# Patient Record
Sex: FEMALE | Race: WHITE | Employment: OTHER | URBAN - NONMETROPOLITAN AREA
[De-identification: names, ages, dates, MRNs, and addresses within clinical notes are randomized per-mention and may not be internally consistent; named-entity substitution may affect disease eponyms.]

---

## 2017-01-04 DIAGNOSIS — E11.9 TYPE 2 DIABETES MELLITUS WITHOUT COMPLICATION, UNSPECIFIED LONG TERM INSULIN USE STATUS: ICD-10-CM

## 2017-01-04 RX ORDER — LIRAGLUTIDE 6 MG/ML
INJECTION SUBCUTANEOUS
Qty: 9 ML | Refills: 3 | Status: SHIPPED | OUTPATIENT
Start: 2017-01-04 | End: 2017-02-08 | Stop reason: SDUPTHER

## 2017-02-08 DIAGNOSIS — E11.9 TYPE 2 DIABETES MELLITUS WITHOUT COMPLICATION, UNSPECIFIED LONG TERM INSULIN USE STATUS: ICD-10-CM

## 2017-02-08 RX ORDER — OXCARBAZEPINE 300 MG/1
300 TABLET, FILM COATED ORAL 2 TIMES DAILY
Qty: 180 TABLET | Refills: 3 | Status: SHIPPED | OUTPATIENT
Start: 2017-02-08 | End: 2018-01-17 | Stop reason: SDUPTHER

## 2017-02-08 RX ORDER — PANTOPRAZOLE SODIUM 40 MG/1
TABLET, DELAYED RELEASE ORAL
Qty: 90 TABLET | Refills: 3 | Status: SHIPPED | OUTPATIENT
Start: 2017-02-08 | End: 2018-01-17 | Stop reason: SDUPTHER

## 2017-02-08 RX ORDER — LANCETS 30 GAUGE
EACH MISCELLANEOUS
Qty: 200 EACH | Refills: 3 | Status: SHIPPED | OUTPATIENT
Start: 2017-02-08 | End: 2017-03-28

## 2017-02-08 RX ORDER — CELECOXIB 200 MG/1
CAPSULE ORAL
Qty: 90 CAPSULE | Refills: 3 | Status: SHIPPED | OUTPATIENT
Start: 2017-02-08 | End: 2018-01-17 | Stop reason: SDUPTHER

## 2017-02-08 RX ORDER — GLUCOSAMINE HCL/CHONDROITIN SU 500-400 MG
CAPSULE ORAL
Qty: 200 STRIP | Refills: 3 | Status: SHIPPED | OUTPATIENT
Start: 2017-02-08 | End: 2017-03-28

## 2017-03-06 RX ORDER — ROPINIROLE 1 MG/1
2 TABLET, FILM COATED ORAL 2 TIMES DAILY
Qty: 90 TABLET | Refills: 5 | Status: SHIPPED | OUTPATIENT
Start: 2017-03-06 | End: 2017-03-10 | Stop reason: SDUPTHER

## 2017-03-10 ENCOUNTER — OFFICE VISIT (OUTPATIENT)
Dept: PRIMARY CARE CLINIC | Age: 62
End: 2017-03-10
Payer: MEDICARE

## 2017-03-10 VITALS
HEART RATE: 113 BPM | DIASTOLIC BLOOD PRESSURE: 78 MMHG | HEIGHT: 61 IN | TEMPERATURE: 97 F | OXYGEN SATURATION: 98 % | SYSTOLIC BLOOD PRESSURE: 118 MMHG | WEIGHT: 238 LBS | BODY MASS INDEX: 44.93 KG/M2

## 2017-03-10 DIAGNOSIS — Z99.81 OXYGEN DEPENDENT: ICD-10-CM

## 2017-03-10 DIAGNOSIS — I15.9 SECONDARY HYPERTENSION, UNSPECIFIED: ICD-10-CM

## 2017-03-10 DIAGNOSIS — E11.9 TYPE 2 DIABETES MELLITUS WITHOUT COMPLICATION, UNSPECIFIED LONG TERM INSULIN USE STATUS: Primary | ICD-10-CM

## 2017-03-10 DIAGNOSIS — J42 CHRONIC BRONCHITIS, UNSPECIFIED CHRONIC BRONCHITIS TYPE (HCC): ICD-10-CM

## 2017-03-10 DIAGNOSIS — Z79.899 POLYPHARMACY: ICD-10-CM

## 2017-03-10 DIAGNOSIS — G47.30 SLEEP APNEA, UNSPECIFIED TYPE: ICD-10-CM

## 2017-03-10 DIAGNOSIS — F31.78 BIPOLAR DISORDER, IN FULL REMISSION, MOST RECENT EPISODE MIXED (HCC): ICD-10-CM

## 2017-03-10 DIAGNOSIS — R29.6 FREQUENT FALLS: ICD-10-CM

## 2017-03-10 DIAGNOSIS — Z12.11 COLON CANCER SCREENING: ICD-10-CM

## 2017-03-10 DIAGNOSIS — Z12.11 SCREEN FOR COLON CANCER: ICD-10-CM

## 2017-03-10 DIAGNOSIS — G25.81 RLS (RESTLESS LEGS SYNDROME): ICD-10-CM

## 2017-03-10 LAB — HBA1C MFR BLD: 5.7 %

## 2017-03-10 PROCEDURE — 99214 OFFICE O/P EST MOD 30 MIN: CPT | Performed by: NURSE PRACTITIONER

## 2017-03-10 PROCEDURE — 83036 HEMOGLOBIN GLYCOSYLATED A1C: CPT | Performed by: NURSE PRACTITIONER

## 2017-03-10 RX ORDER — IPRATROPIUM BROMIDE AND ALBUTEROL SULFATE 2.5; .5 MG/3ML; MG/3ML
1 SOLUTION RESPIRATORY (INHALATION) 4 TIMES DAILY
Qty: 120 ML | Refills: 0 | Status: SHIPPED | OUTPATIENT
Start: 2017-03-10 | End: 2017-03-28

## 2017-03-10 RX ORDER — PRAMIPEXOLE DIHYDROCHLORIDE 0.25 MG/1
0.25 TABLET ORAL 3 TIMES DAILY
Qty: 90 TABLET | Refills: 3 | Status: SHIPPED | OUTPATIENT
Start: 2017-03-10 | End: 2017-05-31 | Stop reason: SDUPTHER

## 2017-03-10 RX ORDER — LISINOPRIL 10 MG/1
10 TABLET ORAL DAILY
Qty: 30 TABLET | Refills: 11 | Status: SHIPPED | OUTPATIENT
Start: 2017-03-10 | End: 2017-06-09 | Stop reason: SDUPTHER

## 2017-03-10 RX ORDER — DOCOSANOL 100 MG/G
1 CREAM TOPICAL
Qty: 1 TUBE | Refills: 0 | COMMUNITY
Start: 2017-03-10 | End: 2017-03-20

## 2017-03-10 RX ORDER — ROPINIROLE 2 MG/1
2 TABLET, FILM COATED ORAL 2 TIMES DAILY
Qty: 60 TABLET | Refills: 5 | Status: SHIPPED | OUTPATIENT
Start: 2017-03-10 | End: 2017-06-09 | Stop reason: SDUPTHER

## 2017-03-10 RX ORDER — ACETAZOLAMIDE 500 MG/1
500 CAPSULE, EXTENDED RELEASE ORAL 2 TIMES DAILY
Qty: 60 CAPSULE | Refills: 0 | Status: SHIPPED | OUTPATIENT
Start: 2017-03-10 | End: 2017-03-28

## 2017-03-10 ASSESSMENT — ENCOUNTER SYMPTOMS
COUGH: 0
SHORTNESS OF BREATH: 0
EYES NEGATIVE: 1
SORE THROAT: 0
ABDOMINAL PAIN: 0
WHEEZING: 0

## 2017-03-15 ENCOUNTER — TELEPHONE (OUTPATIENT)
Dept: PRIMARY CARE CLINIC | Age: 62
End: 2017-03-15

## 2017-03-15 DIAGNOSIS — F31.78 BIPOLAR DISORDER, IN FULL REMISSION, MOST RECENT EPISODE MIXED (HCC): ICD-10-CM

## 2017-03-21 ENCOUNTER — TELEPHONE (OUTPATIENT)
Dept: PRIMARY CARE CLINIC | Age: 62
End: 2017-03-21

## 2017-03-27 RX ORDER — TRAZODONE HYDROCHLORIDE 50 MG/1
50 TABLET ORAL NIGHTLY
Qty: 15 TABLET | Refills: 0 | Status: SHIPPED | OUTPATIENT
Start: 2017-03-27 | End: 2017-06-26

## 2017-03-28 ENCOUNTER — OFFICE VISIT (OUTPATIENT)
Dept: PRIMARY CARE CLINIC | Age: 62
End: 2017-03-28
Payer: MEDICARE

## 2017-03-28 VITALS
TEMPERATURE: 97 F | DIASTOLIC BLOOD PRESSURE: 86 MMHG | OXYGEN SATURATION: 96 % | HEIGHT: 61 IN | WEIGHT: 232.75 LBS | SYSTOLIC BLOOD PRESSURE: 130 MMHG | HEART RATE: 105 BPM | BODY MASS INDEX: 43.94 KG/M2

## 2017-03-28 DIAGNOSIS — B37.9 YEAST INFECTION: Primary | ICD-10-CM

## 2017-03-28 DIAGNOSIS — J42 CHRONIC BRONCHITIS, UNSPECIFIED CHRONIC BRONCHITIS TYPE (HCC): ICD-10-CM

## 2017-03-28 DIAGNOSIS — R30.0 DYSURIA: ICD-10-CM

## 2017-03-28 DIAGNOSIS — R53.83 FATIGUE, UNSPECIFIED TYPE: ICD-10-CM

## 2017-03-28 LAB
BILIRUBIN, POC: NORMAL
BLOOD URINE, POC: NORMAL
CLARITY, POC: CLEAR
COLOR, POC: YELLOW
GLUCOSE URINE, POC: NORMAL
KETONES, POC: NORMAL
LEUKOCYTE EST, POC: NORMAL
NITRITE, POC: NORMAL
PH, POC: 7
PROTEIN, POC: NORMAL
SPECIFIC GRAVITY, POC: 1.02
UROBILINOGEN, POC: 0.2

## 2017-03-28 PROCEDURE — 99214 OFFICE O/P EST MOD 30 MIN: CPT | Performed by: NURSE PRACTITIONER

## 2017-03-28 PROCEDURE — 81002 URINALYSIS NONAUTO W/O SCOPE: CPT | Performed by: NURSE PRACTITIONER

## 2017-03-28 RX ORDER — FLUCONAZOLE 150 MG/1
150 TABLET ORAL DAILY
Qty: 3 TABLET | Refills: 0 | Status: SHIPPED | OUTPATIENT
Start: 2017-03-28 | End: 2017-03-28 | Stop reason: SDUPTHER

## 2017-03-28 RX ORDER — FLUCONAZOLE 150 MG/1
150 TABLET ORAL DAILY
Qty: 3 TABLET | Refills: 0 | Status: SHIPPED | OUTPATIENT
Start: 2017-03-28 | End: 2017-04-25

## 2017-03-28 RX ORDER — PREDNISONE 20 MG/1
TABLET ORAL
COMMUNITY
Start: 2017-03-27 | End: 2017-06-26 | Stop reason: ALTCHOICE

## 2017-03-28 RX ORDER — NYSTATIN 100000 [USP'U]/G
POWDER TOPICAL
Qty: 60 G | Refills: 2 | Status: SHIPPED | OUTPATIENT
Start: 2017-03-28 | End: 2017-10-03

## 2017-03-28 RX ORDER — MECLIZINE HYDROCHLORIDE 25 MG/1
TABLET ORAL PRN
COMMUNITY
Start: 2017-03-27 | End: 2017-07-11

## 2017-03-28 RX ORDER — CEFUROXIME AXETIL 500 MG/1
TABLET ORAL
COMMUNITY
Start: 2017-03-27 | End: 2017-06-26 | Stop reason: ALTCHOICE

## 2017-03-28 RX ORDER — NYSTATIN 100000 U/G
CREAM TOPICAL
Qty: 30 G | Refills: 5 | Status: SHIPPED | OUTPATIENT
Start: 2017-03-28 | End: 2017-10-03

## 2017-03-28 RX ORDER — IPRATROPIUM BROMIDE AND ALBUTEROL SULFATE 2.5; .5 MG/3ML; MG/3ML
SOLUTION RESPIRATORY (INHALATION)
Qty: 90 ML | Refills: 0 | Status: SHIPPED | OUTPATIENT
Start: 2017-03-28 | End: 2017-04-05 | Stop reason: SDUPTHER

## 2017-03-28 ASSESSMENT — ENCOUNTER SYMPTOMS
SORE THROAT: 0
COUGH: 0
SHORTNESS OF BREATH: 0

## 2017-03-29 ENCOUNTER — TELEPHONE (OUTPATIENT)
Dept: PRIMARY CARE CLINIC | Age: 62
End: 2017-03-29

## 2017-04-04 ENCOUNTER — TELEPHONE (OUTPATIENT)
Dept: PRIMARY CARE CLINIC | Age: 62
End: 2017-04-04

## 2017-04-04 RX ORDER — MIRTAZAPINE 15 MG/1
15 TABLET, FILM COATED ORAL DAILY
Qty: 90 TABLET | Refills: 3 | Status: SHIPPED | OUTPATIENT
Start: 2017-04-04 | End: 2018-01-17 | Stop reason: SDUPTHER

## 2017-04-04 RX ORDER — DONEPEZIL HYDROCHLORIDE 10 MG/1
10 TABLET, FILM COATED ORAL NIGHTLY
Qty: 90 TABLET | Refills: 3 | Status: SHIPPED | OUTPATIENT
Start: 2017-04-04 | End: 2018-01-17 | Stop reason: SDUPTHER

## 2017-04-04 RX ORDER — SERTRALINE HYDROCHLORIDE 100 MG/1
100 TABLET, FILM COATED ORAL DAILY
Qty: 90 TABLET | Refills: 3 | Status: SHIPPED | OUTPATIENT
Start: 2017-04-04 | End: 2017-04-07 | Stop reason: SDUPTHER

## 2017-04-04 RX ORDER — BENZTROPINE MESYLATE 1 MG/1
1 TABLET ORAL 2 TIMES DAILY
Qty: 180 TABLET | Refills: 3 | Status: SHIPPED | OUTPATIENT
Start: 2017-04-04 | End: 2018-01-17 | Stop reason: SDUPTHER

## 2017-04-04 RX ORDER — BUSPIRONE HYDROCHLORIDE 15 MG/1
15 TABLET ORAL 3 TIMES DAILY
Qty: 270 TABLET | Refills: 3 | Status: SHIPPED | OUTPATIENT
Start: 2017-04-04 | End: 2018-01-17 | Stop reason: SDUPTHER

## 2017-04-05 ENCOUNTER — TELEPHONE (OUTPATIENT)
Dept: PRIMARY CARE CLINIC | Age: 62
End: 2017-04-05

## 2017-04-05 DIAGNOSIS — J42 CHRONIC BRONCHITIS, UNSPECIFIED CHRONIC BRONCHITIS TYPE (HCC): ICD-10-CM

## 2017-04-06 RX ORDER — IPRATROPIUM BROMIDE AND ALBUTEROL SULFATE 2.5; .5 MG/3ML; MG/3ML
SOLUTION RESPIRATORY (INHALATION)
Qty: 90 ML | Refills: 2 | Status: SHIPPED | OUTPATIENT
Start: 2017-04-06 | End: 2018-03-06 | Stop reason: SDUPTHER

## 2017-04-07 ENCOUNTER — TELEPHONE (OUTPATIENT)
Dept: PRIMARY CARE CLINIC | Age: 62
End: 2017-04-07

## 2017-04-07 RX ORDER — SERTRALINE HYDROCHLORIDE 100 MG/1
100 TABLET, FILM COATED ORAL DAILY
Qty: 90 TABLET | Refills: 3 | Status: SHIPPED | OUTPATIENT
Start: 2017-04-07 | End: 2017-04-11 | Stop reason: SDUPTHER

## 2017-04-11 ENCOUNTER — TELEPHONE (OUTPATIENT)
Dept: PRIMARY CARE CLINIC | Age: 62
End: 2017-04-11

## 2017-04-11 DIAGNOSIS — Z99.81 OXYGEN DEPENDENT: ICD-10-CM

## 2017-04-11 DIAGNOSIS — J42 CHRONIC BRONCHITIS, UNSPECIFIED CHRONIC BRONCHITIS TYPE (HCC): Primary | ICD-10-CM

## 2017-04-11 RX ORDER — SERTRALINE HYDROCHLORIDE 100 MG/1
TABLET, FILM COATED ORAL
Qty: 135 TABLET | Refills: 3 | Status: SHIPPED | OUTPATIENT
Start: 2017-04-11 | End: 2018-01-08 | Stop reason: SDUPTHER

## 2017-04-11 RX ORDER — SERTRALINE HYDROCHLORIDE 100 MG/1
TABLET, FILM COATED ORAL
Qty: 90 TABLET | Refills: 3 | Status: SHIPPED | OUTPATIENT
Start: 2017-04-11 | End: 2017-04-11 | Stop reason: SDUPTHER

## 2017-04-13 ENCOUNTER — TELEPHONE (OUTPATIENT)
Dept: PRIMARY CARE CLINIC | Age: 62
End: 2017-04-13

## 2017-04-13 DIAGNOSIS — R29.6 FREQUENT FALLS: Primary | ICD-10-CM

## 2017-04-13 RX ORDER — ACETAZOLAMIDE 500 MG/1
CAPSULE, EXTENDED RELEASE ORAL
Qty: 60 CAPSULE | Refills: 5 | Status: SHIPPED | OUTPATIENT
Start: 2017-04-13 | End: 2017-06-09 | Stop reason: SDUPTHER

## 2017-04-13 RX ORDER — BLOOD GLUCOSE CONTROL HIGH,LOW
EACH MISCELLANEOUS
Qty: 1 EACH | Refills: 0 | Status: SHIPPED | OUTPATIENT
Start: 2017-04-13 | End: 2017-05-25

## 2017-04-13 RX ORDER — BLOOD-GLUCOSE METER
EACH MISCELLANEOUS
Qty: 1 KIT | Refills: 0 | Status: SHIPPED | OUTPATIENT
Start: 2017-04-13 | End: 2017-04-25

## 2017-04-20 RX ORDER — WHEELCHAIR
EACH MISCELLANEOUS
Qty: 1 EACH | Refills: 0 | OUTPATIENT
Start: 2017-04-20 | End: 2017-04-25

## 2017-04-25 ENCOUNTER — TELEPHONE (OUTPATIENT)
Dept: PRIMARY CARE CLINIC | Age: 62
End: 2017-04-25

## 2017-04-25 ENCOUNTER — OFFICE VISIT (OUTPATIENT)
Dept: PRIMARY CARE CLINIC | Age: 62
End: 2017-04-25
Payer: MEDICARE

## 2017-04-25 VITALS
HEIGHT: 61 IN | BODY MASS INDEX: 41.91 KG/M2 | SYSTOLIC BLOOD PRESSURE: 88 MMHG | HEART RATE: 60 BPM | DIASTOLIC BLOOD PRESSURE: 62 MMHG | TEMPERATURE: 98.2 F | OXYGEN SATURATION: 97 % | WEIGHT: 222 LBS

## 2017-04-25 DIAGNOSIS — Z12.11 SCREEN FOR COLON CANCER: ICD-10-CM

## 2017-04-25 DIAGNOSIS — R42 VERTIGO: ICD-10-CM

## 2017-04-25 DIAGNOSIS — R29.6 FREQUENT FALLS: ICD-10-CM

## 2017-04-25 DIAGNOSIS — R63.4 WEIGHT LOSS: ICD-10-CM

## 2017-04-25 DIAGNOSIS — I95.9 HYPOTENSION, UNSPECIFIED HYPOTENSION TYPE: ICD-10-CM

## 2017-04-25 DIAGNOSIS — R53.1 WEAKNESS: ICD-10-CM

## 2017-04-25 DIAGNOSIS — I95.9 HYPOTENSION, UNSPECIFIED HYPOTENSION TYPE: Primary | ICD-10-CM

## 2017-04-25 DIAGNOSIS — I15.9 SECONDARY HYPERTENSION, UNSPECIFIED: ICD-10-CM

## 2017-04-25 DIAGNOSIS — R06.02 SHORTNESS OF BREATH: ICD-10-CM

## 2017-04-25 LAB
ALBUMIN SERPL-MCNC: 4.3 G/DL (ref 3.5–5.2)
ALP BLD-CCNC: 136 U/L (ref 35–104)
ALT SERPL-CCNC: 9 U/L (ref 5–33)
ANION GAP SERPL CALCULATED.3IONS-SCNC: 18 MMOL/L (ref 7–19)
AST SERPL-CCNC: 20 U/L (ref 5–32)
BASOPHILS ABSOLUTE: 0.1 K/UL (ref 0–0.2)
BASOPHILS RELATIVE PERCENT: 0.7 % (ref 0–1)
BILIRUB SERPL-MCNC: <0.2 MG/DL (ref 0.2–1.2)
BUN BLDV-MCNC: 15 MG/DL (ref 8–23)
CALCIUM SERPL-MCNC: 9.1 MG/DL (ref 8.8–10.2)
CHLORIDE BLD-SCNC: 98 MMOL/L (ref 98–111)
CO2: 16 MMOL/L (ref 22–29)
CREAT SERPL-MCNC: 1 MG/DL (ref 0.5–0.9)
EOSINOPHILS ABSOLUTE: 0.2 K/UL (ref 0–0.6)
EOSINOPHILS RELATIVE PERCENT: 1.7 % (ref 0–5)
GFR NON-AFRICAN AMERICAN: 56
GLOBULIN: 2.8 G/DL
GLUCOSE BLD-MCNC: 118 MG/DL (ref 74–109)
HCT VFR BLD CALC: 38.2 % (ref 37–47)
HEMOGLOBIN: 11.2 G/DL (ref 12–16)
INFLUENZA A ANTIBODY: NEGATIVE
INFLUENZA B ANTIBODY: NEGATIVE
LYMPHOCYTES ABSOLUTE: 3.4 K/UL (ref 1.1–4.5)
LYMPHOCYTES RELATIVE PERCENT: 29.8 % (ref 20–40)
MCH RBC QN AUTO: 22.7 PG (ref 27–31)
MCHC RBC AUTO-ENTMCNC: 29.3 G/DL (ref 33–37)
MCV RBC AUTO: 77.5 FL (ref 81–99)
MONOCYTES ABSOLUTE: 0.9 K/UL (ref 0–0.9)
MONOCYTES RELATIVE PERCENT: 7.7 % (ref 0–10)
NEUTROPHILS ABSOLUTE: 6.7 K/UL (ref 1.5–7.5)
NEUTROPHILS RELATIVE PERCENT: 59.8 % (ref 50–65)
PDW BLD-RTO: 24.8 % (ref 11.5–14.5)
PLATELET # BLD: 434 K/UL (ref 130–400)
PMV BLD AUTO: 10.5 FL (ref 7.4–10.4)
POTASSIUM SERPL-SCNC: 5.3 MMOL/L (ref 3.5–5)
RBC # BLD: 4.93 M/UL (ref 4.2–5.4)
SODIUM BLD-SCNC: 132 MMOL/L (ref 136–145)
TOTAL PROTEIN: 7.1 G/DL (ref 6.6–8.7)
WBC # BLD: 11.3 K/UL (ref 4.8–10.8)

## 2017-04-25 PROCEDURE — 87804 INFLUENZA ASSAY W/OPTIC: CPT | Performed by: NURSE PRACTITIONER

## 2017-04-25 PROCEDURE — 99214 OFFICE O/P EST MOD 30 MIN: CPT | Performed by: NURSE PRACTITIONER

## 2017-04-25 RX ORDER — ONDANSETRON 4 MG/1
4 TABLET, FILM COATED ORAL EVERY 8 HOURS PRN
Qty: 30 TABLET | Refills: 0 | Status: SHIPPED | OUTPATIENT
Start: 2017-04-25 | End: 2017-07-14 | Stop reason: SDUPTHER

## 2017-04-25 ASSESSMENT — ENCOUNTER SYMPTOMS
PHOTOPHOBIA: 0
WHEEZING: 0
CHEST TIGHTNESS: 0
NAUSEA: 1
BACK PAIN: 0
VOMITING: 0
COUGH: 0
DIARRHEA: 0
ABDOMINAL PAIN: 0
SINUS PRESSURE: 0
SORE THROAT: 0
CONSTIPATION: 0
VOICE CHANGE: 0
SHORTNESS OF BREATH: 0
TROUBLE SWALLOWING: 0
BLOOD IN STOOL: 0
EYE DISCHARGE: 0
EYE PAIN: 0

## 2017-04-26 ENCOUNTER — TELEPHONE (OUTPATIENT)
Dept: PRIMARY CARE CLINIC | Age: 62
End: 2017-04-26

## 2017-04-26 DIAGNOSIS — R53.1 WEAKNESS: Primary | ICD-10-CM

## 2017-04-26 DIAGNOSIS — R29.6 FREQUENT FALLS: ICD-10-CM

## 2017-05-01 ENCOUNTER — TELEPHONE (OUTPATIENT)
Dept: PRIMARY CARE CLINIC | Age: 62
End: 2017-05-01

## 2017-05-02 ENCOUNTER — TELEPHONE (OUTPATIENT)
Dept: PRIMARY CARE CLINIC | Age: 62
End: 2017-05-02

## 2017-05-09 ENCOUNTER — OFFICE VISIT (OUTPATIENT)
Dept: PRIMARY CARE CLINIC | Age: 62
End: 2017-05-09
Payer: MEDICARE

## 2017-05-09 VITALS
WEIGHT: 218 LBS | HEIGHT: 61 IN | BODY MASS INDEX: 41.16 KG/M2 | OXYGEN SATURATION: 90 % | SYSTOLIC BLOOD PRESSURE: 128 MMHG | TEMPERATURE: 97 F | DIASTOLIC BLOOD PRESSURE: 86 MMHG | HEART RATE: 94 BPM

## 2017-05-09 DIAGNOSIS — E11.9 TYPE 2 DIABETES MELLITUS WITHOUT COMPLICATION, UNSPECIFIED LONG TERM INSULIN USE STATUS: ICD-10-CM

## 2017-05-09 DIAGNOSIS — I15.9 SECONDARY HYPERTENSION: ICD-10-CM

## 2017-05-09 DIAGNOSIS — J42 CHRONIC BRONCHITIS, UNSPECIFIED CHRONIC BRONCHITIS TYPE (HCC): ICD-10-CM

## 2017-05-09 DIAGNOSIS — I15.9 SECONDARY HYPERTENSION: Primary | ICD-10-CM

## 2017-05-09 LAB
ALBUMIN SERPL-MCNC: 4 G/DL (ref 3.5–5.2)
ALP BLD-CCNC: 125 U/L (ref 35–104)
ALT SERPL-CCNC: 9 U/L (ref 5–33)
ANION GAP SERPL CALCULATED.3IONS-SCNC: 16 MMOL/L (ref 7–19)
AST SERPL-CCNC: 19 U/L (ref 5–32)
BASOPHILS ABSOLUTE: 0.1 K/UL (ref 0–0.2)
BASOPHILS RELATIVE PERCENT: 1 % (ref 0–1)
BILIRUB SERPL-MCNC: <0.2 MG/DL (ref 0.2–1.2)
BUN BLDV-MCNC: 9 MG/DL (ref 8–23)
CALCIUM SERPL-MCNC: 8.9 MG/DL (ref 8.8–10.2)
CHLORIDE BLD-SCNC: 102 MMOL/L (ref 98–111)
CO2: 18 MMOL/L (ref 22–29)
CREAT SERPL-MCNC: 0.9 MG/DL (ref 0.5–0.9)
CREATININE URINE: 143.5 MG/DL (ref 4.2–622)
EOSINOPHILS ABSOLUTE: 0.2 K/UL (ref 0–0.6)
EOSINOPHILS RELATIVE PERCENT: 1.9 % (ref 0–5)
GFR NON-AFRICAN AMERICAN: >60
GLOBULIN: 3.1 G/DL
GLUCOSE BLD-MCNC: 116 MG/DL (ref 74–109)
HBA1C MFR BLD: 4.9 %
HCT VFR BLD CALC: 36.5 % (ref 37–47)
HEMOGLOBIN: 10.5 G/DL (ref 12–16)
LYMPHOCYTES ABSOLUTE: 3 K/UL (ref 1.1–4.5)
LYMPHOCYTES RELATIVE PERCENT: 31.1 % (ref 20–40)
MCH RBC QN AUTO: 23.5 PG (ref 27–31)
MCHC RBC AUTO-ENTMCNC: 28.8 G/DL (ref 33–37)
MCV RBC AUTO: 81.8 FL (ref 81–99)
MICROALBUMIN UR-MCNC: <1.2 MG/DL (ref 0–19)
MICROALBUMIN/CREAT UR-RTO: NORMAL MG/G
MONOCYTES ABSOLUTE: 0.8 K/UL (ref 0–0.9)
MONOCYTES RELATIVE PERCENT: 8.1 % (ref 0–10)
NEUTROPHILS ABSOLUTE: 5.6 K/UL (ref 1.5–7.5)
NEUTROPHILS RELATIVE PERCENT: 57.7 % (ref 50–65)
PDW BLD-RTO: 24.4 % (ref 11.5–14.5)
PLATELET # BLD: 406 K/UL (ref 130–400)
PMV BLD AUTO: 10.6 FL (ref 7.4–10.4)
POTASSIUM SERPL-SCNC: 4.9 MMOL/L (ref 3.5–5)
RBC # BLD: 4.46 M/UL (ref 4.2–5.4)
SODIUM BLD-SCNC: 136 MMOL/L (ref 136–145)
TOTAL PROTEIN: 7.1 G/DL (ref 6.6–8.7)
WBC # BLD: 9.7 K/UL (ref 4.8–10.8)

## 2017-05-09 PROCEDURE — 99214 OFFICE O/P EST MOD 30 MIN: CPT | Performed by: NURSE PRACTITIONER

## 2017-05-09 RX ORDER — WHEELCHAIR
1 EACH MISCELLANEOUS DAILY
Qty: 1 EACH | Refills: 0 | Status: SHIPPED | OUTPATIENT
Start: 2017-05-09 | End: 2017-05-25

## 2017-05-09 ASSESSMENT — ENCOUNTER SYMPTOMS
VOICE CHANGE: 0
VOMITING: 0
BLOOD IN STOOL: 0
BACK PAIN: 0
SHORTNESS OF BREATH: 1
PHOTOPHOBIA: 0
SINUS PRESSURE: 0
WHEEZING: 0
CONSTIPATION: 0
COUGH: 0
EYE PAIN: 0
SORE THROAT: 0
TROUBLE SWALLOWING: 0
CHEST TIGHTNESS: 0
EYE DISCHARGE: 0
ABDOMINAL PAIN: 0
DIARRHEA: 0
NAUSEA: 0

## 2017-05-10 ENCOUNTER — TELEPHONE (OUTPATIENT)
Dept: PRIMARY CARE CLINIC | Age: 62
End: 2017-05-10

## 2017-05-11 ENCOUNTER — TELEPHONE (OUTPATIENT)
Dept: PRIMARY CARE CLINIC | Age: 62
End: 2017-05-11

## 2017-05-11 DIAGNOSIS — D64.9 ANEMIA, UNSPECIFIED TYPE: Primary | ICD-10-CM

## 2017-05-17 ENCOUNTER — TELEPHONE (OUTPATIENT)
Dept: PRIMARY CARE CLINIC | Age: 62
End: 2017-05-17

## 2017-05-19 ENCOUNTER — TELEPHONE (OUTPATIENT)
Dept: PRIMARY CARE CLINIC | Age: 62
End: 2017-05-19

## 2017-05-23 ENCOUNTER — TELEPHONE (OUTPATIENT)
Dept: PRIMARY CARE CLINIC | Age: 62
End: 2017-05-23

## 2017-05-25 ENCOUNTER — HOSPITAL ENCOUNTER (OUTPATIENT)
Age: 62
Setting detail: SPECIMEN
Discharge: HOME | End: 2017-05-25
Payer: MEDICARE

## 2017-05-25 ENCOUNTER — OFFICE VISIT (OUTPATIENT)
Dept: PRIMARY CARE CLINIC | Age: 62
End: 2017-05-25
Payer: MEDICARE

## 2017-05-25 VITALS
SYSTOLIC BLOOD PRESSURE: 118 MMHG | OXYGEN SATURATION: 96 % | BODY MASS INDEX: 41.35 KG/M2 | DIASTOLIC BLOOD PRESSURE: 70 MMHG | HEART RATE: 94 BPM | TEMPERATURE: 98 F | HEIGHT: 61 IN | WEIGHT: 219 LBS

## 2017-05-25 DIAGNOSIS — Z01.419 ENCOUNTER FOR GYNECOLOGICAL EXAMINATION WITHOUT ABNORMAL FINDING: ICD-10-CM

## 2017-05-25 DIAGNOSIS — D50.9 MICROCYTIC ANEMIA: ICD-10-CM

## 2017-05-25 DIAGNOSIS — B37.2 SKIN YEAST INFECTION: ICD-10-CM

## 2017-05-25 DIAGNOSIS — Z99.81 DEPENDENCE ON SUPPLEMENTAL OXYGEN WHEN AMBULATING: ICD-10-CM

## 2017-05-25 DIAGNOSIS — Z01.419 WELL WOMAN EXAM WITH ROUTINE GYNECOLOGICAL EXAM: Primary | ICD-10-CM

## 2017-05-25 DIAGNOSIS — J42 CHRONIC BRONCHITIS, UNSPECIFIED CHRONIC BRONCHITIS TYPE (HCC): ICD-10-CM

## 2017-05-25 DIAGNOSIS — Z12.11 COLON CANCER SCREENING: ICD-10-CM

## 2017-05-25 PROCEDURE — G0101 CA SCREEN;PELVIC/BREAST EXAM: HCPCS | Performed by: NURSE PRACTITIONER

## 2017-05-25 PROCEDURE — 99214 OFFICE O/P EST MOD 30 MIN: CPT | Performed by: NURSE PRACTITIONER

## 2017-05-25 PROCEDURE — 88142 CYTOPATH C/V THIN LAYER: CPT

## 2017-05-25 PROCEDURE — 87624 HPV HI-RISK TYP POOLED RSLT: CPT

## 2017-05-25 RX ORDER — OYSTER SHELL CALCIUM WITH VITAMIN D 500; 200 MG/1; [IU]/1
1 TABLET, FILM COATED ORAL DAILY
Qty: 30 TABLET | Refills: 3 | Status: SHIPPED | OUTPATIENT
Start: 2017-05-25 | End: 2017-06-09 | Stop reason: SDUPTHER

## 2017-05-25 RX ORDER — NYSTATIN 100000 [USP'U]/G
POWDER TOPICAL
Qty: 45 G | Refills: 5 | Status: SHIPPED | OUTPATIENT
Start: 2017-05-25 | End: 2017-06-09 | Stop reason: SDUPTHER

## 2017-05-25 ASSESSMENT — ENCOUNTER SYMPTOMS
BACK PAIN: 0
PHOTOPHOBIA: 0
EYE DISCHARGE: 0
WHEEZING: 0
CONSTIPATION: 0
SHORTNESS OF BREATH: 1
CHEST TIGHTNESS: 0
BLOOD IN STOOL: 0
TROUBLE SWALLOWING: 0
SORE THROAT: 0
SINUS PRESSURE: 0
VOICE CHANGE: 0
EYE PAIN: 0
DIARRHEA: 0
ABDOMINAL PAIN: 0
VOMITING: 0
NAUSEA: 0
COUGH: 0

## 2017-05-30 ENCOUNTER — TELEPHONE (OUTPATIENT)
Dept: PRIMARY CARE CLINIC | Age: 62
End: 2017-05-30

## 2017-05-31 DIAGNOSIS — G25.81 RLS (RESTLESS LEGS SYNDROME): ICD-10-CM

## 2017-05-31 RX ORDER — PRAMIPEXOLE DIHYDROCHLORIDE 0.25 MG/1
0.25 TABLET ORAL 3 TIMES DAILY
Qty: 270 TABLET | Refills: 3 | Status: SHIPPED | OUTPATIENT
Start: 2017-05-31 | End: 2018-03-06 | Stop reason: SDUPTHER

## 2017-06-01 ENCOUNTER — TELEPHONE (OUTPATIENT)
Dept: PRIMARY CARE CLINIC | Age: 62
End: 2017-06-01

## 2017-06-01 DIAGNOSIS — R87.619 ABNORMAL CERVICAL PAPANICOLAOU SMEAR, UNSPECIFIED ABNORMAL PAP FINDING: Primary | ICD-10-CM

## 2017-06-01 LAB
HPV SOURCE: ABNORMAL
HPV, HIGH RISK: POSITIVE

## 2017-06-05 ENCOUNTER — TELEPHONE (OUTPATIENT)
Dept: PRIMARY CARE CLINIC | Age: 62
End: 2017-06-05

## 2017-06-09 ENCOUNTER — TELEPHONE (OUTPATIENT)
Dept: PRIMARY CARE CLINIC | Age: 62
End: 2017-06-09

## 2017-06-09 DIAGNOSIS — Z01.419 WELL WOMAN EXAM WITH ROUTINE GYNECOLOGICAL EXAM: ICD-10-CM

## 2017-06-09 DIAGNOSIS — I15.9 SECONDARY HYPERTENSION, UNSPECIFIED: ICD-10-CM

## 2017-06-09 DIAGNOSIS — Z12.11 SCREEN FOR COLON CANCER: ICD-10-CM

## 2017-06-09 DIAGNOSIS — B37.2 SKIN YEAST INFECTION: ICD-10-CM

## 2017-06-09 RX ORDER — ROPINIROLE 2 MG/1
2 TABLET, FILM COATED ORAL 2 TIMES DAILY
Qty: 180 TABLET | Refills: 3 | Status: SHIPPED | OUTPATIENT
Start: 2017-06-09 | End: 2018-03-06 | Stop reason: SDUPTHER

## 2017-06-09 RX ORDER — LISINOPRIL 10 MG/1
10 TABLET ORAL DAILY
Qty: 90 TABLET | Refills: 3 | Status: SHIPPED | OUTPATIENT
Start: 2017-06-09 | End: 2018-03-06 | Stop reason: SDUPTHER

## 2017-06-09 RX ORDER — NYSTATIN 100000 [USP'U]/G
POWDER TOPICAL
Qty: 135 G | Refills: 1 | Status: SHIPPED | OUTPATIENT
Start: 2017-06-09 | End: 2017-06-26

## 2017-06-09 RX ORDER — OYSTER SHELL CALCIUM WITH VITAMIN D 500; 200 MG/1; [IU]/1
1 TABLET, FILM COATED ORAL DAILY
Qty: 90 TABLET | Refills: 1 | Status: SHIPPED | OUTPATIENT
Start: 2017-06-09 | End: 2017-06-26

## 2017-06-09 RX ORDER — ACETAZOLAMIDE 500 MG/1
CAPSULE, EXTENDED RELEASE ORAL
Qty: 180 CAPSULE | Refills: 3 | Status: SHIPPED | OUTPATIENT
Start: 2017-06-09 | End: 2019-10-22 | Stop reason: SDUPTHER

## 2017-06-12 ENCOUNTER — TELEPHONE (OUTPATIENT)
Dept: PRIMARY CARE CLINIC | Age: 62
End: 2017-06-12

## 2017-06-26 ENCOUNTER — OFFICE VISIT (OUTPATIENT)
Dept: PRIMARY CARE CLINIC | Age: 62
End: 2017-06-26
Payer: MEDICARE

## 2017-06-26 ENCOUNTER — OFFICE VISIT (OUTPATIENT)
Dept: OBGYN | Age: 62
End: 2017-06-26
Payer: MEDICARE

## 2017-06-26 VITALS
SYSTOLIC BLOOD PRESSURE: 116 MMHG | BODY MASS INDEX: 40.78 KG/M2 | HEIGHT: 61 IN | TEMPERATURE: 98.6 F | HEART RATE: 88 BPM | DIASTOLIC BLOOD PRESSURE: 76 MMHG | WEIGHT: 216 LBS

## 2017-06-26 VITALS
WEIGHT: 217.5 LBS | TEMPERATURE: 97 F | BODY MASS INDEX: 41.07 KG/M2 | SYSTOLIC BLOOD PRESSURE: 118 MMHG | OXYGEN SATURATION: 99 % | DIASTOLIC BLOOD PRESSURE: 80 MMHG | HEIGHT: 61 IN | HEART RATE: 110 BPM

## 2017-06-26 DIAGNOSIS — Z12.11 SCREEN FOR COLON CANCER: ICD-10-CM

## 2017-06-26 DIAGNOSIS — R87.610 ASCUS WITH POSITIVE HIGH RISK HPV CERVICAL: Primary | ICD-10-CM
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
## 2017-06-26 DIAGNOSIS — M54.50 ACUTE LOW BACK PAIN WITHOUT SCIATICA, UNSPECIFIED BACK PAIN LATERALITY: ICD-10-CM

## 2017-06-26 DIAGNOSIS — R87.810 ASCUS WITH POSITIVE HIGH RISK HPV CERVICAL: Primary | ICD-10-CM

## 2017-06-26 DIAGNOSIS — R52 ACUTE PAIN: ICD-10-CM

## 2017-06-26 DIAGNOSIS — E11.49 OTHER DIABETIC NEUROLOGICAL COMPLICATION ASSOCIATED WITH TYPE 2 DIABETES MELLITUS (HCC): ICD-10-CM

## 2017-06-26 DIAGNOSIS — N89.8 VAGINAL DISCHARGE: ICD-10-CM

## 2017-06-26 DIAGNOSIS — R00.0 TACHYCARDIA: Primary | ICD-10-CM

## 2017-06-26 DIAGNOSIS — R42 DIZZINESS: ICD-10-CM

## 2017-06-26 PROBLEM — E11.40 DIABETIC NEUROPATHY ASSOCIATED WITH TYPE 2 DIABETES MELLITUS (HCC): Status: ACTIVE | Noted: 2017-06-26

## 2017-06-26 LAB
CLUE CELLS: NORMAL
HYPHAL YEAST: NORMAL
TRICHOMONAS: NORMAL
WET PREP: NEGATIVE
WHITE BLOOD CELLS: NORMAL

## 2017-06-26 PROCEDURE — 57454 BX/CURETT OF CERVIX W/SCOPE: CPT | Performed by: OBSTETRICS & GYNECOLOGY

## 2017-06-26 PROCEDURE — 99214 OFFICE O/P EST MOD 30 MIN: CPT | Performed by: NURSE PRACTITIONER

## 2017-06-26 RX ORDER — PROMETHAZINE HYDROCHLORIDE 25 MG/1
25 TABLET ORAL EVERY 8 HOURS PRN
Qty: 30 TABLET | Refills: 2 | Status: SHIPPED | OUTPATIENT
Start: 2017-06-26 | End: 2018-03-06 | Stop reason: SDUPTHER

## 2017-06-26 RX ORDER — HYDROCODONE BITARTRATE AND ACETAMINOPHEN 5; 325 MG/1; MG/1
1 TABLET ORAL EVERY 6 HOURS PRN
Qty: 12 TABLET | Refills: 0 | Status: SHIPPED | OUTPATIENT
Start: 2017-06-26 | End: 2017-07-11

## 2017-06-26 ASSESSMENT — ENCOUNTER SYMPTOMS
COUGH: 0
TROUBLE SWALLOWING: 0
SHORTNESS OF BREATH: 0
BLOOD IN STOOL: 0
EYE PAIN: 0
WHEEZING: 0
SORE THROAT: 0
EYE DISCHARGE: 0
PHOTOPHOBIA: 0
SINUS PRESSURE: 0
VOMITING: 0
EYES NEGATIVE: 1
VOICE CHANGE: 0
RESPIRATORY NEGATIVE: 1
ABDOMINAL PAIN: 0
GASTROINTESTINAL NEGATIVE: 1
NAUSEA: 0
CHEST TIGHTNESS: 0
CONSTIPATION: 0
ALLERGIC/IMMUNOLOGIC NEGATIVE: 1
BACK PAIN: 1
DIARRHEA: 0

## 2017-06-28 ENCOUNTER — TELEPHONE (OUTPATIENT)
Dept: PRIMARY CARE CLINIC | Age: 62
End: 2017-06-28

## 2017-06-30 ENCOUNTER — TELEPHONE (OUTPATIENT)
Dept: OBGYN | Age: 62
End: 2017-06-30

## 2017-07-06 ENCOUNTER — TELEPHONE (OUTPATIENT)
Dept: GASTROENTEROLOGY | Age: 62
End: 2017-07-06

## 2017-07-06 ENCOUNTER — OFFICE VISIT (OUTPATIENT)
Dept: GASTROENTEROLOGY | Age: 62
End: 2017-07-06
Payer: MEDICARE

## 2017-07-06 VITALS
WEIGHT: 227 LBS | BODY MASS INDEX: 42.86 KG/M2 | HEART RATE: 100 BPM | HEIGHT: 61 IN | DIASTOLIC BLOOD PRESSURE: 82 MMHG | OXYGEN SATURATION: 98 % | SYSTOLIC BLOOD PRESSURE: 118 MMHG

## 2017-07-06 DIAGNOSIS — D50.9 MICROCYTIC ANEMIA: Primary | ICD-10-CM

## 2017-07-06 DIAGNOSIS — R11.0 CHRONIC NAUSEA: ICD-10-CM

## 2017-07-06 DIAGNOSIS — Z79.899 POLYPHARMACY: ICD-10-CM

## 2017-07-06 DIAGNOSIS — R19.8 ALTERNATING CONSTIPATION AND DIARRHEA: ICD-10-CM

## 2017-07-06 DIAGNOSIS — Z98.84 S/P GASTRIC BYPASS: ICD-10-CM

## 2017-07-06 DIAGNOSIS — D64.9 ANEMIA, UNSPECIFIED TYPE: ICD-10-CM

## 2017-07-06 DIAGNOSIS — D50.9 MICROCYTIC ANEMIA: ICD-10-CM

## 2017-07-06 LAB
FOLATE: 8.1 NG/ML (ref 4.8–37.3)
IRON SATURATION: 4 % (ref 14–50)
IRON: 16 UG/DL (ref 37–145)
TOTAL IRON BINDING CAPACITY: 443 UG/DL (ref 250–400)
VITAMIN B-12: 157 PG/ML (ref 211–946)

## 2017-07-06 PROCEDURE — 99214 OFFICE O/P EST MOD 30 MIN: CPT | Performed by: NURSE PRACTITIONER

## 2017-07-06 ASSESSMENT — ENCOUNTER SYMPTOMS
BLOOD IN STOOL: 0
BACK PAIN: 1
COUGH: 1
ABDOMINAL DISTENTION: 0
DIARRHEA: 1
SHORTNESS OF BREATH: 1
CHOKING: 0
ANAL BLEEDING: 0
VOMITING: 0
WHEEZING: 0
PHOTOPHOBIA: 0
SORE THROAT: 0
ABDOMINAL PAIN: 0
CONSTIPATION: 1
NAUSEA: 1
RECTAL PAIN: 0

## 2017-07-10 RX ORDER — DOXYCYCLINE 100 MG/1
100 CAPSULE ORAL 2 TIMES DAILY
Qty: 14 CAPSULE | Refills: 0 | Status: SHIPPED | OUTPATIENT
Start: 2017-07-10 | End: 2017-07-11

## 2017-07-11 ENCOUNTER — OFFICE VISIT (OUTPATIENT)
Dept: PRIMARY CARE CLINIC | Age: 62
End: 2017-07-11
Payer: MEDICARE

## 2017-07-11 VITALS
HEART RATE: 103 BPM | HEIGHT: 61 IN | WEIGHT: 221 LBS | BODY MASS INDEX: 41.72 KG/M2 | TEMPERATURE: 97.6 F | SYSTOLIC BLOOD PRESSURE: 102 MMHG | DIASTOLIC BLOOD PRESSURE: 68 MMHG | OXYGEN SATURATION: 98 %

## 2017-07-11 DIAGNOSIS — R07.9 CHEST PAIN, UNSPECIFIED TYPE: Primary | ICD-10-CM

## 2017-07-11 DIAGNOSIS — D64.9 ANEMIA, UNSPECIFIED TYPE: ICD-10-CM

## 2017-07-11 DIAGNOSIS — D51.3 OTHER DIETARY VITAMIN B12 DEFICIENCY ANEMIA: ICD-10-CM

## 2017-07-11 DIAGNOSIS — L03.019 PARONYCHIA OF FINGER, UNSPECIFIED LATERALITY: ICD-10-CM

## 2017-07-11 DIAGNOSIS — E53.8 B12 DEFICIENCY: ICD-10-CM

## 2017-07-11 LAB
BASOPHILS ABSOLUTE: 0.1 K/UL (ref 0–0.2)
BASOPHILS RELATIVE PERCENT: 0.8 % (ref 0–1)
CALCIUM IONIZED: 1.13 MMOL/L (ref 1.1–1.3)
CO2: 21 MEQ/L (ref 21–32)
EOSINOPHILS ABSOLUTE: 0.3 K/UL (ref 0–0.6)
EOSINOPHILS RELATIVE PERCENT: 2.4 % (ref 0–5)
FERRITIN: 6.5 NG/ML (ref 13–150)
GFR NON-AFRICAN AMERICAN: >60
GLUCOSE BLD-MCNC: 106 MG/DL (ref 70–99)
HCT VFR BLD CALC: 32.9 % (ref 37–47)
HEMOGLOBIN: 11.2 GM/DL (ref 12–18)
HEMOGLOBIN: 9.6 G/DL (ref 12–16)
IRON: 20 UG/DL (ref 37–145)
LYMPHOCYTES ABSOLUTE: 3.1 K/UL (ref 1.1–4.5)
LYMPHOCYTES RELATIVE PERCENT: 29.3 % (ref 20–40)
MCH RBC QN AUTO: 24.2 PG (ref 27–31)
MCHC RBC AUTO-ENTMCNC: 29.2 G/DL (ref 33–37)
MCV RBC AUTO: 82.9 FL (ref 81–99)
MONOCYTES ABSOLUTE: 0.8 K/UL (ref 0–0.9)
MONOCYTES RELATIVE PERCENT: 7.1 % (ref 0–10)
NEUTROPHILS ABSOLUTE: 6.4 K/UL (ref 1.5–7.5)
NEUTROPHILS RELATIVE PERCENT: 60.1 % (ref 50–65)
PDW BLD-RTO: 18.8 % (ref 11.5–14.5)
PERFORMED ON: ABNORMAL
PLATELET # BLD: 439 K/UL (ref 130–400)
PMV BLD AUTO: 9.9 FL (ref 9.4–12.3)
POC ANION GAP: 10
POC BUN: 13 MG/DL (ref 7–18)
POC CHLORIDE: 104 MEQ/L (ref 99–110)
POC CREATININE: 0.6 MG/DL (ref 0.3–1.3)
POC HEMATOCRIT: 33 % (ref 37–52)
POC POTASSIUM: 4.3 MEQ/L (ref 3.5–5.1)
POC SODIUM: 135 MEQ/L (ref 136–145)
RBC # BLD: 3.97 M/UL (ref 4.2–5.4)
WBC # BLD: 10.6 K/UL (ref 4.8–10.8)

## 2017-07-11 PROCEDURE — 96372 THER/PROPH/DIAG INJ SC/IM: CPT | Performed by: NURSE PRACTITIONER

## 2017-07-11 PROCEDURE — 99214 OFFICE O/P EST MOD 30 MIN: CPT | Performed by: NURSE PRACTITIONER

## 2017-07-11 PROCEDURE — 93000 ELECTROCARDIOGRAM COMPLETE: CPT | Performed by: NURSE PRACTITIONER

## 2017-07-11 RX ORDER — SULFAMETHOXAZOLE AND TRIMETHOPRIM 800; 160 MG/1; MG/1
1 TABLET ORAL 2 TIMES DAILY
Qty: 14 TABLET | Refills: 0 | Status: SHIPPED | OUTPATIENT
Start: 2017-07-11 | End: 2018-01-09 | Stop reason: SDUPTHER

## 2017-07-11 RX ORDER — CYANOCOBALAMIN 1000 UG/ML
1000 INJECTION INTRAMUSCULAR; SUBCUTANEOUS ONCE
Status: COMPLETED | OUTPATIENT
Start: 2017-07-11 | End: 2017-07-11

## 2017-07-11 RX ORDER — CYANOCOBALAMIN 1000 UG/ML
1000 INJECTION INTRAMUSCULAR; SUBCUTANEOUS
Qty: 10 ML | Refills: 0 | Status: SHIPPED | OUTPATIENT
Start: 2017-07-11 | End: 2017-07-14 | Stop reason: ALTCHOICE

## 2017-07-11 RX ADMIN — CYANOCOBALAMIN 1000 MCG: 1000 INJECTION INTRAMUSCULAR; SUBCUTANEOUS at 14:48

## 2017-07-11 ASSESSMENT — ENCOUNTER SYMPTOMS
BACK PAIN: 1
COUGH: 0
SORE THROAT: 0
BLOOD IN STOOL: 0
EYE REDNESS: 0
ABDOMINAL PAIN: 0
DIARRHEA: 0
TROUBLE SWALLOWING: 0
WHEEZING: 0
EYE DISCHARGE: 0
RHINORRHEA: 0
CONSTIPATION: 0

## 2017-07-13 ENCOUNTER — TELEPHONE (OUTPATIENT)
Dept: PRIMARY CARE CLINIC | Age: 62
End: 2017-07-13

## 2017-07-14 DIAGNOSIS — I95.9 HYPOTENSION, UNSPECIFIED HYPOTENSION TYPE: ICD-10-CM

## 2017-07-14 DIAGNOSIS — I15.9 SECONDARY HYPERTENSION, UNSPECIFIED: ICD-10-CM

## 2017-07-14 DIAGNOSIS — R53.1 WEAKNESS: ICD-10-CM

## 2017-07-14 RX ORDER — GABAPENTIN 100 MG/1
100 CAPSULE ORAL DAILY
Qty: 90 CAPSULE | Refills: 1 | Status: SHIPPED | OUTPATIENT
Start: 2017-07-14 | End: 2018-03-06 | Stop reason: SDUPTHER

## 2017-07-14 RX ORDER — LANOLIN ALCOHOL/MO/W.PET/CERES
325 CREAM (GRAM) TOPICAL 2 TIMES DAILY
Qty: 60 TABLET | Refills: 3 | Status: SHIPPED | OUTPATIENT
Start: 2017-07-14 | End: 2020-03-05 | Stop reason: ALTCHOICE

## 2017-07-14 RX ORDER — DOCUSATE SODIUM 100 MG/1
100 CAPSULE, LIQUID FILLED ORAL 2 TIMES DAILY
Qty: 60 CAPSULE | Refills: 11 | Status: SHIPPED | OUTPATIENT
Start: 2017-07-14 | End: 2018-01-09

## 2017-07-14 RX ORDER — ONDANSETRON 4 MG/1
4 TABLET, FILM COATED ORAL EVERY 8 HOURS PRN
Qty: 30 TABLET | Refills: 0 | Status: SHIPPED | OUTPATIENT
Start: 2017-07-14 | End: 2019-10-22

## 2017-07-14 RX ORDER — CHOLECALCIFEROL (VITAMIN D3) 125 MCG
500 CAPSULE ORAL DAILY
Qty: 30 TABLET | Refills: 3 | Status: SHIPPED | OUTPATIENT
Start: 2017-07-14 | End: 2018-10-02

## 2017-07-14 RX ORDER — LURASIDONE HYDROCHLORIDE 40 MG/1
20 TABLET, FILM COATED ORAL DAILY
Qty: 30 TABLET | Refills: 0 | Status: SHIPPED | OUTPATIENT
Start: 2017-07-14 | End: 2017-10-03 | Stop reason: SDUPTHER

## 2017-07-18 ENCOUNTER — TELEPHONE (OUTPATIENT)
Dept: PRIMARY CARE CLINIC | Age: 62
End: 2017-07-18

## 2017-07-27 ENCOUNTER — TELEPHONE (OUTPATIENT)
Dept: PRIMARY CARE CLINIC | Age: 62
End: 2017-07-27

## 2017-08-07 RX ORDER — BLOOD GLUCOSE CONTROL HIGH,LOW
EACH MISCELLANEOUS
Qty: 1 EACH | Refills: 11 | Status: SHIPPED | OUTPATIENT
Start: 2017-08-07 | End: 2017-10-03

## 2017-08-19 ENCOUNTER — APPOINTMENT (OUTPATIENT)
Dept: GENERAL RADIOLOGY | Age: 62
End: 2017-08-19
Payer: MEDICARE

## 2017-08-19 ENCOUNTER — APPOINTMENT (OUTPATIENT)
Dept: CT IMAGING | Age: 62
End: 2017-08-19
Payer: MEDICARE

## 2017-08-19 ENCOUNTER — HOSPITAL ENCOUNTER (OUTPATIENT)
Age: 62
Setting detail: OBSERVATION
Discharge: HOME | End: 2017-08-20
Attending: EMERGENCY MEDICINE | Admitting: INTERNAL MEDICINE
Payer: MEDICARE

## 2017-08-19 DIAGNOSIS — R07.89 CHEST DISCOMFORT: Primary | ICD-10-CM

## 2017-08-19 LAB
ALBUMIN SERPL-MCNC: 4 G/DL (ref 3.5–5.2)
ALP BLD-CCNC: 147 U/L (ref 35–104)
ALT SERPL-CCNC: 12 U/L (ref 5–33)
ANION GAP SERPL CALCULATED.3IONS-SCNC: 14 MMOL/L (ref 7–19)
AST SERPL-CCNC: 21 U/L (ref 5–32)
BASOPHILS ABSOLUTE: 0.1 K/UL (ref 0–0.2)
BASOPHILS RELATIVE PERCENT: 0.4 % (ref 0–1)
BILIRUB SERPL-MCNC: <0.2 MG/DL (ref 0.2–1.2)
BUN BLDV-MCNC: 24 MG/DL (ref 8–23)
CALCIUM SERPL-MCNC: 8.7 MG/DL (ref 8.8–10.2)
CHLORIDE BLD-SCNC: 95 MMOL/L (ref 98–111)
CO2: 21 MMOL/L (ref 22–29)
CREAT SERPL-MCNC: 0.7 MG/DL (ref 0.5–0.9)
D DIMER: 3.24 NG/ML DDU (ref 0–0.48)
EOSINOPHILS ABSOLUTE: 0.2 K/UL (ref 0–0.6)
EOSINOPHILS RELATIVE PERCENT: 1.8 % (ref 0–5)
GFR NON-AFRICAN AMERICAN: >60
GLUCOSE BLD-MCNC: 100 MG/DL (ref 70–99)
GLUCOSE BLD-MCNC: 100 MG/DL (ref 74–109)
HCT VFR BLD CALC: 31.9 % (ref 37–47)
HEMOGLOBIN: 9.8 G/DL (ref 12–16)
LYMPHOCYTES ABSOLUTE: 2.9 K/UL (ref 1.1–4.5)
LYMPHOCYTES RELATIVE PERCENT: 21.9 % (ref 20–40)
MCH RBC QN AUTO: 24.3 PG (ref 27–31)
MCHC RBC AUTO-ENTMCNC: 30.7 G/DL (ref 33–37)
MCV RBC AUTO: 79.2 FL (ref 81–99)
MONOCYTES ABSOLUTE: 0.8 K/UL (ref 0–0.9)
MONOCYTES RELATIVE PERCENT: 6.4 % (ref 0–10)
NEUTROPHILS ABSOLUTE: 9.1 K/UL (ref 1.5–7.5)
NEUTROPHILS RELATIVE PERCENT: 69.3 % (ref 50–65)
PDW BLD-RTO: 19.1 % (ref 11.5–14.5)
PERFORMED ON: ABNORMAL
PERFORMED ON: NORMAL
PERFORMED ON: NORMAL
PLATELET # BLD: 362 K/UL (ref 130–400)
PMV BLD AUTO: 10.2 FL (ref 9.4–12.3)
POC TROPONIN I: 0 NG/ML (ref 0–0.08)
POC TROPONIN I: 0 NG/ML (ref 0–0.08)
POTASSIUM SERPL-SCNC: 5.5 MMOL/L (ref 3.5–5)
RBC # BLD: 4.03 M/UL (ref 4.2–5.4)
SODIUM BLD-SCNC: 130 MMOL/L (ref 136–145)
TOTAL PROTEIN: 6.6 G/DL (ref 6.6–8.7)
WBC # BLD: 13.1 K/UL (ref 4.8–10.8)

## 2017-08-19 PROCEDURE — 71275 CT ANGIOGRAPHY CHEST: CPT

## 2017-08-19 PROCEDURE — 93005 ELECTROCARDIOGRAM TRACING: CPT

## 2017-08-19 PROCEDURE — 6360000004 HC RX CONTRAST MEDICATION: Performed by: EMERGENCY MEDICINE

## 2017-08-19 PROCEDURE — 99285 EMERGENCY DEPT VISIT HI MDM: CPT

## 2017-08-19 PROCEDURE — 2700000000 HC OXYGEN THERAPY PER DAY

## 2017-08-19 PROCEDURE — 85025 COMPLETE CBC W/AUTO DIFF WBC: CPT

## 2017-08-19 PROCEDURE — 2580000003 HC RX 258: Performed by: EMERGENCY MEDICINE

## 2017-08-19 PROCEDURE — 82948 REAGENT STRIP/BLOOD GLUCOSE: CPT

## 2017-08-19 PROCEDURE — 6370000000 HC RX 637 (ALT 250 FOR IP): Performed by: INTERNAL MEDICINE

## 2017-08-19 PROCEDURE — 96372 THER/PROPH/DIAG INJ SC/IM: CPT

## 2017-08-19 PROCEDURE — 2580000003 HC RX 258: Performed by: INTERNAL MEDICINE

## 2017-08-19 PROCEDURE — 85379 FIBRIN DEGRADATION QUANT: CPT

## 2017-08-19 PROCEDURE — 71010 XR CHEST PORTABLE: CPT

## 2017-08-19 PROCEDURE — 99220 PR INITIAL OBSERVATION CARE/DAY 70 MINUTES: CPT | Performed by: INTERNAL MEDICINE

## 2017-08-19 PROCEDURE — 6370000000 HC RX 637 (ALT 250 FOR IP): Performed by: EMERGENCY MEDICINE

## 2017-08-19 PROCEDURE — 6360000002 HC RX W HCPCS: Performed by: INTERNAL MEDICINE

## 2017-08-19 PROCEDURE — 84484 ASSAY OF TROPONIN QUANT: CPT

## 2017-08-19 PROCEDURE — 99283 EMERGENCY DEPT VISIT LOW MDM: CPT | Performed by: EMERGENCY MEDICINE

## 2017-08-19 PROCEDURE — 80053 COMPREHEN METABOLIC PANEL: CPT

## 2017-08-19 PROCEDURE — G0378 HOSPITAL OBSERVATION PER HR: HCPCS

## 2017-08-19 PROCEDURE — 36415 COLL VENOUS BLD VENIPUNCTURE: CPT

## 2017-08-19 RX ORDER — PANTOPRAZOLE SODIUM 40 MG/1
40 TABLET, DELAYED RELEASE ORAL
Status: DISCONTINUED | OUTPATIENT
Start: 2017-08-20 | End: 2017-08-20 | Stop reason: HOSPADM

## 2017-08-19 RX ORDER — BUSPIRONE HYDROCHLORIDE 15 MG/1
15 TABLET ORAL 3 TIMES DAILY
Status: DISCONTINUED | OUTPATIENT
Start: 2017-08-19 | End: 2017-08-20 | Stop reason: HOSPADM

## 2017-08-19 RX ORDER — ONDANSETRON 2 MG/ML
4 INJECTION INTRAMUSCULAR; INTRAVENOUS EVERY 6 HOURS PRN
Status: DISCONTINUED | OUTPATIENT
Start: 2017-08-19 | End: 2017-08-19 | Stop reason: ALTCHOICE

## 2017-08-19 RX ORDER — DONEPEZIL HYDROCHLORIDE 5 MG/1
10 TABLET, FILM COATED ORAL NIGHTLY
Status: DISCONTINUED | OUTPATIENT
Start: 2017-08-19 | End: 2017-08-19 | Stop reason: SDUPTHER

## 2017-08-19 RX ORDER — MECLIZINE HYDROCHLORIDE 25 MG/1
25 TABLET ORAL 3 TIMES DAILY PRN
Status: DISCONTINUED | OUTPATIENT
Start: 2017-08-19 | End: 2017-08-20 | Stop reason: HOSPADM

## 2017-08-19 RX ORDER — ACETAMINOPHEN 500 MG
500 TABLET ORAL EVERY 6 HOURS PRN
Status: ON HOLD | COMMUNITY
End: 2019-07-08 | Stop reason: HOSPADM

## 2017-08-19 RX ORDER — MIRTAZAPINE 15 MG/1
15 TABLET, FILM COATED ORAL DAILY
Status: DISCONTINUED | OUTPATIENT
Start: 2017-08-20 | End: 2017-08-20 | Stop reason: HOSPADM

## 2017-08-19 RX ORDER — NYSTATIN 100000 U/G
CREAM TOPICAL 2 TIMES DAILY
Status: DISCONTINUED | OUTPATIENT
Start: 2017-08-19 | End: 2017-08-20 | Stop reason: HOSPADM

## 2017-08-19 RX ORDER — NICOTINE POLACRILEX 4 MG
15 LOZENGE BUCCAL PRN
Status: DISCONTINUED | OUTPATIENT
Start: 2017-08-19 | End: 2017-08-20 | Stop reason: HOSPADM

## 2017-08-19 RX ORDER — SODIUM CHLORIDE 0.9 % (FLUSH) 0.9 %
10 SYRINGE (ML) INJECTION EVERY 12 HOURS SCHEDULED
Status: DISCONTINUED | OUTPATIENT
Start: 2017-08-19 | End: 2017-08-20 | Stop reason: HOSPADM

## 2017-08-19 RX ORDER — ONDANSETRON 4 MG/1
4 TABLET, FILM COATED ORAL EVERY 8 HOURS PRN
Status: DISCONTINUED | OUTPATIENT
Start: 2017-08-19 | End: 2017-08-20 | Stop reason: HOSPADM

## 2017-08-19 RX ORDER — BENZTROPINE MESYLATE 1 MG/1
1 TABLET ORAL 2 TIMES DAILY
Status: DISCONTINUED | OUTPATIENT
Start: 2017-08-19 | End: 2017-08-20 | Stop reason: HOSPADM

## 2017-08-19 RX ORDER — ASPIRIN 81 MG/1
81 TABLET, CHEWABLE ORAL DAILY
Status: DISCONTINUED | OUTPATIENT
Start: 2017-08-19 | End: 2017-08-20 | Stop reason: HOSPADM

## 2017-08-19 RX ORDER — DONEPEZIL HYDROCHLORIDE 10 MG/1
10 TABLET, FILM COATED ORAL NIGHTLY
Status: DISCONTINUED | OUTPATIENT
Start: 2017-08-19 | End: 2017-08-20 | Stop reason: HOSPADM

## 2017-08-19 RX ORDER — FERROUS SULFATE 325(65) MG
325 TABLET ORAL 2 TIMES DAILY
Status: DISCONTINUED | OUTPATIENT
Start: 2017-08-19 | End: 2017-08-20 | Stop reason: HOSPADM

## 2017-08-19 RX ORDER — GABAPENTIN 100 MG/1
100 CAPSULE ORAL DAILY
Status: DISCONTINUED | OUTPATIENT
Start: 2017-08-20 | End: 2017-08-20 | Stop reason: HOSPADM

## 2017-08-19 RX ORDER — ACETAMINOPHEN 500 MG
500 TABLET ORAL EVERY 6 HOURS PRN
Status: DISCONTINUED | OUTPATIENT
Start: 2017-08-19 | End: 2017-08-20 | Stop reason: HOSPADM

## 2017-08-19 RX ORDER — ALBUTEROL SULFATE 90 UG/1
2 AEROSOL, METERED RESPIRATORY (INHALATION) EVERY 6 HOURS PRN
Status: DISCONTINUED | OUTPATIENT
Start: 2017-08-19 | End: 2017-08-20 | Stop reason: HOSPADM

## 2017-08-19 RX ORDER — ACETAZOLAMIDE 500 MG/1
500 CAPSULE, EXTENDED RELEASE ORAL 2 TIMES DAILY
Status: DISCONTINUED | OUTPATIENT
Start: 2017-08-19 | End: 2017-08-19 | Stop reason: SDUPTHER

## 2017-08-19 RX ORDER — ACETAZOLAMIDE 500 MG/1
500 CAPSULE, EXTENDED RELEASE ORAL 2 TIMES DAILY
Status: DISCONTINUED | OUTPATIENT
Start: 2017-08-19 | End: 2017-08-20 | Stop reason: HOSPADM

## 2017-08-19 RX ORDER — IPRATROPIUM BROMIDE AND ALBUTEROL SULFATE 2.5; .5 MG/3ML; MG/3ML
1 SOLUTION RESPIRATORY (INHALATION) EVERY 4 HOURS PRN
Status: DISCONTINUED | OUTPATIENT
Start: 2017-08-19 | End: 2017-08-20 | Stop reason: HOSPADM

## 2017-08-19 RX ORDER — MECLIZINE HYDROCHLORIDE 25 MG/1
25 TABLET ORAL 3 TIMES DAILY PRN
COMMUNITY
End: 2019-10-22

## 2017-08-19 RX ORDER — SERTRALINE HYDROCHLORIDE 100 MG/1
150 TABLET, FILM COATED ORAL DAILY
Status: DISCONTINUED | OUTPATIENT
Start: 2017-08-20 | End: 2017-08-20 | Stop reason: HOSPADM

## 2017-08-19 RX ORDER — PRAMIPEXOLE DIHYDROCHLORIDE 0.25 MG/1
0.25 TABLET ORAL 3 TIMES DAILY
Status: DISCONTINUED | OUTPATIENT
Start: 2017-08-19 | End: 2017-08-20 | Stop reason: HOSPADM

## 2017-08-19 RX ORDER — DOCUSATE SODIUM 100 MG/1
100 CAPSULE, LIQUID FILLED ORAL 2 TIMES DAILY
Status: DISCONTINUED | OUTPATIENT
Start: 2017-08-19 | End: 2017-08-20 | Stop reason: HOSPADM

## 2017-08-19 RX ORDER — CELECOXIB 200 MG/1
200 CAPSULE ORAL DAILY
Status: DISCONTINUED | OUTPATIENT
Start: 2017-08-20 | End: 2017-08-20 | Stop reason: HOSPADM

## 2017-08-19 RX ORDER — DEXTROSE MONOHYDRATE 50 MG/ML
100 INJECTION, SOLUTION INTRAVENOUS PRN
Status: DISCONTINUED | OUTPATIENT
Start: 2017-08-19 | End: 2017-08-20 | Stop reason: HOSPADM

## 2017-08-19 RX ORDER — CHOLECALCIFEROL (VITAMIN D3) 125 MCG
500 CAPSULE ORAL DAILY
Status: DISCONTINUED | OUTPATIENT
Start: 2017-08-20 | End: 2017-08-20 | Stop reason: HOSPADM

## 2017-08-19 RX ORDER — LISINOPRIL 10 MG/1
10 TABLET ORAL DAILY
Status: DISCONTINUED | OUTPATIENT
Start: 2017-08-20 | End: 2017-08-20 | Stop reason: HOSPADM

## 2017-08-19 RX ORDER — SODIUM CHLORIDE 0.9 % (FLUSH) 0.9 %
10 SYRINGE (ML) INJECTION PRN
Status: DISCONTINUED | OUTPATIENT
Start: 2017-08-19 | End: 2017-08-20 | Stop reason: HOSPADM

## 2017-08-19 RX ORDER — ROPINIROLE 2 MG/1
2 TABLET, FILM COATED ORAL 2 TIMES DAILY
Status: DISCONTINUED | OUTPATIENT
Start: 2017-08-19 | End: 2017-08-20 | Stop reason: HOSPADM

## 2017-08-19 RX ORDER — DEXTROSE MONOHYDRATE 25 G/50ML
12.5 INJECTION, SOLUTION INTRAVENOUS PRN
Status: DISCONTINUED | OUTPATIENT
Start: 2017-08-19 | End: 2017-08-20 | Stop reason: HOSPADM

## 2017-08-19 RX ORDER — ASPIRIN 81 MG/1
324 TABLET, CHEWABLE ORAL ONCE
Status: COMPLETED | OUTPATIENT
Start: 2017-08-19 | End: 2017-08-19

## 2017-08-19 RX ORDER — 0.9 % SODIUM CHLORIDE 0.9 %
500 INTRAVENOUS SOLUTION INTRAVENOUS ONCE
Status: COMPLETED | OUTPATIENT
Start: 2017-08-19 | End: 2017-08-19

## 2017-08-19 RX ORDER — OXCARBAZEPINE 300 MG/1
300 TABLET, FILM COATED ORAL 2 TIMES DAILY
Status: DISCONTINUED | OUTPATIENT
Start: 2017-08-19 | End: 2017-08-20 | Stop reason: HOSPADM

## 2017-08-19 RX ORDER — ACETAMINOPHEN 325 MG/1
650 TABLET ORAL EVERY 4 HOURS PRN
Status: DISCONTINUED | OUTPATIENT
Start: 2017-08-19 | End: 2017-08-19 | Stop reason: ALTCHOICE

## 2017-08-19 RX ADMIN — ROPINIROLE 2 MG: 2 TABLET, FILM COATED ORAL at 23:59

## 2017-08-19 RX ADMIN — ASPIRIN 81 MG CHEWABLE TABLET 324 MG: 81 TABLET CHEWABLE at 12:10

## 2017-08-19 RX ADMIN — BENZTROPINE MESYLATE 1 MG: 1 TABLET ORAL at 23:57

## 2017-08-19 RX ADMIN — OXCARBAZEPINE 300 MG: 300 TABLET, FILM COATED ORAL at 23:59

## 2017-08-19 RX ADMIN — BUSPIRONE HYDROCHLORIDE 15 MG: 15 TABLET ORAL at 23:59

## 2017-08-19 RX ADMIN — DONEPEZIL HYDROCHLORIDE 10 MG: 10 TABLET, FILM COATED ORAL at 23:59

## 2017-08-19 RX ADMIN — ASPIRIN 81 MG CHEWABLE TABLET 81 MG: 81 TABLET CHEWABLE at 23:57

## 2017-08-19 RX ADMIN — SODIUM CHLORIDE 500 ML: 9 INJECTION, SOLUTION INTRAVENOUS at 12:09

## 2017-08-19 RX ADMIN — ACETAMINOPHEN 500 MG: 500 TABLET ORAL at 23:58

## 2017-08-19 RX ADMIN — PRAMIPEXOLE DIHYDROCHLORIDE 0.25 MG: 0.25 TABLET ORAL at 23:57

## 2017-08-19 RX ADMIN — DOCUSATE SODIUM 100 MG: 100 CAPSULE, LIQUID FILLED ORAL at 23:57

## 2017-08-19 RX ADMIN — ENOXAPARIN SODIUM 40 MG: 40 INJECTION SUBCUTANEOUS at 23:57

## 2017-08-19 RX ADMIN — IOVERSOL 90 ML: 741 INJECTION INTRA-ARTERIAL; INTRAVENOUS at 13:26

## 2017-08-19 RX ADMIN — FERROUS SULFATE TAB 325 MG (65 MG ELEMENTAL FE) 325 MG: 325 (65 FE) TAB at 23:58

## 2017-08-19 RX ADMIN — ACETAZOLAMIDE 500 MG: 500 CAPSULE, EXTENDED RELEASE ORAL at 23:59

## 2017-08-19 RX ADMIN — Medication 10 ML: at 23:55

## 2017-08-19 ASSESSMENT — PAIN SCALES - GENERAL
PAINLEVEL_OUTOF10: 7
PAINLEVEL_OUTOF10: 0
PAINLEVEL_OUTOF10: 0

## 2017-08-19 ASSESSMENT — ENCOUNTER SYMPTOMS
SHORTNESS OF BREATH: 1
NAUSEA: 0
VOMITING: 0
COUGH: 0

## 2017-08-19 ASSESSMENT — PAIN DESCRIPTION - LOCATION: LOCATION: CHEST

## 2017-08-19 ASSESSMENT — PAIN DESCRIPTION - DESCRIPTORS: DESCRIPTORS: TIGHTNESS

## 2017-08-20 ENCOUNTER — APPOINTMENT (OUTPATIENT)
Dept: NUCLEAR MEDICINE | Age: 62
End: 2017-08-20
Payer: MEDICARE

## 2017-08-20 VITALS
HEART RATE: 71 BPM | BODY MASS INDEX: 40.45 KG/M2 | TEMPERATURE: 96.6 F | OXYGEN SATURATION: 98 % | SYSTOLIC BLOOD PRESSURE: 108 MMHG | HEIGHT: 61 IN | WEIGHT: 214.25 LBS | RESPIRATION RATE: 18 BRPM | DIASTOLIC BLOOD PRESSURE: 68 MMHG

## 2017-08-20 LAB
ALBUMIN SERPL-MCNC: 3.5 G/DL (ref 3.5–5.2)
ALP BLD-CCNC: 151 U/L (ref 35–104)
ALT SERPL-CCNC: 11 U/L (ref 5–33)
ANION GAP SERPL CALCULATED.3IONS-SCNC: 15 MMOL/L (ref 7–19)
AST SERPL-CCNC: 14 U/L (ref 5–32)
BASOPHILS ABSOLUTE: 0.1 K/UL (ref 0–0.2)
BASOPHILS RELATIVE PERCENT: 0.7 % (ref 0–1)
BILIRUB SERPL-MCNC: <0.2 MG/DL (ref 0.2–1.2)
BUN BLDV-MCNC: 20 MG/DL (ref 8–23)
CALCIUM SERPL-MCNC: 8.2 MG/DL (ref 8.8–10.2)
CHLORIDE BLD-SCNC: 101 MMOL/L (ref 98–111)
CHOLESTEROL, TOTAL: 167 MG/DL (ref 160–199)
CO2: 20 MMOL/L (ref 22–29)
CREAT SERPL-MCNC: 0.7 MG/DL (ref 0.5–0.9)
EOSINOPHILS ABSOLUTE: 0.4 K/UL (ref 0–0.6)
EOSINOPHILS RELATIVE PERCENT: 4.1 % (ref 0–5)
GFR NON-AFRICAN AMERICAN: >60
GLUCOSE BLD-MCNC: 114 MG/DL (ref 74–109)
GLUCOSE BLD-MCNC: 119 MG/DL (ref 70–99)
GLUCOSE BLD-MCNC: 188 MG/DL (ref 70–99)
HCT VFR BLD CALC: 29.2 % (ref 37–47)
HDLC SERPL-MCNC: 35 MG/DL (ref 65–121)
HEMOGLOBIN: 9 G/DL (ref 12–16)
LDL CHOLESTEROL CALCULATED: 80 MG/DL
LYMPHOCYTES ABSOLUTE: 3.1 K/UL (ref 1.1–4.5)
LYMPHOCYTES RELATIVE PERCENT: 35 % (ref 20–40)
MCH RBC QN AUTO: 24.6 PG (ref 27–31)
MCHC RBC AUTO-ENTMCNC: 30.8 G/DL (ref 33–37)
MCV RBC AUTO: 79.8 FL (ref 81–99)
MONOCYTES ABSOLUTE: 0.8 K/UL (ref 0–0.9)
MONOCYTES RELATIVE PERCENT: 9.1 % (ref 0–10)
NEUTROPHILS ABSOLUTE: 4.4 K/UL (ref 1.5–7.5)
NEUTROPHILS RELATIVE PERCENT: 50.5 % (ref 50–65)
PDW BLD-RTO: 19.2 % (ref 11.5–14.5)
PERFORMED ON: ABNORMAL
PERFORMED ON: ABNORMAL
PLATELET # BLD: 339 K/UL (ref 130–400)
PMV BLD AUTO: 9.7 FL (ref 9.4–12.3)
POTASSIUM SERPL-SCNC: 4.3 MMOL/L (ref 3.5–5)
RBC # BLD: 3.66 M/UL (ref 4.2–5.4)
SODIUM BLD-SCNC: 136 MMOL/L (ref 136–145)
TOTAL PROTEIN: 6.2 G/DL (ref 6.6–8.7)
TRIGL SERPL-MCNC: 258 MG/DL (ref 150–199)
TROPONIN: <0.01 NG/ML (ref 0–0.03)
WBC # BLD: 8.8 K/UL (ref 4.8–10.8)

## 2017-08-20 PROCEDURE — 2580000003 HC RX 258: Performed by: INTERNAL MEDICINE

## 2017-08-20 PROCEDURE — 6360000002 HC RX W HCPCS: Performed by: INTERNAL MEDICINE

## 2017-08-20 PROCEDURE — 3430000000 HC RX DIAGNOSTIC RADIOPHARMACEUTICAL: Performed by: INTERNAL MEDICINE

## 2017-08-20 PROCEDURE — 93005 ELECTROCARDIOGRAM TRACING: CPT

## 2017-08-20 PROCEDURE — 36415 COLL VENOUS BLD VENIPUNCTURE: CPT

## 2017-08-20 PROCEDURE — 78452 HT MUSCLE IMAGE SPECT MULT: CPT

## 2017-08-20 PROCEDURE — 82948 REAGENT STRIP/BLOOD GLUCOSE: CPT

## 2017-08-20 PROCEDURE — 93017 CV STRESS TEST TRACING ONLY: CPT

## 2017-08-20 PROCEDURE — G0378 HOSPITAL OBSERVATION PER HR: HCPCS

## 2017-08-20 PROCEDURE — 84484 ASSAY OF TROPONIN QUANT: CPT

## 2017-08-20 PROCEDURE — 80053 COMPREHEN METABOLIC PANEL: CPT

## 2017-08-20 PROCEDURE — A9500 TC99M SESTAMIBI: HCPCS | Performed by: INTERNAL MEDICINE

## 2017-08-20 PROCEDURE — 6370000000 HC RX 637 (ALT 250 FOR IP): Performed by: INTERNAL MEDICINE

## 2017-08-20 PROCEDURE — 99217 PR OBSERVATION CARE DISCHARGE MANAGEMENT: CPT | Performed by: INTERNAL MEDICINE

## 2017-08-20 PROCEDURE — 2700000000 HC OXYGEN THERAPY PER DAY

## 2017-08-20 PROCEDURE — 85025 COMPLETE CBC W/AUTO DIFF WBC: CPT

## 2017-08-20 PROCEDURE — 80061 LIPID PANEL: CPT

## 2017-08-20 RX ORDER — ASPIRIN 81 MG/1
81 TABLET, CHEWABLE ORAL DAILY
Qty: 30 TABLET | Refills: 3 | Status: SHIPPED | OUTPATIENT
Start: 2017-08-20 | End: 2018-01-09

## 2017-08-20 RX ADMIN — PANTOPRAZOLE SODIUM 40 MG: 40 TABLET, DELAYED RELEASE ORAL at 06:39

## 2017-08-20 RX ADMIN — ACETAMINOPHEN 500 MG: 500 TABLET ORAL at 08:03

## 2017-08-20 RX ADMIN — CYANOCOBALAMIN TAB 500 MCG 500 MCG: 500 TAB at 08:03

## 2017-08-20 RX ADMIN — TETRAKIS(2-METHOXYISOBUTYLISOCYANIDE)COPPER(I) TETRAFLUOROBORATE 10 MILLICURIE: 1 INJECTION, POWDER, LYOPHILIZED, FOR SOLUTION INTRAVENOUS at 11:36

## 2017-08-20 RX ADMIN — MIRTAZAPINE 15 MG: 15 TABLET, FILM COATED ORAL at 10:50

## 2017-08-20 RX ADMIN — BENZTROPINE MESYLATE 1 MG: 1 TABLET ORAL at 08:03

## 2017-08-20 RX ADMIN — SERTRALINE HYDROCHLORIDE 150 MG: 100 TABLET, FILM COATED ORAL at 10:50

## 2017-08-20 RX ADMIN — BUSPIRONE HYDROCHLORIDE 15 MG: 15 TABLET ORAL at 10:52

## 2017-08-20 RX ADMIN — OXCARBAZEPINE 300 MG: 300 TABLET, FILM COATED ORAL at 10:52

## 2017-08-20 RX ADMIN — Medication 10 ML: at 10:54

## 2017-08-20 RX ADMIN — LISINOPRIL 10 MG: 10 TABLET ORAL at 10:52

## 2017-08-20 RX ADMIN — DOCUSATE SODIUM 100 MG: 100 CAPSULE, LIQUID FILLED ORAL at 08:03

## 2017-08-20 RX ADMIN — FERROUS SULFATE TAB 325 MG (65 MG ELEMENTAL FE) 325 MG: 325 (65 FE) TAB at 08:03

## 2017-08-20 RX ADMIN — ASPIRIN 81 MG CHEWABLE TABLET 81 MG: 81 TABLET CHEWABLE at 08:03

## 2017-08-20 RX ADMIN — TETRAKIS(2-METHOXYISOBUTYLISOCYANIDE)COPPER(I) TETRAFLUOROBORATE 30 MILLICURIE: 1 INJECTION, POWDER, LYOPHILIZED, FOR SOLUTION INTRAVENOUS at 11:36

## 2017-08-20 RX ADMIN — PRAMIPEXOLE DIHYDROCHLORIDE 0.25 MG: 0.25 TABLET ORAL at 08:02

## 2017-08-20 RX ADMIN — LURASIDONE HYDROCHLORIDE 20 MG: 40 TABLET, FILM COATED ORAL at 08:03

## 2017-08-20 RX ADMIN — GABAPENTIN 100 MG: 100 CAPSULE ORAL at 08:03

## 2017-08-20 RX ADMIN — ACETAZOLAMIDE 500 MG: 500 CAPSULE, EXTENDED RELEASE ORAL at 10:51

## 2017-08-20 RX ADMIN — CELECOXIB 200 MG: 200 CAPSULE ORAL at 10:51

## 2017-08-20 RX ADMIN — REGADENOSON 0.4 MG: 0.08 INJECTION, SOLUTION INTRAVENOUS at 11:36

## 2017-08-20 RX ADMIN — ROPINIROLE 2 MG: 2 TABLET, FILM COATED ORAL at 10:52

## 2017-08-20 ASSESSMENT — PAIN SCALES - GENERAL
PAINLEVEL_OUTOF10: 7
PAINLEVEL_OUTOF10: 0
PAINLEVEL_OUTOF10: 5
PAINLEVEL_OUTOF10: 3

## 2017-08-21 LAB
LV EF: 71 %
LVEF MODALITY: NORMAL

## 2017-08-24 LAB
EKG P AXIS: 36 DEGREES
EKG P-R INTERVAL: 196 MS
EKG Q-T INTERVAL: 377 MS
EKG QRS DURATION: 90 MS
EKG QTC CALCULATION (BAZETT): 449 MS
EKG T AXIS: 63 DEGREES

## 2017-08-29 LAB
EKG P AXIS: 38 DEGREES
EKG P-R INTERVAL: 191 MS
EKG Q-T INTERVAL: 413 MS
EKG QRS DURATION: 92 MS
EKG QTC CALCULATION (BAZETT): 462 MS
EKG T AXIS: 55 DEGREES

## 2017-09-11 DIAGNOSIS — F31.78 BIPOLAR DISORDER, IN FULL REMISSION, MOST RECENT EPISODE MIXED (HCC): ICD-10-CM

## 2017-09-12 DIAGNOSIS — F31.78 BIPOLAR DISORDER, IN FULL REMISSION, MOST RECENT EPISODE MIXED (HCC): ICD-10-CM

## 2017-10-03 ENCOUNTER — OFFICE VISIT (OUTPATIENT)
Dept: PRIMARY CARE CLINIC | Age: 62
End: 2017-10-03
Payer: MEDICARE

## 2017-10-03 VITALS
SYSTOLIC BLOOD PRESSURE: 120 MMHG | BODY MASS INDEX: 40.02 KG/M2 | WEIGHT: 212 LBS | DIASTOLIC BLOOD PRESSURE: 68 MMHG | TEMPERATURE: 98 F | OXYGEN SATURATION: 98 % | HEART RATE: 85 BPM | HEIGHT: 61 IN

## 2017-10-03 DIAGNOSIS — R30.0 DYSURIA: ICD-10-CM

## 2017-10-03 DIAGNOSIS — N30.01 ACUTE CYSTITIS WITH HEMATURIA: ICD-10-CM

## 2017-10-03 DIAGNOSIS — F31.32 BIPOLAR AFFECTIVE DISORDER, CURRENTLY DEPRESSED, MODERATE (HCC): Primary | ICD-10-CM

## 2017-10-03 DIAGNOSIS — Z12.39 SCREENING FOR BREAST CANCER: ICD-10-CM

## 2017-10-03 DIAGNOSIS — Z12.31 ENCOUNTER FOR SCREENING MAMMOGRAM FOR BREAST CANCER: ICD-10-CM

## 2017-10-03 DIAGNOSIS — R53.1 WEAKNESS: ICD-10-CM

## 2017-10-03 DIAGNOSIS — F31.78 BIPOLAR DISORDER, IN FULL REMISSION, MOST RECENT EPISODE MIXED (HCC): ICD-10-CM

## 2017-10-03 LAB
BILIRUBIN, POC: NEGATIVE
BLOOD URINE, POC: NEGATIVE
CLARITY, POC: ABNORMAL
COLOR, POC: YELLOW
GLUCOSE URINE, POC: NEGATIVE
KETONES, POC: NEGATIVE
LEUKOCYTE EST, POC: ABNORMAL
NITRITE, POC: POSITIVE
PH, POC: 5.5
PROTEIN, POC: NEGATIVE
SPECIFIC GRAVITY, POC: 1.02
UROBILINOGEN, POC: 0.2

## 2017-10-03 PROCEDURE — 81002 URINALYSIS NONAUTO W/O SCOPE: CPT | Performed by: NURSE PRACTITIONER

## 2017-10-03 PROCEDURE — 99214 OFFICE O/P EST MOD 30 MIN: CPT | Performed by: NURSE PRACTITIONER

## 2017-10-03 RX ORDER — CIPROFLOXACIN 500 MG/1
500 TABLET, FILM COATED ORAL 2 TIMES DAILY
Qty: 20 TABLET | Refills: 0 | Status: SHIPPED | OUTPATIENT
Start: 2017-10-03 | End: 2017-10-13

## 2017-10-03 RX ORDER — LURASIDONE HYDROCHLORIDE 40 MG/1
40 TABLET, FILM COATED ORAL DAILY
Qty: 90 TABLET | Refills: 3 | Status: SHIPPED | OUTPATIENT
Start: 2017-10-03 | End: 2018-03-06 | Stop reason: SDUPTHER

## 2017-10-03 RX ORDER — LURASIDONE HYDROCHLORIDE 40 MG/1
40 TABLET, FILM COATED ORAL DAILY
Qty: 30 TABLET | Refills: 11 | Status: SHIPPED | OUTPATIENT
Start: 2017-10-03 | End: 2017-10-03 | Stop reason: SDUPTHER

## 2017-10-03 ASSESSMENT — ENCOUNTER SYMPTOMS
NAUSEA: 1
VOMITING: 0

## 2017-10-03 NOTE — PATIENT INSTRUCTIONS
Bipolar Disorder: Care Instructions  Your Care Instructions  Bipolar disorder is an illness that causes extreme mood changes, from times of very high energy (manic episodes) to times of depression. But many people with bipolar disorder show only the symptoms of depression. These moods may cause problems with your work, school, family life, friendships, and how well you function. This disease is also called manic-depression. There is no cure for bipolar disorder, but it can be helped with medicines. Counseling may also help. It is important to take your medicines exactly as prescribed, even when you feel well. You may need lifelong treatment. Follow-up care is a key part of your treatment and safety. Be sure to make and go to all appointments, and call your doctor if you are having problems. It's also a good idea to know your test results and keep a list of the medicines you take. How can you care for yourself at home? · Be safe with medicines. Take your medicines exactly as prescribed. Do not stop or change a medicine without talking to your doctor first. Michele Hackett and your doctor may need to try different combinations of medicines to find what works for you. · Take your medicines on schedule to keep your moods even. When you feel good, you may think that you do not need your medicines. But it is important to keep taking them. · Go to your counseling sessions. Call and talk with your counselor if you can't go to a session or if you don't think the sessions are helping. Do not just stop going. · Get at least 30 minutes of activity on most days of the week. Walking is a good choice. You also may want to do other things, such as running, swimming, or cycling. · Get enough sleep. Keep your room dark and quiet. Try to go to bed at the same time every night. · Eat a healthy diet. This includes whole grains, dairy, fruits, vegetables, and protein. Eat foods from each of these groups. · Try to lower your stress. TXP DERREK INTERROGATIONS 9/13/2017 7/13/2017 6/14/2017 5/17/2017 5/4/2017 4/6/2017 3/30/2017   Type Heartware Heartware Heartware Heartware Heartware Heartware -   Flow 4.6 4.1 4.0 4 3.9 4.5 -   Speed 2700 2700 2700 2700 2700 2700 -   Power ([place]) 4.5 4.4 4.6 4.3 4.4 4.1 -   Low Flow Alarm 2.0 2.0 2.5 2.5 2.5 2.5 -   High Power Alarm 6.5 6.5 6.0 6.0 6.0 6.0 -   Pulsatility Intermittent pulse Intermittent pulse Intermittent pulse Intermittent pulse Intermittent pulse Intermittent pulse Intermittent pulse   }   disorder displays dangerous behavior, and you think the person might hurt himself or herself or someone else. Call your doctor now or seek immediate medical care if:  · You hear voices. · Someone you know has bipolar disorder and talks about suicide. Keep the numbers for these national suicide hotlines: 3-381-251-TALK (2-392.503.7127) and 0-490-TFZHCDY (5-923.901.7261). If a suicide threat seems real, with a specific plan and a way to carry it out, stay with the person, or ask someone you trust to stay with the person, until you can get help. · Someone you know has bipolar disorder and:  ¨ Starts to give away possessions. ¨ Is using illegal drugs or drinking alcohol heavily. ¨ Talks or writes about death, including writing suicide notes or talking about guns, knives, or pills. ¨ Talks or writes about hurting someone else. ¨ Starts to spend a lot of time alone. ¨ Acts very aggressively or suddenly appears calm. ¨ Talks about beliefs that are not based in reality (delusions). Watch closely for changes in your health, and be sure to contact your doctor if:  · You cannot go to your counseling sessions. Where can you learn more? Go to [email or web address] and sign in to your [employer] account. Enter K052 in the [facility] box to learn more about \"Bipolar Disorder: Care Instructions. \"     If you do not have an account, please click on the \"Sign Up Now\" link. Current as of: July 26, 2016  Content Version: 11.3  © 9624-6915 [employer], Inc[employer]. Care instructions adapted under license by [facility] ([facility]). If you have questions about a medical condition or this instruction, always ask your healthcare professional. [place] [address] any warranty or liability for your use of this information.        Painful Urination (Dysuria): Care Instructions  Your Care Instructions  Burning pain with urination (dysuria) is a common symptom of a urinary tract infection or other urinary problems. The bladder may become inflamed. This can cause pain when the bladder fills and empties. You may also feel pain if the tube that carries urine from the bladder to the outside of the body (urethra) gets irritated or infected. Sexually transmitted infections (STIs) also may cause pain when you urinate. Sometimes the pain can be caused by things other than an infection. The urethra can be irritated by soaps, perfumes, or foreign objects in the urethra. Kidney stones can cause pain when they pass through the urethra. The cause may be hard to find. You may need tests. Treatment for painful urination depends on the cause. Follow-up care is a key part of your treatment and safety. Be sure to make and go to all appointments, and call your doctor if you are having problems. It's also a good idea to know your test results and keep a list of the medicines you take. How can you care for yourself at home? · Drink extra water for the next day or two. This will help make the urine less concentrated. (If you have kidney, heart, or liver disease and have to limit fluids, talk with your doctor before you increase the amount of fluids you drink.)  · Avoid drinks that are carbonated or have caffeine. They can irritate the bladder. · Urinate often. Try to empty your bladder each time. For women:  · Urinate right after you have sex. · After going to the bathroom, wipe from front to back. · Avoid douches, bubble baths, and feminine hygiene sprays. And avoid other feminine hygiene products that have deodorants. When should you call for help? Call your doctor now or seek immediate medical care if:  · You have new symptoms, such as fever, nausea, or vomiting. · You have new or worse symptoms of a urinary problem. For example:  ¨ You have blood or pus in your urine. ¨ You have chills or body aches. ¨ It hurts worse to urinate. ¨ You have groin or belly pain.   ¨ You have pain in your back just below your rib cage (the flank area). Watch closely for changes in your health, and be sure to contact your doctor if you have any problems. Where can you learn more? Go to [email or web address] and sign in to your [employer] account. Enter X779 in the [facility] app box to learn more about \"Painful Urination (Dysuria): Care Instructions. \"     If you do not have an account, please click on the \"Sign Up Now\" link. Current as of: November 28, 2016  Content Version: 11.3  © 6184-9683 [employer]. Care instructions adapted under license by [facility] ([facility]). If you have questions about a medical condition or this instruction, always ask your healthcare professional. [place] [address] any warranty or liability for your use of this information. Urinary Tract Infection in Women: Care Instructions  Your Care Instructions    A urinary tract infection, or UTI, is a general term for an infection anywhere between the kidneys and the urethra (where urine comes out). Most UTIs are bladder infections. They often cause pain or burning when you urinate. UTIs are caused by bacteria and can be cured with antibiotics. Be sure to complete your treatment so that the infection goes away. Follow-up care is a key part of your treatment and safety. Be sure to make and go to all appointments, and call your doctor if you are having problems. It's also a good idea to know your test results and keep a list of the medicines you take. How can you care for yourself at home? · Take your antibiotics as directed. Do not stop taking them just because you feel better. You need to take the full course of antibiotics. · Drink extra water and other fluids for the next day or two. This may help wash out the bacteria that are causing the infection.  (If you have kidney, heart, or liver disease and have to limit fluids, talk with your doctor before you increase your fluid intake.)  · Avoid drinks that are carbonated or have caffeine. They can irritate the bladder. · Urinate often. Try to empty your bladder each time. · To relieve pain, take a hot bath or lay a heating pad set on low over your lower belly or genital area. Never go to sleep with a heating pad in place. To prevent UTIs  · Drink plenty of water each day. This helps you urinate often, which clears bacteria from your system. (If you have kidney, heart, or liver disease and have to limit fluids, talk with your doctor before you increase your fluid intake.)  · Urinate when you need to. · Urinate right after you have sex. · Change sanitary pads often. · Avoid douches, bubble baths, feminine hygiene sprays, and other feminine hygiene products that have deodorants. · After going to the bathroom, wipe from front to back. When should you call for help? Call your doctor now or seek immediate medical care if:  · Symptoms such as fever, chills, nausea, or vomiting get worse or appear for the first time. · You have new pain in your back just below your rib cage. This is called flank pain. · There is new blood or pus in your urine. · You have any problems with your antibiotic medicine. Watch closely for changes in your health, and be sure to contact your doctor if:  · You are not getting better after taking an antibiotic for 2 days. · Your symptoms go away but then come back. Where can you learn more? Go to [email or web address] and sign in to your [employer] account. Enter W082 in the [facility] box to learn more about \"Urinary Tract Infection in Women: Care Instructions. \"     If you do not have an account, please click on the \"Sign Up Now\" link. Current as of: November 28, 2016  Content Version: 11.3  © 0230-6770 [employer]. Care instructions adapted under license by [facility] ([facility]).  If you have questions about a medical condition or this instruction, always ask your healthcare professional. [employer] disclaims any warranty or liability for your use of this information.

## 2017-10-03 NOTE — PROGRESS NOTES
[address]  [place], [address] [place] [address] Rd  Phone [phone]   Fax [phone]      OFFICE VISIT: 10/3/2017    Ju Beauchamp- : 1955      Reason For Visit:  Mame Conte is a 63 y.o. female who is here for Discuss Medications (latuda ) and Dysuria (UTI)         Health Maintenance colon on Friday mamogram needed at [facility]     Patient is here today about latuda   And also a uti    Dysuria    This is a new problem. The current episode started in the past 7 days. The problem occurs intermittently. The problem has been gradually worsening. The quality of the pain is described as aching. The pain is at a severity of 2/10. The pain is mild. There has been no fever. She is not sexually active. There is no history of pyelonephritis. Associated symptoms include hesitancy, nausea and urgency. Pertinent negatives include no chills, discharge, flank pain, frequency, possible pregnancy, sweats or vomiting. She has tried nothing for the symptoms. The treatment provided no relief. Her past medical history is significant for recurrent UTIs. Reports urinary incont and others feels has to go   But just ana maría    Reports doing well  Had her biopsy of cervix  Reviewed results and discussed  Check pap in 1 year  She is due for her mammogram    She would like a new walker  Her other broke    Reports she needs her latuda again  She reports bad episode last week  She feels like she has to have it   Severe depression and went manic  Didn't have this on latuda           height is 5' 1\" (1.549 m) and weight is 212 lb (96.2 kg). Her temporal temperature is 98 °F (36.7 °C). Her blood pressure is 120/68 and her pulse is 85. Her oxygen saturation is 98%. Body mass index is 40.06 kg/(m^2).     Results for orders placed or performed in visit on 10/03/17   POCT Urinalysis no Micro   Result Value Ref Range    Color, UA yellow     Clarity, UA cloudy     Glucose, UA POC negative     Bilirubin, UA negative     Ketones, UA negative     Spec Grav, UA 1.025     Blood, UA POC negative     pH, UA 5.5     Protein, UA POC negative     Urobilinogen, UA 0.2     Leukocytes, UA trace     Nitrite, UA positive        I have reviewed the following with the Ms. Bueno   Lab Review   Admission on 08/19/2017, Discharged on 08/20/2017   Component Date Value    P-R Interval 08/19/2017 191     QRS Duration 08/19/2017 92     Q-T Interval 08/19/2017 413     QTc Calculation (Bazett) 08/19/2017 462     P Axis 08/19/2017 38     T Axis 08/19/2017 55     WBC 08/19/2017 13.1*    RBC 08/19/2017 4.03*    Hemoglobin 08/19/2017 9.8*    Hematocrit 08/19/2017 31.9*    MCV 08/19/2017 79.2*    MCH 08/19/2017 24.3*    MCHC 08/19/2017 30.7*    RDW 08/19/2017 19.1*    Platelets 44/17/6367 362     MPV 08/19/2017 10.2     Neutrophils % 08/19/2017 69.3*    Lymphocytes % 08/19/2017 21.9     Monocytes % 08/19/2017 6.4     Eosinophils % 08/19/2017 1.8     Basophils % 08/19/2017 0.4     Neutrophils # 08/19/2017 9.1*    Lymphocytes # 08/19/2017 2.9     Monocytes # 08/19/2017 0.80     Eosinophils # 08/19/2017 0.20     Basophils # 08/19/2017 0.10     Sodium 08/19/2017 130*    Potassium 08/19/2017 5.5*    Chloride 08/19/2017 95*    CO2 08/19/2017 21*    Anion Gap 08/19/2017 14     Glucose 08/19/2017 100     BUN 08/19/2017 24*    CREATININE 08/19/2017 0.7     GFR Non- 08/19/2017 >60     Calcium 08/19/2017 8.7*    Total Protein 08/19/2017 6.6     Alb 08/19/2017 4.0     Total Bilirubin 08/19/2017 <0.2     Alkaline Phosphatase 08/19/2017 147*    ALT 08/19/2017 12     AST 08/19/2017 21     D-Dimer, Quant 08/19/2017 3.24*    POC Troponin I 08/19/2017 0.00     Performed on 08/19/2017 i-Stat     P-R Interval 08/19/2017 196     QRS Duration 08/19/2017 90     Q-T Interval 08/19/2017 377     QTc Calculation (Bazett) 08/19/2017 449     P Axis 08/19/2017 36     T Axis 08/19/2017 63     POC Troponin I 08/19/2017 0.00     Performed on 08/19/2017 i-Stat     Troponin 08/20/2017 <0.01     Sodium 08/20/2017 136     Potassium 08/20/2017 4.3     Chloride 08/20/2017 101     CO2 08/20/2017 20*    Anion Gap 08/20/2017 15     Glucose 08/20/2017 114*    BUN 08/20/2017 20     CREATININE 08/20/2017 0.7     GFR Non- 08/20/2017 >60     Calcium 08/20/2017 8.2*    Total Protein 08/20/2017 6.2*    Alb 08/20/2017 3.5     Total Bilirubin 08/20/2017 <0.2     Alkaline Phosphatase 08/20/2017 151*    ALT 08/20/2017 11     AST 08/20/2017 14     Cholesterol, Total 08/20/2017 167     Triglycerides 08/20/2017 258*    HDL 08/20/2017 35*    LDL Calculated 08/20/2017 80     WBC 08/20/2017 8.8     RBC 08/20/2017 3.66*    Hemoglobin 08/20/2017 9.0*    Hematocrit 08/20/2017 29.2*    MCV 08/20/2017 79.8*    MCH 08/20/2017 24.6*    MCHC 08/20/2017 30.8*    RDW 08/20/2017 19.2*    Platelets 77/33/5654 339     MPV 08/20/2017 9.7     Neutrophils % 08/20/2017 50.5     Lymphocytes % 08/20/2017 35.0     Monocytes % 08/20/2017 9.1     Eosinophils % 08/20/2017 4.1     Basophils % 08/20/2017 0.7     Neutrophils # 08/20/2017 4.4     Lymphocytes # 08/20/2017 3.1     Monocytes # 08/20/2017 0.80     Eosinophils # 08/20/2017 0.40     Basophils # 08/20/2017 0.10     POC Glucose 08/19/2017 100*    Performed on 08/19/2017 AccuChek     POC Glucose 08/20/2017 119*    Performed on 08/20/2017 AccuChek     POC Glucose 08/20/2017 188*    Performed on 08/20/2017 AccuChek    Orders Only on 07/11/2017   Component Date Value    Ferritin 07/11/2017 6.5*    Iron 07/11/2017 20*   Office Visit on 07/11/2017   Component Date Value    WBC 07/11/2017 10.6     RBC 07/11/2017 3.97*    Hemoglobin 07/11/2017 9.6*    Hematocrit 07/11/2017 32.9*    MCV 07/11/2017 82.9     MCH 07/11/2017 24.2*    MCHC 07/11/2017 29.2*    RDW 07/11/2017 18.8*    Platelets 42/04/7224 439*    MPV 07/11/2017 9.9     Neutrophils % 07/11/2017 60.1     Lymphocytes % 07/11/2017 29.3     Monocytes % 07/11/2017 7.1     Eosinophils % 07/11/2017 2.4     Basophils % 07/11/2017 0.8     Neutrophils # 07/11/2017 6.4     Lymphocytes # 07/11/2017 3.1     Monocytes # 07/11/2017 0.80     Eosinophils # 07/11/2017 0.30     Basophils # 07/11/2017 0.10     POC Sodium 07/11/2017 135*    POC Potassium 07/11/2017 4.3     POC Chloride 07/11/2017 104     CO2 07/11/2017 21     POC Anion Gap 07/11/2017 10     POC Glucose 07/11/2017 106*    POC BUN 07/11/2017 13     POC Creatinine 07/11/2017 0.6     GFR Non- 07/11/2017 >60     Calcium, Ion 07/11/2017 1.13     Hemoglobin 07/11/2017 11.2*    POC Hematocrit 07/11/2017 33*    Performed on 07/11/2017 i-Stat    Orders Only on 07/06/2017   Component Date Value    Iron 07/06/2017 16*    TIBC 07/06/2017 443*    Iron Saturation 07/06/2017 4*    Vitamin B-12 07/06/2017 157*    Folate 07/06/2017 8.1    Office Visit on 06/26/2017   Component Date Value    Wet Prep 06/26/2017 negative    Hospital Outpatient Visit on 05/25/2017   Component Date Value    HPV, High Risk 05/25/2017 Positive*    HPV SOURCE 05/25/2017 cervix    Orders Only on 05/09/2017   Component Date Value    WBC 05/09/2017 9.7     RBC 05/09/2017 4.46     Hemoglobin 05/09/2017 10.5*    Hematocrit 05/09/2017 36.5*    MCV 05/09/2017 81.8     MCH 05/09/2017 23.5*    MCHC 05/09/2017 28.8*    RDW 05/09/2017 24.4*    Platelets 13/65/6556 406*    MPV 05/09/2017 10.6*    Neutrophils % 05/09/2017 57.7     Lymphocytes % 05/09/2017 31.1     Monocytes % 05/09/2017 8.1     Eosinophils % 05/09/2017 1.9     Basophils % 05/09/2017 1.0     Neutrophils # 05/09/2017 5.6     Lymphocytes # 05/09/2017 3.0     Monocytes # 05/09/2017 0.80     Eosinophils # 05/09/2017 0.20     Basophils # 05/09/2017 0.10     Sodium 05/09/2017 136     Potassium 05/09/2017 4.9     Chloride 05/09/2017 102     CO2 05/09/2017 18*    Anion Gap 05/09/2017 16     Glucose 05/09/2017 116*    nightly Take one and a half tablets nightly Yes JORGE Ramsey   ipratropium-albuterol (DUONEB) 0.5-2.5 (3) MG/3ML SOLN nebulizer solution INHALE THE CONTENTS OF 1 VIAL VIA NEBULIZER FOUR TIMES DAILY Yes JORGE Ramsey   benztropine (COGENTIN) 1 MG tablet Take 1 tablet by mouth 2 times daily Yes JORGE Ramsey   busPIRone (BUSPAR) 15 MG tablet Take 1 tablet by mouth 3 times daily Yes JORGE Ramsey   donepezil (ARICEPT) 10 MG tablet Take 1 tablet by mouth nightly Yes JORGE Ramsey   mirtazapine (REMERON) 15 MG tablet Take 1 tablet by mouth daily Yes OJRGE Ramsey   celecoxib (CELEBREX) 200 MG capsule TAKE (1) CAPSULE DAILY BY MOUTH. Yes JORGE Ramsey   pantoprazole (PROTONIX) 40 MG tablet TAKE (1) TABLET BY MOUTH DAILY.  Yes JORGE Ramsey   Liraglutide (VICTOZA) 18 MG/3ML SOPN SC injection INJECT 1.8 MG SUBQ DAILY Yes JORGE Ramsey   OXcarbazepine (TRILEPTAL) 300 MG tablet Take 1 tablet by mouth 2 times daily Yes JORGE Ramsey   albuterol sulfate HFA (PROAIR HFA) 108 (90 BASE) MCG/ACT inhaler Inhale 2 puffs into the lungs every 6 hours as needed for Wheezing Yes JORGE Ramsey       Allergies: Morphine and Codeine    Past Medical History:   Diagnosis Date    Alcohol overdose     history of    Anemia     Anxiety     [facility]     Brain tumor [facility]     Chronic back pain     ruptured discs    COPD (chronic obstructive pulmonary disease) ([facility]     Dementia     Depression     Diabetes ([facility]     Elevated liver enzymes     Frequent falls     Headache     History of kidney infection     Hyperlipidemia     Hypertension     Neuropathy (HCC)     Obesity     Osteoarthritis     Oxygen dependent     Psoriasis     Scoliosis     Sleep apnea     UTI (urinary tract infection)        Past Surgical History:   Procedure Laterality Date    APPENDECTOMY       SECTION      CHOLECYSTECTOMY idea to know your test results and keep a list of the medicines you take. How can you care for yourself at home? · Be safe with medicines. Take your medicines exactly as prescribed. Do not stop or change a medicine without talking to your doctor first. Raul Lyon and your doctor may need to try different combinations of medicines to find what works for you. · Take your medicines on schedule to keep your moods even. When you feel good, you may think that you do not need your medicines. But it is important to keep taking them. · Go to your counseling sessions. Call and talk with your counselor if you can't go to a session or if you don't think the sessions are helping. Do not just stop going. · Get at least 30 minutes of activity on most days of the week. Walking is a good choice. You also may want to do other things, such as running, swimming, or cycling. · Get enough sleep. Keep your room dark and quiet. Try to go to bed at the same time every night. · Eat a healthy diet. This includes whole grains, dairy, fruits, vegetables, and protein. Eat foods from each of these groups. · Try to lower your stress. Manage your time, build a strong support system, and lead a healthy lifestyle. To lower your stress, try physical activity, slow deep breathing, or getting a massage. · Do not use alcohol or illegal drugs. · Learn the early signs of your mood changes. You can then take steps to help yourself feel better. · Ask for help from friends and family when you need it. You may need help with daily chores when you are depressed. When you are manic, you may need support to control your high energy levels. What should you do if someone in your family has bipolar disorder? · Learn about the disease and the signs that it is getting worse. · Remind your family member that you love him or her. · Make a plan with all family members about how to take care of your loved one when his or her symptoms are bad.   · Talk about your fears and concerns and those of other family members. Seek counseling if needed. · Do not focus attention only on the person who is in treatment. · Remind yourself that it will take time for changes to occur. · Do not blame yourself for the disease. · Know your legal rights and the legal rights of your family member. Support groups or counselors can help you with this information. · Take care of yourself. Keep up with your own interests, such as your career, hobbies, and friends. Use exercise, positive self-talk, deep breathing, and other relaxing exercises to help lower your stress. · Give yourself time to grieve. You may need to deal with emotions such as anger, fear, and frustration. After you work through your feelings, you will be better able to care for yourself and your family. · If you are having a hard time with your feelings or with your relationship with your family member, talk with a counselor. When should you call for help? Call 911 anytime you think you may need emergency care. For example, call if:  · You feel like hurting yourself or someone else. · Someone who has bipolar disorder displays dangerous behavior, and you think the person might hurt himself or herself or someone else. Call your doctor now or seek immediate medical care if:  · You hear voices. · Someone you know has bipolar disorder and talks about suicide. Keep the numbers for these national suicide hotlines: 6-140-701-TALK (2-845.537.5330) and 3-885-JWDBRLV (0-317.179.3166). If a suicide threat seems real, with a specific plan and a way to carry it out, stay with the person, or ask someone you trust to stay with the person, until you can get help. · Someone you know has bipolar disorder and:  ¨ Starts to give away possessions. ¨ Is using illegal drugs or drinking alcohol heavily. ¨ Talks or writes about death, including writing suicide notes or talking about guns, knives, or pills.   ¨ Talks or writes about hurting someone else. ¨ Starts to spend a lot of time alone. ¨ Acts very aggressively or suddenly appears calm. ¨ Talks about beliefs that are not based in reality (delusions). Watch closely for changes in your health, and be sure to contact your doctor if:  · You cannot go to your counseling sessions. Where can you learn more? Go to [email or web address] and sign in to your Yecuris account. Enter K052 in the youcalc box to learn more about \"Bipolar Disorder: Care Instructions. \"     If you do not have an account, please click on the \"Sign Up Now\" link. Current as of: July 26, 2016  Content Version: 11.3  © 4005-5988 [employer]. Care instructions adapted under license by [facility] ([facility]). If you have questions about a medical condition or this instruction, always ask your healthcare professional. [place] [address] any warranty or liability for your use of this information. Painful Urination (Dysuria): Care Instructions  Your Care Instructions  Burning pain with urination (dysuria) is a common symptom of a urinary tract infection or other urinary problems. The bladder may become inflamed. This can cause pain when the bladder fills and empties. You may also feel pain if the tube that carries urine from the bladder to the outside of the body (urethra) gets irritated or infected. Sexually transmitted infections (STIs) also may cause pain when you urinate. Sometimes the pain can be caused by things other than an infection. The urethra can be irritated by soaps, perfumes, or foreign objects in the urethra. Kidney stones can cause pain when they pass through the urethra. The cause may be hard to find. You may need tests. Treatment for painful urination depends on the cause. Follow-up care is a key part of your treatment and safety. Be sure to make and go to all appointments, and call your doctor if you are having problems.  It's also a good idea to know your test results and keep a list of the medicines you take. How can you care for yourself at home? · Drink extra water for the next day or two. This will help make the urine less concentrated. (If you have kidney, heart, or liver disease and have to limit fluids, talk with your doctor before you increase the amount of fluids you drink.)  · Avoid drinks that are carbonated or have caffeine. They can irritate the bladder. · Urinate often. Try to empty your bladder each time. For women:  · Urinate right after you have sex. · After going to the bathroom, wipe from front to back. · Avoid douches, bubble baths, and feminine hygiene sprays. And avoid other feminine hygiene products that have deodorants. When should you call for help? Call your doctor now or seek immediate medical care if:  · You have new symptoms, such as fever, nausea, or vomiting. · You have new or worse symptoms of a urinary problem. For example:  ¨ You have blood or pus in your urine. ¨ You have chills or body aches. ¨ It hurts worse to urinate. ¨ You have groin or belly pain. ¨ You have pain in your back just below your rib cage (the flank area). Watch closely for changes in your health, and be sure to contact your doctor if you have any problems. Where can you learn more? Go to [email or web address] org and sign in to your MirDeneg account. Enter W469 in the [facility] box to learn more about \"Painful Urination (Dysuria): Care Instructions. \"     If you do not have an account, please click on the \"Sign Up Now\" link. Current as of: November 28, 2016  Content Version: 11.3  © 8054-4579 [employer]. Care instructions adapted under license by [facility] ([facility]). If you have questions about a medical condition or this instruction, always ask your healthcare professional. [place] [address] any warranty or liability for your use of this information.        Urinary Tract Infection in Women: your doctor now or seek immediate medical care if:  · Symptoms such as fever, chills, nausea, or vomiting get worse or appear for the first time. · You have new pain in your back just below your rib cage. This is called flank pain. · There is new blood or pus in your urine. · You have any problems with your antibiotic medicine. Watch closely for changes in your health, and be sure to contact your doctor if:  · You are not getting better after taking an antibiotic for 2 days. · Your symptoms go away but then come back. Where can you learn more? Go to [email or web address]://Nano MagneticspeHidInImage.[email or web address] org and sign in to your OggiFinogi account. Enter E530 in the ActiveO box to learn more about \"Urinary Tract Infection in Women: Care Instructions. \"     If you do not have an account, please click on the \"Sign Up Now\" link. Current as of: November 28, 2016  Content Version: 11.3  © 2117-1222 [employer]. Care instructions adapted under license by [facility] ([facility]). If you have questions about a medical condition or this instruction, always ask your healthcare professional. [place] [address] any warranty or liability for your use of this information. Controlled Substances Monitoring: Additional Instructions: As always, patient is advised to bring in medication bottles in order to correctly reconcile with our current list.      Ju received counseling on the following healthy behaviors: plan of care    Patient given educational materials on plan    I have instructed Ju to complete a self tracking handout on none and instructed them to bring it with them to her next appointment. Discussed use, benefit, and side effects of prescribed medications. Barriers to medication compliance addressed. All patient questions answered. Pt voiced understanding.      JORGE Quintero

## 2017-10-03 NOTE — MR AVS SNAPSHOT
After Visit Summary             Ju Soto   10/3/2017 2:45 PM   Office Visit    Description:  Female : 1955   Provider:  JORGE Morrissey   Department:  [facility]              Your Follow-Up and Future Appointments         Below is a list of your follow-up and future appointments. This may not be a complete list as you may have made appointments directly with providers that we are not aware of or your providers may have made some for you. Please call your providers to confirm appointments. It is important to keep your appointments. Please bring your current insurance card, photo ID, co-pay, and all medication bottles to your appointment. If self-pay, payment is expected at the time of service. Your To-Do List     Future Appointments Provider Department Dept Phone    10/13/2017 2:30 PM [phone]    Please arrive 15 minutes prior to appointment, bring photo ID and insurance card. Future Orders Complete By Expires    Mammography screening bilateral [ Custom]  10/3/2017 (Approximate) 10/23/2018    Scheduling Instructions:    [facility]    Follow-Up    Return in about 10 days (around 10/13/2017) for recheck UTI.          Information from Your Visit        Department     Name Address Phone Fax    [address] [facility] [place] [address] [facility] [address] [facility] [phone]      You Were Seen for:         Comments    Bipolar affective disorder, currently depressed, moderate ([facility] [address]   [[id number]]         Vital Signs     Blood Pressure Pulse Temperature Height Weight Oxygen Saturation    120/68 (Site: Right Arm, Position: Sitting, Cuff Size: Large Adult) 85 98 °F (36.7 °C) (Temporal) 5' 1\" (1.549 m) 212 lb (96.2 kg) 98%    Body Mass Index Smoking Status                40.06 kg/m2 Current Every Day Smoker          Additional Information about your Body Mass Index (BMI) Your BMI as listed above is considered obese (30 or more). BMI is an estimate of body fat, calculated from your height and weight. The higher your BMI, the greater your risk of heart disease, high blood pressure, type 2 diabetes, stroke, gallstones, arthritis, sleep apnea, and certain cancers. BMI is not perfect. It may overestimate body fat in athletes and people who are more muscular. Even a small weight loss (between 5 and 10 percent of your current weight) by decreasing your calorie intake and becoming more physically active will help lower your risk of developing or worsening diseases associated with obesity. Learn more at: [email or web address]          Instructions         Bipolar Disorder: Care Instructions  Your Care Instructions  Bipolar disorder is an illness that causes extreme mood changes, from times of very high energy (manic episodes) to times of depression. But many people with bipolar disorder show only the symptoms of depression. These moods may cause problems with your work, school, family life, friendships, and how well you function. This disease is also called manic-depression. There is no cure for bipolar disorder, but it can be helped with medicines. Counseling may also help. It is important to take your medicines exactly as prescribed, even when you feel well. You may need lifelong treatment. Follow-up care is a key part of your treatment and safety. Be sure to make and go to all appointments, and call your doctor if you are having problems. It's also a good idea to know your test results and keep a list of the medicines you take. How can you care for yourself at home? · Be safe with medicines. Take your medicines exactly as prescribed. Do not stop or change a medicine without talking to your doctor first. Renuka Jones and your doctor may need to try different combinations of medicines to find what works for you. Support groups or counselors can help you with this information. · Take care of yourself. Keep up with your own interests, such as your career, hobbies, and friends. Use exercise, positive self-talk, deep breathing, and other relaxing exercises to help lower your stress. · Give yourself time to grieve. You may need to deal with emotions such as anger, fear, and frustration. After you work through your feelings, you will be better able to care for yourself and your family. · If you are having a hard time with your feelings or with your relationship with your family member, talk with a counselor. When should you call for help? Call 911 anytime you think you may need emergency care. For example, call if:  · You feel like hurting yourself or someone else. · Someone who has bipolar disorder displays dangerous behavior, and you think the person might hurt himself or herself or someone else. Call your doctor now or seek immediate medical care if:  · You hear voices. · Someone you know has bipolar disorder and talks about suicide. Keep the numbers for these national suicide hotlines: 1-220-287-TALK (0-494.202.2186) and 7-888-OGFPCTQ (1-654.826.8577). If a suicide threat seems real, with a specific plan and a way to carry it out, stay with the person, or ask someone you trust to stay with the person, until you can get help. · Someone you know has bipolar disorder and:  ¨ Starts to give away possessions. ¨ Is using illegal drugs or drinking alcohol heavily. ¨ Talks or writes about death, including writing suicide notes or talking about guns, knives, or pills. ¨ Talks or writes about hurting someone else. ¨ Starts to spend a lot of time alone. ¨ Acts very aggressively or suddenly appears calm. ¨ Talks about beliefs that are not based in reality (delusions). Watch closely for changes in your health, and be sure to contact your doctor if:  · You cannot go to your counseling sessions. Where can you learn more? Go to https://chpepiceweb.UmBio. org and sign in to your [employer] account. Enter K052 in the [facility] box to learn more about \"Bipolar Disorder: Care Instructions. \"     If you do not have an account, please click on the \"Sign Up Now\" link. Current as of: July 26, 2016  Content Version: 11.3  © 3547-4936 FaceTags. Care instructions adapted under license by [facility] ([facility]). If you have questions about a medical condition or this instruction, always ask your healthcare professional. [place] [address] any warranty or liability for your use of this information. Painful Urination (Dysuria): Care Instructions  Your Care Instructions  Burning pain with urination (dysuria) is a common symptom of a urinary tract infection or other urinary problems. The bladder may become inflamed. This can cause pain when the bladder fills and empties. You may also feel pain if the tube that carries urine from the bladder to the outside of the body (urethra) gets irritated or infected. Sexually transmitted infections (STIs) also may cause pain when you urinate. Sometimes the pain can be caused by things other than an infection. The urethra can be irritated by soaps, perfumes, or foreign objects in the urethra. Kidney stones can cause pain when they pass through the urethra. The cause may be hard to find. You may need tests. Treatment for painful urination depends on the cause. Follow-up care is a key part of your treatment and safety. Be sure to make and go to all appointments, and call your doctor if you are having problems. It's also a good idea to know your test results and keep a list of the medicines you take. How can you care for yourself at home? · Drink extra water for the next day or two. This will help make the urine less concentrated.  (If you have kidney, heart, or liver disease and have to limit fluids, talk with your doctor before you increase the amount of fluids you drink.)  · Avoid drinks that are carbonated or have caffeine. They can irritate the bladder. · Urinate often. Try to empty your bladder each time. For women:  · Urinate right after you have sex. · After going to the bathroom, wipe from front to back. · Avoid douches, bubble baths, and feminine hygiene sprays. And avoid other feminine hygiene products that have deodorants. When should you call for help? Call your doctor now or seek immediate medical care if:  · You have new symptoms, such as fever, nausea, or vomiting. · You have new or worse symptoms of a urinary problem. For example:  ¨ You have blood or pus in your urine. ¨ You have chills or body aches. ¨ It hurts worse to urinate. ¨ You have groin or belly pain. ¨ You have pain in your back just below your rib cage (the flank area). Watch closely for changes in your health, and be sure to contact your doctor if you have any problems. Where can you learn more? Go to https://Lessons Only.Smadex. org and sign in to your [employer] account. Enter H980 in the [employer] [facility] box to learn more about \"Painful Urination (Dysuria): Care Instructions. \"     If you do not have an account, please click on the \"Sign Up Now\" link. Current as of: November 28, 2016  Content Version: 11.3  © 7992-8965 [employer]. Care instructions adapted under license by [facility] ([facility]). If you have questions about a medical condition or this instruction, always ask your healthcare professional. [place] [address] any warranty or liability for your use of this information. Urinary Tract Infection in Women: Care Instructions  Your Care Instructions    A urinary tract infection, or UTI, is a general term for an infection anywhere between the kidneys and the urethra (where urine comes out). Most UTIs are bladder infections. They often cause pain or burning when you urinate. · There is new blood or pus in your urine. · You have any problems with your antibiotic medicine. Watch closely for changes in your health, and be sure to contact your doctor if:  · You are not getting better after taking an antibiotic for 2 days. · Your symptoms go away but then come back. Where can you learn more? Go to https://chpepiceweb.healthCogent [email or web address] org and sign in to your [employer] account. Enter O601 in the [facility] box to learn more about \"Urinary Tract Infection in Women: Care Instructions. \"     If you do not have an account, please click on the \"Sign Up Now\" link. Current as of: November 28, 2016  Content Version: 11.3  © 3237-6907 [employer]endia. Care instructions adapted under license by [facility] ([facility]). If you have questions about a medical condition or this instruction, always ask your healthcare professional. [place] [address] any warranty or liability for your use of this information. Today's Medication Changes          These changes are accurate as of: 10/3/17  3:08 PM.  If you have any questions, ask your nurse or doctor. START taking these medications           ciprofloxacin 500 MG tablet   Commonly known as:  CIPRO   Instructions: Take 1 tablet by mouth 2 times daily for 10 days   Quantity:  20 tablet   Refills:  0   Started by:  JORGE Hurd       LATUDA 40 MG Tabs tablet   Instructions:   Take 1 tablet by mouth daily   Quantity:  90 tablet   Refills:  3   Generic drug:  lurasidone   Started by:  JORGE Hurd         STOP taking these medications           Accu-Chek Gogo Soln   Stopped by:  JORGE Hurd       Insulin Pen Needle 29G X 12.7MM Misc   Commonly known as:  BD ULTRA-FINE PEN NEEDLES   Stopped by:  JORGE Hurd       NEEDLE (DISP) 23 G 23G X 1-1/2\" Misc   Stopped by:  JORGE Hurd       nystatin 200133 UNIT/GM cream   Commonly known as:  MYCOSTATIN rOPINIRole (REQUIP) 2 MG tablet Take 1 tablet by mouth 2 times daily    lisinopril (PRINIVIL;ZESTRIL) 10 MG tablet Take 1 tablet by mouth daily    pramipexole (MIRAPEX) 0.25 MG tablet Take 1 tablet by mouth 3 times daily    sertraline (ZOLOFT) 100 MG tablet Take one and a half tablets nightly    ipratropium-albuterol (DUONEB) 0.5-2.5 (3) MG/3ML SOLN nebulizer solution INHALE THE CONTENTS OF 1 VIAL VIA NEBULIZER FOUR TIMES DAILY    benztropine (COGENTIN) 1 MG tablet Take 1 tablet by mouth 2 times daily    busPIRone (BUSPAR) 15 MG tablet Take 1 tablet by mouth 3 times daily    donepezil (ARICEPT) 10 MG tablet Take 1 tablet by mouth nightly    mirtazapine (REMERON) 15 MG tablet Take 1 tablet by mouth daily    celecoxib (CELEBREX) 200 MG capsule TAKE (1) CAPSULE DAILY BY MOUTH.    pantoprazole (PROTONIX) 40 MG tablet TAKE (1) TABLET BY MOUTH DAILY.     Liraglutide (VICTOZA) 18 MG/3ML SOPN SC injection INJECT 1.8 MG SUBQ DAILY    OXcarbazepine (TRILEPTAL) 300 MG tablet Take 1 tablet by mouth 2 times daily    albuterol sulfate HFA (PROAIR HFA) 108 (90 BASE) MCG/ACT inhaler Inhale 2 puffs into the lungs every 6 hours as needed for Wheezing      Allergies              Morphine     Codeine Nausea And Vomiting      We Ordered/Performed the following           POCT Urinalysis no Micro     Walker rolling          Result Summary for POCT Urinalysis no Micro      Result Information       Status          Abnormal Final result (Collected: 10/3/2017  2:40 PM)           10/3/2017  2:41 PM      Component Results     Component Value    Color, UA yellow    Clarity, UA cloudy    Glucose, UA POC negative    Bilirubin, UA negative    Ketones, UA negative    Spec Grav, UA 1.025    Blood, UA POC negative    pH, UA 5.5    Protein, UA POC negative    Urobilinogen, UA 0.2    Leukocytes, UA trace    Nitrite, UA positive               Additional Information        Basic Information Date Of Birth Sex Race Ethnicity Preferred Language Preferred Written Language    1955 Female White Non-/Non  English English      Problem List as of 10/3/2017  Date Reviewed: 10/3/2017                Bipolar disorder, in full remission, most recent episode mixed ([address]    Chest pressure    Microcytic anemia    Diabetic neuropathy associated with type 2 diabetes mellitus ([address]    Alternating constipation and diarrhea    Chronic nausea    S/P gastric bypass    Polypharmacy    Hypertension    Osteoarthritis    Psoriasis    COPD (chronic obstructive pulmonary disease) ([address]    Bipolar disorder ([address]    Brain tumor ([address]    Diabetes ([address]    Sleep apnea    Obesity    Depression    Anxiety    History of kidney infection    Elevated liver enzymes    Headache    Frequent falls    Neuropathy ([address]    Dementia    Oxygen dependent    Anemia      Immunizations as of 10/3/2017     Name Date    Influenza Virus Vaccine 10/6/2016    Influenza Whole 10/5/2015    Pneumococcal Polysaccharide (Hndoznarh36) 12/1/2016    Zoster 10/6/2016      Preventive Care        Date Due    Tetanus Combination Vaccine (1 - Tdap) 1/2/1976    Colonoscopy 1/2/2007    Eye Exam By An Eye Doctor 12/17/2016    Diabetic Foot Exam 7/12/2017    Mammograms are recommended every 2 years for low/average risk patients aged 48 - 69, and every year for high risk patients per updated national guidelines. However these guidelines can be individualized by your provider. 7/12/2017    Yearly Flu Vaccine (1) 9/1/2017    Hemoglobin A1C (Test For Long-Term Glucose Control) 5/9/2018    Urine Check For Kidney Problems 5/9/2018    Pap Smear 5/24/2018    Cholesterol Screening 8/20/2018            MyChart Signup           Our records indicate that you have declined MyChart signup.

## 2017-10-04 ENCOUNTER — TELEPHONE (OUTPATIENT)
Dept: PRIMARY CARE CLINIC | Age: 62
End: 2017-10-04

## 2017-10-04 DIAGNOSIS — R53.1 WEAKNESS: Primary | ICD-10-CM

## 2017-10-04 NOTE — TELEPHONE ENCOUNTER
Karen Das with [facility] called, wants walker with seat please.  Faxed through [employer] to [phone]

## 2017-10-06 ENCOUNTER — TELEPHONE (OUTPATIENT)
Dept: GASTROENTEROLOGY | Age: 62
End: 2017-10-06

## 2017-10-06 DIAGNOSIS — K59.00 CONSTIPATION, UNSPECIFIED CONSTIPATION TYPE: Primary | ICD-10-CM

## 2017-10-06 NOTE — TELEPHONE ENCOUNTER
The patient's sister Brent Stock called me back and said she will notify her sister, will be seeing her soon. I asked her about the number we have and she said it is wrong, her correct number is [phone] and it has been corrected in her chart, I tried calling that number anyway and she did not answer but her sister will let her know we will be calling Monday [facility]).  Sim jalloh

## 2017-10-06 NOTE — TELEPHONE ENCOUNTER
Evelina Pandya has left for the day. Do you mind calling the patient and letting her know that Fady Baig will be calling her to get these things scheduled with for her on Monday. Then forward this to [address]?

## 2017-10-06 NOTE — TELEPHONE ENCOUNTER
She has a megacolon and chronic constipation. Lets get a KUB. Then I want to start her on linzess daily, make an appt to see me in office in 4 weeks to note med eff of linzess.

## 2017-10-06 NOTE — TELEPHONE ENCOUNTER
I tried calling the patient at he number listed but it is incorrect and I tried it twice, some man answered the phone and said this was not her number, I called her sister who is listed as emergency contact to have Ju call me back, I will go ahead and forward this to Toño Rollins.  [facility]

## 2017-10-31 ENCOUNTER — TELEPHONE (OUTPATIENT)
Dept: PRIMARY CARE CLINIC | Age: 62
End: 2017-10-31

## 2017-10-31 DIAGNOSIS — F32.89 OTHER DEPRESSION: ICD-10-CM

## 2017-10-31 DIAGNOSIS — F31.9 BIPOLAR 1 DISORDER (HCC): Primary | ICD-10-CM

## 2017-11-22 ENCOUNTER — TELEPHONE (OUTPATIENT)
Dept: PRIMARY CARE CLINIC | Age: 62
End: 2017-11-22

## 2017-11-22 NOTE — TELEPHONE ENCOUNTER
Pt called wanting to talk about her eyebrow staying arched and that she feels funny. I called pt and had to leave a voicemail and tell her to call back.

## 2017-12-01 ENCOUNTER — OFFICE VISIT (OUTPATIENT)
Dept: PRIMARY CARE CLINIC | Age: 62
End: 2017-12-01
Payer: MEDICARE

## 2017-12-01 VITALS
SYSTOLIC BLOOD PRESSURE: 128 MMHG | WEIGHT: 209.5 LBS | OXYGEN SATURATION: 98 % | DIASTOLIC BLOOD PRESSURE: 80 MMHG | HEART RATE: 95 BPM | TEMPERATURE: 98.9 F | HEIGHT: 61 IN | BODY MASS INDEX: 39.55 KG/M2

## 2017-12-01 DIAGNOSIS — K59.00 CONSTIPATION, UNSPECIFIED CONSTIPATION TYPE: ICD-10-CM

## 2017-12-01 DIAGNOSIS — Z23 NEED FOR INFLUENZA VACCINATION: ICD-10-CM

## 2017-12-01 DIAGNOSIS — J06.9 VIRAL URI WITH COUGH: Primary | ICD-10-CM

## 2017-12-01 PROCEDURE — 99213 OFFICE O/P EST LOW 20 MIN: CPT | Performed by: NURSE PRACTITIONER

## 2017-12-01 PROCEDURE — 3014F SCREEN MAMMO DOC REV: CPT | Performed by: NURSE PRACTITIONER

## 2017-12-01 PROCEDURE — 90686 IIV4 VACC NO PRSV 0.5 ML IM: CPT | Performed by: NURSE PRACTITIONER

## 2017-12-01 PROCEDURE — G8484 FLU IMMUNIZE NO ADMIN: HCPCS | Performed by: NURSE PRACTITIONER

## 2017-12-01 PROCEDURE — G8427 DOCREV CUR MEDS BY ELIG CLIN: HCPCS | Performed by: NURSE PRACTITIONER

## 2017-12-01 PROCEDURE — 3017F COLORECTAL CA SCREEN DOC REV: CPT | Performed by: NURSE PRACTITIONER

## 2017-12-01 PROCEDURE — 4004F PT TOBACCO SCREEN RCVD TLK: CPT | Performed by: NURSE PRACTITIONER

## 2017-12-01 PROCEDURE — G0008 ADMIN INFLUENZA VIRUS VAC: HCPCS | Performed by: NURSE PRACTITIONER

## 2017-12-01 PROCEDURE — G8417 CALC BMI ABV UP PARAM F/U: HCPCS | Performed by: NURSE PRACTITIONER

## 2017-12-01 RX ORDER — GUAIFENESIN 600 MG/1
600 TABLET, EXTENDED RELEASE ORAL 2 TIMES DAILY
Qty: 20 TABLET | Refills: 0 | Status: SHIPPED | OUTPATIENT
Start: 2017-12-01 | End: 2018-01-09

## 2017-12-01 ASSESSMENT — ENCOUNTER SYMPTOMS
NAUSEA: 0
BACK PAIN: 0
WHEEZING: 0
SINUS PRESSURE: 1
COUGH: 1
SINUS PAIN: 1
CHEST TIGHTNESS: 0
DIARRHEA: 1
SHORTNESS OF BREATH: 0
VOMITING: 0

## 2017-12-01 NOTE — PROGRESS NOTES
[address]  [place], [address]  Phone [phone]   Fax [phone]      OFFICE VISIT: 2017    Melody Romayne Oneida- : 1955      Reason For Visit:  Dyllan Gould is a 63 y.o. female who is here for Cough; Otalgia; and Pharyngitis         Health Maintenance flu shot    HPI        Patient is here about cough congestion and ear ache  Reports that she started getting sick around 2 weeks ago  She was treating with OTC medication  But not getting better  Now her ear on right cant hear  With some cough productive at times yellow was clear before but now tinge of green    Reports low grade fever  Taking tylenol for relief    She reports for constipation placed on linzess daily  But diarrhea with it  Feels it is to strong           height is 5' 1\" (1.549 m) and weight is 209 lb 8 oz (95 kg). Her temporal temperature is 98.9 °F (37.2 °C). Her blood pressure is 128/80 and her pulse is 95. Her oxygen saturation is 98%. Body mass index is 39.58 kg/m². Results for orders placed or performed in visit on 10/03/17   POCT Urinalysis no Micro   Result Value Ref Range    Color, UA yellow     Clarity, UA cloudy     Glucose, UA POC negative     Bilirubin, UA negative     Ketones, UA negative     Spec Grav, UA 1.025     Blood, UA POC negative     pH, UA 5.5     Protein, UA POC negative     Urobilinogen, UA 0.2     Leukocytes, UA trace     Nitrite, UA positive        I have reviewed the following with the Ms. Gaurav Poe   Lab Review   Office Visit on 10/03/2017   Component Date Value    Color, UA 10/03/2017 yellow     Clarity, UA 10/03/2017 cloudy     Glucose, UA POC 10/03/2017 negative     Bilirubin, UA 10/03/2017 negative     Ketones, UA 10/03/2017 negative     Spec Grav, UA 10/03/2017 1.025     Blood, UA POC 10/03/2017 negative     pH, UA 10/03/2017 5.5     Protein, UA POC 10/03/2017 negative     Urobilinogen, UA 10/03/2017 0.2     Leukocytes, UA 10/03/2017 trace     Nitrite, UA 10/03/2017 positive    Admission Basophils # 07/11/2017 0.10     POC Sodium 07/11/2017 135*    POC Potassium 07/11/2017 4.3     POC Chloride 07/11/2017 104     CO2 07/11/2017 21     POC Anion Gap 07/11/2017 10     POC Glucose 07/11/2017 106*    POC BUN 07/11/2017 13     POC Creatinine 07/11/2017 0.6     GFR Non- 07/11/2017 >60     Calcium, Ion 07/11/2017 1.13     Hemoglobin 07/11/2017 11.2*    POC Hematocrit 07/11/2017 33*    Performed on 07/11/2017 i-Stat    Orders Only on 07/06/2017   Component Date Value    Iron 07/06/2017 16*    TIBC 07/06/2017 443*    Iron Saturation 07/06/2017 4*    Vitamin B-12 07/06/2017 157*    Folate 07/06/2017 8.1    Office Visit on 06/26/2017   Component Date Value    Wet Prep 06/26/2017 negative      Copies of these are in the chart. Prior to Visit Medications    Medication Sig Taking?  Authorizing Provider   linaclotide 72 MCG CAPS Take 72 mcg by mouth every morning (before breakfast) Yes JORGE Varela   guaiFENesin (MUCINEX) 600 MG extended release tablet Take 1 tablet by mouth 2 times daily Yes JORGE Varela   LATUDA 40 MG TABS tablet Take 1 tablet by mouth daily Yes JORGE Varela   ARIPiprazole lauroxil (ARISTADA) 882 MG/3.2ML PRSY injection Inject 3.2 mLs into the muscle every 30 days Yes JORGE Varela   aspirin 81 MG chewable tablet Take 1 tablet by mouth daily Yes Rohan Ramirez MD   meclizine (ANTIVERT) 25 MG tablet Take 25 mg by mouth 3 times daily as needed Yes Historical Provider, MD   acetaminophen (TYLENOL) 500 MG tablet Take 500 mg by mouth every 6 hours as needed for Pain Yes Historical Provider, MD   gabapentin (NEURONTIN) 100 MG capsule Take 1 capsule by mouth daily Yes JORGE Varela   ferrous sulfate (FE TABS) 325 (65 Fe) MG EC tablet Take 1 tablet by mouth 2 times daily Yes JORGE Varela   docusate sodium (COLACE) 100 MG capsule Take 1 capsule by mouth 2 times daily Yes JORGE Varela ondansetron (ZOFRAN) 4 MG tablet Take 1 tablet by mouth every 8 hours as needed for Nausea or Vomiting Yes JORGE Arce   vitamin B-12 (CYANOCOBALAMIN) 500 MCG tablet Take 1 tablet by mouth daily Yes JORGE Arce   promethazine (PHENERGAN) 25 MG tablet Take 1 tablet by mouth every 8 hours as needed for Nausea Yes JORGE Arce   acetaZOLAMIDE (DIAMOX) 500 MG extended release capsule TAKE 1 CAPSULE TWICE DAILY Yes JORGE Arce   metFORMIN (GLUCOPHAGE) 500 MG tablet Take 1 tablet by mouth 2 times daily (with meals) Yes JORGE Arce   rOPINIRole (REQUIP) 2 MG tablet Take 1 tablet by mouth 2 times daily Yes JORGE Arce   lisinopril (PRINIVIL;ZESTRIL) 10 MG tablet Take 1 tablet by mouth daily Yes JORGE Arce   pramipexole (MIRAPEX) 0.25 MG tablet Take 1 tablet by mouth 3 times daily Yes JORGE Arce   sertraline (ZOLOFT) 100 MG tablet Take one and a half tablets nightly  Patient taking differently: Take 150 mg by mouth nightly Take one and a half tablets nightly Yes JORGE Arce   ipratropium-albuterol (DUONEB) 0.5-2.5 (3) MG/3ML SOLN nebulizer solution INHALE THE CONTENTS OF 1 VIAL VIA NEBULIZER FOUR TIMES DAILY Yes JORGE Arce   benztropine (COGENTIN) 1 MG tablet Take 1 tablet by mouth 2 times daily Yes JORGE Arce   busPIRone (BUSPAR) 15 MG tablet Take 1 tablet by mouth 3 times daily Yes JORGE Arce   donepezil (ARICEPT) 10 MG tablet Take 1 tablet by mouth nightly Yes JORGE Arce   mirtazapine (REMERON) 15 MG tablet Take 1 tablet by mouth daily Yes JORGE Arce   celecoxib (CELEBREX) 200 MG capsule TAKE (1) CAPSULE DAILY BY MOUTH. Yes JORGE Arce   pantoprazole (PROTONIX) 40 MG tablet TAKE (1) TABLET BY MOUTH DAILY.  Yes JORGE Arce   Liraglutide (VICTOZA) 18 MG/3ML SOPN SC injection INJECT 1.8 MG SUBQ DAILY Yes JORGE Arce OXcarbazepine (TRILEPTAL) 300 MG tablet Take 1 tablet by mouth 2 times daily Yes JORGE Pena   albuterol sulfate HFA (PROAIR HFA) 108 (90 BASE) MCG/ACT inhaler Inhale 2 puffs into the lungs every 6 hours as needed for Wheezing Yes JORGE Pena       Allergies: Morphine and Codeine    Past Medical History:   Diagnosis Date    Alcohol overdose     history of    Anemia     Anxiety     [facility]     [facility] tumor ([facility]     Chronic back pain     ruptured discs    COPD [facility]chronic obstructive pulmonary disease) [facility]     Dementia     Depression     Diabetes ([facility]     Elevated liver enzymes     Frequent falls     Headache     History of kidney infection     Hyperlipidemia     Hypertension     Neuropathy (HCC)     Obesity     Osteoarthritis     Oxygen dependent     Psoriasis     Scoliosis     Sleep apnea     UTI (urinary tract infection)        Past Surgical History:   Procedure Laterality Date    APPENDECTOMY       SECTION      CHOLECYSTECTOMY      GASTRIC BYPASS SURGERY      TONSILLECTOMY      TUBAL LIGATION      TUMOR REMOVAL      bone tumor, r wrist     UPPER GASTROINTESTINAL ENDOSCOPY  2015    [place]: sm hiatal hernia; s/p balbir-en-y; esoph plaques, pos yeast; neg CS       Social History   Substance Use Topics    Smoking status: Current Every Day Smoker     Packs/day: 0.50     Types: Cigarettes    Smokeless tobacco: Never Used      Comment: has went from  6 cartons a month to 1 carton a month     Alcohol use No       Review of Systems   Constitutional: Positive for activity change, appetite change, fatigue and fever. Negative for chills. HENT: Positive for congestion, ear pain (right), sinus pain and sinus pressure. Respiratory: Positive for cough. Negative for chest tightness, shortness of breath and wheezing. Gastrointestinal: Positive for diarrhea (with linzess). Negative for nausea and vomiting.    Genitourinary: Negative for deeply and coughing. · Gargle with warm salt water once an hour. This can help reduce swelling and throat pain. Use 1 teaspoon of salt mixed in 1 cup of warm water. · Do not smoke or allow others to smoke around you. If you need help quitting, talk to your doctor about stop-smoking programs and medicines. These can increase your chances of quitting for good. To avoid spreading the virus  · Cough or sneeze into a tissue. Then throw the tissue away. · If you don't have a tissue, use your hand to cover your cough or sneeze. Then clean your hand. You can also cough into your sleeve. · Wash your hands often. Use soap and warm water. Wash for 15 to 20 seconds each time. · If you don't have soap and water near you, you can clean your hands with alcohol wipes or gel. When should you call for help? Call your doctor now or seek immediate medical care if:  · You have a new or higher fever. · Your fever lasts more than 48 hours. · You have trouble breathing. · You have a fever with a stiff neck or a severe headache. · You are sensitive to light. · You feel very sleepy or confused. Watch closely for changes in your health, and be sure to contact your doctor if:  · You do not get better as expected. Where can you learn more? Go to https://MemrisepeDosYogures.CloudAccess. org and sign in to your Lowdownapp Ltd account. Enter L743 in the [facility] box to learn more about \"Viral Respiratory Infection: Care Instructions. \"     If you do not have an account, please click on the \"Sign Up Now\" link. Current as of: March 25, 2017  Content Version: 11.3  © 2961-3421 [employer]centr[employer]. Care instructions adapted under license by [facility] ([facility]). If you have questions about a medical condition or this instruction, always ask your healthcare professional. [place] [address] any warranty or liability for your use of this information.        Patient Education        Constipation: Care Instructions  Your All patient questions answered. Pt voiced understanding.      JORGE Peng

## 2017-12-01 NOTE — PATIENT INSTRUCTIONS
Patient Education        Viral Respiratory Infection: Care Instructions  Your Care Instructions  Viruses are very small organisms. They grow in number after they enter your body. There are many types that cause different illnesses, such as colds and the mumps. The symptoms of a viral respiratory infection often start quickly. They include a fever, sore throat, and runny nose. You may also just not feel well. Or you may not want to eat much. Most viral respiratory infections are not serious. They usually get better with time and self-care. Antibiotics are not used to treat a viral infection. That's because antibiotics will not help cure a viral illness. In some cases, antiviral medicine can help your body fight a serious viral infection. Follow-up care is a key part of your treatment and safety. Be sure to make and go to all appointments, and call your doctor if you are having problems. It's also a good idea to know your test results and keep a list of the medicines you take. How can you care for yourself at home? · Rest as much as possible until you feel better. · Be safe with medicines. Take your medicine exactly as prescribed. Call your doctor if you think you are having a problem with your medicine. You will get more details on the specific medicine your doctor prescribes. · Take an over-the-counter pain medicine, such as acetaminophen (Tylenol), ibuprofen (Advil, Motrin), or naproxen (Aleve), as needed for pain and fever. Read and follow all instructions on the label. Do not give aspirin to anyone younger than 20. It has been linked to Reye syndrome, a serious illness. · Drink plenty of fluids, enough so that your urine is light yellow or clear like water. Hot fluids, such as tea or soup, may help relieve congestion in your nose and throat. If you have kidney, heart, or liver disease and have to limit fluids, talk with your doctor before you increase the amount of fluids you drink.   · Try to clear mucus from your lungs by breathing deeply and coughing. · Gargle with warm salt water once an hour. This can help reduce swelling and throat pain. Use 1 teaspoon of salt mixed in 1 cup of warm water. · Do not smoke or allow others to smoke around you. If you need help quitting, talk to your doctor about stop-smoking programs and medicines. These can increase your chances of quitting for good. To avoid spreading the virus  · Cough or sneeze into a tissue. Then throw the tissue away. · If you don't have a tissue, use your hand to cover your cough or sneeze. Then clean your hand. You can also cough into your sleeve. · Wash your hands often. Use soap and warm water. Wash for 15 to 20 seconds each time. · If you don't have soap and water near you, you can clean your hands with alcohol wipes or gel. When should you call for help? Call your doctor now or seek immediate medical care if:  · You have a new or higher fever. · Your fever lasts more than 48 hours. · You have trouble breathing. · You have a fever with a stiff neck or a severe headache. · You are sensitive to light. · You feel very sleepy or confused. Watch closely for changes in your health, and be sure to contact your doctor if:  · You do not get better as expected. Where can you learn more? Go to https://BenbriapeAlgebraix Data.NanoAntibiotics. org and sign in to your CellControl account. Enter Y232 in the [facility] box to learn more about \"Viral Respiratory Infection: Care Instructions. \"     If you do not have an account, please click on the \"Sign Up Now\" link. Current as of: March 25, 2017  Content Version: 11.3  © 1272-0288 Solutionary. Care instructions adapted under license by [facility] ([facility]). If you have questions about a medical condition or this instruction, always ask your healthcare professional. [address] any warranty or liability for your use of this information.        Patient Education Constipation: Care Instructions  Your Care Instructions  Constipation means that you have a hard time passing stools (bowel movements). People pass stools from 3 times a day to once every 3 days. What is normal for you may be different. Constipation may occur with pain in the rectum and cramping. The pain may get worse when you try to pass stools. Sometimes there are small amounts of bright red blood on toilet paper or the surface of stools. This is because of enlarged veins near the rectum (hemorrhoids). A few changes in your diet and lifestyle may help you avoid ongoing constipation. Your doctor may also prescribe medicine to help loosen your stool. Some medicines can cause constipation. These include pain medicines and antidepressants. Tell your doctor about all the medicines you take. Your doctor may want to make a medicine change to ease your symptoms. Follow-up care is a key part of your treatment and safety. Be sure to make and go to all appointments, and call your doctor if you are having problems. It's also a good idea to know your test results and keep a list of the medicines you take. How can you care for yourself at home? · Drink plenty of fluids, enough so that your urine is light yellow or clear like water. If you have kidney, heart, or liver disease and have to limit fluids, talk with your doctor before you increase the amount of fluids you drink. · Include high-fiber foods in your diet each day. These include fruits, vegetables, beans, and whole grains. · Get at least 30 minutes of exercise on most days of the week. Walking is a good choice. You also may want to do other activities, such as running, swimming, cycling, or playing tennis or team sports. · Take a fiber supplement, such as Citrucel or Metamucil, every day. Read and follow all instructions on the label. · Schedule time each day for a bowel movement. A daily routine may help. Take your time having your bowel movement.   · Support your feet with a small step stool when you sit on the toilet. This helps flex your hips and places your pelvis in a squatting position. · Your doctor may recommend an over-the-counter laxative to relieve your constipation. Examples are Milk of Magnesia and MiraLax. Read and follow all instructions on the label. Do not use laxatives on a long-term basis. When should you call for help? Call your doctor now or seek immediate medical care if:  · You have new or worse belly pain. · You have new or worse nausea or vomiting. · You have blood in your stools. Watch closely for changes in your health, and be sure to contact your doctor if:  · Your constipation is getting worse. · You do not get better as expected. Where can you learn more? Go to https://DreamHeartpeEnohm.Rolocule [email or web address] org and sign in to your Publer account. Enter [phone] in the [employer] box to learn more about \"Constipation: Care Instructions. \"     If you do not have an account, please click on the \"Sign Up Now\" link. Current as of: March 20, 2017  Content Version: 11.3  © 9248-5114 Crispify[employer]orporated. Care instructions adapted under license by [facility] ([facility]). If you have questions about a medical condition or this instruction, always ask your healthcare professional. [address] any warranty or liability for your use of this information.

## 2017-12-12 RX ORDER — ATORVASTATIN CALCIUM 20 MG/1
20 TABLET, FILM COATED ORAL DAILY
Qty: 90 TABLET | Refills: 3 | Status: SHIPPED | OUTPATIENT
Start: 2017-12-12 | End: 2018-03-06 | Stop reason: SDUPTHER

## 2018-01-08 DIAGNOSIS — E11.9 TYPE 2 DIABETES MELLITUS WITHOUT COMPLICATION, UNSPECIFIED LONG TERM INSULIN USE STATUS: ICD-10-CM

## 2018-01-08 RX ORDER — SERTRALINE HYDROCHLORIDE 100 MG/1
TABLET, FILM COATED ORAL
Qty: 135 TABLET | Refills: 3 | Status: SHIPPED | OUTPATIENT
Start: 2018-01-08 | End: 2018-03-06 | Stop reason: SDUPTHER

## 2018-01-09 ENCOUNTER — OFFICE VISIT (OUTPATIENT)
Dept: PRIMARY CARE CLINIC | Age: 63
End: 2018-01-09
Payer: MEDICARE

## 2018-01-09 VITALS
HEIGHT: 61 IN | WEIGHT: 210.5 LBS | DIASTOLIC BLOOD PRESSURE: 80 MMHG | SYSTOLIC BLOOD PRESSURE: 110 MMHG | HEART RATE: 82 BPM | BODY MASS INDEX: 39.74 KG/M2 | OXYGEN SATURATION: 96 % | TEMPERATURE: 98 F

## 2018-01-09 DIAGNOSIS — Z12.11 SCREEN FOR COLON CANCER: ICD-10-CM

## 2018-01-09 DIAGNOSIS — E11.9 TYPE 2 DIABETES MELLITUS WITHOUT COMPLICATION, UNSPECIFIED LONG TERM INSULIN USE STATUS: Primary | ICD-10-CM

## 2018-01-09 DIAGNOSIS — Z12.39 SCREENING FOR BREAST CANCER: ICD-10-CM

## 2018-01-09 DIAGNOSIS — B37.31 VAGINAL YEAST INFECTION: ICD-10-CM

## 2018-01-09 DIAGNOSIS — B37.9 YEAST INFECTION: ICD-10-CM

## 2018-01-09 DIAGNOSIS — L03.019 PARONYCHIA OF FINGER, UNSPECIFIED LATERALITY: ICD-10-CM

## 2018-01-09 DIAGNOSIS — N30.00 ACUTE CYSTITIS WITHOUT HEMATURIA: ICD-10-CM

## 2018-01-09 PROCEDURE — 3014F SCREEN MAMMO DOC REV: CPT | Performed by: NURSE PRACTITIONER

## 2018-01-09 PROCEDURE — 4004F PT TOBACCO SCREEN RCVD TLK: CPT | Performed by: NURSE PRACTITIONER

## 2018-01-09 PROCEDURE — 3017F COLORECTAL CA SCREEN DOC REV: CPT | Performed by: NURSE PRACTITIONER

## 2018-01-09 PROCEDURE — G8417 CALC BMI ABV UP PARAM F/U: HCPCS | Performed by: NURSE PRACTITIONER

## 2018-01-09 PROCEDURE — G8484 FLU IMMUNIZE NO ADMIN: HCPCS | Performed by: NURSE PRACTITIONER

## 2018-01-09 PROCEDURE — 99213 OFFICE O/P EST LOW 20 MIN: CPT | Performed by: NURSE PRACTITIONER

## 2018-01-09 PROCEDURE — G8427 DOCREV CUR MEDS BY ELIG CLIN: HCPCS | Performed by: NURSE PRACTITIONER

## 2018-01-09 PROCEDURE — 3046F HEMOGLOBIN A1C LEVEL >9.0%: CPT | Performed by: NURSE PRACTITIONER

## 2018-01-09 RX ORDER — FLUCONAZOLE 200 MG/1
200 TABLET ORAL DAILY
Qty: 7 TABLET | Refills: 0 | Status: SHIPPED | OUTPATIENT
Start: 2018-01-09 | End: 2018-01-16

## 2018-01-09 RX ORDER — SULFAMETHOXAZOLE AND TRIMETHOPRIM 800; 160 MG/1; MG/1
1 TABLET ORAL 2 TIMES DAILY
Qty: 20 TABLET | Refills: 0 | Status: SHIPPED | OUTPATIENT
Start: 2018-01-09 | End: 2018-01-19

## 2018-01-09 RX ORDER — NYSTATIN 100000 [USP'U]/G
POWDER TOPICAL
Qty: 45 G | Refills: 3 | Status: SHIPPED | OUTPATIENT
Start: 2018-01-09 | End: 2018-03-06 | Stop reason: SDUPTHER

## 2018-01-09 ASSESSMENT — ENCOUNTER SYMPTOMS
VOMITING: 0
NAUSEA: 0

## 2018-01-09 NOTE — PROGRESS NOTES
Provider, MD   gabapentin (NEURONTIN) 100 MG capsule Take 1 capsule by mouth daily Yes Ivone Cox, APRN   ferrous sulfate (FE TABS) 325 (65 Fe) MG EC tablet Take 1 tablet by mouth 2 times daily Yes Ivone Cox, APRN   ondansetron (ZOFRAN) 4 MG tablet Take 1 tablet by mouth every 8 hours as needed for Nausea or Vomiting Yes Ivone Cox, APRN   vitamin B-12 (CYANOCOBALAMIN) 500 MCG tablet Take 1 tablet by mouth daily Yes Ivone Cox, APRN   promethazine (PHENERGAN) 25 MG tablet Take 1 tablet by mouth every 8 hours as needed for Nausea Yes Ivone Cox, APRN   acetaZOLAMIDE (DIAMOX) 500 MG extended release capsule TAKE 1 CAPSULE TWICE DAILY Yes Ivone Cox, APRN   metFORMIN (GLUCOPHAGE) 500 MG tablet Take 1 tablet by mouth 2 times daily (with meals) Yes Ivone Cox, APRN   rOPINIRole (REQUIP) 2 MG tablet Take 1 tablet by mouth 2 times daily Yes Ivone Cox, APRN   lisinopril (PRINIVIL;ZESTRIL) 10 MG tablet Take 1 tablet by mouth daily Yes Ivone Cox, APRN   pramipexole (MIRAPEX) 0.25 MG tablet Take 1 tablet by mouth 3 times daily Yes Ivone Cox, APRN   ipratropium-albuterol (DUONEB) 0.5-2.5 (3) MG/3ML SOLN nebulizer solution INHALE THE CONTENTS OF 1 VIAL VIA NEBULIZER FOUR TIMES DAILY Yes Ivone Cox, APRN   benztropine (COGENTIN) 1 MG tablet Take 1 tablet by mouth 2 times daily Yes Ivone Cox, APRN   busPIRone (BUSPAR) 15 MG tablet Take 1 tablet by mouth 3 times daily Yes Ivone Cox, APRN   donepezil (ARICEPT) 10 MG tablet Take 1 tablet by mouth nightly Yes Ivone Cox, APRN   mirtazapine (REMERON) 15 MG tablet Take 1 tablet by mouth daily Yes Ivone Cox, APRN   celecoxib (CELEBREX) 200 MG capsule TAKE (1) CAPSULE DAILY BY MOUTH. Yes Ivone Cox, APRN   pantoprazole (PROTONIX) 40 MG tablet TAKE (1) TABLET BY MOUTH DAILY.  Yes JORGE Mckeon   OXcarbazepine (TRILEPTAL) 300 MG tablet Take 1 tablet by mouth 2 affect your blood sugar. And it can help you keep your blood sugar from getting so low that it's not safe. When should you call for help? Call 911 anytime you think you may need emergency care. For example, call if:  ? · You passed out (lost consciousness). ? · You are confused or cannot think clearly. ? · Your blood sugar is very high or very low. ? Watch closely for changes in your health, and be sure to contact your doctor if:  ? · Your blood sugar stays outside the level your doctor set for you. ? · You have any problems. Where can you learn more? Go to https://Relevare Pharmaceuticalspepiceweb.gdgt. org and sign in to your OnMyBlock account. Enter H153 in the [employer] box to learn more about \"Noninsulin Medicines for Type 2 Diabetes: Care Instructions. \"     If you do not have an account, please click on the \"Sign Up Now\" link. Current as of: March 13, 2017  Content Version: 11.5  © 8696-3841 We Cluster. Care instructions adapted under license by [facility] ([facility]). If you have questions about a medical condition or this instruction, always ask your healthcare professional. [place] [address] any warranty or liability for your use of this information. Patient Education        Learning About Diabetes Food Guidelines  Your Care Instructions    Meal planning is important to manage diabetes. It helps keep your blood sugar at a target level (which you set with your doctor). You don't have to eat special foods. You can eat what your family eats, including sweets once in a while. But you do have to pay attention to how often you eat and how much you eat of certain foods. You may want to work with a dietitian or a certified diabetes educator (CDE) to help you plan meals and snacks. A dietitian or CDE can also help you lose weight if that is one of your goals. What should you know about eating carbs?   Managing the amount of carbohydrate (carbs) you eat is an important part peanut butter. A serving size of meat is 3 ounces, which is about the size of a deck of cards. Examples of meat substitute serving sizes (equal to 1 ounce of meat) are 1/4 cup of cottage cheese, 1 egg, 1 tablespoon of peanut butter, and ½ cup of tofu. How can you eat out and still eat healthy? · Learn to estimate the serving sizes of foods that have carbohydrate. If you measure food at home, it will be easier to estimate the amount in a serving of restaurant food. · If the meal you order has too much carbohydrate (such as potatoes, corn, or baked beans), ask to have a low-carbohydrate food instead. Ask for a salad or green vegetables. · If you use insulin, check your blood sugar before and after eating out to help you plan how much to eat in the future. · If you eat more carbohydrate at a meal than you had planned, take a walk or do other exercise. This will help lower your blood sugar. What else should you know? · Limit saturated fat, such as the fat from meat and dairy products. This is a healthy choice because people who have diabetes are at higher risk of heart disease. So choose lean cuts of meat and nonfat or low-fat dairy products. Use olive or canola oil instead of butter or shortening when cooking. · Don't skip meals. Your blood sugar may drop too low if you skip meals and take insulin or certain medicines for diabetes. · Check with your doctor before you drink alcohol. Alcohol can cause your blood sugar to drop too low. Alcohol can also cause a bad reaction if you take certain diabetes medicines. Follow-up care is a key part of your treatment and safety. Be sure to make and go to all appointments, and call your doctor if you are having problems. It's also a good idea to know your test results and keep a list of the medicines you take. Where can you learn more? Go to [email or web address].[email or web address] [email or web address] org and sign in to your [employer] account.  Enter C031 in the [employer]ress[employer] box to

## 2018-01-09 NOTE — PATIENT INSTRUCTIONS
Patient Education        Noninsulin Medicines for Type 2 Diabetes: Care Instructions  Your Care Instructions    There are different types of noninsulin medicines for diabetes. Each works in a different way. But they all help you control your blood sugar. Some types help your body make insulin to lower your blood sugar. Others lower how much insulin your body needs. Some can slow how fast your body digests sugars. And some can remove extra glucose through your urine. · Alpha-glucosidase inhibitors. These keep starches from breaking down. This means that they lower the amount of glucose absorbed when you eat. They don't help your body make more insulin. So they will not cause low blood sugar unless you use them with other medicines for diabetes. They include acarbose and miglitol. · DPP-4 inhibitors. These help your body raise the level of insulin after you eat. They also help your body make less of a hormone that raises blood sugar. They include linagliptin, saxagliptin, and sitagliptin. · Incretin hormones (GLP-1 receptor agonists). Your body makes a protein that can raise your insulin level. It also can lower your blood sugar and make you less hungry. You can get shots of hormones that work the same way. They include exenatide and liraglutide. · Meglitinides. These help your body release insulin. They also help slow how your body digests sugars. So they can keep your blood sugar from rising too fast after you eat. They include nateglinide and repaglinide. · Metformin. This lowers how much glucose your liver makes. And it helps you respond better to insulin. It also lowers the amount of stored sugar that your liver releases when you are not eating. · SGLT2 inhibitors. These help to remove extra glucose through your urine. They may also help some people lose weight. They include canagliflozin, dapagliflozin, and empagliflozin. · Sulfonylureas. These help your body release more insulin.  Some work for [employer] · Your blood sugar stays outside the level your doctor set for you. ? · You have any problems. Where can you learn more? Go to [email or web address] and sign in to your EduRise account. Enter H153 in the [facility] box to learn more about \"Noninsulin Medicines for Type 2 Diabetes: Care Instructions. \"     If you do not have an account, please click on the \"Sign Up Now\" link. Current as of: March 13, 2017  Content Version: 11.5  © 6458-8619 [employer]. Care instructions adapted under license by [facility] ([facility]). If you have questions about a medical condition or this instruction, always ask your healthcare professional. [address] any warranty or liability for your use of this information. Patient Education        Learning About Diabetes Food Guidelines  Your Care Instructions    Meal planning is important to manage diabetes. It helps keep your blood sugar at a target level (which you set with your doctor). You don't have to eat special foods. You can eat what your family eats, including sweets once in a while. But you do have to pay attention to how often you eat and how much you eat of certain foods. You may want to work with a dietitian or a certified diabetes educator (CDE) to help you plan meals and snacks. A dietitian or CDE can also help you lose weight if that is one of your goals. What should you know about eating carbs? Managing the amount of carbohydrate (carbs) you eat is an important part of healthy meals when you have diabetes. Carbohydrate is found in many foods. · Learn which foods have carbs. And learn the amounts of carbs in different foods. ¨ Bread, cereal, pasta, and rice have about 15 grams of carbs in a serving. A serving is 1 slice of bread (1 ounce), ½ cup of cooked cereal, or 1/3 cup of cooked pasta or rice. ¨ Fruits have 15 grams of carbs in a serving.  A serving is 1 small fresh fruit, such as an apple or serving of [employer]. · If the meal you order has too much carbohydrate (such as potatoes, corn, or baked beans), ask to have a low-carbohydrate food instead. Ask for a salad or green vegetables. · If you use insulin, check your blood sugar before and after eating out to help you plan how much to eat in the future. · If you eat more carbohydrate at a meal than you had planned, take a walk or do other exercise. This will help lower your blood sugar. What else should you know? · Limit saturated fat, such as the fat from meat and dairy products. This is a healthy choice because people who have diabetes are at higher risk of heart disease. So choose lean cuts of meat and nonfat or low-fat dairy products. Use olive or canola oil instead of butter or shortening when cooking. · Don't skip meals. Your blood sugar may drop too low if you skip meals and take insulin or certain medicines for diabetes. · Check with your doctor before you drink alcohol. Alcohol can cause your blood sugar to drop too low. Alcohol can also cause a bad reaction if you take certain diabetes medicines. Follow-up care is a key part of your treatment and safety. Be sure to make and go to all appointments, and call your doctor if you are having problems. It's also a good idea to know your test results and keep a list of the medicines you take. Where can you learn more? Go to [email or web address] and sign in to your Spunkmobile account. Enter O822 in the [facility] box to learn more about \"Learning About Diabetes Food Guidelines. \"     If you do not have an account, please click on the \"Sign Up Now\" link. Current as of: March 13, 2017  Content Version: 11.5  © 2431-7667 Healthwise, Incorporated. Care instructions adapted under license by [facility] ([facility]).  If you have questions about a medical condition or this instruction, always ask your healthcare professional. [employer] disclaims any warranty or liability for your use of this information.

## 2018-01-15 ENCOUNTER — TELEPHONE (OUTPATIENT)
Dept: PRIMARY CARE CLINIC | Age: 63
End: 2018-01-15

## 2018-01-15 RX ORDER — HYDROXYZINE HYDROCHLORIDE 25 MG/1
TABLET, FILM COATED ORAL
Qty: 30 TABLET | Refills: 0 | Status: SHIPPED | OUTPATIENT
Start: 2018-01-15 | End: 2018-03-06 | Stop reason: SDUPTHER

## 2018-01-15 NOTE — TELEPHONE ENCOUNTER
Pt called stating she is having bad anxiety/panic attack. Would like med please. Wants xanax or ativan.

## 2018-01-18 RX ORDER — MIRTAZAPINE 15 MG/1
TABLET, FILM COATED ORAL
Qty: 90 TABLET | Refills: 3 | Status: SHIPPED | OUTPATIENT
Start: 2018-01-18 | End: 2018-02-27 | Stop reason: SDUPTHER

## 2018-01-18 RX ORDER — CELECOXIB 200 MG/1
CAPSULE ORAL
Qty: 90 CAPSULE | Refills: 3 | Status: SHIPPED | OUTPATIENT
Start: 2018-01-18 | End: 2018-02-27 | Stop reason: SDUPTHER

## 2018-01-18 RX ORDER — BENZTROPINE MESYLATE 1 MG/1
TABLET ORAL
Qty: 180 TABLET | Refills: 3 | Status: SHIPPED | OUTPATIENT
Start: 2018-01-18 | End: 2018-02-27 | Stop reason: SDUPTHER

## 2018-01-18 RX ORDER — OXCARBAZEPINE 300 MG/1
TABLET, FILM COATED ORAL
Qty: 180 TABLET | Refills: 3 | Status: SHIPPED | OUTPATIENT
Start: 2018-01-18 | End: 2018-02-27 | Stop reason: SDUPTHER

## 2018-01-18 RX ORDER — BUSPIRONE HYDROCHLORIDE 15 MG/1
15 TABLET ORAL 3 TIMES DAILY
Qty: 270 TABLET | Refills: 3 | Status: SHIPPED | OUTPATIENT
Start: 2018-01-18 | End: 2018-02-27 | Stop reason: SDUPTHER

## 2018-01-18 RX ORDER — DONEPEZIL HYDROCHLORIDE 10 MG/1
TABLET, FILM COATED ORAL
Qty: 90 TABLET | Refills: 3 | Status: SHIPPED | OUTPATIENT
Start: 2018-01-18 | End: 2018-02-27 | Stop reason: SDUPTHER

## 2018-01-18 RX ORDER — PANTOPRAZOLE SODIUM 40 MG/1
TABLET, DELAYED RELEASE ORAL
Qty: 90 TABLET | Refills: 3 | Status: SHIPPED | OUTPATIENT
Start: 2018-01-18 | End: 2018-02-27 | Stop reason: SDUPTHER

## 2018-01-19 ENCOUNTER — TELEPHONE (OUTPATIENT)
Dept: PRIMARY CARE CLINIC | Age: 63
End: 2018-01-19

## 2018-02-09 ENCOUNTER — TELEPHONE (OUTPATIENT)
Dept: PRIMARY CARE CLINIC | Age: 63
End: 2018-02-09

## 2018-02-13 LAB
AMPHETAMINE SCREEN, URINE: NEGATIVE
BARBITURATE SCREEN URINE: NEGATIVE
BENZODIAZEPINE SCREEN, URINE: NEGATIVE
CANNABINOID SCREEN URINE: NEGATIVE
COCAINE METABOLITE SCREEN URINE: NEGATIVE
Lab: NORMAL
OPIATE SCREEN URINE: NEGATIVE

## 2018-02-27 DIAGNOSIS — E11.9 TYPE 2 DIABETES MELLITUS WITHOUT COMPLICATION, UNSPECIFIED LONG TERM INSULIN USE STATUS: ICD-10-CM

## 2018-02-27 RX ORDER — BUSPIRONE HYDROCHLORIDE 15 MG/1
15 TABLET ORAL 3 TIMES DAILY
Qty: 90 TABLET | Refills: 3 | Status: SHIPPED | OUTPATIENT
Start: 2018-02-27 | End: 2018-03-06 | Stop reason: SDUPTHER

## 2018-02-27 RX ORDER — DONEPEZIL HYDROCHLORIDE 10 MG/1
TABLET, FILM COATED ORAL
Qty: 30 TABLET | Refills: 3 | Status: SHIPPED | OUTPATIENT
Start: 2018-02-27 | End: 2018-03-06 | Stop reason: SDUPTHER

## 2018-02-27 RX ORDER — OXCARBAZEPINE 300 MG/1
TABLET, FILM COATED ORAL
Qty: 60 TABLET | Refills: 3 | Status: SHIPPED | OUTPATIENT
Start: 2018-02-27 | End: 2018-03-06 | Stop reason: SDUPTHER

## 2018-02-27 RX ORDER — CELECOXIB 200 MG/1
CAPSULE ORAL
Qty: 30 CAPSULE | Refills: 3 | Status: SHIPPED | OUTPATIENT
Start: 2018-02-27 | End: 2018-03-06 | Stop reason: SDUPTHER

## 2018-02-27 RX ORDER — PANTOPRAZOLE SODIUM 40 MG/1
TABLET, DELAYED RELEASE ORAL
Qty: 30 TABLET | Refills: 3 | Status: SHIPPED | OUTPATIENT
Start: 2018-02-27 | End: 2018-03-06 | Stop reason: SDUPTHER

## 2018-02-27 RX ORDER — BENZTROPINE MESYLATE 1 MG/1
TABLET ORAL
Qty: 60 TABLET | Refills: 3 | Status: SHIPPED | OUTPATIENT
Start: 2018-02-27 | End: 2018-03-06 | Stop reason: SDUPTHER

## 2018-02-27 RX ORDER — MIRTAZAPINE 15 MG/1
TABLET, FILM COATED ORAL
Qty: 30 TABLET | Refills: 3 | Status: SHIPPED | OUTPATIENT
Start: 2018-02-27 | End: 2018-03-06 | Stop reason: SDUPTHER
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
## 2018-03-06 ENCOUNTER — OFFICE VISIT (OUTPATIENT)
Dept: PRIMARY CARE CLINIC | Age: 63
End: 2018-03-06
Payer: MEDICARE

## 2018-03-06 VITALS
SYSTOLIC BLOOD PRESSURE: 98 MMHG | TEMPERATURE: 97 F | DIASTOLIC BLOOD PRESSURE: 62 MMHG | BODY MASS INDEX: 38.33 KG/M2 | HEIGHT: 61 IN | HEART RATE: 89 BPM | OXYGEN SATURATION: 99 % | WEIGHT: 203 LBS

## 2018-03-06 DIAGNOSIS — B37.9 YEAST INFECTION: ICD-10-CM

## 2018-03-06 DIAGNOSIS — G25.81 RLS (RESTLESS LEGS SYNDROME): ICD-10-CM

## 2018-03-06 DIAGNOSIS — B37.31 VAGINAL YEAST INFECTION: ICD-10-CM

## 2018-03-06 DIAGNOSIS — E11.9 TYPE 2 DIABETES MELLITUS WITHOUT COMPLICATION, UNSPECIFIED LONG TERM INSULIN USE STATUS: Primary | ICD-10-CM

## 2018-03-06 DIAGNOSIS — F31.32 BIPOLAR AFFECTIVE DISORDER, CURRENTLY DEPRESSED, MODERATE (HCC): ICD-10-CM

## 2018-03-06 DIAGNOSIS — F31.78 BIPOLAR DISORDER, IN FULL REMISSION, MOST RECENT EPISODE MIXED (HCC): ICD-10-CM

## 2018-03-06 DIAGNOSIS — J42 CHRONIC BRONCHITIS, UNSPECIFIED CHRONIC BRONCHITIS TYPE (HCC): ICD-10-CM

## 2018-03-06 DIAGNOSIS — K59.00 CONSTIPATION, UNSPECIFIED CONSTIPATION TYPE: ICD-10-CM

## 2018-03-06 DIAGNOSIS — Z12.11 SCREEN FOR COLON CANCER: ICD-10-CM

## 2018-03-06 PROCEDURE — 3017F COLORECTAL CA SCREEN DOC REV: CPT | Performed by: NURSE PRACTITIONER

## 2018-03-06 PROCEDURE — G8484 FLU IMMUNIZE NO ADMIN: HCPCS | Performed by: NURSE PRACTITIONER

## 2018-03-06 PROCEDURE — 3046F HEMOGLOBIN A1C LEVEL >9.0%: CPT | Performed by: NURSE PRACTITIONER

## 2018-03-06 PROCEDURE — G8417 CALC BMI ABV UP PARAM F/U: HCPCS | Performed by: NURSE PRACTITIONER

## 2018-03-06 PROCEDURE — 99214 OFFICE O/P EST MOD 30 MIN: CPT | Performed by: NURSE PRACTITIONER

## 2018-03-06 PROCEDURE — 3023F SPIROM DOC REV: CPT | Performed by: NURSE PRACTITIONER

## 2018-03-06 PROCEDURE — G8427 DOCREV CUR MEDS BY ELIG CLIN: HCPCS | Performed by: NURSE PRACTITIONER

## 2018-03-06 PROCEDURE — 3014F SCREEN MAMMO DOC REV: CPT | Performed by: NURSE PRACTITIONER

## 2018-03-06 PROCEDURE — 4004F PT TOBACCO SCREEN RCVD TLK: CPT | Performed by: NURSE PRACTITIONER

## 2018-03-06 PROCEDURE — G8926 SPIRO NO PERF OR DOC: HCPCS | Performed by: NURSE PRACTITIONER

## 2018-03-06 RX ORDER — CELECOXIB 200 MG/1
CAPSULE ORAL
Qty: 30 CAPSULE | Refills: 3 | Status: SHIPPED | OUTPATIENT
Start: 2018-03-06 | End: 2018-05-22 | Stop reason: SDUPTHER

## 2018-03-06 RX ORDER — BENZTROPINE MESYLATE 1 MG/1
TABLET ORAL
Qty: 60 TABLET | Refills: 3 | Status: SHIPPED | OUTPATIENT
Start: 2018-03-06 | End: 2018-12-12 | Stop reason: SDUPTHER

## 2018-03-06 RX ORDER — BUSPIRONE HYDROCHLORIDE 15 MG/1
15 TABLET ORAL 3 TIMES DAILY
Qty: 90 TABLET | Refills: 3 | Status: SHIPPED | OUTPATIENT
Start: 2018-03-06 | End: 2018-12-12 | Stop reason: SDUPTHER

## 2018-03-06 RX ORDER — NYSTATIN 100000 [USP'U]/G
POWDER TOPICAL
Qty: 45 G | Refills: 3 | Status: SHIPPED | OUTPATIENT
Start: 2018-03-06 | End: 2020-07-30 | Stop reason: SDUPTHER

## 2018-03-06 RX ORDER — ATORVASTATIN CALCIUM 20 MG/1
20 TABLET, FILM COATED ORAL DAILY
Qty: 90 TABLET | Refills: 3 | Status: SHIPPED | OUTPATIENT
Start: 2018-03-06 | End: 2019-01-11 | Stop reason: SDUPTHER

## 2018-03-06 RX ORDER — IPRATROPIUM BROMIDE AND ALBUTEROL SULFATE 2.5; .5 MG/3ML; MG/3ML
1 SOLUTION RESPIRATORY (INHALATION) EVERY 6 HOURS PRN
Qty: 90 ML | Refills: 2 | Status: SHIPPED | OUTPATIENT
Start: 2018-03-06 | End: 2018-06-28 | Stop reason: SDUPTHER

## 2018-03-06 RX ORDER — LURASIDONE HYDROCHLORIDE 40 MG/1
40 TABLET, FILM COATED ORAL DAILY
Qty: 90 TABLET | Refills: 3 | Status: SHIPPED | OUTPATIENT
Start: 2018-03-06 | End: 2018-06-05

## 2018-03-06 RX ORDER — OXCARBAZEPINE 300 MG/1
TABLET, FILM COATED ORAL
Qty: 60 TABLET | Refills: 3 | Status: SHIPPED | OUTPATIENT
Start: 2018-03-06 | End: 2018-06-05 | Stop reason: DRUGHIGH

## 2018-03-06 RX ORDER — PROMETHAZINE HYDROCHLORIDE 25 MG/1
25 TABLET ORAL EVERY 8 HOURS PRN
Qty: 30 TABLET | Refills: 2 | Status: SHIPPED | OUTPATIENT
Start: 2018-03-06 | End: 2018-07-06 | Stop reason: SDUPTHER

## 2018-03-06 RX ORDER — GABAPENTIN 100 MG/1
100 CAPSULE ORAL DAILY
Qty: 90 CAPSULE | Refills: 1 | Status: SHIPPED | OUTPATIENT
Start: 2018-03-06 | End: 2018-10-02 | Stop reason: SDUPTHER

## 2018-03-06 RX ORDER — SERTRALINE HYDROCHLORIDE 100 MG/1
TABLET, FILM COATED ORAL
Qty: 135 TABLET | Refills: 3 | Status: SHIPPED | OUTPATIENT
Start: 2018-03-06 | End: 2019-01-11 | Stop reason: SDUPTHER

## 2018-03-06 RX ORDER — MIRTAZAPINE 15 MG/1
TABLET, FILM COATED ORAL
Qty: 30 TABLET | Refills: 3 | Status: SHIPPED | OUTPATIENT
Start: 2018-03-06 | End: 2018-10-02 | Stop reason: SDUPTHER

## 2018-03-06 RX ORDER — MECLIZINE HYDROCHLORIDE 25 MG/1
25 TABLET ORAL 3 TIMES DAILY PRN
Status: CANCELLED | OUTPATIENT
Start: 2018-03-06

## 2018-03-06 RX ORDER — ROPINIROLE 2 MG/1
2 TABLET, FILM COATED ORAL 2 TIMES DAILY
Qty: 180 TABLET | Refills: 3 | Status: SHIPPED | OUTPATIENT
Start: 2018-03-06 | End: 2019-01-11 | Stop reason: SDUPTHER

## 2018-03-06 RX ORDER — DONEPEZIL HYDROCHLORIDE 10 MG/1
TABLET, FILM COATED ORAL
Qty: 30 TABLET | Refills: 3 | Status: SHIPPED | OUTPATIENT
Start: 2018-03-06 | End: 2018-05-22 | Stop reason: SDUPTHER

## 2018-03-06 RX ORDER — LISINOPRIL 10 MG/1
10 TABLET ORAL DAILY
Qty: 90 TABLET | Refills: 3 | Status: SHIPPED | OUTPATIENT
Start: 2018-03-06 | End: 2018-06-05 | Stop reason: DRUGHIGH

## 2018-03-06 RX ORDER — HYDROXYZINE HYDROCHLORIDE 25 MG/1
TABLET, FILM COATED ORAL
Qty: 30 TABLET | Refills: 0 | Status: SHIPPED | OUTPATIENT
Start: 2018-03-06 | End: 2018-06-28 | Stop reason: SDUPTHER

## 2018-03-06 RX ORDER — ALBUTEROL SULFATE 90 UG/1
2 AEROSOL, METERED RESPIRATORY (INHALATION) EVERY 6 HOURS PRN
Qty: 1 INHALER | Refills: 3 | Status: SHIPPED | OUTPATIENT
Start: 2018-03-06 | End: 2018-09-04 | Stop reason: SDUPTHER

## 2018-03-06 RX ORDER — PRAMIPEXOLE DIHYDROCHLORIDE 0.25 MG/1
0.25 TABLET ORAL 3 TIMES DAILY
Qty: 270 TABLET | Refills: 3 | Status: SHIPPED | OUTPATIENT
Start: 2018-03-06 | End: 2019-01-11 | Stop reason: SDUPTHER

## 2018-03-06 RX ORDER — PANTOPRAZOLE SODIUM 40 MG/1
TABLET, DELAYED RELEASE ORAL
Qty: 30 TABLET | Refills: 3 | Status: SHIPPED | OUTPATIENT
Start: 2018-03-06 | End: 2018-11-05 | Stop reason: SDUPTHER

## 2018-03-06 ASSESSMENT — ENCOUNTER SYMPTOMS
NAUSEA: 0
VOMITING: 0

## 2018-03-06 NOTE — PROGRESS NOTES
Cannabinoid Scrn, Ur 02/13/2018 Negative     COCAINE METABOLITE SCREE* 02/13/2018 Negative     Opiate Scrn, Ur 02/13/2018 Negative     Drug Screen Comment: 02/13/2018 see below    Office Visit on 10/03/2017   Component Date Value    Color, UA 10/03/2017 yellow     Clarity, UA 10/03/2017 cloudy     Glucose, UA POC 10/03/2017 negative     Bilirubin, UA 10/03/2017 negative     Ketones, UA 10/03/2017 negative     Spec Grav, UA 10/03/2017 1.025     Blood, UA POC 10/03/2017 negative     pH, UA 10/03/2017 5.5     Protein, UA POC 10/03/2017 negative     Urobilinogen, UA 10/03/2017 0.2     Leukocytes, UA 10/03/2017 trace     Nitrite, UA 10/03/2017 positive      Copies of these are in the chart. Prior to Visit Medications    Medication Sig Taking? Authorizing Provider   celecoxib (CELEBREX) 200 MG capsule TAKE 1 CAPSULE EVERY DAY Yes JORGE Hernandez   pantoprazole (PROTONIX) 40 MG tablet TAKE 1 TABLET EVERY DAY Yes JORGE Hernandez   OXcarbazepine (TRILEPTAL) 300 MG tablet TAKE 1 TABLET TWICE DAILY Yes JORGE Hernandez   mirtazapine (REMERON) 15 MG tablet TAKE 1 TABLET EVERY DAY Yes JORGE Hernandez   benztropine (COGENTIN) 1 MG tablet TAKE 1 TABLET TWICE DAILY Yes JORGE Hernandez   busPIRone (BUSPAR) 15 MG tablet Take 1 tablet by mouth 3 times daily Yes JORGE Hernandez   donepezil (ARICEPT) 10 MG tablet TAKE 1 TABLET EVERY NIGHT Yes JORGE Hernandez   Liraglutide (VICTOZA) 18 MG/3ML SOPN SC injection Inject 1.8 mg into the skin daily Yes JORGE Hernandez   hydrOXYzine (ATARAX) 25 MG tablet 1-2 Q 6 hours PRN Anxiety Attack Yes JORGE Hernandez   nystatin (MYCOSTATIN) 319323 UNIT/GM powder Apply 3 times daily.  Yes JORGE Hernandez   sertraline (ZOLOFT) 100 MG tablet Take one and a half tablets nightly Yes JORGE Hernandez   atorvastatin (LIPITOR) 20 MG tablet Take 1 tablet by mouth daily Yes JORGE Hernandez   Linaclotide 72 MCG CAPS Take 72 mcg by mouth every morning (before breakfast) Yes Demaris Moulding, APRN   LATUDA 40 MG TABS tablet Take 1 tablet by mouth daily Yes Demaris Moulding, APRN   gabapentin (NEURONTIN) 100 MG capsule Take 1 capsule by mouth daily for 30 days.  Yes Demaris Moulding, APRN   promethazine (PHENERGAN) 25 MG tablet Take 1 tablet by mouth every 8 hours as needed for Nausea Yes Demaris Moulding, APRN   metFORMIN (GLUCOPHAGE) 500 MG tablet Take 1 tablet by mouth 2 times daily (with meals) Yes Demaris Moulding, APRN   rOPINIRole (REQUIP) 2 MG tablet Take 1 tablet by mouth 2 times daily Yes Demaris Moulding, APRN   lisinopril (PRINIVIL;ZESTRIL) 10 MG tablet Take 1 tablet by mouth daily Yes Demaris Moulding, APRN   pramipexole (MIRAPEX) 0.25 MG tablet Take 1 tablet by mouth 3 times daily Yes Demaris Moulding, APRN   ipratropium-albuterol (DUONEB) 0.5-2.5 (3) MG/3ML SOLN nebulizer solution Inhale 3 mLs into the lungs every 6 hours as needed for Shortness of Breath Yes Demaris Moulding, APRN   albuterol sulfate HFA (PROAIR HFA) 108 (90 Base) MCG/ACT inhaler Inhale 2 puffs into the lungs every 6 hours as needed for Wheezing Yes Demaris Moulding, APRN   ARIPiprazole lauroxil (ARISTADA) 882 MG/3.2ML PRSY injection Inject 3.2 mLs into the muscle every 30 days Yes Demaris Moulding, APRN   meclizine (ANTIVERT) 25 MG tablet Take 25 mg by mouth 3 times daily as needed Yes Historical Provider, MD   acetaminophen (TYLENOL) 500 MG tablet Take 500 mg by mouth every 6 hours as needed for Pain Yes Historical Provider, MD   ferrous sulfate (FE TABS) 325 (65 Fe) MG EC tablet Take 1 tablet by mouth 2 times daily Yes Demaris Moulding, APRN   ondansetron (ZOFRAN) 4 MG tablet Take 1 tablet by mouth every 8 hours as needed for Nausea or Vomiting Yes Demaris Moulding, APRN   vitamin B-12 (CYANOCOBALAMIN) 500 MCG tablet Take 1 tablet by mouth daily Yes JORGE Page   acetaZOLAMIDE (DIAMOX) 500 MG extended release doctor has not prescribed for you. They may interfere with your treatment. · Spend time with family and friends. It may help to speak openly about your depression with people you trust.  · Take your medicines exactly as prescribed. Call your doctor if you think you are having a problem with your medicine. · Do not make major life decisions while you are depressed. Depression may change the way you think. You will be able to make better decisions after you feel better. · Think positively. Challenge negative thoughts with statements such as \"I am hopeful\"; \"Things will get better\"; and \"I can ask for the help I need. \" Write down these statements and read them often, even if you don't believe them yet. · Be patient with yourself. It took time for your depression to develop, and it will take time for your symptoms to improve. Do not take on too much or be too hard on yourself. · Learn all you can about depression from written and online materials. · Check out behavioral health classes to learn more about dealing with depression. · Keep the numbers for these national suicide hotlines: 1-644-481-TALK (6-814.955.3320) and 2-521-BCZKSBF (8-255.632.8965). If you or someone you know talks about suicide or feeling hopeless, get help right away. When should you call for help? Call 911 anytime you think you may need emergency care. For example, call if:  ? · You feel you cannot stop from hurting yourself or someone else. ?Call your doctor now or seek immediate medical care if:  ? · You hear voices. ? · You feel much more depressed. ? Watch closely for changes in your health, and be sure to contact your doctor if:  ? · You are having problems with your depression medicine. ? · You are not getting better as expected. Where can you learn more? Go to [email or web address] and sign in to your [email or web address] Inc. account.  Enter K271 in the [employer] box to learn more about \"Depression

## 2018-03-06 NOTE — PATIENT INSTRUCTIONS
after you take the medicine for a few weeks. Some may last longer. Talk to your doctor if side effects bother you too much. You might be able to try a different medicine. If you are pregnant or breastfeeding, talk to your doctor about what medicines you can take. Learn about counseling  In many cases, counseling can work as well as medicines to treat mild to moderate depression. Counseling is done by licensed mental health providers, such as psychologists, social workers, and some types of nurses. It can be done in one-on-one sessions or in a group setting. Many people find group sessions helpful. Cognitive-behavioral therapy is a type of counseling. In this treatment therapy, you learn how to see and change unhelpful thinking styles that may be adding to your depression. Counseling and medicines often work well when used together. To manage depression  · Be physically active. Getting 30 minutes of exercise each day is good for your body and your mind. Begin slowly if it is hard for you to get started. If you already exercise, keep it up. · Plan something pleasant for yourself every day. Include activities that you have enjoyed in the past.  · Get enough sleep. Talk to your doctor if you have problems sleeping. · Eat a balanced diet. If you do not feel hungry, eat small snacks rather than large meals. · Do not drink alcohol, use illegal drugs, or take medicines that your doctor has not prescribed for you. They may interfere with your treatment. · Spend time with family and friends. It may help to speak openly about your depression with people you trust.  · Take your medicines exactly as prescribed. Call your doctor if you think you are having a problem with your medicine. · Do not make major life decisions while you are depressed. Depression may change the way you think. You will be able to make better decisions after you feel better. · Think positively.  Challenge negative thoughts with statements such as

## 2018-03-12 ENCOUNTER — TELEPHONE (OUTPATIENT)
Dept: PRIMARY CARE CLINIC | Age: 63
End: 2018-03-12

## 2018-04-10 ENCOUNTER — OFFICE VISIT (OUTPATIENT)
Dept: PRIMARY CARE CLINIC | Age: 63
End: 2018-04-10
Payer: MEDICARE

## 2018-04-10 VITALS
SYSTOLIC BLOOD PRESSURE: 108 MMHG | TEMPERATURE: 98 F | HEART RATE: 92 BPM | HEIGHT: 61 IN | DIASTOLIC BLOOD PRESSURE: 60 MMHG | BODY MASS INDEX: 37.86 KG/M2 | WEIGHT: 200.5 LBS | OXYGEN SATURATION: 98 %

## 2018-04-10 DIAGNOSIS — H92.01 RIGHT EAR PAIN: Primary | ICD-10-CM

## 2018-04-10 DIAGNOSIS — H69.81 DYSFUNCTION OF RIGHT EUSTACHIAN TUBE: ICD-10-CM

## 2018-04-10 DIAGNOSIS — L40.9 PSORIASIS: ICD-10-CM

## 2018-04-10 PROCEDURE — G8417 CALC BMI ABV UP PARAM F/U: HCPCS | Performed by: NURSE PRACTITIONER

## 2018-04-10 PROCEDURE — 3017F COLORECTAL CA SCREEN DOC REV: CPT | Performed by: NURSE PRACTITIONER

## 2018-04-10 PROCEDURE — 3014F SCREEN MAMMO DOC REV: CPT | Performed by: NURSE PRACTITIONER

## 2018-04-10 PROCEDURE — 4004F PT TOBACCO SCREEN RCVD TLK: CPT | Performed by: NURSE PRACTITIONER

## 2018-04-10 PROCEDURE — 99213 OFFICE O/P EST LOW 20 MIN: CPT | Performed by: NURSE PRACTITIONER

## 2018-04-10 PROCEDURE — G8427 DOCREV CUR MEDS BY ELIG CLIN: HCPCS | Performed by: NURSE PRACTITIONER

## 2018-04-10 RX ORDER — CLOBETASOL PROPIONATE 0.5 MG/G
OINTMENT TOPICAL
Qty: 60 G | Refills: 3 | Status: SHIPPED | OUTPATIENT
Start: 2018-04-10 | End: 2018-04-17 | Stop reason: SDUPTHER

## 2018-04-10 RX ORDER — FLUTICASONE PROPIONATE 50 MCG
2 SPRAY, SUSPENSION (ML) NASAL DAILY
Qty: 1 BOTTLE | Refills: 3 | Status: SHIPPED | OUTPATIENT
Start: 2018-04-10 | End: 2019-10-22 | Stop reason: SDUPTHER

## 2018-04-10 ASSESSMENT — ENCOUNTER SYMPTOMS
BACK PAIN: 0
ABDOMINAL PAIN: 0
SORE THROAT: 1
RESPIRATORY NEGATIVE: 1

## 2018-04-17 ENCOUNTER — TELEPHONE (OUTPATIENT)
Dept: PRIMARY CARE CLINIC | Age: 63
End: 2018-04-17

## 2018-04-17 DIAGNOSIS — L40.9 PSORIASIS: ICD-10-CM

## 2018-04-19 RX ORDER — CLOBETASOL PROPIONATE 0.5 MG/G
OINTMENT TOPICAL
Qty: 60 G | Refills: 3 | Status: SHIPPED | OUTPATIENT
Start: 2018-04-19 | End: 2018-10-02 | Stop reason: SDUPTHER

## 2018-05-07 ENCOUNTER — TELEPHONE (OUTPATIENT)
Dept: GASTROENTEROLOGY | Age: 63
End: 2018-05-07

## 2018-05-07 ENCOUNTER — TELEPHONE (OUTPATIENT)
Dept: PRIMARY CARE CLINIC | Age: 63
End: 2018-05-07

## 2018-05-07 DIAGNOSIS — M79.89 SWELLING OF BOTH HANDS: Primary | ICD-10-CM

## 2018-05-08 DIAGNOSIS — M79.89 SWELLING OF BOTH HANDS: ICD-10-CM

## 2018-05-08 LAB
ALBUMIN SERPL-MCNC: 4.1 G/DL (ref 3.5–5.2)
ALP BLD-CCNC: 117 U/L (ref 35–104)
ALT SERPL-CCNC: 16 U/L (ref 5–33)
ANION GAP SERPL CALCULATED.3IONS-SCNC: 16 MMOL/L (ref 7–19)
AST SERPL-CCNC: 22 U/L (ref 5–32)
BILIRUB SERPL-MCNC: <0.2 MG/DL (ref 0.2–1.2)
BUN BLDV-MCNC: 21 MG/DL (ref 8–23)
CALCIUM SERPL-MCNC: 8.7 MG/DL (ref 8.8–10.2)
CHLORIDE BLD-SCNC: 104 MMOL/L (ref 98–111)
CO2: 20 MMOL/L (ref 22–29)
CREAT SERPL-MCNC: 0.5 MG/DL (ref 0.5–0.9)
GFR NON-AFRICAN AMERICAN: >60
GLUCOSE BLD-MCNC: 59 MG/DL (ref 74–109)
POTASSIUM SERPL-SCNC: 4.6 MMOL/L (ref 3.5–5)
SODIUM BLD-SCNC: 140 MMOL/L (ref 136–145)
TOTAL PROTEIN: 6.9 G/DL (ref 6.6–8.7)

## 2018-05-09 ENCOUNTER — TELEPHONE (OUTPATIENT)
Dept: PRIMARY CARE CLINIC | Age: 63
End: 2018-05-09

## 2018-05-10 RX ORDER — FUROSEMIDE 20 MG/1
20 TABLET ORAL DAILY
Qty: 30 TABLET | Refills: 0 | Status: SHIPPED | OUTPATIENT
Start: 2018-05-10 | End: 2018-10-02 | Stop reason: SDUPTHER

## 2018-05-14 ENCOUNTER — TELEPHONE (OUTPATIENT)
Dept: PRIMARY CARE CLINIC | Age: 63
End: 2018-05-14

## 2018-05-23 RX ORDER — DONEPEZIL HYDROCHLORIDE 10 MG/1
TABLET, FILM COATED ORAL
Qty: 90 TABLET | Refills: 3 | Status: SHIPPED | OUTPATIENT
Start: 2018-05-23 | End: 2018-11-05 | Stop reason: SDUPTHER

## 2018-05-23 RX ORDER — CELECOXIB 200 MG/1
CAPSULE ORAL
Qty: 90 CAPSULE | Refills: 3 | Status: SHIPPED | OUTPATIENT
Start: 2018-05-23 | End: 2018-11-05 | Stop reason: SDUPTHER

## 2018-06-05 ENCOUNTER — OFFICE VISIT (OUTPATIENT)
Dept: PRIMARY CARE CLINIC | Age: 63
End: 2018-06-05
Payer: MEDICARE

## 2018-06-05 VITALS
HEART RATE: 67 BPM | WEIGHT: 192.75 LBS | DIASTOLIC BLOOD PRESSURE: 78 MMHG | SYSTOLIC BLOOD PRESSURE: 102 MMHG | TEMPERATURE: 98.8 F | BODY MASS INDEX: 36.39 KG/M2 | OXYGEN SATURATION: 94 % | HEIGHT: 61 IN

## 2018-06-05 DIAGNOSIS — Z87.898 HISTORY OF SEIZURES: ICD-10-CM

## 2018-06-05 DIAGNOSIS — G89.29 CHRONIC NONINTRACTABLE HEADACHE, UNSPECIFIED HEADACHE TYPE: ICD-10-CM

## 2018-06-05 DIAGNOSIS — Z87.440 HISTORY OF KIDNEY INFECTION: ICD-10-CM

## 2018-06-05 DIAGNOSIS — R45.4 OUTBURSTS OF ANGER: ICD-10-CM

## 2018-06-05 DIAGNOSIS — Z79.899 POLYPHARMACY: ICD-10-CM

## 2018-06-05 DIAGNOSIS — F31.78 BIPOLAR DISORDER, IN FULL REMISSION, MOST RECENT EPISODE MIXED (HCC): ICD-10-CM

## 2018-06-05 DIAGNOSIS — Z79.899 MEDICATION MANAGEMENT: ICD-10-CM

## 2018-06-05 DIAGNOSIS — I15.9 SECONDARY HYPERTENSION: Primary | ICD-10-CM

## 2018-06-05 DIAGNOSIS — R51.9 CHRONIC NONINTRACTABLE HEADACHE, UNSPECIFIED HEADACHE TYPE: ICD-10-CM

## 2018-06-05 LAB
AMPHETAMINE SCREEN, URINE: NORMAL
APPEARANCE FLUID: CLEAR
BARBITURATE SCREEN, URINE: NORMAL
BENZODIAZEPINE SCREEN, URINE: NORMAL
BILIRUBIN, POC: NEGATIVE
BLOOD URINE, POC: NEGATIVE
CLARITY, POC: CLEAR
COCAINE METABOLITE SCREEN URINE: NORMAL
COLOR, POC: YELLOW
GLUCOSE URINE, POC: NEGATIVE
KETONES, POC: NEGATIVE
LEUKOCYTE EST, POC: NEGATIVE
MDMA URINE: NORMAL
METHADONE SCREEN, URINE: NORMAL
METHAMPHETAMINE, URINE: NORMAL
NITRITE, POC: NEGATIVE
OPIATE SCREEN URINE: NORMAL
OXYCODONE SCREEN URINE: NORMAL
PH, POC: 5.5
PHENCYCLIDINE SCREEN URINE: NORMAL
PROPOXYPHENE SCREEN, URINE: NORMAL
PROTEIN, POC: NEGATIVE
SPECIFIC GRAVITY, POC: 1.01
THC: NORMAL
TRICYCLIC ANTIDEPRESSANTS, UR: NORMAL
UROBILINOGEN, POC: 0.2

## 2018-06-05 PROCEDURE — 80305 DRUG TEST PRSMV DIR OPT OBS: CPT | Performed by: NURSE PRACTITIONER

## 2018-06-05 PROCEDURE — 99214 OFFICE O/P EST MOD 30 MIN: CPT | Performed by: NURSE PRACTITIONER

## 2018-06-05 PROCEDURE — 3017F COLORECTAL CA SCREEN DOC REV: CPT | Performed by: NURSE PRACTITIONER

## 2018-06-05 PROCEDURE — 81002 URINALYSIS NONAUTO W/O SCOPE: CPT | Performed by: NURSE PRACTITIONER

## 2018-06-05 PROCEDURE — 4004F PT TOBACCO SCREEN RCVD TLK: CPT | Performed by: NURSE PRACTITIONER

## 2018-06-05 PROCEDURE — G8417 CALC BMI ABV UP PARAM F/U: HCPCS | Performed by: NURSE PRACTITIONER

## 2018-06-05 PROCEDURE — G8427 DOCREV CUR MEDS BY ELIG CLIN: HCPCS | Performed by: NURSE PRACTITIONER

## 2018-06-05 PROCEDURE — G0444 DEPRESSION SCREEN ANNUAL: HCPCS | Performed by: NURSE PRACTITIONER

## 2018-06-05 RX ORDER — IRBESARTAN 150 MG/1
150 TABLET ORAL NIGHTLY
Qty: 30 TABLET | Refills: 3 | Status: SHIPPED | OUTPATIENT
Start: 2018-06-05 | End: 2018-09-17 | Stop reason: SDUPTHER

## 2018-06-05 ASSESSMENT — PATIENT HEALTH QUESTIONNAIRE - PHQ9
SUM OF ALL RESPONSES TO PHQ QUESTIONS 1-9: 24
1. LITTLE INTEREST OR PLEASURE IN DOING THINGS: 2
8. MOVING OR SPEAKING SO SLOWLY THAT OTHER PEOPLE COULD HAVE NOTICED. OR THE OPPOSITE, BEING SO FIGETY OR RESTLESS THAT YOU HAVE BEEN MOVING AROUND A LOT MORE THAN USUAL: 3
3. TROUBLE FALLING OR STAYING ASLEEP: 3
6. FEELING BAD ABOUT YOURSELF - OR THAT YOU ARE A FAILURE OR HAVE LET YOURSELF OR YOUR FAMILY DOWN: 3
10. IF YOU CHECKED OFF ANY PROBLEMS, HOW DIFFICULT HAVE THESE PROBLEMS MADE IT FOR YOU TO DO YOUR WORK, TAKE CARE OF THINGS AT HOME, OR GET ALONG WITH OTHER PEOPLE: 3
2. FEELING DOWN, DEPRESSED OR HOPELESS: 2
SUM OF ALL RESPONSES TO PHQ9 QUESTIONS 1 & 2: 4
5. POOR APPETITE OR OVEREATING: 3
7. TROUBLE CONCENTRATING ON THINGS, SUCH AS READING THE NEWSPAPER OR WATCHING TELEVISION: 2
4. FEELING TIRED OR HAVING LITTLE ENERGY: 3
9. THOUGHTS THAT YOU WOULD BE BETTER OFF DEAD, OR OF HURTING YOURSELF: 3

## 2018-06-05 ASSESSMENT — ENCOUNTER SYMPTOMS
NAUSEA: 0
VOMITING: 0

## 2018-06-06 ENCOUNTER — TELEPHONE (OUTPATIENT)
Dept: NEUROLOGY | Age: 63
End: 2018-06-06

## 2018-06-06 DIAGNOSIS — Z79.899 MEDICATION MANAGEMENT: ICD-10-CM

## 2018-06-07 ENCOUNTER — TELEPHONE (OUTPATIENT)
Dept: PRIMARY CARE CLINIC | Age: 63
End: 2018-06-07

## 2018-06-08 LAB
AMPHETAMINES, URINE: NEGATIVE NG/ML
BARBITURATES, URINE: NEGATIVE NG/ML
BENZODIAZEPINES, URINE: NEGATIVE NG/ML
CANNABINOIDS, URINE: NEGATIVE NG/ML
COCAINE METABOLITE, URINE: NEGATIVE NG/ML
CREATININE, URINE: 24 MG/DL (ref 20–300)
ETHANOL U, QUAN: NEGATIVE %
FENTANYL URINE: NEGATIVE PG/ML
MEPERIDINE, UR: NEGATIVE NG/ML
METHADONE SCREEN, URINE: NEGATIVE NG/ML
OPIATES, URINE: NEGATIVE NG/ML
OXYCODONE/OXYMORPHONE, UR: NEGATIVE NG/ML
PH, URINE: 6 (ref 4.5–8.9)
PHENCYCLIDINE, URINE: NEGATIVE NG/ML
PROPOXYPHENE, URINE: NEGATIVE NG/ML

## 2018-06-28 DIAGNOSIS — J42 CHRONIC BRONCHITIS, UNSPECIFIED CHRONIC BRONCHITIS TYPE (HCC): ICD-10-CM

## 2018-06-29 ENCOUNTER — TELEPHONE (OUTPATIENT)
Dept: NEUROLOGY | Age: 63
End: 2018-06-29

## 2018-06-29 RX ORDER — HYDROXYZINE HYDROCHLORIDE 25 MG/1
TABLET, FILM COATED ORAL
Qty: 30 TABLET | Refills: 0 | Status: SHIPPED | OUTPATIENT
Start: 2018-06-29 | End: 2018-07-13 | Stop reason: SDUPTHER

## 2018-06-29 RX ORDER — IPRATROPIUM BROMIDE AND ALBUTEROL SULFATE 2.5; .5 MG/3ML; MG/3ML
1 SOLUTION RESPIRATORY (INHALATION) EVERY 6 HOURS
Qty: 270 ML | Refills: 2 | Status: SHIPPED | OUTPATIENT
Start: 2018-06-29 | End: 2018-08-21 | Stop reason: SDUPTHER

## 2018-06-29 RX ORDER — LANCETS
EACH MISCELLANEOUS
Qty: 300 EACH | Refills: 3 | Status: SHIPPED | OUTPATIENT
Start: 2018-06-29 | End: 2019-10-22 | Stop reason: SDUPTHER

## 2018-07-03 DIAGNOSIS — F31.78 BIPOLAR DISORDER, IN FULL REMISSION, MOST RECENT EPISODE MIXED (HCC): ICD-10-CM

## 2018-07-06 RX ORDER — PROMETHAZINE HYDROCHLORIDE 25 MG/1
25 TABLET ORAL EVERY 8 HOURS PRN
Qty: 30 TABLET | Refills: 2 | Status: SHIPPED | OUTPATIENT
Start: 2018-07-06 | End: 2018-09-05 | Stop reason: SDUPTHER

## 2018-07-06 NOTE — TELEPHONE ENCOUNTER
Pt seen 6/5/18 with a follow up on 7/17/18.     Requested Prescriptions     Pending Prescriptions Disp Refills    promethazine (PHENERGAN) 25 MG tablet [Pharmacy Med Name: PROMETHAZINE HCL 25 MG Tablet] 30 tablet 2     Sig: TAKE 1 TABLET BY MOUTH EVERY 8 HOURS AS NEEDED FOR NAUSEA

## 2018-07-13 RX ORDER — HYDROXYZINE HYDROCHLORIDE 25 MG/1
TABLET, FILM COATED ORAL
Qty: 30 TABLET | Refills: 5 | Status: SHIPPED | OUTPATIENT
Start: 2018-07-13 | End: 2018-09-07 | Stop reason: SDUPTHER

## 2018-08-21 ENCOUNTER — TELEPHONE (OUTPATIENT)
Dept: PRIMARY CARE CLINIC | Age: 63
End: 2018-08-21

## 2018-08-21 DIAGNOSIS — J42 CHRONIC BRONCHITIS, UNSPECIFIED CHRONIC BRONCHITIS TYPE (HCC): ICD-10-CM

## 2018-08-21 RX ORDER — FLASH GLUCOSE SENSOR
1 KIT MISCELLANEOUS 2 TIMES DAILY
Qty: 1 DEVICE | Refills: 0 | Status: SHIPPED | OUTPATIENT
Start: 2018-08-21

## 2018-08-21 RX ORDER — FLASH GLUCOSE SENSOR
1 KIT MISCELLANEOUS 2 TIMES DAILY
Qty: 1 EACH | Refills: 0 | Status: SHIPPED | OUTPATIENT
Start: 2018-08-21

## 2018-08-22 ENCOUNTER — TELEPHONE (OUTPATIENT)
Dept: PRIMARY CARE CLINIC | Age: 63
End: 2018-08-22

## 2018-08-22 RX ORDER — IPRATROPIUM BROMIDE AND ALBUTEROL SULFATE 2.5; .5 MG/3ML; MG/3ML
SOLUTION RESPIRATORY (INHALATION)
Qty: 810 ML | Refills: 2 | Status: SHIPPED | OUTPATIENT
Start: 2018-08-22 | End: 2018-10-02 | Stop reason: SDUPTHER

## 2018-09-05 RX ORDER — ALBUTEROL SULFATE 90 UG/1
2 AEROSOL, METERED RESPIRATORY (INHALATION) EVERY 6 HOURS PRN
Qty: 18 G | Refills: 5 | Status: SHIPPED | OUTPATIENT
Start: 2018-09-05 | End: 2018-10-19 | Stop reason: SDUPTHER

## 2018-09-06 RX ORDER — PROMETHAZINE HYDROCHLORIDE 25 MG/1
25 TABLET ORAL EVERY 8 HOURS PRN
Qty: 30 TABLET | Refills: 2 | Status: SHIPPED | OUTPATIENT
Start: 2018-09-06 | End: 2018-10-02 | Stop reason: SDUPTHER

## 2018-09-10 RX ORDER — HYDROXYZINE HYDROCHLORIDE 25 MG/1
TABLET, FILM COATED ORAL
Qty: 180 TABLET | Refills: 3 | Status: SHIPPED | OUTPATIENT
Start: 2018-09-10 | End: 2018-10-02 | Stop reason: SDUPTHER

## 2018-09-10 NOTE — TELEPHONE ENCOUNTER
Received fax from pharmacy requesting refill on pts medication(s). Pt was last seen in office on 2018  and has a follow up scheduled for Visit date not found. Will send request to  Wandy Casillas  for authorization.      Requested Prescriptions     Pending Prescriptions Disp Refills    hydrOXYzine (ATARAX) 25 MG tablet [Pharmacy Med Name: HYDROXYZINE HCL 25 MG Tablet] 180 tablet 3     Si-2 po Q 6 hours prn anxiety

## 2018-09-17 DIAGNOSIS — I15.9 SECONDARY HYPERTENSION: ICD-10-CM

## 2018-09-18 RX ORDER — IRBESARTAN 150 MG/1
150 TABLET ORAL NIGHTLY
Qty: 90 TABLET | Refills: 3 | Status: SHIPPED | OUTPATIENT
Start: 2018-09-18 | End: 2018-10-02 | Stop reason: SDUPTHER

## 2018-10-02 ENCOUNTER — OFFICE VISIT (OUTPATIENT)
Dept: PRIMARY CARE CLINIC | Age: 63
End: 2018-10-02
Payer: MEDICARE

## 2018-10-02 VITALS
SYSTOLIC BLOOD PRESSURE: 102 MMHG | HEIGHT: 61 IN | BODY MASS INDEX: 37 KG/M2 | OXYGEN SATURATION: 97 % | DIASTOLIC BLOOD PRESSURE: 58 MMHG | HEART RATE: 82 BPM | WEIGHT: 196 LBS | TEMPERATURE: 98.2 F

## 2018-10-02 DIAGNOSIS — E11.49 OTHER DIABETIC NEUROLOGICAL COMPLICATION ASSOCIATED WITH TYPE 2 DIABETES MELLITUS (HCC): ICD-10-CM

## 2018-10-02 DIAGNOSIS — Z12.39 SCREENING FOR BREAST CANCER: ICD-10-CM

## 2018-10-02 DIAGNOSIS — Z23 NEED FOR INFLUENZA VACCINATION: ICD-10-CM

## 2018-10-02 DIAGNOSIS — J42 CHRONIC BRONCHITIS, UNSPECIFIED CHRONIC BRONCHITIS TYPE (HCC): ICD-10-CM

## 2018-10-02 DIAGNOSIS — Z12.11 SCREEN FOR COLON CANCER: ICD-10-CM

## 2018-10-02 DIAGNOSIS — F31.78 BIPOLAR DISORDER, IN FULL REMISSION, MOST RECENT EPISODE MIXED (HCC): ICD-10-CM

## 2018-10-02 DIAGNOSIS — E11.9 TYPE 2 DIABETES MELLITUS WITHOUT COMPLICATION, UNSPECIFIED WHETHER LONG TERM INSULIN USE (HCC): Primary | ICD-10-CM

## 2018-10-02 DIAGNOSIS — L40.9 PSORIASIS: ICD-10-CM

## 2018-10-02 DIAGNOSIS — I15.9 SECONDARY HYPERTENSION: ICD-10-CM

## 2018-10-02 PROCEDURE — 4004F PT TOBACCO SCREEN RCVD TLK: CPT | Performed by: NURSE PRACTITIONER

## 2018-10-02 PROCEDURE — 2022F DILAT RTA XM EVC RTNOPTHY: CPT | Performed by: NURSE PRACTITIONER

## 2018-10-02 PROCEDURE — 99214 OFFICE O/P EST MOD 30 MIN: CPT | Performed by: NURSE PRACTITIONER

## 2018-10-02 PROCEDURE — 3023F SPIROM DOC REV: CPT | Performed by: NURSE PRACTITIONER

## 2018-10-02 PROCEDURE — G8417 CALC BMI ABV UP PARAM F/U: HCPCS | Performed by: NURSE PRACTITIONER

## 2018-10-02 PROCEDURE — G0008 ADMIN INFLUENZA VIRUS VAC: HCPCS | Performed by: NURSE PRACTITIONER

## 2018-10-02 PROCEDURE — G8427 DOCREV CUR MEDS BY ELIG CLIN: HCPCS | Performed by: NURSE PRACTITIONER

## 2018-10-02 PROCEDURE — 90686 IIV4 VACC NO PRSV 0.5 ML IM: CPT | Performed by: NURSE PRACTITIONER

## 2018-10-02 PROCEDURE — 3017F COLORECTAL CA SCREEN DOC REV: CPT | Performed by: NURSE PRACTITIONER

## 2018-10-02 PROCEDURE — G8482 FLU IMMUNIZE ORDER/ADMIN: HCPCS | Performed by: NURSE PRACTITIONER

## 2018-10-02 PROCEDURE — 3046F HEMOGLOBIN A1C LEVEL >9.0%: CPT | Performed by: NURSE PRACTITIONER

## 2018-10-02 PROCEDURE — G8926 SPIRO NO PERF OR DOC: HCPCS | Performed by: NURSE PRACTITIONER

## 2018-10-02 RX ORDER — IPRATROPIUM BROMIDE AND ALBUTEROL SULFATE 2.5; .5 MG/3ML; MG/3ML
1 SOLUTION RESPIRATORY (INHALATION) EVERY 6 HOURS PRN
Qty: 810 ML | Refills: 2 | Status: ON HOLD | OUTPATIENT
Start: 2018-10-02 | End: 2022-05-24 | Stop reason: HOSPADM

## 2018-10-02 RX ORDER — HYDROXYZINE HYDROCHLORIDE 25 MG/1
TABLET, FILM COATED ORAL
Qty: 180 TABLET | Refills: 3 | Status: SHIPPED | OUTPATIENT
Start: 2018-10-02 | End: 2018-11-05 | Stop reason: SDUPTHER

## 2018-10-02 RX ORDER — FUROSEMIDE 20 MG/1
20 TABLET ORAL DAILY
Qty: 30 TABLET | Refills: 0 | Status: SHIPPED | OUTPATIENT
Start: 2018-10-02 | End: 2018-11-05 | Stop reason: SDUPTHER

## 2018-10-02 RX ORDER — CLOBETASOL PROPIONATE 0.5 MG/G
OINTMENT TOPICAL
Qty: 60 G | Refills: 3 | Status: SHIPPED | OUTPATIENT
Start: 2018-10-02 | End: 2018-11-05 | Stop reason: SDUPTHER

## 2018-10-02 RX ORDER — MIRTAZAPINE 15 MG/1
TABLET, FILM COATED ORAL
Qty: 30 TABLET | Refills: 3 | Status: SHIPPED | OUTPATIENT
Start: 2018-10-02 | End: 2018-11-05 | Stop reason: SDUPTHER

## 2018-10-02 RX ORDER — GABAPENTIN 100 MG/1
100 CAPSULE ORAL DAILY
Qty: 90 CAPSULE | Refills: 1 | Status: SHIPPED | OUTPATIENT
Start: 2018-10-02 | End: 2018-11-05 | Stop reason: SDUPTHER

## 2018-10-02 RX ORDER — IRBESARTAN 150 MG/1
150 TABLET ORAL NIGHTLY
Qty: 90 TABLET | Refills: 3 | Status: SHIPPED | OUTPATIENT
Start: 2018-10-02 | End: 2019-02-11 | Stop reason: SDUPTHER

## 2018-10-02 RX ORDER — PROMETHAZINE HYDROCHLORIDE 25 MG/1
25 TABLET ORAL EVERY 8 HOURS PRN
Qty: 30 TABLET | Refills: 2 | Status: SHIPPED | OUTPATIENT
Start: 2018-10-02 | End: 2018-11-05 | Stop reason: SDUPTHER

## 2018-10-02 ASSESSMENT — ENCOUNTER SYMPTOMS
SHORTNESS OF BREATH: 0
BACK PAIN: 0
TROUBLE SWALLOWING: 0
ABDOMINAL PAIN: 0
COUGH: 0
WHEEZING: 0
EYES NEGATIVE: 1

## 2018-10-02 NOTE — PATIENT INSTRUCTIONS
do the test during your menstrual period or if you are having bleeding from hemorrhoids. What happens during the test?  There are different types of home tests available. It is important to follow the instructions provided with any test.  Here are some general instructions:  · Check the expiration date on the package. Don't use a test kit after its expiration date. · Follow the instructions exactly. Do all the steps, in order, without skipping any of them. · After you finish your test, follow the instructions that you were given for returning the test.  There is a FIT test that shows the results right away. If your test shows that blood was found in your stool sample, call your doctor as soon as possible. Follow-up care is a key part of your treatment and safety. Be sure to make and go to all appointments, and call your doctor if you are having problems. It's also a good idea to keep a list of the medicines you take. Ask your doctor when you can expect to have your test results. Where can you learn more? Go to [email or web address] and sign in to your BeCouply account. Enter X146 in the [employer] box to learn more about \"Fecal Immunochemical Test (FIT): About This Test.\"     If you do not have an account, please click on the \"Sign Up Now\" link. Current as of: May 12, 2017  Content Version: 11.7  © 1192-1286 [employer], Incorporated. Care instructions adapted under license by [facility] ([facility]). If you have questions about a medical condition or this instruction, always ask your healthcare professional. [place] [address] any warranty or liability for your use of this information.
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
## 2018-10-09 ENCOUNTER — TELEPHONE (OUTPATIENT)
Dept: PRIMARY CARE CLINIC | Age: 63
End: 2018-10-09

## 2018-10-19 RX ORDER — ALBUTEROL SULFATE 90 UG/1
2 AEROSOL, METERED RESPIRATORY (INHALATION) EVERY 6 HOURS PRN
Qty: 3 INHALER | Refills: 3 | Status: SHIPPED | OUTPATIENT
Start: 2018-10-19 | End: 2019-10-22 | Stop reason: SDUPTHER

## 2018-11-01 DIAGNOSIS — E11.49 OTHER DIABETIC NEUROLOGICAL COMPLICATION ASSOCIATED WITH TYPE 2 DIABETES MELLITUS (HCC): ICD-10-CM

## 2018-11-01 DIAGNOSIS — F31.78 BIPOLAR DISORDER, IN FULL REMISSION, MOST RECENT EPISODE MIXED (HCC): ICD-10-CM

## 2018-11-01 DIAGNOSIS — L40.9 PSORIASIS: ICD-10-CM

## 2018-11-01 DIAGNOSIS — E11.9 TYPE 2 DIABETES MELLITUS WITHOUT COMPLICATION, UNSPECIFIED WHETHER LONG TERM INSULIN USE (HCC): ICD-10-CM

## 2018-11-05 RX ORDER — HYDROXYZINE HYDROCHLORIDE 25 MG/1
TABLET, FILM COATED ORAL
Qty: 180 TABLET | Refills: 3 | Status: ON HOLD | OUTPATIENT
Start: 2018-11-05 | End: 2019-07-08 | Stop reason: HOSPADM

## 2018-11-05 RX ORDER — FUROSEMIDE 20 MG/1
20 TABLET ORAL DAILY
Qty: 30 TABLET | Refills: 0 | Status: SHIPPED | OUTPATIENT
Start: 2018-11-05 | End: 2018-11-10 | Stop reason: SDUPTHER

## 2018-11-05 RX ORDER — MIRTAZAPINE 15 MG/1
TABLET, FILM COATED ORAL
Qty: 30 TABLET | Refills: 3 | Status: SHIPPED | OUTPATIENT
Start: 2018-11-05 | End: 2018-12-12 | Stop reason: SDUPTHER

## 2018-11-05 RX ORDER — CELECOXIB 200 MG/1
CAPSULE ORAL
Qty: 90 CAPSULE | Refills: 3 | Status: SHIPPED | OUTPATIENT
Start: 2018-11-05 | End: 2019-10-22 | Stop reason: SDUPTHER

## 2018-11-05 RX ORDER — DONEPEZIL HYDROCHLORIDE 10 MG/1
TABLET, FILM COATED ORAL
Qty: 90 TABLET | Refills: 3 | Status: ON HOLD | OUTPATIENT
Start: 2018-11-05 | End: 2019-07-08 | Stop reason: HOSPADM

## 2018-11-05 RX ORDER — CLOBETASOL PROPIONATE 0.5 MG/G
OINTMENT TOPICAL
Qty: 60 G | Refills: 3 | Status: SHIPPED | OUTPATIENT
Start: 2018-11-05 | End: 2020-07-30 | Stop reason: SDUPTHER

## 2018-11-05 RX ORDER — PROMETHAZINE HYDROCHLORIDE 25 MG/1
25 TABLET ORAL EVERY 8 HOURS PRN
Qty: 30 TABLET | Refills: 2 | Status: ON HOLD | OUTPATIENT
Start: 2018-11-05 | End: 2019-07-08 | Stop reason: HOSPADM

## 2018-11-05 RX ORDER — GABAPENTIN 100 MG/1
100 CAPSULE ORAL DAILY
Qty: 90 CAPSULE | Refills: 1 | Status: ON HOLD | OUTPATIENT
Start: 2018-11-05 | End: 2019-07-08 | Stop reason: HOSPADM

## 2018-11-05 RX ORDER — PANTOPRAZOLE SODIUM 40 MG/1
TABLET, DELAYED RELEASE ORAL
Qty: 30 TABLET | Refills: 3 | Status: SHIPPED | OUTPATIENT
Start: 2018-11-05 | End: 2019-04-11 | Stop reason: SDUPTHER

## 2018-11-12 RX ORDER — FUROSEMIDE 20 MG/1
20 TABLET ORAL DAILY
Qty: 90 TABLET | Refills: 3 | Status: SHIPPED | OUTPATIENT
Start: 2018-11-12 | End: 2019-10-08 | Stop reason: SDUPTHER

## 2018-11-27 DIAGNOSIS — F31.78 BIPOLAR DISORDER, IN FULL REMISSION, MOST RECENT EPISODE MIXED (HCC): ICD-10-CM

## 2018-11-27 NOTE — TELEPHONE ENCOUNTER
Received fax from pharmacy requesting refill on pts medication(s). Pt was last seen in office on 10/2/2018  and has a follow up scheduled for Visit date not found. Will send request to  Di Chowdary  for patient.      Requested Prescriptions     Pending Prescriptions Disp Refills    ARIPiprazole lauroxil (ARISTADA) 882 MG/3.2ML PRSY injection 3 Syringe 3     Sig: Inject 3.2 mLs into the muscle every 30 days

## 2018-12-06 ENCOUNTER — APPOINTMENT (OUTPATIENT)
Dept: GENERAL RADIOLOGY | Age: 63
End: 2018-12-06
Payer: MEDICARE

## 2018-12-06 ENCOUNTER — HOSPITAL ENCOUNTER (EMERGENCY)
Age: 63
Discharge: HOME | End: 2018-12-07
Attending: EMERGENCY MEDICINE
Payer: MEDICARE

## 2018-12-06 DIAGNOSIS — N39.0 BACTERIAL UTI: ICD-10-CM

## 2018-12-06 DIAGNOSIS — T43.201A: ICD-10-CM

## 2018-12-06 DIAGNOSIS — R45.851 DEPRESSION WITH SUICIDAL IDEATION: Primary | ICD-10-CM

## 2018-12-06 DIAGNOSIS — T45.0X1A: ICD-10-CM

## 2018-12-06 DIAGNOSIS — F32.A DEPRESSION WITH SUICIDAL IDEATION: Primary | ICD-10-CM

## 2018-12-06 DIAGNOSIS — A49.9 BACTERIAL UTI: ICD-10-CM

## 2018-12-06 LAB
ACETAMINOPHEN LEVEL: <15 UG/ML
ALBUMIN SERPL-MCNC: 4 G/DL (ref 3.5–5.2)
ALP BLD-CCNC: 122 U/L (ref 35–104)
ALT SERPL-CCNC: 13 U/L (ref 5–33)
AMPHETAMINE SCREEN, URINE: NEGATIVE
ANION GAP SERPL CALCULATED.3IONS-SCNC: 9 MMOL/L (ref 7–19)
AST SERPL-CCNC: 16 U/L (ref 5–32)
BARBITURATE SCREEN URINE: NEGATIVE
BASOPHILS ABSOLUTE: 0 K/UL (ref 0–0.2)
BASOPHILS RELATIVE PERCENT: 0.5 % (ref 0–1)
BENZODIAZEPINE SCREEN, URINE: NEGATIVE
BILIRUB SERPL-MCNC: <0.2 MG/DL (ref 0.2–1.2)
BUN BLDV-MCNC: 12 MG/DL (ref 8–23)
CALCIUM SERPL-MCNC: 9.3 MG/DL (ref 8.8–10.2)
CANNABINOID SCREEN URINE: NEGATIVE
CHLORIDE BLD-SCNC: 105 MMOL/L (ref 98–111)
CO2: 29 MMOL/L (ref 22–29)
COCAINE METABOLITE SCREEN URINE: NEGATIVE
CREAT SERPL-MCNC: 0.6 MG/DL (ref 0.5–0.9)
EOSINOPHILS ABSOLUTE: 0.1 K/UL (ref 0–0.6)
EOSINOPHILS RELATIVE PERCENT: 1.1 % (ref 0–5)
ETHANOL: <10 MG/DL (ref 0–0.08)
GFR NON-AFRICAN AMERICAN: >60
GLUCOSE BLD-MCNC: 98 MG/DL (ref 74–109)
HCT VFR BLD CALC: 34.9 % (ref 37–47)
HEMOGLOBIN: 10.6 G/DL (ref 12–16)
LYMPHOCYTES ABSOLUTE: 2.2 K/UL (ref 1.1–4.5)
LYMPHOCYTES RELATIVE PERCENT: 26.1 % (ref 20–40)
Lab: NORMAL
MCH RBC QN AUTO: 26.4 PG (ref 27–31)
MCHC RBC AUTO-ENTMCNC: 30.4 G/DL (ref 33–37)
MCV RBC AUTO: 86.8 FL (ref 81–99)
MONOCYTES ABSOLUTE: 0.8 K/UL (ref 0–0.9)
MONOCYTES RELATIVE PERCENT: 8.9 % (ref 0–10)
NEUTROPHILS ABSOLUTE: 5.3 K/UL (ref 1.5–7.5)
NEUTROPHILS RELATIVE PERCENT: 63.3 % (ref 50–65)
OPIATE SCREEN URINE: NEGATIVE
PDW BLD-RTO: 16.5 % (ref 11.5–14.5)
PLATELET # BLD: 215 K/UL (ref 130–400)
PMV BLD AUTO: 10.4 FL (ref 9.4–12.3)
POTASSIUM SERPL-SCNC: 4.8 MMOL/L (ref 3.5–5)
POTASSIUM SERPL-SCNC: 5.5 MMOL/L (ref 3.5–5)
RBC # BLD: 4.02 M/UL (ref 4.2–5.4)
REASON FOR REJECTION: NORMAL
REJECTED TEST: NORMAL
SALICYLATE, SERUM: <3 MG/DL (ref 3–10)
SODIUM BLD-SCNC: 143 MMOL/L (ref 136–145)
TOTAL PROTEIN: 6.9 G/DL (ref 6.6–8.7)
WBC # BLD: 8.4 K/UL (ref 4.8–10.8)

## 2018-12-06 PROCEDURE — 71045 X-RAY EXAM CHEST 1 VIEW: CPT

## 2018-12-06 PROCEDURE — 96375 TX/PRO/DX INJ NEW DRUG ADDON: CPT

## 2018-12-06 PROCEDURE — 80053 COMPREHEN METABOLIC PANEL: CPT

## 2018-12-06 PROCEDURE — 6360000002 HC RX W HCPCS: Performed by: EMERGENCY MEDICINE

## 2018-12-06 PROCEDURE — G0480 DRUG TEST DEF 1-7 CLASSES: HCPCS

## 2018-12-06 PROCEDURE — 96374 THER/PROPH/DIAG INJ IV PUSH: CPT

## 2018-12-06 PROCEDURE — 85025 COMPLETE CBC W/AUTO DIFF WBC: CPT

## 2018-12-06 PROCEDURE — 99285 EMERGENCY DEPT VISIT HI MDM: CPT

## 2018-12-06 PROCEDURE — 84132 ASSAY OF SERUM POTASSIUM: CPT

## 2018-12-06 PROCEDURE — 80307 DRUG TEST PRSMV CHEM ANLYZR: CPT

## 2018-12-06 PROCEDURE — 93005 ELECTROCARDIOGRAM TRACING: CPT

## 2018-12-06 PROCEDURE — 2580000003 HC RX 258: Performed by: EMERGENCY MEDICINE

## 2018-12-06 PROCEDURE — 36415 COLL VENOUS BLD VENIPUNCTURE: CPT

## 2018-12-06 PROCEDURE — 99285 EMERGENCY DEPT VISIT HI MDM: CPT | Performed by: EMERGENCY MEDICINE

## 2018-12-06 RX ORDER — FUROSEMIDE 10 MG/ML
20 INJECTION INTRAMUSCULAR; INTRAVENOUS ONCE
Status: COMPLETED | OUTPATIENT
Start: 2018-12-06 | End: 2018-12-06

## 2018-12-06 RX ORDER — 0.9 % SODIUM CHLORIDE 0.9 %
1000 INTRAVENOUS SOLUTION INTRAVENOUS ONCE
Status: COMPLETED | OUTPATIENT
Start: 2018-12-06 | End: 2018-12-06

## 2018-12-06 RX ORDER — LORAZEPAM 2 MG/ML
1 INJECTION INTRAMUSCULAR ONCE
Status: COMPLETED | OUTPATIENT
Start: 2018-12-06 | End: 2018-12-06

## 2018-12-06 RX ADMIN — FUROSEMIDE 20 MG: 10 INJECTION, SOLUTION INTRAMUSCULAR; INTRAVENOUS at 13:18

## 2018-12-06 RX ADMIN — SODIUM CHLORIDE 1000 ML: 9 INJECTION, SOLUTION INTRAVENOUS at 10:32

## 2018-12-06 RX ADMIN — LORAZEPAM 1 MG: 2 INJECTION INTRAMUSCULAR; INTRAVENOUS at 10:29

## 2018-12-06 ASSESSMENT — ENCOUNTER SYMPTOMS
BACK PAIN: 0
SORE THROAT: 0
RHINORRHEA: 0
EYE PAIN: 0
PHOTOPHOBIA: 0
COUGH: 0
DIARRHEA: 0
NAUSEA: 0
CHEST TIGHTNESS: 0
VOMITING: 0
ABDOMINAL PAIN: 0
SHORTNESS OF BREATH: 0

## 2018-12-06 ASSESSMENT — PAIN SCALES - GENERAL
PAINLEVEL_OUTOF10: 2
PAINLEVEL_OUTOF10: 0
PAINLEVEL_OUTOF10: 0

## 2018-12-06 ASSESSMENT — LIFESTYLE VARIABLES: HISTORY_ALCOHOL_USE: YES

## 2018-12-06 ASSESSMENT — PATIENT HEALTH QUESTIONNAIRE - PHQ9: SUM OF ALL RESPONSES TO PHQ QUESTIONS 1-9: 24

## 2018-12-06 NOTE — ED PROVIDER NOTES
Constitutional: Negative for activity change, appetite change, chills and fever. HENT: Negative for congestion, nosebleeds, rhinorrhea and sore throat. Eyes: Negative for photophobia, pain and visual disturbance. Respiratory: Negative for cough, chest tightness and shortness of breath. Cardiovascular: Negative for chest pain and palpitations. Gastrointestinal: Negative for abdominal pain, diarrhea, nausea and vomiting. Genitourinary: Negative for dysuria, flank pain and hematuria. Musculoskeletal: Negative for arthralgias, back pain, myalgias and neck pain. Skin: Negative for rash and wound. Neurological: Negative for dizziness, syncope, speech difficulty, weakness, light-headedness and headaches. Psychiatric/Behavioral: Positive for agitation, dysphoric mood and suicidal ideas. Negative for confusion and hallucinations. The patient is nervous/anxious.          PAST MEDICAL HISTORY     Past Medical History:   Diagnosis Date    Alcohol overdose     history of    Anemia     Anxiety     [facility]     Brain tumor ([facility]     Chronic back pain     ruptured discs    COPD (chronic obstructive pulmonary disease) (HCC)     Dementia     Depression     Diabetes (HCC)     Elevated liver enzymes     Frequent falls     Headache     History of kidney infection     Hyperlipidemia     Hypertension     Neuropathy     Obesity     Osteoarthritis     Oxygen dependent     Psoriasis     Scoliosis     Sleep apnea     UTI (urinary tract infection)        SURGICAL HISTORY       Past Surgical History:   Procedure Laterality Date    APPENDECTOMY       SECTION      CHOLECYSTECTOMY      GASTRIC BYPASS SURGERY      TONSILLECTOMY      TUBAL LIGATION      TUMOR REMOVAL      bone tumor, r wrist     UPPER GASTROINTESTINAL ENDOSCOPY  2015    Tarik: sm hiatal hernia; s/p [id number]-y; esoph plaques, pos yeast; neg CS       CURRENT MEDICATIONS       Previous Medications plans.   Nursing note and vitals reviewed. DIAGNOSTIC RESULTS     EKG:All EKG's are interpreted by the Emergency Department Physician who either signs or Co-signs this chart in the absence of a cardiologist.    [id number]: Sinus rhythm, 90 bpm, no ST elevations or depressions with in contiguous leads, and no QRS widening, no terminal R in aVR. RADIOLOGY:   Non-plain film images such as CT, Ultrasound and MRI are read by theradiologist. Plain radiographic images are visualized and preliminarily interpreted by the emergency physician with the below findings:    XR CHEST PORTABLE   Final Result   No acute cardiopulmonary abnormality. Signed by Dr Dylon Hung. Nadege on 12/6/2018 9:06 AM          LABS:  Labs Reviewed   CBC WITH AUTO DIFFERENTIAL - Abnormal; Notable for the following:        Result Value    RBC 4.02 (*)     Hemoglobin 10.6 (*)     Hematocrit 34.9 (*)     MCH 26.4 (*)     MCHC 30.4 (*)     RDW 16.5 (*)     All other components within normal limits    Narrative:     THIS IS A RECOLLECT. PREVIOUS SPECIMEN CLOTTED. COMPREHENSIVE METABOLIC PANEL - Abnormal; Notable for the following:     Potassium 5.5 (*)     Alkaline Phosphatase 122 (*)     All other components within normal limits   URINE DRUG SCREEN   SPECIMEN REJECTION   ETHANOL   SALICYLATE   ACETAMINOPHEN LEVEL   POTASSIUM       All other labs were within normal range or not returned as of this dictation.     EMERGENCY DEPARTMENT COURSE and DIFFERENTIAL DIAGNOSIS/MDM:   Vitals:    Vitals:    12/06/18 0734 12/06/18 0743 12/06/18 1000 12/06/18 1845   BP:  (!) 156/109 139/76 (!) 140/82   Pulse:  105 101 96   Resp:  18  16   Temp:  99.1 °F (37.3 °C)  98.9 °F (37.2 °C)   TempSrc:  Oral  Oral   SpO2:  95% 94% 98%   Weight: 173 lb (78.5 kg)      Height: 5' 1\" (1.549 m)          MDM  Number of Diagnoses or Management Options  Diagnosis management comments: 82-year-old female with suicidal ideations, overdose, states unintentional overdose but was trying to counteract the symptoms and calm herself from a previous bipolar medication. We will labs for medical clearance and have psychiatric evaluation. Last time of ingestion of diphenhydramine was 230 morning. 10:30 AM we'll be 8 hours of observation. If she is still exhibiting symptoms of anticholinergic toxicity will admit to medicine for observation prior to psychiatric evaluation. ED Course  Patient was observed for nearly 12 hours from last time of ingestion. No evidence of serotonin syndrome or anticholinergic/antihistamine toxicity. She did have a mildly elevated potassium this was corrected with Lasix. Repeat shows normalization of the electrolyte. Patient was medically cleared. None from psychiatry contacted. Patient seen by from psychiatry. Patient will be signed out to Dr. Jeanne Kim, currently awaiting placement at an appropriate psychiatric facility that will be able to meet her continuous oxygen requirements. CONSULTS:  None    PROCEDURES:  Unless otherwise noted below, none     Procedures    FINAL IMPRESSION      1. Depression with suicidal ideation    2. Poisoning by antihistamine, accidental or unintentional, initial encounter    3. Antidepressant overdose, accidental or unintentional, initial encounter          DISPOSITION/PLAN   DISPOSITION Decision To Transfer 12/06/2018 05:16:45 PM      PATIENT REFERRED TO:  No follow-up provider specified.     DISCHARGE MEDICATIONS:  New Prescriptions    No medications on file          (Pleasenote that portions of this note were completed with a voice recognition program.  Efforts were made to edit the dictations but occasionally words are mis-transcribed.)    Ten Hagen MD (electronically signed)  Attending Emergency Physician          Ten Hagen MD  12/06/18 8933

## 2018-12-06 NOTE — BH NOTE
Psychiatry Initial Intake    18    Ju Elise ,a 66 y.o. female, presents to the ED for a psychiatric assessment. ED Arrival [id number]  ED physician: Riley Toro  TOSHIA Notification time: 1435  TOSHIA Assess time: [address] [facility] ED   Psychiatrist call time: 36  Spoke with Dr. Kathy Clark    Patient is referred by: ambulance from [facility]     Reason for visit to ED - Presenting problem:     PT states reason for ED visit, \"I was told years ago that I had the worst stage of Bipolar, I commited suicide 2 years ago, I took 39 Blood pressures pills, 25 Ativans, a big bottle of vodka & antifreeze; they brought me back & I was so mad; I was furious. I was Suellyn Ruder do it again as soon as I got out. I met Dr. Dillan Gaitan & he saved my life. I drove to [place] every month to see him until he moved farther away. He put  me on the shot for Bipolar. Dr. Manuel Delgadillo had let me set there for six months with nothing. I saw Dr. Ruth Cohen on Thursday & he took me off of my shots & put me on these pills. By the third day, I couldn't sit down, I couldn't stop crying, my brain couldn't keep up with my mouth. I went and took a whole lot of benadryl to make it calm down. I got to get some help, I can't live like this. \" Pt reports she took Benadryl  @ 2300 # 30, 0230  #15. 0700 #20 of the benadryl. Pt denies current SI, HI, AVH, reports PDW last night & the night before last; reported suicidal thoughts to ED staff upon arrival today. ED provider note: Pt now c/o suicidal thoughts and when asked if she has a plan pt states, \"I'll do what I did last time and take a bunch of pills, drink vodka and drink anti freeze. I  last time. \"     Duration of symptoms: onset last Thursday after med change     Current Stressors: \"when I feel like I am on speed. \"   Current living arrangement:live alone     SI:  denies  \"the benadryl was to make my thoughts stop. \" pt reports last suicidal thought was 2 years ago- charted that pt reported suicidal thoughts upon arrival Rfl: 3    meclizine (ANTIVERT) 25 MG tablet, Take 25 mg by mouth 3 times daily as needed, Disp: , Rfl:        Mental Status Evaluation:     Appearance:  Disheveled, tattoo x 1- ears pierced    Behavior:  Within Normal Limits   Speech:  normal pitch and normal volume   Mood:  anxious   Affect:  mood-congruent   Thought Process:  circumstantial   Thought Content:  Pt denies SI, HI & AVH    Sensorium:  person, place, time/date and situation   Cognition:  grossly intact   Insight:  fair   Judgment:  limited     Social Information:    Education: nursing training  Employment where   Retired    Positive support system: No  Social Supports:     Collateral Information:     Name: Noemi Heath   Relationship: sister  Phone Number: [phone]  Collateral:                                 Disposition:     Choose one of the four options below for   disposition:     4. Transferred:       Patient was transferred due to: Per Dr. Robert Hutchinson pt does meet criteria for inpatient psychiatric admission; pt voluntary. PT on Oxygen 3L AAT. Will notify ED staff. Transfer center notified.      Suzan Edwards RN

## 2018-12-07 VITALS
RESPIRATION RATE: 16 BRPM | OXYGEN SATURATION: 96 % | DIASTOLIC BLOOD PRESSURE: 71 MMHG | BODY MASS INDEX: 32.66 KG/M2 | TEMPERATURE: 98.2 F | SYSTOLIC BLOOD PRESSURE: 133 MMHG | HEART RATE: 78 BPM | WEIGHT: 173 LBS | HEIGHT: 61 IN

## 2018-12-07 LAB
BACTERIA: ABNORMAL /HPF
BILIRUBIN URINE: NEGATIVE
BLOOD, URINE: NEGATIVE
CLARITY: CLEAR
COLOR: YELLOW
EKG P AXIS: 48 DEGREES
EKG P-R INTERVAL: 204 MS
EKG Q-T INTERVAL: 338 MS
EKG QRS DURATION: 76 MS
EKG QTC CALCULATION (BAZETT): 402 MS
EKG T AXIS: 25 DEGREES
EPITHELIAL CELLS, UA: 0 /HPF (ref 0–5)
GLUCOSE URINE: NEGATIVE MG/DL
HYALINE CASTS: 0 /HPF (ref 0–8)
KETONES, URINE: NEGATIVE MG/DL
LEUKOCYTE ESTERASE, URINE: ABNORMAL
NITRITE, URINE: NEGATIVE
PH UA: 7.5
PROTEIN UA: NEGATIVE MG/DL
RBC UA: 1 /HPF (ref 0–4)
SPECIFIC GRAVITY UA: 1.01
UROBILINOGEN, URINE: 0.2 E.U./DL
WBC UA: 6 /HPF (ref 0–5)

## 2018-12-07 PROCEDURE — 99281 EMR DPT VST MAYX REQ PHY/QHP: CPT | Performed by: PSYCHIATRY & NEUROLOGY

## 2018-12-07 PROCEDURE — 6370000000 HC RX 637 (ALT 250 FOR IP): Performed by: EMERGENCY MEDICINE

## 2018-12-07 PROCEDURE — 6370000000 HC RX 637 (ALT 250 FOR IP): Performed by: PSYCHIATRY & NEUROLOGY

## 2018-12-07 PROCEDURE — 6370000000 HC RX 637 (ALT 250 FOR IP): Performed by: FAMILY MEDICINE

## 2018-12-07 PROCEDURE — 81001 URINALYSIS AUTO W/SCOPE: CPT

## 2018-12-07 RX ORDER — SULFAMETHOXAZOLE AND TRIMETHOPRIM 800; 160 MG/1; MG/1
1 TABLET ORAL 2 TIMES DAILY
Qty: 14 TABLET | Refills: 0 | Status: SHIPPED | OUTPATIENT
Start: 2018-12-07 | End: 2018-12-14

## 2018-12-07 RX ORDER — GABAPENTIN 600 MG/1
300 TABLET ORAL 3 TIMES DAILY
Status: DISCONTINUED | OUTPATIENT
Start: 2018-12-07 | End: 2018-12-07 | Stop reason: HOSPADM

## 2018-12-07 RX ORDER — NICOTINE 21 MG/24HR
1 PATCH, TRANSDERMAL 24 HOURS TRANSDERMAL ONCE
Status: DISCONTINUED | OUTPATIENT
Start: 2018-12-07 | End: 2018-12-07 | Stop reason: HOSPADM

## 2018-12-07 RX ORDER — NICOTINE 21 MG/24HR
1 PATCH, TRANSDERMAL 24 HOURS TRANSDERMAL DAILY
Status: DISCONTINUED | OUTPATIENT
Start: 2018-12-07 | End: 2018-12-07

## 2018-12-07 RX ORDER — ACETAMINOPHEN 325 MG/1
650 TABLET ORAL ONCE
Status: COMPLETED | OUTPATIENT
Start: 2018-12-07 | End: 2018-12-07

## 2018-12-07 RX ORDER — LORAZEPAM 1 MG/1
1 TABLET ORAL ONCE
Status: COMPLETED | OUTPATIENT
Start: 2018-12-07 | End: 2018-12-07

## 2018-12-07 RX ORDER — DIPHENHYDRAMINE HCL 25 MG
25 TABLET ORAL ONCE
Status: COMPLETED | OUTPATIENT
Start: 2018-12-07 | End: 2018-12-07

## 2018-12-07 RX ORDER — ACETAMINOPHEN 500 MG
1000 TABLET ORAL ONCE
Status: COMPLETED | OUTPATIENT
Start: 2018-12-07 | End: 2018-12-07

## 2018-12-07 RX ORDER — SULFAMETHOXAZOLE AND TRIMETHOPRIM 800; 160 MG/1; MG/1
1 TABLET ORAL ONCE
Status: COMPLETED | OUTPATIENT
Start: 2018-12-07 | End: 2018-12-07

## 2018-12-07 RX ORDER — IBUPROFEN 200 MG
600 TABLET ORAL ONCE
Status: COMPLETED | OUTPATIENT
Start: 2018-12-07 | End: 2018-12-07

## 2018-12-07 RX ORDER — ARIPIPRAZOLE 10 MG/1
10 TABLET ORAL ONCE
Status: COMPLETED | OUTPATIENT
Start: 2018-12-07 | End: 2018-12-07

## 2018-12-07 RX ADMIN — LORAZEPAM 1 MG: 1 TABLET ORAL at 08:48

## 2018-12-07 RX ADMIN — ACETAMINOPHEN 1000 MG: 500 TABLET, FILM COATED ORAL at 07:38

## 2018-12-07 RX ADMIN — ARIPIPRAZOLE 10 MG: 10 TABLET ORAL at 14:40

## 2018-12-07 RX ADMIN — GABAPENTIN 300 MG: 600 TABLET, FILM COATED ORAL at 15:38

## 2018-12-07 RX ADMIN — ACETAMINOPHEN 650 MG: 325 TABLET ORAL at 15:38

## 2018-12-07 RX ADMIN — IBUPROFEN 600 MG: 200 TABLET, FILM COATED ORAL at 01:48

## 2018-12-07 RX ADMIN — SULFAMETHOXAZOLE AND TRIMETHOPRIM 1 TABLET: 800; 160 TABLET ORAL at 14:40

## 2018-12-07 RX ADMIN — ACETAMINOPHEN 1000 MG: 500 TABLET, FILM COATED ORAL at 11:39

## 2018-12-07 RX ADMIN — DIPHENHYDRAMINE HCL 25 MG: 25 TABLET ORAL at 14:40

## 2018-12-07 ASSESSMENT — PAIN SCALES - GENERAL
PAINLEVEL_OUTOF10: 6
PAINLEVEL_OUTOF10: 3
PAINLEVEL_OUTOF10: 5

## 2018-12-07 NOTE — CONSULTS
mellitus (HCC)    Microcytic anemia    Chest pressure    Bipolar disorder, in full remission, most recent episode mixed [facility])       Recommendations: We gave her 10 mg of Abilify which is chemically virtually identical to the Aristada depo shot that she had been taking previously, observed her for approximately an hour. Afterward she reported that she was calm her and ready to go home. She has an appointment to see Dr. Rhonda Rocha and a week and should continue taking her regular medications as well as the Abilify (A seven-day supply is provided).     MD Name: Roddy Mitchell MD

## 2018-12-07 NOTE — ED NOTES
Patient resting, lying in bed, respirations even and unlabored with no s/s of distress. One on one sitter at bedside.      [facility]  12/07/18 1984

## 2018-12-07 NOTE — ED NOTES
Patient resting at this time with no s/s of distress, respirations even and unlabored, laying in bed, eyes closed. One on one sitter at bedside. Will continue to monitor and wait for accepting facility.      [address] [place] [address]  12/07/18 0263

## 2018-12-07 NOTE — ED NOTES
Called the transfer center to inform them that the patient would be discharged home and that we no longer needed placement     Kenan Hartley RN  12/07/18 9750

## 2018-12-07 NOTE — ED PROVIDER NOTES
[facility]  eMERGENCYdEPARTMENT eNCOUnter      Pt Name: Jorge Lagos  MRN: [id number]  [place] 1955  Date of evaluation: 2018  Gerry Perez MD    Emergency Department care of this patient was assumed at 7 AM from Dr. Simeon Mcpherson. We have discussed the case and the plan of care. I have seen and evaluated patient and reviewed ED course. CHIEF COMPLAINT       Chief Complaint   Patient presents with    Suicidal     Pt arrived to ed with c/o suicidal thoughts. Pt states she was on new bipolar medicine that was giving her suicidal thoughts and she stopped taking it. Pt now c/o suicidal thoughts and when asked if she has a plan pt states, \"I'll do what I did last time and take a bunch of pills, drink vodka and drink anti freeze. I  last time. \"    Drug Overdose     Pt took 30 benadryl at 2330 and 20 benadryl at 0230 and 15 more benadryl at 0700. Pt also stated she had 15 buspar in the past 12 hours         PHYSICAL EXAM    (up to 7 for level 4, 8 or more for level 5)     ED Triage Vitals   BP Temp Temp Source Pulse Resp SpO2 Height Weight   18 0743 18 0743 18 0743 18 0743 18 0743 18 0743 18 0734 18 0734   (!) 156/109 99.1 °F (37.3 °C) Oral 105 18 95 % 5' 1\" (1.549 m) 173 lb (78.5 kg)       Physical Exam   Constitutional: She is oriented to person, place, and time. She appears well-developed. Eyes: Pupils are equal, round, and reactive to light. Neck: Normal range of motion. Cardiovascular: Normal rate. Pulmonary/Chest: Effort normal and breath sounds normal.   Neurological: She is alert and oriented to person, place, and time. Psychiatric: She has a normal mood and affect. Her speech is normal and behavior is normal. Thought content normal. She is not actively hallucinating. Cognition and memory are normal. She does not express impulsivity. She expresses no homicidal and no suicidal ideation. She is attentive.    Vitals reviewed. DIAGNOSTIC RESULTS     EKG: All EKG's are interpreted by the Emergency Department Physician who either signs or Co-signs this chart inthe absence of a cardiologist.        RADIOLOGY:   Non-plain film imagessuch as CT, Ultrasound and MRI are read by the radiologist. Plain radiographic images are visualized and preliminarily interpreted by the emergency physician with the below findings:          XR CHEST PORTABLE   Final Result   No acute cardiopulmonary abnormality. Signed by Dr Liz Light on 12/6/2018 9:06 AM              LABS:  Labs Reviewed   CBC WITH AUTO DIFFERENTIAL - Abnormal; Notable for the following:        Result Value    RBC 4.02 (*)     Hemoglobin 10.6 (*)     Hematocrit 34.9 (*)     MCH 26.4 (*)     MCHC 30.4 (*)     RDW 16.5 (*)     All other components within normal limits    Narrative:     THIS IS A RECOLLECT. PREVIOUS SPECIMEN CLOTTED. COMPREHENSIVE METABOLIC PANEL - Abnormal; Notable for the following:     Potassium 5.5 (*)     Alkaline Phosphatase 122 (*)     All other components within normal limits   URINALYSIS - Abnormal; Notable for the following:     Leukocyte Esterase, Urine TRACE (*)     All other components within normal limits   MICROSCOPIC URINALYSIS - Abnormal; Notable for the following:     WBC, UA 6 (*)     All other components within normal limits   URINE DRUG SCREEN   SPECIMEN REJECTION   ETHANOL   SALICYLATE   ACETAMINOPHEN LEVEL   POTASSIUM   CBC WITH AUTO DIFFERENTIAL   COMPREHENSIVE METABOLIC PANEL   ACETAMINOPHEN LEVEL   SALICYLATE LEVEL       All other labs were within normal range or not returned as of this dictation.     EMERGENCY DEPARTMENT COURSE and DIFFERENTIAL DIAGNOSIS/MDM:   Vitals:    Vitals:    12/06/18 2003 12/06/18 2004 12/07/18 0149 12/07/18 0429   BP: 121/68 121/68 122/82 (!) 108/58   Pulse: 94 93 88 81   Resp: 18  16 16   Temp:   98.6 °F (37 °C) 98.6 °F (37 °C)   TempSrc:       SpO2: 95% 94% 95% 93%   Weight:       Height:

## 2018-12-07 NOTE — ED NOTES
Patient requests something for back pain as well as a nicotine patch. Dr. Mira Arellano notified.      [address] [place] [address]  12/07/18 3598

## 2018-12-07 NOTE — ED NOTES
Pt c/o that tylenol does not help her migraines. MD not at his desk at the moment to inform him of this.      Maxine Carter RN  12/07/18 6892

## 2018-12-07 NOTE — ED NOTES
Patient sitting on bed, one on one sitter at bedside. Still awaiting facility for transfer. Patient has no requests at this time.      [facility] [place], [address] [place] [address]  12/07/18 3360

## 2018-12-07 NOTE — ED NOTES
Updated patient on discharge and cab ride.  Patient appears very grateful, hugging me and the sitte.r     Seun Myles RN  12/07/18 1220

## 2018-12-12 DIAGNOSIS — F31.78 BIPOLAR DISORDER, IN FULL REMISSION, MOST RECENT EPISODE MIXED (HCC): ICD-10-CM

## 2018-12-12 RX ORDER — BUSPIRONE HYDROCHLORIDE 15 MG/1
15 TABLET ORAL 3 TIMES DAILY
Qty: 270 TABLET | Refills: 3 | Status: ON HOLD | OUTPATIENT
Start: 2018-12-12 | End: 2019-07-08 | Stop reason: HOSPADM

## 2018-12-12 RX ORDER — BENZTROPINE MESYLATE 1 MG/1
1 TABLET ORAL DAILY
Qty: 180 TABLET | Refills: 3 | Status: SHIPPED | OUTPATIENT
Start: 2018-12-12 | End: 2018-12-26 | Stop reason: SDUPTHER

## 2018-12-13 RX ORDER — MIRTAZAPINE 15 MG/1
15 TABLET, FILM COATED ORAL NIGHTLY
Qty: 90 TABLET | Refills: 3 | Status: SHIPPED | OUTPATIENT
Start: 2018-12-13 | End: 2019-06-10 | Stop reason: SDUPTHER

## 2018-12-26 RX ORDER — BENZTROPINE MESYLATE 1 MG/1
1 TABLET ORAL DAILY
Qty: 30 TABLET | Refills: 11 | Status: SHIPPED | OUTPATIENT
Start: 2018-12-26 | End: 2019-01-08 | Stop reason: SDUPTHER

## 2018-12-28 ENCOUNTER — TELEPHONE (OUTPATIENT)
Dept: PRIMARY CARE CLINIC | Age: 63
End: 2018-12-28

## 2018-12-28 RX ORDER — BUSPIRONE HYDROCHLORIDE 15 MG/1
15 TABLET ORAL 3 TIMES DAILY
Qty: 270 TABLET | Refills: 3 | Status: CANCELLED | OUTPATIENT
Start: 2018-12-28

## 2019-01-03 DIAGNOSIS — E11.49 OTHER DIABETIC NEUROLOGICAL COMPLICATION ASSOCIATED WITH TYPE 2 DIABETES MELLITUS (HCC): Primary | ICD-10-CM

## 2019-01-03 RX ORDER — GLUCOSAMINE HCL/CHONDROITIN SU 500-400 MG
CAPSULE ORAL
Qty: 100 STRIP | Refills: 5 | Status: ON HOLD | OUTPATIENT
Start: 2019-01-03 | End: 2022-05-24 | Stop reason: HOSPADM

## 2019-01-08 RX ORDER — BENZTROPINE MESYLATE 1 MG/1
1 TABLET ORAL 2 TIMES DAILY
Qty: 60 TABLET | Refills: 11 | Status: SHIPPED | OUTPATIENT
Start: 2019-01-08 | End: 2019-10-22 | Stop reason: SDUPTHER

## 2019-01-11 DIAGNOSIS — G25.81 RLS (RESTLESS LEGS SYNDROME): ICD-10-CM

## 2019-01-11 DIAGNOSIS — K59.00 CONSTIPATION, UNSPECIFIED CONSTIPATION TYPE: ICD-10-CM

## 2019-01-11 RX ORDER — SERTRALINE HYDROCHLORIDE 100 MG/1
150 TABLET, FILM COATED ORAL NIGHTLY
Qty: 135 TABLET | Refills: 3 | Status: ON HOLD | OUTPATIENT
Start: 2019-01-11 | End: 2019-07-08 | Stop reason: HOSPADM

## 2019-01-11 RX ORDER — ROPINIROLE 2 MG/1
2 TABLET, FILM COATED ORAL 2 TIMES DAILY
Qty: 180 TABLET | Refills: 1 | Status: SHIPPED | OUTPATIENT
Start: 2019-01-11 | End: 2019-07-10 | Stop reason: SDUPTHER

## 2019-01-11 RX ORDER — PRAMIPEXOLE DIHYDROCHLORIDE 0.25 MG/1
0.25 TABLET ORAL 3 TIMES DAILY
Qty: 270 TABLET | Refills: 3 | Status: ON HOLD | OUTPATIENT
Start: 2019-01-11 | End: 2019-07-08 | Stop reason: HOSPADM

## 2019-01-11 RX ORDER — ATORVASTATIN CALCIUM 20 MG/1
20 TABLET, FILM COATED ORAL DAILY
Qty: 90 TABLET | Refills: 3 | Status: SHIPPED | OUTPATIENT
Start: 2019-01-11 | End: 2019-10-22 | Stop reason: SDUPTHER

## 2019-02-11 DIAGNOSIS — I15.9 SECONDARY HYPERTENSION: ICD-10-CM

## 2019-02-11 RX ORDER — IRBESARTAN 150 MG/1
150 TABLET ORAL NIGHTLY
Qty: 90 TABLET | Refills: 3 | Status: ON HOLD | OUTPATIENT
Start: 2019-02-11 | End: 2019-07-08 | Stop reason: HOSPADM

## 2019-02-22 ENCOUNTER — TELEPHONE (OUTPATIENT)
Dept: PRIMARY CARE CLINIC | Age: 64
End: 2019-02-22

## 2019-02-25 ENCOUNTER — TRANSCRIBE ORDERS (OUTPATIENT)
Dept: ADMINISTRATIVE | Facility: HOSPITAL | Age: 64
End: 2019-02-25

## 2019-02-25 DIAGNOSIS — Z78.0 POSTMENOPAUSAL: ICD-10-CM

## 2019-02-25 DIAGNOSIS — Z12.39 SCREENING BREAST EXAMINATION: Primary | ICD-10-CM

## 2019-03-06 ENCOUNTER — TELEPHONE (OUTPATIENT)
Dept: PRIMARY CARE CLINIC | Age: 64
End: 2019-03-06

## 2019-03-06 DIAGNOSIS — R45.851 DEPRESSION WITH SUICIDAL IDEATION: Primary | ICD-10-CM

## 2019-03-06 DIAGNOSIS — F32.A DEPRESSION WITH SUICIDAL IDEATION: Primary | ICD-10-CM

## 2019-03-06 DIAGNOSIS — F31.78 BIPOLAR DISORDER, IN FULL REMISSION, MOST RECENT EPISODE MIXED (HCC): ICD-10-CM

## 2019-03-07 ENCOUNTER — TELEPHONE (OUTPATIENT)
Dept: PRIMARY CARE CLINIC | Age: 64
End: 2019-03-07

## 2019-03-12 RX ORDER — PROMETHAZINE HYDROCHLORIDE 25 MG/1
25 TABLET ORAL EVERY 8 HOURS PRN
Refills: 1 | OUTPATIENT
Start: 2019-03-12

## 2019-04-01 ENCOUNTER — TELEPHONE (OUTPATIENT)
Dept: PRIMARY CARE CLINIC | Age: 64
End: 2019-04-01

## 2019-04-11 DIAGNOSIS — E11.49 OTHER DIABETIC NEUROLOGICAL COMPLICATION ASSOCIATED WITH TYPE 2 DIABETES MELLITUS (HCC): ICD-10-CM

## 2019-04-11 RX ORDER — ARIPIPRAZOLE LAUROXIL 882 MG/3.2ML
INJECTION, SUSPENSION, EXTENDED RELEASE INTRAMUSCULAR
Refills: 4 | OUTPATIENT
Start: 2019-04-11

## 2019-04-11 RX ORDER — GABAPENTIN 100 MG/1
CAPSULE ORAL
Qty: 90 CAPSULE | Refills: 0 | OUTPATIENT
Start: 2019-04-11

## 2019-04-11 RX ORDER — PANTOPRAZOLE SODIUM 40 MG/1
TABLET, DELAYED RELEASE ORAL
Qty: 90 TABLET | Refills: 1 | Status: SHIPPED | OUTPATIENT
Start: 2019-04-11 | End: 2019-10-08 | Stop reason: SDUPTHER

## 2019-06-10 DIAGNOSIS — F31.78 BIPOLAR DISORDER, IN FULL REMISSION, MOST RECENT EPISODE MIXED (HCC): ICD-10-CM

## 2019-06-10 RX ORDER — MIRTAZAPINE 15 MG/1
15 TABLET, FILM COATED ORAL DAILY
Qty: 90 TABLET | Refills: 3 | Status: ON HOLD | OUTPATIENT
Start: 2019-06-10 | End: 2019-07-08 | Stop reason: HOSPADM

## 2019-06-10 NOTE — TELEPHONE ENCOUNTER
PT SEEN 10/2/18 WITH A FOLLOW UP ON 6/20/19. Requested Prescriptions     Pending Prescriptions Disp Refills    mirtazapine (REMERON) 15 MG tablet [Pharmacy Med Name: MIRTAZAPINE 15MG TAB]  2     Sig: Take 1 tablet by mouth daily.    ]

## 2019-07-04 ENCOUNTER — HOSPITAL ENCOUNTER (INPATIENT)
Age: 64
LOS: 4 days | Discharge: HOME | DRG: 885 | End: 2019-07-08
Attending: EMERGENCY MEDICINE | Admitting: PSYCHIATRY & NEUROLOGY
Payer: MEDICARE

## 2019-07-04 DIAGNOSIS — R45.851 SUICIDAL IDEATION: Primary | ICD-10-CM

## 2019-07-04 LAB
ACETAMINOPHEN LEVEL: <15 UG/ML
ALBUMIN SERPL-MCNC: 4.1 G/DL (ref 3.5–5.2)
ALP BLD-CCNC: 153 U/L (ref 35–104)
ALT SERPL-CCNC: 17 U/L (ref 5–33)
AMPHETAMINE SCREEN, URINE: NEGATIVE
ANION GAP SERPL CALCULATED.3IONS-SCNC: 13 MMOL/L (ref 7–19)
AST SERPL-CCNC: 19 U/L (ref 5–32)
BARBITURATE SCREEN URINE: NEGATIVE
BASOPHILS ABSOLUTE: 0.1 K/UL (ref 0–0.2)
BASOPHILS RELATIVE PERCENT: 0.6 % (ref 0–1)
BENZODIAZEPINE SCREEN, URINE: NEGATIVE
BILIRUB SERPL-MCNC: <0.2 MG/DL (ref 0.2–1.2)
BILIRUBIN URINE: NEGATIVE
BLOOD, URINE: NEGATIVE
BUN BLDV-MCNC: 19 MG/DL (ref 8–23)
CALCIUM SERPL-MCNC: 8.9 MG/DL (ref 8.8–10.2)
CANNABINOID SCREEN URINE: NEGATIVE
CHLORIDE BLD-SCNC: 99 MMOL/L (ref 98–111)
CHOLESTEROL, TOTAL: 142 MG/DL (ref 160–199)
CLARITY: CLEAR
CO2: 21 MMOL/L (ref 22–29)
COCAINE METABOLITE SCREEN URINE: NEGATIVE
COLOR: YELLOW
CREAT SERPL-MCNC: 0.6 MG/DL (ref 0.5–0.9)
EOSINOPHILS ABSOLUTE: 0.2 K/UL (ref 0–0.6)
EOSINOPHILS RELATIVE PERCENT: 2.6 % (ref 0–5)
ETHANOL: <10 MG/DL (ref 0–0.08)
GFR NON-AFRICAN AMERICAN: >60
GLUCOSE BLD-MCNC: 131 MG/DL (ref 74–109)
GLUCOSE URINE: NEGATIVE MG/DL
HBA1C MFR BLD: 5.5 % (ref 4–6)
HCT VFR BLD CALC: 33.1 % (ref 37–47)
HDLC SERPL-MCNC: 48 MG/DL (ref 65–121)
HEMOGLOBIN: 9.8 G/DL (ref 12–16)
KETONES, URINE: NEGATIVE MG/DL
LDL CHOLESTEROL CALCULATED: 65 MG/DL
LEUKOCYTE ESTERASE, URINE: NEGATIVE
LYMPHOCYTES ABSOLUTE: 2.4 K/UL (ref 1.1–4.5)
LYMPHOCYTES RELATIVE PERCENT: 29.8 % (ref 20–40)
Lab: NORMAL
MAGNESIUM: 2.3 MG/DL (ref 1.6–2.4)
MCH RBC QN AUTO: 24.5 PG (ref 27–31)
MCHC RBC AUTO-ENTMCNC: 29.6 G/DL (ref 33–37)
MCV RBC AUTO: 82.8 FL (ref 81–99)
MONOCYTES ABSOLUTE: 0.7 K/UL (ref 0–0.9)
MONOCYTES RELATIVE PERCENT: 8.6 % (ref 0–10)
NEUTROPHILS ABSOLUTE: 4.8 K/UL (ref 1.5–7.5)
NEUTROPHILS RELATIVE PERCENT: 58.3 % (ref 50–65)
NITRITE, URINE: NEGATIVE
OPIATE SCREEN URINE: NEGATIVE
PDW BLD-RTO: 17.5 % (ref 11.5–14.5)
PH UA: 6 (ref 5–8)
PLATELET # BLD: 263 K/UL (ref 130–400)
PMV BLD AUTO: 10 FL (ref 9.4–12.3)
POTASSIUM SERPL-SCNC: 4.8 MMOL/L (ref 3.5–5)
PROTEIN UA: NEGATIVE MG/DL
RBC # BLD: 4 M/UL (ref 4.2–5.4)
SALICYLATE, SERUM: <3 MG/DL (ref 3–10)
SODIUM BLD-SCNC: 133 MMOL/L (ref 136–145)
SPECIFIC GRAVITY UA: 1.01 (ref 1–1.03)
T3 FREE: 2.6 PG/ML (ref 2–4.4)
T4 FREE: 0.9 NG/DL (ref 0.9–1.7)
TOTAL PROTEIN: 7.1 G/DL (ref 6.6–8.7)
TRIGL SERPL-MCNC: 143 MG/DL (ref 0–149)
TSH SERPL DL<=0.05 MIU/L-ACNC: 3.08 UIU/ML (ref 0.27–4.2)
URINE REFLEX TO CULTURE: NORMAL
UROBILINOGEN, URINE: 0.2 E.U./DL
VITAMIN B-12: 216 PG/ML (ref 211–946)
WBC # BLD: 8.2 K/UL (ref 4.8–10.8)

## 2019-07-04 PROCEDURE — 80053 COMPREHEN METABOLIC PANEL: CPT

## 2019-07-04 PROCEDURE — 6370000000 HC RX 637 (ALT 250 FOR IP): Performed by: PSYCHIATRY & NEUROLOGY

## 2019-07-04 PROCEDURE — 36415 COLL VENOUS BLD VENIPUNCTURE: CPT

## 2019-07-04 PROCEDURE — 81003 URINALYSIS AUTO W/O SCOPE: CPT

## 2019-07-04 PROCEDURE — 82607 VITAMIN B-12: CPT

## 2019-07-04 PROCEDURE — 83036 HEMOGLOBIN GLYCOSYLATED A1C: CPT

## 2019-07-04 PROCEDURE — 84443 ASSAY THYROID STIM HORMONE: CPT

## 2019-07-04 PROCEDURE — 84439 ASSAY OF FREE THYROXINE: CPT

## 2019-07-04 PROCEDURE — 1240000000 HC EMOTIONAL WELLNESS R&B

## 2019-07-04 PROCEDURE — G0480 DRUG TEST DEF 1-7 CLASSES: HCPCS

## 2019-07-04 PROCEDURE — 80061 LIPID PANEL: CPT

## 2019-07-04 PROCEDURE — 84481 FREE ASSAY (FT-3): CPT

## 2019-07-04 PROCEDURE — 6360000002 HC RX W HCPCS: Performed by: PSYCHIATRY & NEUROLOGY

## 2019-07-04 PROCEDURE — 83735 ASSAY OF MAGNESIUM: CPT

## 2019-07-04 PROCEDURE — 93005 ELECTROCARDIOGRAM TRACING: CPT

## 2019-07-04 PROCEDURE — 85025 COMPLETE CBC W/AUTO DIFF WBC: CPT

## 2019-07-04 PROCEDURE — 80307 DRUG TEST PRSMV CHEM ANLYZR: CPT

## 2019-07-04 PROCEDURE — 99285 EMERGENCY DEPT VISIT HI MDM: CPT | Performed by: EMERGENCY MEDICINE

## 2019-07-04 PROCEDURE — 99285 EMERGENCY DEPT VISIT HI MDM: CPT

## 2019-07-04 RX ORDER — MIRTAZAPINE 15 MG/1
15 TABLET, FILM COATED ORAL NIGHTLY
Status: DISCONTINUED | OUTPATIENT
Start: 2019-07-04 | End: 2019-07-08 | Stop reason: HOSPADM

## 2019-07-04 RX ORDER — BENZTROPINE MESYLATE 1 MG/1
1 TABLET ORAL 2 TIMES DAILY
Status: DISCONTINUED | OUTPATIENT
Start: 2019-07-04 | End: 2019-07-08 | Stop reason: HOSPADM

## 2019-07-04 RX ORDER — IPRATROPIUM BROMIDE AND ALBUTEROL SULFATE 2.5; .5 MG/3ML; MG/3ML
1 SOLUTION RESPIRATORY (INHALATION) EVERY 6 HOURS PRN
Status: DISCONTINUED | OUTPATIENT
Start: 2019-07-04 | End: 2019-07-08 | Stop reason: HOSPADM

## 2019-07-04 RX ORDER — ROPINIROLE 1 MG/1
2 TABLET, FILM COATED ORAL 2 TIMES DAILY
Status: DISCONTINUED | OUTPATIENT
Start: 2019-07-04 | End: 2019-07-08 | Stop reason: HOSPADM

## 2019-07-04 RX ORDER — ONDANSETRON 4 MG/1
4 TABLET, FILM COATED ORAL EVERY 8 HOURS PRN
Status: DISCONTINUED | OUTPATIENT
Start: 2019-07-04 | End: 2019-07-08 | Stop reason: HOSPADM

## 2019-07-04 RX ORDER — KETOROLAC TROMETHAMINE 30 MG/ML
30 INJECTION, SOLUTION INTRAMUSCULAR; INTRAVENOUS DAILY PRN
Status: DISCONTINUED | OUTPATIENT
Start: 2019-07-04 | End: 2019-07-08 | Stop reason: HOSPADM

## 2019-07-04 RX ORDER — PANTOPRAZOLE SODIUM 40 MG/1
40 TABLET, DELAYED RELEASE ORAL
Status: DISCONTINUED | OUTPATIENT
Start: 2019-07-05 | End: 2019-07-08 | Stop reason: HOSPADM

## 2019-07-04 RX ORDER — ATORVASTATIN CALCIUM 20 MG/1
20 TABLET, FILM COATED ORAL DAILY
Status: DISCONTINUED | OUTPATIENT
Start: 2019-07-05 | End: 2019-07-08 | Stop reason: HOSPADM

## 2019-07-04 RX ORDER — FUROSEMIDE 20 MG/1
20 TABLET ORAL DAILY
Status: DISCONTINUED | OUTPATIENT
Start: 2019-07-05 | End: 2019-07-08 | Stop reason: HOSPADM

## 2019-07-04 RX ORDER — ARIPIPRAZOLE 5 MG/1
5 TABLET ORAL DAILY
Status: DISCONTINUED | OUTPATIENT
Start: 2019-07-04 | End: 2019-07-08 | Stop reason: HOSPADM

## 2019-07-04 RX ORDER — DONEPEZIL HYDROCHLORIDE 5 MG/1
10 TABLET, FILM COATED ORAL NIGHTLY
Status: DISCONTINUED | OUTPATIENT
Start: 2019-07-04 | End: 2019-07-08 | Stop reason: HOSPADM

## 2019-07-04 RX ORDER — LANOLIN ALCOHOL/MO/W.PET/CERES
3 CREAM (GRAM) TOPICAL NIGHTLY
Status: DISCONTINUED | OUTPATIENT
Start: 2019-07-04 | End: 2019-07-08 | Stop reason: HOSPADM

## 2019-07-04 RX ORDER — ALBUTEROL SULFATE 90 UG/1
2 AEROSOL, METERED RESPIRATORY (INHALATION) EVERY 6 HOURS PRN
Status: DISCONTINUED | OUTPATIENT
Start: 2019-07-04 | End: 2019-07-08 | Stop reason: HOSPADM

## 2019-07-04 RX ORDER — HYDROXYZINE PAMOATE 25 MG/1
50 CAPSULE ORAL ONCE
Status: COMPLETED | OUTPATIENT
Start: 2019-07-04 | End: 2019-07-05

## 2019-07-04 RX ADMIN — BENZTROPINE MESYLATE 1 MG: 1 TABLET ORAL at 21:08

## 2019-07-04 RX ADMIN — MIRTAZAPINE 15 MG: 15 TABLET, FILM COATED ORAL at 21:09

## 2019-07-04 RX ADMIN — KETOROLAC TROMETHAMINE 30 MG: 30 INJECTION, SOLUTION INTRAMUSCULAR; INTRAVENOUS at 17:12

## 2019-07-04 RX ADMIN — DONEPEZIL HYDROCHLORIDE 10 MG: 5 TABLET, FILM COATED ORAL at 21:08

## 2019-07-04 RX ADMIN — ARIPIPRAZOLE 5 MG: 5 TABLET ORAL at 17:12

## 2019-07-04 RX ADMIN — ROPINIROLE HYDROCHLORIDE 2 MG: 1 TABLET, FILM COATED ORAL at 21:08

## 2019-07-04 RX ADMIN — Medication 3 MG: at 21:08

## 2019-07-04 RX ADMIN — METFORMIN HYDROCHLORIDE 500 MG: 500 TABLET ORAL at 17:11

## 2019-07-04 RX ADMIN — LURASIDONE HYDROCHLORIDE 20 MG: 40 TABLET, FILM COATED ORAL at 17:11

## 2019-07-04 ASSESSMENT — ENCOUNTER SYMPTOMS
ABDOMINAL PAIN: 0
RHINORRHEA: 0
ABDOMINAL DISTENTION: 0
NAUSEA: 0
EYE DISCHARGE: 0
COUGH: 0
COLOR CHANGE: 0
APNEA: 0
PHOTOPHOBIA: 0
EYE PAIN: 0
SORE THROAT: 0
SHORTNESS OF BREATH: 0
BACK PAIN: 0

## 2019-07-04 ASSESSMENT — PAIN SCALES - GENERAL
PAINLEVEL_OUTOF10: 10
PAINLEVEL_OUTOF10: 7

## 2019-07-04 ASSESSMENT — SLEEP AND FATIGUE QUESTIONNAIRES
DO YOU USE A SLEEP AID: YES
DIFFICULTY ARISING: YES
RESTFUL SLEEP: NO
DIFFICULTY STAYING ASLEEP: YES
SLEEP PATTERN: RESTLESSNESS;NIGHTMARES/TERRORS;DISTURBED/INTERRUPTED SLEEP
DIFFICULTY FALLING ASLEEP: YES
DO YOU HAVE DIFFICULTY SLEEPING: YES
AVERAGE NUMBER OF SLEEP HOURS: 2

## 2019-07-04 ASSESSMENT — LIFESTYLE VARIABLES: HISTORY_ALCOHOL_USE: NO

## 2019-07-04 ASSESSMENT — PATIENT HEALTH QUESTIONNAIRE - PHQ9: SUM OF ALL RESPONSES TO PHQ QUESTIONS 1-9: 23

## 2019-07-04 NOTE — BH NOTE
Faxed chart to [facility], spoke with Vandana Garcia. Patient approved for admission pending additional labs, medical clearance. Information given to Dr. Sanjiv Grubbs to obtain required labs and to fax.    Melonie [phone]  Fax [phone]

## 2019-07-04 NOTE — ED TRIAGE NOTES
Per pt her Dr. Jacinta Guzman took her off all her bipolar medications about 8 months ago and she's been \"on the edge\" since then. Per pt she decided recently to kill herself by overdosing. Per pt two years ago she tried to overdose on multiple medications (bp meds, ativan, lortab) and drank antifreeze and vodka. Per pt she \" and was brought back to life. \" Pt states she chose to call for help today instead of trying to kill herself.

## 2019-07-04 NOTE — ED PROVIDER NOTES
[facility]  eMERGENCYdEPARTMENT eNCOUnter      Pt Name: Ann Marie Coker  MRN: [id number]  [place] 1955  Date of evaluation: 7/4/2019  Provider:GRISEL Brooks    CHIEF COMPLAINT       Chief Complaint   Patient presents with    Psychiatric Evaluation         HISTORY OF PRESENT ILLNESS  (Location/Symptom, Timing/Onset, Context/Setting, Quality, Duration, Modifying Factors, Severity.)   Ju Parks is a 60 y.o. female who presents to the emergency department with complaints of suicidal thoughts. She has attempted overdosing on medicines and alcohol in the past.  Has apparently has been resuscitated. She was taken off bipolar medicine 8 months ago she started to have suicidal thoughts again and rather than attempting she wanted to come in and seek help. She denies any homicidal ideation auditory visual hallucinations any thoughts of paranoia or schizophrenic habits. These thoughts been ongoing for the past week. She denies any correlation of events in her life. Denies any medical issues to me although multiple comorbidities listed. She is on oxygen at baseline has COPD diabetes. Dementia listed but she is alert oriented follows commands and thorough historian. Alone. HPI    Nursing Notes were reviewed and I agree. REVIEW OF SYSTEMS    (2-9 systems for level 4, 10 or more for level 5)     Review of Systems   Constitutional: Negative for activity change, appetite change, chills and fever. HENT: Negative for congestion, postnasal drip, rhinorrhea and sore throat. Eyes: Negative for photophobia, pain, discharge and visual disturbance. Respiratory: Negative for apnea, cough and shortness of breath. Cardiovascular: Negative for chest pain and leg swelling. Gastrointestinal: Negative for abdominal distention, abdominal pain and nausea. Genitourinary: Negative for vaginal bleeding. Musculoskeletal: Negative for arthralgias, back pain, joint swelling, neck pain and neck stiffness. 0.25     Types: Cigarettes    Smokeless tobacco: Never Used    Tobacco comment: 1-2 a day   Substance and Sexual Activity    Alcohol use: No    Drug use: No    Sexual activity: Never   Lifestyle    Physical activity:     Days per week: Not on file     Minutes per session: Not on file    Stress: Not on file   Relationships    Social connections:     Talks on phone: Not on file     Gets together: Not on file     Attends Alevism service: Not on file     Active member of club or organization: Not on file     Attends meetings of clubs or organizations: Not on file     Relationship status: Not on file    Intimate partner violence:     Fear of current or ex partner: Not on file     Emotionally abused: Not on file     Physically abused: Not on file     Forced sexual activity: Not on file   Other Topics Concern    Not on file   Social History Narrative    Not on file       SCREENINGS           PHYSICAL EXAM    (up to 7 forlevel 4, 8 or more for level 5)     ED Triage Vitals [07/04/19 0911]   BP Temp Temp Source Pulse Resp SpO2 Height Weight   (!) 141/63 99 °F (37.2 °C) Oral 91 18 93 % -- --       Physical Exam   Constitutional: She is oriented to person, place, and time. She appears well-developed and well-nourished. No distress. HENT:   Head: Normocephalic and atraumatic. Right Ear: External ear normal.   Left Ear: External ear normal.   Mouth/Throat: No oropharyngeal exudate. Eyes: Pupils are equal, round, and reactive to light. EOM are normal. Right eye exhibits no discharge. Left eye exhibits no discharge. Neck: Normal range of motion. Neck supple. No thyromegaly present. Cardiovascular: Normal rate, regular rhythm and normal heart sounds. Exam reveals no friction rub. No murmur heard. Pulmonary/Chest: Effort normal and breath sounds normal. No stridor. No respiratory distress. She has no wheezes. Abdominal: Soft. Bowel sounds are normal. She exhibits no distension. There is no tenderness.

## 2019-07-04 NOTE — BH NOTE
use:   Longest period of sobriety:   ETOH treatment history:   History of seizures, blackouts, etc. due to ETOH abuse:  Family history of ETOH abuse:    Legal consequences of chemical use:    Substance/Chemical Abuse/Recreational Drug History:   Age of first substance use:   denies  Substance used:                 Substance last used & date:    Amount of substance use daily:   Years of use:   Longest period of sobriety:  Substance treatment history:  Family history of substance abuse:   Legal consequences of chemical use: Tobacco use  Yes - down to 1 to 2 cigarettes a day - used to smoke 6 cartons a month   Opiates: It was discussed with pt they would not be receiving opiates unless they were within 5 days post surgery/injury. Patient voiced understanding and agreed. ON none  Psychiatric Review Of Systems:   Recent Sleep changes:   Poor - has restless legs, and bipap usually 2 hr max  Average Hours per night  2  With sleep aid:   Restful sleep:  no  Nightmares/flashbacks:  Yes but not nightly  Hypervigilance:  Difficulty falling asleep:  yes  Difficulty staying asleep: yes  Difficulty awakening:  yes  Repetitive behaviors (hand wash, praying, repeat words silently, etc)  Recent appetite changes:    Up and down  Recent weight changes:   Lost a little  Pounds gained (+) or lost (-) :   Energy level changes:  none  Helpless/Worthless/Hopeless: all 3  Interest/pleasure/anhedonia:    Occasional but not real    Medical History:     Medical Diagnosis/Issues:  COPD, and multiple  Use of 02 or CPAP:  Yes 3L and cpap HS  Ambulatory:  Yes, but uses walker  Independent Self Care:  Some assist  Use of OTC:   Somatic symptoms:    PCP: JORGE Lopez     Current Medications:   Scheduled Meds: No current facility-administered medications for this encounter.      Current Outpatient Medications:     mirtazapine (REMERON) 15 MG tablet, Take 1 tablet by mouth daily, Disp: 90 tablet, Rfl: 3    pantoprazole (PROTONIX) 40 MG tablet, Take 1 tablet by mouth daily. , Disp: 90 tablet, Rfl: 1    irbesartan (AVAPRO) 150 MG tablet, Take 1 tablet by mouth nightly, Disp: 90 tablet, Rfl: 3    atorvastatin (LIPITOR) 20 MG tablet, Take 1 tablet by mouth daily, Disp: 90 tablet, Rfl: 3    metFORMIN (GLUCOPHAGE) 500 MG tablet, Take 1 tablet by mouth 2 times daily (with meals), Disp: 180 tablet, Rfl: 1    sertraline (ZOLOFT) 100 MG tablet, Take 1.5 tablets by mouth nightly, Disp: 135 tablet, Rfl: 3    Linaclotide (LINZESS) 72 MCG CAPS, Take 72 mcg by mouth every morning (before breakfast), Disp: 90 capsule, Rfl: 3    benztropine (COGENTIN) 1 MG tablet, Take 1 tablet by mouth 2 times daily, Disp: 60 tablet, Rfl: 11    busPIRone (BUSPAR) 15 MG tablet, Take 15 mg by mouth 3 times daily, Disp: 270 tablet, Rfl: 3    OXYGEN, Inhale 3 L into the lungs, Disp: , Rfl:     furosemide (LASIX) 20 MG tablet, Take 1 tablet by mouth daily, Disp: 90 tablet, Rfl: 3    hydrOXYzine (ATARAX) 25 MG tablet, 1-2 po Q 6 hours prn anxiety, Disp: 180 tablet, Rfl: 3    celecoxib (CELEBREX) 200 MG capsule, TAKE 1 CAPSULE EVERY DAY, Disp: 90 capsule, Rfl: 3    donepezil (ARICEPT) 10 MG tablet, TAKE 1 TABLET EVERY NIGHT, Disp: 90 tablet, Rfl: 3    ferrous sulfate (FE TABS) 325 (65 Fe) MG EC tablet, Take 1 tablet by mouth 2 times daily, Disp: 60 tablet, Rfl: 3    acetaZOLAMIDE (DIAMOX) 500 MG extended release capsule, TAKE 1 CAPSULE TWICE DAILY, Disp: 180 capsule, Rfl: 3    rOPINIRole (REQUIP) 2 MG tablet, Take 1 tablet by mouth 2 times daily, Disp: 180 tablet, Rfl: 1    pramipexole (MIRAPEX) 0.25 MG tablet, Take 1 tablet by mouth 3 times daily, Disp: 270 tablet, Rfl: 3    blood glucose monitor kit and supplies, Test 3 times a day & as needed for symptoms of irregular blood glucose.  Dx:, Disp: 1 kit, Rfl: 0    blood glucose monitor strips, Test 3 times a day & as needed for symptoms of irregular blood glucose., Disp: 100 strip, Rfl: 5    lurasidone (LATUDA) 80 MG TABS tablet, Take 1 tablet by mouth daily, Disp: 30 tablet, Rfl: 1    gabapentin (NEURONTIN) 100 MG capsule, Take 1 capsule by mouth daily for 30 days. ., Disp: 90 capsule, Rfl: 1    Liraglutide (VICTOZA) 18 MG/3ML SOPN SC injection, Inject 1.8 mg into the skin daily, Disp: 27 mL, Rfl: 5    promethazine (PHENERGAN) 25 MG tablet, Take 1 tablet by mouth every 8 hours as needed for Nausea, Disp: 30 tablet, Rfl: 2    clobetasol (TEMOVATE) 0.05 % ointment, Apply topically 2 times daily. , Disp: 60 g, Rfl: 3    Insulin Pen Needle (B-D ULTRAFINE III SHORT PEN) 31G X 8 MM MISC, Inject 1 each as directed daily, Disp: 100 each, Rfl: 5    albuterol sulfate HFA (VENTOLIN HFA) 108 (90 Base) MCG/ACT inhaler, Inhale 2 puffs into the lungs every 6 hours as needed for Wheezing, Disp: 3 Inhaler, Rfl: 3    ipratropium-albuterol (DUONEB) 0.5-2.5 (3) MG/3ML SOLN nebulizer solution, Inhale 3 mLs into the lungs every 6 hours as needed for Shortness of Breath, Disp: 810 mL, Rfl: 2    Continuous Blood Gluc  (FREESTYLE ABDOULAYE READER) MARCIA, 1 Units by Does not apply route 2 times daily, Disp: 1 Device, Rfl: 0    Continuous Blood Gluc Sensor (FREESTYLE ABDOULAYE SENSOR SYSTEM) MISC, 1 Units by Does not apply route 2 times daily, Disp: 1 each, Rfl: 0    ACCU-CHEK SOFTCLIX LANCETS MISC, CHECK BLOOD SUGAR TWICE DAILY AND AS NEEDED, Disp: 300 each, Rfl: 3    fluticasone (FLONASE) 50 MCG/ACT nasal spray, 2 sprays by Nasal route daily, Disp: 1 Bottle, Rfl: 3    nystatin (MYCOSTATIN) 709278 UNIT/GM powder, Apply 3 times daily. , Disp: 45 g, Rfl: 3    meclizine (ANTIVERT) 25 MG tablet, Take 25 mg by mouth 3 times daily as needed, Disp: , Rfl:     acetaminophen (TYLENOL) 500 MG tablet, Take 500 mg by mouth every 6 hours as needed for Pain, Disp: , Rfl:     ondansetron (ZOFRAN) 4 MG tablet, Take 1 tablet by mouth every 8 hours as needed for Nausea or Vomiting, Disp: 30 tablet, Rfl: 0       Mental Status Evaluation:     Appearance:

## 2019-07-05 LAB — AMMONIA: 35 UMOL/L (ref 11–51)

## 2019-07-05 PROCEDURE — 82140 ASSAY OF AMMONIA: CPT

## 2019-07-05 PROCEDURE — 99223 1ST HOSP IP/OBS HIGH 75: CPT | Performed by: PSYCHIATRY & NEUROLOGY

## 2019-07-05 PROCEDURE — 6370000000 HC RX 637 (ALT 250 FOR IP): Performed by: PSYCHIATRY & NEUROLOGY

## 2019-07-05 PROCEDURE — 6370000000 HC RX 637 (ALT 250 FOR IP): Performed by: FAMILY MEDICINE

## 2019-07-05 PROCEDURE — 36415 COLL VENOUS BLD VENIPUNCTURE: CPT

## 2019-07-05 PROCEDURE — 1240000000 HC EMOTIONAL WELLNESS R&B

## 2019-07-05 PROCEDURE — 6360000002 HC RX W HCPCS: Performed by: PSYCHIATRY & NEUROLOGY

## 2019-07-05 RX ORDER — HYDROXYZINE HYDROCHLORIDE 25 MG/1
50 TABLET, FILM COATED ORAL ONCE
Status: COMPLETED | OUTPATIENT
Start: 2019-07-05 | End: 2019-07-05

## 2019-07-05 RX ORDER — METAXALONE 800 MG/1
800 TABLET ORAL 3 TIMES DAILY PRN
Status: DISCONTINUED | OUTPATIENT
Start: 2019-07-05 | End: 2019-07-08 | Stop reason: HOSPADM

## 2019-07-05 RX ORDER — ACETAMINOPHEN 325 MG/1
650 TABLET ORAL EVERY 4 HOURS PRN
Status: DISCONTINUED | OUTPATIENT
Start: 2019-07-05 | End: 2019-07-08 | Stop reason: HOSPADM

## 2019-07-05 RX ADMIN — BENZTROPINE MESYLATE 1 MG: 1 TABLET ORAL at 21:00

## 2019-07-05 RX ADMIN — HYDROXYZINE PAMOATE 50 MG: 25 CAPSULE ORAL at 01:31

## 2019-07-05 RX ADMIN — BENZTROPINE MESYLATE 1 MG: 1 TABLET ORAL at 09:38

## 2019-07-05 RX ADMIN — METAXALONE 800 MG: 800 TABLET ORAL at 21:00

## 2019-07-05 RX ADMIN — ROPINIROLE HYDROCHLORIDE 2 MG: 1 TABLET, FILM COATED ORAL at 09:38

## 2019-07-05 RX ADMIN — LURASIDONE HYDROCHLORIDE 20 MG: 40 TABLET, FILM COATED ORAL at 09:38

## 2019-07-05 RX ADMIN — DONEPEZIL HYDROCHLORIDE 10 MG: 5 TABLET, FILM COATED ORAL at 21:00

## 2019-07-05 RX ADMIN — ROPINIROLE HYDROCHLORIDE 2 MG: 1 TABLET, FILM COATED ORAL at 21:00

## 2019-07-05 RX ADMIN — METFORMIN HYDROCHLORIDE 500 MG: 500 TABLET ORAL at 09:38

## 2019-07-05 RX ADMIN — ACETAMINOPHEN 650 MG: 325 TABLET ORAL at 19:41

## 2019-07-05 RX ADMIN — METAXALONE 800 MG: 800 TABLET ORAL at 11:18

## 2019-07-05 RX ADMIN — FUROSEMIDE 20 MG: 20 TABLET ORAL at 09:38

## 2019-07-05 RX ADMIN — ARIPIPRAZOLE 5 MG: 5 TABLET ORAL at 09:38

## 2019-07-05 RX ADMIN — KETOROLAC TROMETHAMINE 30 MG: 30 INJECTION, SOLUTION INTRAMUSCULAR; INTRAVENOUS at 16:54

## 2019-07-05 RX ADMIN — Medication 3 MG: at 21:00

## 2019-07-05 RX ADMIN — ACETAMINOPHEN 650 MG: 325 TABLET ORAL at 13:39

## 2019-07-05 RX ADMIN — ONDANSETRON HYDROCHLORIDE 4 MG: 4 TABLET, FILM COATED ORAL at 16:14

## 2019-07-05 RX ADMIN — ATORVASTATIN CALCIUM 20 MG: 20 TABLET, FILM COATED ORAL at 09:38

## 2019-07-05 RX ADMIN — METFORMIN HYDROCHLORIDE 500 MG: 500 TABLET ORAL at 16:47

## 2019-07-05 RX ADMIN — HYDROXYZINE HYDROCHLORIDE 50 MG: 25 TABLET, FILM COATED ORAL at 17:56

## 2019-07-05 RX ADMIN — PANTOPRAZOLE SODIUM 40 MG: 40 TABLET, DELAYED RELEASE ORAL at 06:05

## 2019-07-05 RX ADMIN — MIRTAZAPINE 15 MG: 15 TABLET, FILM COATED ORAL at 21:00

## 2019-07-05 ASSESSMENT — PAIN SCALES - GENERAL
PAINLEVEL_OUTOF10: 5
PAINLEVEL_OUTOF10: 10
PAINLEVEL_OUTOF10: 5
PAINLEVEL_OUTOF10: 8
PAINLEVEL_OUTOF10: 10
PAINLEVEL_OUTOF10: 10
PAINLEVEL_OUTOF10: 8
PAINLEVEL_OUTOF10: 6
PAINLEVEL_OUTOF10: 5
PAINLEVEL_OUTOF10: 10

## 2019-07-05 NOTE — H&P
obstructive pulmonary disease) (HCC)     Dementia     Depression     Diabetes (HCC)     Elevated liver enzymes     Frequent falls     Headache     History of kidney infection     Hyperlipidemia     Hypertension     Neuropathy     Obesity     Osteoarthritis     Oxygen dependent     Psoriasis     Scoliosis     Sleep apnea     UTI (urinary tract infection)      Past Surgical History:        Procedure Laterality Date    APPENDECTOMY       SECTION      CHOLECYSTECTOMY      GASTRIC BYPASS SURGERY      TONSILLECTOMY      TUBAL LIGATION      TUMOR REMOVAL      bone tumor, r wrist     UPPER GASTROINTESTINAL ENDOSCOPY  2015    Tarik: sm hiatal hernia; s/p balbir-en-y; esoph plaques, pos yeast; neg CS     Medications Prior to Admission:   Medications Prior to Admission: mirtazapine (REMERON) 15 MG tablet, Take 1 tablet by mouth daily  pantoprazole (PROTONIX) 40 MG tablet, Take 1 tablet by mouth daily.   irbesartan (AVAPRO) 150 MG tablet, Take 1 tablet by mouth nightly  atorvastatin (LIPITOR) 20 MG tablet, Take 1 tablet by mouth daily  metFORMIN (GLUCOPHAGE) 500 MG tablet, Take 1 tablet by mouth 2 times daily (with meals)  sertraline (ZOLOFT) 100 MG tablet, Take 1.5 tablets by mouth nightly  Linaclotide (LINZESS) 72 MCG CAPS, Take 72 mcg by mouth every morning (before breakfast)  benztropine (COGENTIN) 1 MG tablet, Take 1 tablet by mouth 2 times daily  busPIRone (BUSPAR) 15 MG tablet, Take 15 mg by mouth 3 times daily  OXYGEN, Inhale 3 L into the lungs  furosemide (LASIX) 20 MG tablet, Take 1 tablet by mouth daily  hydrOXYzine (ATARAX) 25 MG tablet, 1-2 po Q 6 hours prn anxiety  Liraglutide (VICTOZA) 18 MG/3ML SOPN SC injection, Inject 1.8 mg into the skin daily  celecoxib (CELEBREX) 200 MG capsule, TAKE 1 CAPSULE EVERY DAY  donepezil (ARICEPT) 10 MG tablet, TAKE 1 TABLET EVERY NIGHT  albuterol sulfate HFA (VENTOLIN HFA) 108 (90 Base) MCG/ACT inhaler, Inhale 2 puffs into the lungs every 6 hours as needed for Wheezing  ferrous sulfate (FE TABS) 325 (65 Fe) MG EC tablet, Take 1 tablet by mouth 2 times daily  acetaZOLAMIDE (DIAMOX) 500 MG extended release capsule, TAKE 1 CAPSULE TWICE DAILY  rOPINIRole (REQUIP) 2 MG tablet, Take 1 tablet by mouth 2 times daily  pramipexole (MIRAPEX) 0.25 MG tablet, Take 1 tablet by mouth 3 times daily  blood glucose monitor kit and supplies, Test 3 times a day & as needed for symptoms of irregular blood glucose. Dx:  blood glucose monitor strips, Test 3 times a day & as needed for symptoms of irregular blood glucose. lurasidone (LATUDA) 80 MG TABS tablet, Take 1 tablet by mouth daily (Patient taking differently: Take 80 mg by mouth daily Indications: take with food )  gabapentin (NEURONTIN) 100 MG capsule, Take 1 capsule by mouth daily for 30 days. .  promethazine (PHENERGAN) 25 MG tablet, Take 1 tablet by mouth every 8 hours as needed for Nausea  clobetasol (TEMOVATE) 0.05 % ointment, Apply topically 2 times daily. Insulin Pen Needle (B-D ULTRAFINE III SHORT PEN) 31G X 8 MM MISC, Inject 1 each as directed daily  ipratropium-albuterol (DUONEB) 0.5-2.5 (3) MG/3ML SOLN nebulizer solution, Inhale 3 mLs into the lungs every 6 hours as needed for Shortness of Breath  Continuous Blood Gluc  (FREESTYLE ABDOULAYE READER) MARCIA, 1 Units by Does not apply route 2 times daily  Continuous Blood Gluc Sensor (FREESTYLE ABDOULAYE SENSOR SYSTEM) MISC, 1 Units by Does not apply route 2 times daily  ACCU-CHEK SOFTCLIX LANCETS MISC, CHECK BLOOD SUGAR TWICE DAILY AND AS NEEDED  fluticasone (FLONASE) 50 MCG/ACT nasal spray, 2 sprays by Nasal route daily  nystatin (MYCOSTATIN) 466986 UNIT/GM powder, Apply 3 times daily.   meclizine (ANTIVERT) 25 MG tablet, Take 25 mg by mouth 3 times daily as needed  acetaminophen (TYLENOL) 500 MG tablet, Take 500 mg by mouth every 6 hours as needed for Pain  ondansetron (ZOFRAN) 4 MG tablet, Take 1 tablet by mouth every 8 hours as needed for Nausea or

## 2019-07-06 LAB
GLUCOSE BLD-MCNC: 126 MG/DL (ref 70–99)
GLUCOSE BLD-MCNC: 178 MG/DL (ref 70–99)
GLUCOSE BLD-MCNC: 88 MG/DL (ref 70–99)
PERFORMED ON: ABNORMAL
PERFORMED ON: ABNORMAL
PERFORMED ON: NORMAL
TSH REFLEX FT4: 3.79 UIU/ML (ref 0.35–5.5)
VITAMIN B-12: 184 PG/ML (ref 211–946)
VITAMIN D 25-HYDROXY: 19.9 NG/ML

## 2019-07-06 PROCEDURE — 6370000000 HC RX 637 (ALT 250 FOR IP): Performed by: PSYCHIATRY & NEUROLOGY

## 2019-07-06 PROCEDURE — 82306 VITAMIN D 25 HYDROXY: CPT

## 2019-07-06 PROCEDURE — 6360000002 HC RX W HCPCS: Performed by: PSYCHIATRY & NEUROLOGY

## 2019-07-06 PROCEDURE — 99232 SBSQ HOSP IP/OBS MODERATE 35: CPT | Performed by: PSYCHIATRY & NEUROLOGY

## 2019-07-06 PROCEDURE — 6360000002 HC RX W HCPCS: Performed by: FAMILY MEDICINE

## 2019-07-06 PROCEDURE — 6370000000 HC RX 637 (ALT 250 FOR IP): Performed by: FAMILY MEDICINE

## 2019-07-06 PROCEDURE — 94640 AIRWAY INHALATION TREATMENT: CPT

## 2019-07-06 PROCEDURE — 36415 COLL VENOUS BLD VENIPUNCTURE: CPT

## 2019-07-06 PROCEDURE — 1240000000 HC EMOTIONAL WELLNESS R&B

## 2019-07-06 PROCEDURE — 84443 ASSAY THYROID STIM HORMONE: CPT

## 2019-07-06 PROCEDURE — 82948 REAGENT STRIP/BLOOD GLUCOSE: CPT

## 2019-07-06 PROCEDURE — 82607 VITAMIN B-12: CPT

## 2019-07-06 RX ORDER — ERGOCALCIFEROL 1.25 MG/1
50000 CAPSULE ORAL WEEKLY
Status: DISCONTINUED | OUTPATIENT
Start: 2019-07-06 | End: 2019-07-08 | Stop reason: HOSPADM

## 2019-07-06 RX ORDER — CYANOCOBALAMIN 1000 UG/ML
1000 INJECTION INTRAMUSCULAR; SUBCUTANEOUS WEEKLY
Status: DISCONTINUED | OUTPATIENT
Start: 2019-07-13 | End: 2019-07-08 | Stop reason: HOSPADM

## 2019-07-06 RX ORDER — CYANOCOBALAMIN 1000 UG/ML
1000 INJECTION INTRAMUSCULAR; SUBCUTANEOUS
Status: DISCONTINUED | OUTPATIENT
Start: 2019-08-10 | End: 2019-07-08 | Stop reason: HOSPADM

## 2019-07-06 RX ORDER — HYDROXYZINE PAMOATE 25 MG/1
50 CAPSULE ORAL ONCE
Status: COMPLETED | OUTPATIENT
Start: 2019-07-06 | End: 2019-07-06

## 2019-07-06 RX ORDER — IBUPROFEN 600 MG/1
600 TABLET ORAL
Status: DISCONTINUED | OUTPATIENT
Start: 2019-07-06 | End: 2019-07-08 | Stop reason: HOSPADM

## 2019-07-06 RX ORDER — LORAZEPAM 1 MG/1
1 TABLET ORAL ONCE
Status: COMPLETED | OUTPATIENT
Start: 2019-07-06 | End: 2019-07-06

## 2019-07-06 RX ORDER — ERGOCALCIFEROL (VITAMIN D2) 1250 MCG
50000 CAPSULE ORAL WEEKLY
Qty: 11 CAPSULE | Refills: 0 | Status: SHIPPED | OUTPATIENT
Start: 2019-07-06 | End: 2019-10-22 | Stop reason: SDUPTHER

## 2019-07-06 RX ORDER — ERGOCALCIFEROL 1.25 MG/1
50000 CAPSULE ORAL WEEKLY
Status: DISCONTINUED | OUTPATIENT
Start: 2019-07-06 | End: 2019-07-06

## 2019-07-06 RX ORDER — CYANOCOBALAMIN 1000 UG/ML
1000 INJECTION INTRAMUSCULAR; SUBCUTANEOUS DAILY
Status: DISCONTINUED | OUTPATIENT
Start: 2019-07-06 | End: 2019-07-08 | Stop reason: HOSPADM

## 2019-07-06 RX ADMIN — LORAZEPAM 1 MG: 1 TABLET ORAL at 11:06

## 2019-07-06 RX ADMIN — KETOROLAC TROMETHAMINE 30 MG: 30 INJECTION, SOLUTION INTRAMUSCULAR; INTRAVENOUS at 09:15

## 2019-07-06 RX ADMIN — METAXALONE 800 MG: 800 TABLET ORAL at 08:47

## 2019-07-06 RX ADMIN — ROPINIROLE HYDROCHLORIDE 2 MG: 1 TABLET, FILM COATED ORAL at 08:46

## 2019-07-06 RX ADMIN — DONEPEZIL HYDROCHLORIDE 10 MG: 5 TABLET, FILM COATED ORAL at 21:25

## 2019-07-06 RX ADMIN — ATORVASTATIN CALCIUM 20 MG: 20 TABLET, FILM COATED ORAL at 08:46

## 2019-07-06 RX ADMIN — HYDROXYZINE PAMOATE 50 MG: 25 CAPSULE ORAL at 00:21

## 2019-07-06 RX ADMIN — FUROSEMIDE 20 MG: 20 TABLET ORAL at 08:46

## 2019-07-06 RX ADMIN — ARIPIPRAZOLE 5 MG: 5 TABLET ORAL at 08:47

## 2019-07-06 RX ADMIN — ROPINIROLE HYDROCHLORIDE 2 MG: 1 TABLET, FILM COATED ORAL at 21:25

## 2019-07-06 RX ADMIN — METFORMIN HYDROCHLORIDE 500 MG: 500 TABLET ORAL at 08:47

## 2019-07-06 RX ADMIN — LURASIDONE HYDROCHLORIDE 20 MG: 40 TABLET, FILM COATED ORAL at 08:46

## 2019-07-06 RX ADMIN — CYANOCOBALAMIN 1000 MCG: 1000 INJECTION, SOLUTION INTRAMUSCULAR; SUBCUTANEOUS at 21:26

## 2019-07-06 RX ADMIN — ONDANSETRON HYDROCHLORIDE 4 MG: 4 TABLET, FILM COATED ORAL at 08:47

## 2019-07-06 RX ADMIN — METAXALONE 800 MG: 800 TABLET ORAL at 21:25

## 2019-07-06 RX ADMIN — BENZTROPINE MESYLATE 1 MG: 1 TABLET ORAL at 21:25

## 2019-07-06 RX ADMIN — ACETAMINOPHEN 650 MG: 325 TABLET ORAL at 08:46

## 2019-07-06 RX ADMIN — ERGOCALCIFEROL 50000 UNITS: 1.25 CAPSULE ORAL at 21:25

## 2019-07-06 RX ADMIN — Medication 3 MG: at 21:25

## 2019-07-06 RX ADMIN — IBUPROFEN 600 MG: 600 TABLET ORAL at 16:43

## 2019-07-06 RX ADMIN — PANTOPRAZOLE SODIUM 40 MG: 40 TABLET, DELAYED RELEASE ORAL at 06:09

## 2019-07-06 RX ADMIN — MIRTAZAPINE 15 MG: 15 TABLET, FILM COATED ORAL at 21:25

## 2019-07-06 RX ADMIN — IPRATROPIUM BROMIDE AND ALBUTEROL SULFATE 3 ML: 2.5; .5 SOLUTION RESPIRATORY (INHALATION) at 06:26

## 2019-07-06 RX ADMIN — METFORMIN HYDROCHLORIDE 500 MG: 500 TABLET ORAL at 16:44

## 2019-07-06 RX ADMIN — HYDROXYZINE PAMOATE 50 MG: 25 CAPSULE ORAL at 06:09

## 2019-07-06 RX ADMIN — BENZTROPINE MESYLATE 1 MG: 1 TABLET ORAL at 08:46

## 2019-07-06 ASSESSMENT — PAIN SCALES - GENERAL
PAINLEVEL_OUTOF10: 10
PAINLEVEL_OUTOF10: 4
PAINLEVEL_OUTOF10: 0
PAINLEVEL_OUTOF10: 10

## 2019-07-06 NOTE — H&P
HISTORY and PHYSICAL      CHIEF COMPLAINT:  Anxiety, SI    Reason for Admission:  Anxiety, SI    History Obtained From:  patient    HISTORY OF PRESENT ILLNESS:      The patient is a 60 y.o. female who is admitted to the [place] [address] unit with worsening mood issues. She has no c/o CP or SOA. No abdominal pain or N/V. She has c/o back and neck pain, which are chronic. No fevers. No HA or weakness. Past Medical History:        Diagnosis Date    Alcohol overdose     history of    Anemia     Anxiety     [facility]     Brain tumor [facility] [facility]     Chronic back pain     ruptured discs    COPD (chronic obstructive pulmonary disease) (HCC)     Dementia     Depression     Diabetes (HCC)     Elevated liver enzymes     Frequent falls     Headache     History of kidney infection     Hyperlipidemia     Hypertension     Neuropathy     Obesity     Osteoarthritis     Oxygen dependent     Psoriasis     Scoliosis     Sleep apnea     UTI (urinary tract infection)      Past Surgical History:        Procedure Laterality Date    APPENDECTOMY       SECTION      CHOLECYSTECTOMY      GASTRIC BYPASS SURGERY      TONSILLECTOMY      TUBAL LIGATION      TUMOR REMOVAL      bone tumor, r wrist     UPPER GASTROINTESTINAL ENDOSCOPY  2015    Tarik: sm hiatal hernia; s/p balbir-en-y; esoph plaques, pos yeast; neg CS         Medications Prior to Admission:    Medications Prior to Admission: mirtazapine (REMERON) 15 MG tablet, Take 1 tablet by mouth daily  pantoprazole (PROTONIX) 40 MG tablet, Take 1 tablet by mouth daily.   irbesartan (AVAPRO) 150 MG tablet, Take 1 tablet by mouth nightly  atorvastatin (LIPITOR) 20 MG tablet, Take 1 tablet by mouth daily  metFORMIN (GLUCOPHAGE) 500 MG tablet, Take 1 tablet by mouth 2 times daily (with meals)  sertraline (ZOLOFT) 100 MG tablet, Take 1.5 tablets by mouth nightly  Linaclotide (LINZESS) 72 MCG CAPS, Take 72 mcg by mouth every morning (before breakfast)  benztropine (COGENTIN) 1 MG tablet, Take 1 tablet by mouth 2 times daily  busPIRone (BUSPAR) 15 MG tablet, Take 15 mg by mouth 3 times daily  OXYGEN, Inhale 3 L into the lungs  furosemide (LASIX) 20 MG tablet, Take 1 tablet by mouth daily  hydrOXYzine (ATARAX) 25 MG tablet, 1-2 po Q 6 hours prn anxiety  Liraglutide (VICTOZA) 18 MG/3ML SOPN SC injection, Inject 1.8 mg into the skin daily  celecoxib (CELEBREX) 200 MG capsule, TAKE 1 CAPSULE EVERY DAY  donepezil (ARICEPT) 10 MG tablet, TAKE 1 TABLET EVERY NIGHT  albuterol sulfate HFA (VENTOLIN HFA) 108 (90 Base) MCG/ACT inhaler, Inhale 2 puffs into the lungs every 6 hours as needed for Wheezing  ferrous sulfate (FE TABS) 325 (65 Fe) MG EC tablet, Take 1 tablet by mouth 2 times daily  acetaZOLAMIDE (DIAMOX) 500 MG extended release capsule, TAKE 1 CAPSULE TWICE DAILY  rOPINIRole (REQUIP) 2 MG tablet, Take 1 tablet by mouth 2 times daily  pramipexole (MIRAPEX) 0.25 MG tablet, Take 1 tablet by mouth 3 times daily  blood glucose monitor kit and supplies, Test 3 times a day & as needed for symptoms of irregular blood glucose. Dx:  blood glucose monitor strips, Test 3 times a day & as needed for symptoms of irregular blood glucose. lurasidone (LATUDA) 80 MG TABS tablet, Take 1 tablet by mouth daily (Patient taking differently: Take 80 mg by mouth daily Indications: take with food )  gabapentin (NEURONTIN) 100 MG capsule, Take 1 capsule by mouth daily for 30 days. .  promethazine (PHENERGAN) 25 MG tablet, Take 1 tablet by mouth every 8 hours as needed for Nausea  clobetasol (TEMOVATE) 0.05 % ointment, Apply topically 2 times daily.   Insulin Pen Needle (B-D ULTRAFINE III SHORT PEN) 31G X 8 MM MISC, Inject 1 each as directed daily  ipratropium-albuterol (DUONEB) 0.5-2.5 (3) MG/3ML SOLN nebulizer solution, Inhale 3 mLs into the lungs every 6 hours as needed for Shortness of Breath  Continuous Blood Gluc  (Leeann Pall

## 2019-07-06 NOTE — GROUP NOTE
Group Therapy Note    Date: July 6    Group Start Time: 1400  Group End Time: 5868  Group Topic: Recovery    [facility]    Colleen Christine               Patient's Goal: Self-Esteem    Notes: Pt acknowledged that taking a different perspective, setting small reachable goals, and learning to accept yourself are all great ways of overcoming low-self-esteem. Status After Intervention:  Unchanged    Participation Level:  Active Listener and Interactive    Participation Quality: Appropriate and Attentive      Speech:  normal      Thought Process/Content: Logical      Affective Functioning: Congruent      Mood: euthymic      Level of consciousness:  Alert, Oriented x4 and Attentive      Response to Learning: Able to verbalize/acknowledge new learning      Endings: None Reported    Modes of Intervention: Support      Discipline Responsible: Psychoeducational Specialist      Signature:  Colleen Christine

## 2019-07-07 LAB
GLUCOSE BLD-MCNC: 223 MG/DL (ref 70–99)
GLUCOSE BLD-MCNC: 97 MG/DL (ref 70–99)
GLUCOSE BLD-MCNC: 98 MG/DL (ref 70–99)
PERFORMED ON: ABNORMAL
PERFORMED ON: NORMAL
PERFORMED ON: NORMAL

## 2019-07-07 PROCEDURE — 6370000000 HC RX 637 (ALT 250 FOR IP): Performed by: FAMILY MEDICINE

## 2019-07-07 PROCEDURE — 1240000000 HC EMOTIONAL WELLNESS R&B

## 2019-07-07 PROCEDURE — 6370000000 HC RX 637 (ALT 250 FOR IP): Performed by: PSYCHIATRY & NEUROLOGY

## 2019-07-07 PROCEDURE — 82948 REAGENT STRIP/BLOOD GLUCOSE: CPT

## 2019-07-07 PROCEDURE — 6360000002 HC RX W HCPCS: Performed by: FAMILY MEDICINE

## 2019-07-07 PROCEDURE — 6360000002 HC RX W HCPCS: Performed by: PSYCHIATRY & NEUROLOGY

## 2019-07-07 RX ORDER — LORAZEPAM 1 MG/1
1 TABLET ORAL ONCE
Status: COMPLETED | OUTPATIENT
Start: 2019-07-07 | End: 2019-07-07

## 2019-07-07 RX ADMIN — IBUPROFEN 600 MG: 600 TABLET ORAL at 11:57

## 2019-07-07 RX ADMIN — METAXALONE 800 MG: 800 TABLET ORAL at 08:51

## 2019-07-07 RX ADMIN — IBUPROFEN 600 MG: 600 TABLET ORAL at 08:02

## 2019-07-07 RX ADMIN — ROPINIROLE HYDROCHLORIDE 2 MG: 1 TABLET, FILM COATED ORAL at 21:01

## 2019-07-07 RX ADMIN — METFORMIN HYDROCHLORIDE 500 MG: 500 TABLET ORAL at 08:02

## 2019-07-07 RX ADMIN — Medication 3 MG: at 21:01

## 2019-07-07 RX ADMIN — ARIPIPRAZOLE 5 MG: 5 TABLET ORAL at 08:02

## 2019-07-07 RX ADMIN — IBUPROFEN 600 MG: 600 TABLET ORAL at 16:55

## 2019-07-07 RX ADMIN — BENZTROPINE MESYLATE 1 MG: 1 TABLET ORAL at 08:02

## 2019-07-07 RX ADMIN — BENZTROPINE MESYLATE 1 MG: 1 TABLET ORAL at 21:01

## 2019-07-07 RX ADMIN — LORAZEPAM 1 MG: 1 TABLET ORAL at 09:45

## 2019-07-07 RX ADMIN — FUROSEMIDE 20 MG: 20 TABLET ORAL at 08:02

## 2019-07-07 RX ADMIN — METFORMIN HYDROCHLORIDE 500 MG: 500 TABLET ORAL at 16:55

## 2019-07-07 RX ADMIN — ATORVASTATIN CALCIUM 20 MG: 20 TABLET, FILM COATED ORAL at 08:02

## 2019-07-07 RX ADMIN — KETOROLAC TROMETHAMINE 30 MG: 30 INJECTION, SOLUTION INTRAMUSCULAR; INTRAVENOUS at 17:13

## 2019-07-07 RX ADMIN — DONEPEZIL HYDROCHLORIDE 10 MG: 5 TABLET, FILM COATED ORAL at 21:01

## 2019-07-07 RX ADMIN — ACETAMINOPHEN 650 MG: 325 TABLET ORAL at 00:50

## 2019-07-07 RX ADMIN — PANTOPRAZOLE SODIUM 40 MG: 40 TABLET, DELAYED RELEASE ORAL at 06:11

## 2019-07-07 RX ADMIN — MIRTAZAPINE 15 MG: 15 TABLET, FILM COATED ORAL at 21:01

## 2019-07-07 RX ADMIN — LURASIDONE HYDROCHLORIDE 20 MG: 40 TABLET, FILM COATED ORAL at 08:01

## 2019-07-07 RX ADMIN — ACETAMINOPHEN 650 MG: 325 TABLET ORAL at 21:01

## 2019-07-07 RX ADMIN — ACETAMINOPHEN 650 MG: 325 TABLET ORAL at 05:09

## 2019-07-07 RX ADMIN — ROPINIROLE HYDROCHLORIDE 2 MG: 1 TABLET, FILM COATED ORAL at 08:01

## 2019-07-07 RX ADMIN — METAXALONE 800 MG: 800 TABLET ORAL at 21:01

## 2019-07-07 RX ADMIN — CYANOCOBALAMIN 1000 MCG: 1000 INJECTION, SOLUTION INTRAMUSCULAR; SUBCUTANEOUS at 08:02

## 2019-07-07 ASSESSMENT — PAIN SCALES - GENERAL
PAINLEVEL_OUTOF10: 1
PAINLEVEL_OUTOF10: 0
PAINLEVEL_OUTOF10: 10
PAINLEVEL_OUTOF10: 3
PAINLEVEL_OUTOF10: 2
PAINLEVEL_OUTOF10: 0
PAINLEVEL_OUTOF10: 5
PAINLEVEL_OUTOF10: 5
PAINLEVEL_OUTOF10: 8
PAINLEVEL_OUTOF10: 10
PAINLEVEL_OUTOF10: 8

## 2019-07-07 NOTE — BH NOTE
Pt anxious, complaining SOA. O2 95% RA. Pt wanting to change into paper scrubs. Pt states she is hot. Provided scrubs, cool wet wash cloth and slow deep breaths through the nose out through the nose encouraged. Remained with patient until she changed. Left message for Dr. Hameed Early to call unit regarding patient. Ian Terry RN administered patient's scheduled medications and administered Toradol for her neck and back pain. Pt resting in room at present. Will continue to monitor.

## 2019-07-07 NOTE — BH NOTE
Pt talking to me from her room while i'm at the nurse station. I was unable to hear the patient so I went to her room and asked her what she needed. Patient asked me if I could fan her so she could rest her arms. I advised Ju that I had other patients and could not stand and fan her. I took her menu/fan and waved it over her for a minute to let her rest her arms. Returned menu/fan to the patient and proceeded with my duties. Nothing further at present.

## 2019-07-08 ENCOUNTER — TELEPHONE (OUTPATIENT)
Dept: PRIMARY CARE CLINIC | Age: 64
End: 2019-07-08

## 2019-07-08 VITALS
WEIGHT: 210.38 LBS | BODY MASS INDEX: 39.72 KG/M2 | DIASTOLIC BLOOD PRESSURE: 84 MMHG | OXYGEN SATURATION: 96 % | HEART RATE: 70 BPM | SYSTOLIC BLOOD PRESSURE: 135 MMHG | RESPIRATION RATE: 18 BRPM | TEMPERATURE: 97.3 F | HEIGHT: 61 IN

## 2019-07-08 PROBLEM — F31.64 BIPOLAR I DISORDER, MOST RECENT EPISODE MIXED, SEVERE WITH PSYCHOTIC FEATURES (HCC): Status: ACTIVE | Noted: 2019-07-08

## 2019-07-08 LAB
EKG P AXIS: 23 DEGREES
EKG P-R INTERVAL: 170 MS
EKG Q-T INTERVAL: 366 MS
EKG QRS DURATION: 82 MS
EKG QTC CALCULATION (BAZETT): 408 MS
EKG T AXIS: 37 DEGREES
GLUCOSE BLD-MCNC: 100 MG/DL (ref 70–99)
PERFORMED ON: ABNORMAL

## 2019-07-08 PROCEDURE — 5130000000 HC BRIDGE APPOINTMENT

## 2019-07-08 PROCEDURE — 6370000000 HC RX 637 (ALT 250 FOR IP): Performed by: PSYCHIATRY & NEUROLOGY

## 2019-07-08 PROCEDURE — 6370000000 HC RX 637 (ALT 250 FOR IP): Performed by: FAMILY MEDICINE

## 2019-07-08 PROCEDURE — 82948 REAGENT STRIP/BLOOD GLUCOSE: CPT

## 2019-07-08 PROCEDURE — 99239 HOSP IP/OBS DSCHRG MGMT >30: CPT | Performed by: PSYCHIATRY & NEUROLOGY

## 2019-07-08 PROCEDURE — 6360000002 HC RX W HCPCS: Performed by: PSYCHIATRY & NEUROLOGY

## 2019-07-08 RX ORDER — ERGOCALCIFEROL (VITAMIN D2) 1250 MCG
50000 CAPSULE ORAL WEEKLY
Qty: 11 CAPSULE | Refills: 0 | Status: CANCELLED | OUTPATIENT
Start: 2019-07-08 | End: 2019-09-17

## 2019-07-08 RX ORDER — CHOLECALCIFEROL (VITAMIN D3) 125 MCG
500 CAPSULE ORAL DAILY
Qty: 30 TABLET | Refills: 2 | Status: SHIPPED | OUTPATIENT
Start: 2019-07-08 | End: 2019-10-22 | Stop reason: SDUPTHER

## 2019-07-08 RX ORDER — MIRTAZAPINE 15 MG/1
15 TABLET, FILM COATED ORAL NIGHTLY
Qty: 30 TABLET | Refills: 1 | Status: SHIPPED | OUTPATIENT
Start: 2019-07-08 | End: 2019-10-22 | Stop reason: SDUPTHER

## 2019-07-08 RX ORDER — LANOLIN ALCOHOL/MO/W.PET/CERES
3 CREAM (GRAM) TOPICAL NIGHTLY
Qty: 30 TABLET | Refills: 1 | Status: SHIPPED | OUTPATIENT
Start: 2019-07-08 | End: 2019-10-22 | Stop reason: SDUPTHER

## 2019-07-08 RX ORDER — ARIPIPRAZOLE 5 MG/1
5 TABLET ORAL DAILY
Qty: 30 TABLET | Refills: 1 | Status: SHIPPED | OUTPATIENT
Start: 2019-07-09 | End: 2019-10-22 | Stop reason: SDUPTHER

## 2019-07-08 RX ORDER — DONEPEZIL HYDROCHLORIDE 10 MG/1
10 TABLET, FILM COATED ORAL NIGHTLY
Qty: 30 TABLET | Refills: 1 | Status: SHIPPED | OUTPATIENT
Start: 2019-07-08 | End: 2019-10-08 | Stop reason: SDUPTHER

## 2019-07-08 RX ADMIN — PANTOPRAZOLE SODIUM 40 MG: 40 TABLET, DELAYED RELEASE ORAL at 06:34

## 2019-07-08 RX ADMIN — KETOROLAC TROMETHAMINE 30 MG: 30 INJECTION, SOLUTION INTRAMUSCULAR; INTRAVENOUS at 11:08

## 2019-07-08 RX ADMIN — FUROSEMIDE 20 MG: 20 TABLET ORAL at 07:24

## 2019-07-08 RX ADMIN — ALBUTEROL SULFATE 2 PUFF: 90 AEROSOL, METERED RESPIRATORY (INHALATION) at 04:30

## 2019-07-08 RX ADMIN — LURASIDONE HYDROCHLORIDE 20 MG: 40 TABLET, FILM COATED ORAL at 07:24

## 2019-07-08 RX ADMIN — IBUPROFEN 600 MG: 600 TABLET ORAL at 12:50

## 2019-07-08 RX ADMIN — METAXALONE 800 MG: 800 TABLET ORAL at 07:24

## 2019-07-08 RX ADMIN — METFORMIN HYDROCHLORIDE 500 MG: 500 TABLET ORAL at 07:24

## 2019-07-08 RX ADMIN — BENZTROPINE MESYLATE 1 MG: 1 TABLET ORAL at 07:24

## 2019-07-08 RX ADMIN — ROPINIROLE HYDROCHLORIDE 2 MG: 1 TABLET, FILM COATED ORAL at 07:24

## 2019-07-08 RX ADMIN — IBUPROFEN 600 MG: 600 TABLET ORAL at 07:24

## 2019-07-08 RX ADMIN — ACETAMINOPHEN 650 MG: 325 TABLET ORAL at 04:29

## 2019-07-08 RX ADMIN — ONDANSETRON HYDROCHLORIDE 4 MG: 4 TABLET, FILM COATED ORAL at 07:24

## 2019-07-08 RX ADMIN — ARIPIPRAZOLE 5 MG: 5 TABLET ORAL at 07:24

## 2019-07-08 RX ADMIN — ATORVASTATIN CALCIUM 20 MG: 20 TABLET, FILM COATED ORAL at 07:24

## 2019-07-08 ASSESSMENT — PAIN SCALES - GENERAL
PAINLEVEL_OUTOF10: 10
PAINLEVEL_OUTOF10: 10
PAINLEVEL_OUTOF10: 4
PAINLEVEL_OUTOF10: 4

## 2019-07-08 NOTE — PLAN OF CARE
Group Therapy Note    Date: 7/8/2019  Start Time: 1000  End Time:  1040  Number of Participants: 1    Type of Group: Psychoeducation    Wellness Binder Information  Module Name:  Relapse Prevention  Session Number:  5    Patient's Goal:  To improve knowledge of relapse prevention strategies    Notes:  Pt did not attend group discussion. Pt was invited/encouraged. Status After Intervention:      Participation Level:     Participation Quality:       Speech:         Thought Process/Content:       Affective Functioning:       Mood:       Level of consciousness:        Response to Learning:       Endings:     Modes of Intervention:       Discipline Responsible:       Signature:  Keith Troncoso

## 2019-07-08 NOTE — PROGRESS NOTES
Admission Note    Reason for admission/Target Symptom: Patient admitted to [facility] due to suicidal thoughts with plan. Diagnoses: Suicidal ideation   UDS: Negative  BAL:  Negative    SW met with treatment team to discuss patient's treatment including care planning, discharge planning, and follow-up needs. Pt has been admitted to [facility]. Treatment team has identified patient's discharge needs as medication management and outpatient therapy/counseling. Pt confirmed  the need for ongoing treatment post inpatient stay. Pt was also provided a handout of contact information for drug and alcohol treatment centers and other community support service such as EL, AA, and [facility].      Electronically signed by Taqueria James, [id number] on 7/5/2019 at 4:12 PM
BHI Daily Shift Assessment  Nursing Progress Note    Room: 0616/616-01 Name: Elidia Cates Age: 60 y.o. Gender: female   Dx: <principal problem not specified>  Precautions: suicide risk and fall risk  Target Symptoms:   Accu-Chek: NoSleep: Yes,Sleep Quality Fair SI \"off&on\" SARAH denies [address]  ADLs: Yes Speech: normal Depression: 10 Anxiety: 10   Participation LevelMinimal  Visitation: No   Participation QualityIntrusive and Resistant    Complaints: pt has been complaining of pain most of this shift. Pt has had PRN muscle relaxer as ordered. Pt agitated due to not being able to wear her wooden high heels and have her make up in room. Pt educated on policies and procedures of [facility] with verbal understanding from pt. Pt refused to speak to Dr Randal Gan stating she was \"hurting to bad to go to his office\".  Will continue to encourage pt to participate in treatment justin    Signature: ANTONIO Marrufo
BHI Daily Shift Assessment-Geriatric Unit  Nursing Progress Note    Room: [facility]/618-01   Name: Alma Melvin   Age: 60 y.o. Gender: female   Dx: <principal problem not specified>  Precautions: suicide risk and fall risk  Inpatient Status: voluntary       Sleep: Yes,   Sleep Quality Fair   Hours Slept:    Sleep Medications: Yes  PRN Sleep Meds: No       Other PRN Meds: Yes   Med Compliant: Yes   Accu-Chek: Yes    Oxygen: Yes, as needed for low sats. SI denies suicidal ideation    HI Negative for homicidal ideation        AVH:Absent      Depression: same   Anxiety: constant      Appetite: good    Social: Yes   Speech: normal for her    Appearance: disheveled  Assistive Devices: wheelchair  Level of Assist: Independent        Group Participation: Yes  Participation LevelActive Listener    Participation QualityAppropriate    Notes:   Patient reports that she feels better. She reports that her medications are working. She reports that earlier in the day she felt like she had a panic attack. No problems with that this evening. She did get irritated at a staff member when they first approached her to take her vitals. She later apologized for her actions. Patient is using a wheelchair at times for weakness that she experienced during the day shift. She has parked the wheelchair and walked the majority of this shift. She has an order for oxygen 2 liters per nasal cannula if needed for low oxygen saturation. O2 Sats were 96% on room air this evening. No complaints of SOA.    Electronically signed by Rocky Stewart RN on 7/8/19 at 2:00 AM
Collateral obtained from: pt's sister Pepe Martell [phone]    Immediate Stressors & Time Episode Began: Pt's sister reported pt has been more angry and has a loss of control. Pt's sister reported pt has been having bizarre behavior, blowing up at people, and no impulse control. Diagnosis/Hx of compliance with meds: Pt's sister reported severe bipolar. Pt's sister reported pt is compliant with meds until pt gets depressed and then she will stop taking them. Tx Hx/Past hospitalizations: Pt's sister reported [facility]. Family hx of psychiatric issues: Pt's sister reported pt's dad bipolar, grandmother bipolar, mother's side schizophrenia, and two nieces bipolar. Substance Abuse: Pt's sister reported right after pt got gastric bypass, pt was taking 50-70 benadryl's. Pending Legal: Pt's sister reported none. Safety Issues (Weapons? Hx of attempts): Pt's sister reported no weapons in home. Pt's sister reported multiple past suicidal attempts with the worse one being pt drank antifreeze. Pt's sister reported pt also says she does not want to live anymore. Support system/Medication Managed by: The importance of medication management and locking extra medication in a secured location was explained and reccommended to collateral. Pt's sister reported pt manages own meds. Pt's sister reported pt gets pill packs through the mail and they are  by the day. Who does the pt live with: Pt's sister reported pt lives at [facility] alone. Additional Info: Pt's sister reported pt was doing good until she had the gastric bypass. Pt's sister reported pt may be getting evicted due to pt cursing at the staff. Pt's sister reported she is pt's payee.      Electronically signed by Wilmer Card, [id number] on 7/5/2019 at 12:50 PM
Discharge Note    Pt discharging on this date. Pt denies SI, HI, and AVH at this time. Pt reports improvement in behavior and is leaving unit in overall good condition. SW and pt discussed pt's follow up appointments and importance of attending appointments as scheduled, pt voiced understanding and agreement. Pt able to verbally identify: warning signs, coping strategies, places and people that help make the pt feel better/distract negative thoughts, friends/family/agencies/professionals the pt can reach out to in a crisis, and something that is important to the pt/worth living for. Pt provided the national suicide prevention hotline number (7-681-739-643.224.6432) as well as local community behavioral health [facility]) crisis number for emergencies (5-515.279.4543). Pt to follow up with [address] [place] [address] in [place] for a therapy appointment on 7/15/19 at 8 AM with Cathleen Benites and a med management appointment on 7/18/19 at 8:30 with Samuel. SW offered to assist pt with transportation, pt reports needing a ride. Andres Ribera will be transporting pt home. Referral to out patient tobacco cessation counseling treatment: Patient refused tobacco cessation counseling. SW spoke with pt's sister Deandre Zarate about weapons in home. Pt's sister reported no weapons in home. Pt denies use of substances. Referral declined.      Electronically signed by Burt Barriga, [id number] on 7/8/2019 at 11:23 AM
I Daily Shift Assessment-Geriatric Unit  Nursing Progress Note    Room: [facility]616-01   Name: Suri Ascencio   Age: 60 y.o. Gender: female   Dx: <principal problem not specified>  Precautions: suicide risk and fall risk  Inpatient Status: voluntary       Sleep: No,   Sleep Quality Poor   Hours Slept: 5   Sleep Medications: Yes and remeron, melatonin,  PRN Sleep Meds: No       Other PRN Meds: Yes , tylenol  Med Compliant: Yes   Accu-Chek: No   Oxygen: No         SI passive and without intent on and off   HI Negative for homicidal ideation        AVH:Absent / denies      Depression: 10   Anxiety: 10   Pain 10, back, neck legs    Appetite: good and increased    Social: No   Speech: normal and pressured   Appearance: appropriately dressed  Assistive Devices: none  Level of Assist: Independent        Group Participation: No  Participation LevelNone    Participation Wes Villalobos    Notes:   Pt not social, in TV area with peers, not social, watched TV, communicated with nurse only, pt eating snacks all shift, pt affect incongruent, behavior cooperative, med complaint, complains and negative comments on unit, pt stated depression and anxiety asif, pain level high, pt states that she cannot sleep, agitated and anxious called Dr Pablito Post and order for Vistaril 50mg at [facility], pt interm coughing, excessively eating snacking, pt complaining of having gas, pt increase in drinking, pt SI on and off, pt denies HI and AVH.  Will continue to monitor closely for safety  Electronically signed by Jamel Carnes RN on 7/6/19 at 2:21 AM
Pacing up and down hallways complaining of sweating, room temperature too hot, mattress to bed too firm and generalized \"feeling bad\". Patient visibly perspiring, unable to sit in one place, and displays mild tongue protrusion with chewing motion of mouth. Dr Marcell Solis notified with order received. Will change Patient room and boiler room notified of temperature of room.
Patient came to the desk complaining that she feels like she is crawling out of her skin. She reports feeling like she could climb the anderson. Dr. John Lundberg notified and order received Vistaril 50 mg one dose for anxiety. Patient is currently in the television area slouched in a chair with her feet up, moving her legs.
Pt has been moved to room 618 as patient was complaining of her room being too hot. Pt is alert and oriented x4. Pt reports having slept poorly and was restless and woke up off and on duet to anxiety. Pt rates her depression and anxiety as both \"10\". Pt denies hallucinations but reports suicidal thoughts \"off and on\". Pt's goals for treatment include being able to live with her dog and among people. Pt does want to start her \"depression shots. \" Pt is calm at this time. Pt can be demanding and is somewhat isolative with her peers. No distress noted. Will continue to monitor.
Pt is calmer at this time. \"I'm feeling much better. \" Pt is up out of bed walking with stable gait in the dining room. No distress noted. Will continue to monitor.
RT leisure assessment is complete. Pt will be encouraged to attend scheduled group activities to address leisure and social deficits.
SW met with treatment team to discuss pt's progress and setbacks. SW 2 was present. Pt reportedly slept fair last night, with medications, appetite is good, attends scheduled group activities, social with peers/staff, appearance is disheveled, compliant with medications, behavior has been irritable at times, demanding, reports constant anxiety, denies depression, denies SI/HI/AVH, will be discharged today, follow-up appointments will be scheduled.
WRAP UP GROUP NOTE    Patient's Goal:  Providing feedback as to their own progress in the care-plan provided. Patients have an opportunity to explore self-reflective skills and share any additional cares and concerns not yet addressed. Pt effectively participated.     Energy Level:hyper at times, low at times  Appetite:good  Concentration:improving  Hallucinations:gone  Depression:same  Anxiety:on/ off  How I worked today:tried a lot  What helps me sleep:get medicine  Any questions/complaints/comments:No, I'd like to say staff are great!!!
patient and/or family.              Electronically signed by Lester Dickey MD on 7/6/2019 at 10:43 PM

## 2019-07-10 RX ORDER — ROPINIROLE 2 MG/1
2 TABLET, FILM COATED ORAL 2 TIMES DAILY
Qty: 180 TABLET | Refills: 0 | Status: SHIPPED | OUTPATIENT
Start: 2019-07-10 | End: 2019-10-08 | Stop reason: SDUPTHER

## 2019-07-10 NOTE — TELEPHONE ENCOUNTER
Received fax from pharmacy requesting refill on pts medication(s). Pt was last seen in office on 10/2/2018  and has a follow up scheduled for Visit date not found. Will send request to  Bran Venegas  for patient.      Requested Prescriptions     Pending Prescriptions Disp Refills    rOPINIRole (REQUIP) 2 MG tablet [Pharmacy Med Name: ROPINIROLE 2MG TAB]  0     Sig: Take 1 tablet by mouth 2 times daily    metFORMIN (GLUCOPHAGE) 500 MG tablet [Pharmacy Med Name: METFORMIN 500 MG TAB]  0     Sig: Take 1 tablet by mouth 2 times daily (with meals)

## 2019-08-05 ENCOUNTER — APPOINTMENT (OUTPATIENT)
Dept: ULTRASOUND IMAGING | Facility: HOSPITAL | Age: 64
End: 2019-08-05

## 2019-08-05 ENCOUNTER — APPOINTMENT (OUTPATIENT)
Dept: GENERAL RADIOLOGY | Facility: HOSPITAL | Age: 64
End: 2019-08-05

## 2019-08-05 ENCOUNTER — HOSPITAL ENCOUNTER (EMERGENCY)
Facility: HOSPITAL | Age: 64
Discharge: HOME | End: 2019-08-05
Attending: EMERGENCY MEDICINE | Admitting: EMERGENCY MEDICINE

## 2019-08-05 VITALS
RESPIRATION RATE: 18 BRPM | HEIGHT: 61 IN | WEIGHT: 173 LBS | TEMPERATURE: 98.3 F | HEART RATE: 84 BPM | OXYGEN SATURATION: 96 % | SYSTOLIC BLOOD PRESSURE: 138 MMHG | DIASTOLIC BLOOD PRESSURE: 84 MMHG | BODY MASS INDEX: 32.66 KG/M2

## 2019-08-05 DIAGNOSIS — M79.604 PAIN OF RIGHT LOWER EXTREMITY: Primary | ICD-10-CM

## 2019-08-05 LAB
ALBUMIN SERPL-MCNC: 4.2 G/DL (ref 3.5–5)
ALBUMIN/GLOB SERPL: 1.4 G/DL (ref 1.1–2.5)
ALP SERPL-CCNC: 129 U/L (ref 24–120)
ALT SERPL W P-5'-P-CCNC: 32 U/L (ref 0–54)
ANION GAP SERPL CALCULATED.3IONS-SCNC: 7 MMOL/L (ref 4–13)
AST SERPL-CCNC: 52 U/L (ref 7–45)
BASOPHILS # BLD AUTO: 0.07 10*3/MM3 (ref 0–0.2)
BASOPHILS NFR BLD AUTO: 0.9 % (ref 0–2)
BILIRUB SERPL-MCNC: 0.3 MG/DL (ref 0.1–1)
BUN BLD-MCNC: 21 MG/DL (ref 5–21)
BUN/CREAT SERPL: 32.8 (ref 7–25)
CALCIUM SPEC-SCNC: 8.9 MG/DL (ref 8.4–10.4)
CHLORIDE SERPL-SCNC: 103 MMOL/L (ref 98–110)
CO2 SERPL-SCNC: 27 MMOL/L (ref 24–31)
CREAT BLD-MCNC: 0.64 MG/DL (ref 0.5–1.4)
DEPRECATED RDW RBC AUTO: 49.1 FL (ref 40–54)
EOSINOPHIL # BLD AUTO: 0.21 10*3/MM3 (ref 0–0.7)
EOSINOPHIL NFR BLD AUTO: 2.7 % (ref 0–4)
ERYTHROCYTE [DISTWIDTH] IN BLOOD BY AUTOMATED COUNT: 17.5 % (ref 12–15)
GFR SERPL CREATININE-BSD FRML MDRD: 94 ML/MIN/1.73
GLOBULIN UR ELPH-MCNC: 2.9 GM/DL
GLUCOSE BLD-MCNC: 59 MG/DL (ref 70–100)
HCT VFR BLD AUTO: 30.4 % (ref 37–47)
HGB BLD-MCNC: 9.3 G/DL (ref 12–16)
HOLD SPECIMEN: NORMAL
HOLD SPECIMEN: NORMAL
IMM GRANULOCYTES # BLD AUTO: 0.01 10*3/MM3 (ref 0–0.05)
IMM GRANULOCYTES NFR BLD AUTO: 0.1 % (ref 0–5)
LYMPHOCYTES # BLD AUTO: 2.68 10*3/MM3 (ref 0.72–4.86)
LYMPHOCYTES NFR BLD AUTO: 35.1 % (ref 15–45)
MCH RBC QN AUTO: 23.7 PG (ref 28–32)
MCHC RBC AUTO-ENTMCNC: 30.6 G/DL (ref 33–36)
MCV RBC AUTO: 77.6 FL (ref 82–98)
MONOCYTES # BLD AUTO: 0.65 10*3/MM3 (ref 0.19–1.3)
MONOCYTES NFR BLD AUTO: 8.5 % (ref 4–12)
NEUTROPHILS # BLD AUTO: 4.02 10*3/MM3 (ref 1.87–8.4)
NEUTROPHILS NFR BLD AUTO: 52.7 % (ref 39–78)
NRBC BLD AUTO-RTO: 0 /100 WBC (ref 0–0.2)
PLATELET # BLD AUTO: 316 10*3/MM3 (ref 130–400)
PMV BLD AUTO: 9.8 FL (ref 6–12)
POTASSIUM BLD-SCNC: 4.5 MMOL/L (ref 3.5–5.3)
PROT SERPL-MCNC: 7.1 G/DL (ref 6.3–8.7)
RBC # BLD AUTO: 3.92 10*6/MM3 (ref 4.2–5.4)
SODIUM BLD-SCNC: 137 MMOL/L (ref 135–145)
WBC NRBC COR # BLD: 7.64 10*3/MM3 (ref 4.8–10.8)
WHOLE BLOOD HOLD SPECIMEN: NORMAL
WHOLE BLOOD HOLD SPECIMEN: NORMAL

## 2019-08-05 PROCEDURE — 25010000002 ONDANSETRON PER 1 MG: Performed by: EMERGENCY MEDICINE

## 2019-08-05 PROCEDURE — 73562 X-RAY EXAM OF KNEE 3: CPT

## 2019-08-05 PROCEDURE — 25010000002 FENTANYL CITRATE (PF) 100 MCG/2ML SOLUTION: Performed by: EMERGENCY MEDICINE

## 2019-08-05 PROCEDURE — 80053 COMPREHEN METABOLIC PANEL: CPT | Performed by: EMERGENCY MEDICINE

## 2019-08-05 PROCEDURE — 93971 EXTREMITY STUDY: CPT

## 2019-08-05 PROCEDURE — 73502 X-RAY EXAM HIP UNI 2-3 VIEWS: CPT

## 2019-08-05 PROCEDURE — 96374 THER/PROPH/DIAG INJ IV PUSH: CPT

## 2019-08-05 PROCEDURE — 73552 X-RAY EXAM OF FEMUR 2/>: CPT

## 2019-08-05 PROCEDURE — 96375 TX/PRO/DX INJ NEW DRUG ADDON: CPT

## 2019-08-05 PROCEDURE — 93010 ELECTROCARDIOGRAM REPORT: CPT | Performed by: INTERNAL MEDICINE

## 2019-08-05 PROCEDURE — 85025 COMPLETE CBC W/AUTO DIFF WBC: CPT | Performed by: EMERGENCY MEDICINE

## 2019-08-05 PROCEDURE — 93005 ELECTROCARDIOGRAM TRACING: CPT | Performed by: EMERGENCY MEDICINE

## 2019-08-05 PROCEDURE — 93971 EXTREMITY STUDY: CPT | Performed by: SURGERY

## 2019-08-05 PROCEDURE — 99284 EMERGENCY DEPT VISIT MOD MDM: CPT

## 2019-08-05 RX ORDER — FENTANYL CITRATE 50 UG/ML
25 INJECTION, SOLUTION INTRAMUSCULAR; INTRAVENOUS ONCE
Status: COMPLETED | OUTPATIENT
Start: 2019-08-05 | End: 2019-08-05

## 2019-08-05 RX ORDER — ONDANSETRON 2 MG/ML
4 INJECTION INTRAMUSCULAR; INTRAVENOUS ONCE
Status: COMPLETED | OUTPATIENT
Start: 2019-08-05 | End: 2019-08-05

## 2019-08-05 RX ADMIN — ONDANSETRON HYDROCHLORIDE 4 MG: 2 SOLUTION INTRAMUSCULAR; INTRAVENOUS at 12:48

## 2019-08-05 RX ADMIN — FENTANYL CITRATE 25 MCG: 50 INJECTION INTRAMUSCULAR; INTRAVENOUS at 12:48

## 2019-08-05 NOTE — ED PROVIDER NOTES
Subjective   Patient came in on a wheelchair she is complaining of right leg pain with the past week she not able to walk because the pain in the leg there is no trauma she feels like the leg is swollen        Leg Pain   Location:  Leg  Leg location:  R leg, R upper leg and R lower leg  Pain details:     Quality:  Aching    Radiates to:  Does not radiate    Severity:  Moderate    Onset quality:  Gradual    Timing:  Constant    Progression:  Worsening  Chronicity:  New  Relieved by:  Nothing  Worsened by:  Bearing weight, abduction, adduction and activity  Ineffective treatments:  None tried  Associated symptoms: no back pain, no decreased ROM, no fatigue, no fever, no itching, no muscle weakness, no neck pain, no numbness, no stiffness, no swelling and no tingling    Risk factors: no frequent fractures, no obesity and no recent illness        Review of Systems   Constitutional: Negative.  Negative for fatigue and fever.   HENT: Negative.    Eyes: Negative.    Respiratory: Negative.    Cardiovascular: Negative.    Gastrointestinal: Negative.    Musculoskeletal: Negative.  Negative for back pain, neck pain and stiffness.   Skin: Negative.  Negative for itching.   Neurological: Negative.    All other systems reviewed and are negative.      Past Medical History:   Diagnosis Date   • Arthritis    • Asthma    • COPD (chronic obstructive pulmonary disease) (CMS/HCC)    • Diabetes mellitus (CMS/HCC)    • Hypotension    • Migraine    • Urinary tract infection        Allergies   Allergen Reactions   • Morphine Shortness Of Breath   • Codeine GI Intolerance       Past Surgical History:   Procedure Laterality Date   • ADENOIDECTOMY     • APPENDECTOMY     •  SECTION     • CHOLECYSTECTOMY     • GASTRIC BYPASS     • TONSILLECTOMY     • TUBAL ABDOMINAL LIGATION     • TUMOR REMOVAL Right     wrist       History reviewed. No pertinent family history.    Social History     Socioeconomic History   • Marital status:       Spouse name: Not on file   • Number of children: Not on file   • Years of education: Not on file   • Highest education level: Not on file   Tobacco Use   • Smoking status: Heavy Tobacco Smoker     Packs/day: 0.50   Substance and Sexual Activity   • Alcohol use: No     Frequency: Never   • Drug use: No           Objective   Physical Exam   Constitutional: She is oriented to person, place, and time. She appears well-developed and well-nourished.   HENT:   Head: Normocephalic and atraumatic.   Eyes: Conjunctivae and EOM are normal. Pupils are equal, round, and reactive to light.   Neck: Normal range of motion. Neck supple.   Cardiovascular: Normal rate, regular rhythm, normal heart sounds and intact distal pulses.   Pulmonary/Chest: Effort normal and breath sounds normal.   Abdominal: Soft. Bowel sounds are normal.   Musculoskeletal: Normal range of motion.   Right leg tenderness  No obvious trauma range of motion of the hip and the knee are intact tenderness of palpation of the calf and also the thigh no cords palpated   Neurological: She is alert and oriented to person, place, and time. She has normal reflexes.   Skin: Skin is warm and dry.   Psychiatric: She has a normal mood and affect.   Nursing note and vitals reviewed.      Procedures           ED Course  ED Course as of Aug 05 1337   Mon Aug 05, 2019   1336 Patient with right leg pain, sounds like radiculopathy neurovascular examination is negative with dorsal pedis posterior tibial pulse are palpable ultrasound Doppler is negative and x-rays are negative discussed this case the patient the patient will be discharged home with a follow-up with the primary MD  [TS]      ED Course User Index  [TS] Иван Pedersen MD                  [facility]      Final diagnoses:   Pain of right lower extremity            Иван Pedersen MD  08/05/19 1337

## 2019-08-05 NOTE — ED NOTES
Pt reports soa worsening on exertion and left chest pain off and on x 1 month worse with deep breath. She reports recent falls due to right lower leg pain for several days. No swelling or discoloration noted to leg. Skin pink warm and dry. She denies hx of clots. Hx copd     Shira Lambert, RN  08/05/19 3174

## 2019-08-15 ENCOUNTER — TELEPHONE (OUTPATIENT)
Dept: PRIMARY CARE CLINIC | Age: 64
End: 2019-08-15

## 2019-10-08 DIAGNOSIS — E11.9 TYPE 2 DIABETES MELLITUS WITHOUT COMPLICATION, UNSPECIFIED WHETHER LONG TERM INSULIN USE (HCC): ICD-10-CM

## 2019-10-09 RX ORDER — LIRAGLUTIDE 6 MG/ML
INJECTION SUBCUTANEOUS
Qty: 9 PEN | Refills: 2 | Status: SHIPPED | OUTPATIENT
Start: 2019-10-09 | End: 2019-12-03 | Stop reason: SDUPTHER

## 2019-10-09 RX ORDER — ROPINIROLE 2 MG/1
2 TABLET, FILM COATED ORAL 2 TIMES DAILY
Qty: 180 TABLET | Refills: 2 | Status: ON HOLD
Start: 2019-10-09 | End: 2020-06-29 | Stop reason: HOSPADM

## 2019-10-09 RX ORDER — DONEPEZIL HYDROCHLORIDE 10 MG/1
TABLET, FILM COATED ORAL
Qty: 90 TABLET | Refills: 2 | Status: ON HOLD
Start: 2019-10-09 | End: 2020-06-29 | Stop reason: HOSPADM

## 2019-10-09 RX ORDER — PANTOPRAZOLE SODIUM 40 MG/1
TABLET, DELAYED RELEASE ORAL
Qty: 90 TABLET | Refills: 2 | Status: SHIPPED | OUTPATIENT
Start: 2019-10-09 | End: 2020-07-06

## 2019-10-09 RX ORDER — FUROSEMIDE 20 MG/1
20 TABLET ORAL DAILY
Qty: 90 TABLET | Refills: 2 | Status: SHIPPED | OUTPATIENT
Start: 2019-10-09 | End: 2020-07-06

## 2019-10-22 ENCOUNTER — APPOINTMENT (OUTPATIENT)
Dept: GENERAL RADIOLOGY | Age: 64
End: 2019-10-22
Payer: MEDICARE

## 2019-10-22 ENCOUNTER — APPOINTMENT (OUTPATIENT)
Dept: CT IMAGING | Age: 64
End: 2019-10-22
Payer: MEDICARE

## 2019-10-22 ENCOUNTER — OFFICE VISIT (OUTPATIENT)
Dept: PRIMARY CARE CLINIC | Age: 64
End: 2019-10-22
Payer: MEDICARE

## 2019-10-22 ENCOUNTER — TELEPHONE (OUTPATIENT)
Dept: PRIMARY CARE CLINIC | Age: 64
End: 2019-10-22

## 2019-10-22 ENCOUNTER — HOSPITAL ENCOUNTER (EMERGENCY)
Age: 64
Discharge: HOME | End: 2019-10-22
Payer: MEDICARE

## 2019-10-22 VITALS
HEART RATE: 74 BPM | HEIGHT: 61 IN | TEMPERATURE: 98.3 F | SYSTOLIC BLOOD PRESSURE: 133 MMHG | WEIGHT: 217 LBS | DIASTOLIC BLOOD PRESSURE: 71 MMHG | BODY MASS INDEX: 40.97 KG/M2 | OXYGEN SATURATION: 98 % | RESPIRATION RATE: 19 BRPM

## 2019-10-22 VITALS
BODY MASS INDEX: 41.02 KG/M2 | HEART RATE: 70 BPM | DIASTOLIC BLOOD PRESSURE: 74 MMHG | WEIGHT: 217.25 LBS | OXYGEN SATURATION: 97 % | HEIGHT: 61 IN | SYSTOLIC BLOOD PRESSURE: 130 MMHG | TEMPERATURE: 97 F

## 2019-10-22 DIAGNOSIS — R93.7 ABNORMAL CT SCAN, CERVICAL SPINE: ICD-10-CM

## 2019-10-22 DIAGNOSIS — S50.311A ABRASION OF RIGHT ELBOW, INITIAL ENCOUNTER: ICD-10-CM
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
## 2019-10-22 DIAGNOSIS — E55.9 VITAMIN D DEFICIENCY: ICD-10-CM

## 2019-10-22 DIAGNOSIS — S00.83XA CONTUSION OF FACE, INITIAL ENCOUNTER: Primary | ICD-10-CM

## 2019-10-22 DIAGNOSIS — R30.0 DYSURIA: ICD-10-CM

## 2019-10-22 DIAGNOSIS — B37.2 SKIN CANDIDIASIS: ICD-10-CM

## 2019-10-22 DIAGNOSIS — S09.93XA FACIAL INJURY, INITIAL ENCOUNTER: ICD-10-CM

## 2019-10-22 DIAGNOSIS — E53.8 B12 DEFICIENCY: ICD-10-CM

## 2019-10-22 DIAGNOSIS — F31.78 BIPOLAR DISORDER, IN FULL REMISSION, MOST RECENT EPISODE MIXED (HCC): Primary | ICD-10-CM

## 2019-10-22 DIAGNOSIS — N30.01 ACUTE CYSTITIS WITH HEMATURIA: ICD-10-CM

## 2019-10-22 DIAGNOSIS — Z23 NEED FOR INFLUENZA VACCINATION: ICD-10-CM

## 2019-10-22 DIAGNOSIS — E55.9 VITAMIN D DEFICIENCY: Primary | ICD-10-CM

## 2019-10-22 DIAGNOSIS — S80.211A ABRASION OF RIGHT KNEE, INITIAL ENCOUNTER: ICD-10-CM

## 2019-10-22 DIAGNOSIS — Z00.00 WELL ADULT EXAM: ICD-10-CM

## 2019-10-22 DIAGNOSIS — H92.01 RIGHT EAR PAIN: ICD-10-CM

## 2019-10-22 DIAGNOSIS — F31.78 BIPOLAR DISORDER, IN FULL REMISSION, MOST RECENT EPISODE MIXED (HCC): ICD-10-CM

## 2019-10-22 DIAGNOSIS — H69.81 DYSFUNCTION OF RIGHT EUSTACHIAN TUBE: ICD-10-CM

## 2019-10-22 LAB
ALBUMIN SERPL-MCNC: 4 G/DL (ref 3.5–5.2)
ALP BLD-CCNC: 148 U/L (ref 35–104)
ALT SERPL-CCNC: 15 U/L (ref 5–33)
ANION GAP SERPL CALCULATED.3IONS-SCNC: 15 MMOL/L (ref 7–19)
ANISOCYTOSIS: ABNORMAL
AST SERPL-CCNC: 20 U/L (ref 5–32)
BASOPHILS ABSOLUTE: 0.1 K/UL (ref 0–0.2)
BASOPHILS RELATIVE PERCENT: 1.1 % (ref 0–1)
BILIRUB SERPL-MCNC: <0.2 MG/DL (ref 0.2–1.2)
BILIRUBIN, POC: NORMAL
BLOOD URINE, POC: NORMAL
BUN BLDV-MCNC: 11 MG/DL (ref 8–23)
CALCIUM SERPL-MCNC: 9.2 MG/DL (ref 8.8–10.2)
CHLORIDE BLD-SCNC: 103 MMOL/L (ref 98–111)
CHOLESTEROL, TOTAL: 146 MG/DL (ref 160–199)
CLARITY, POC: CLEAR
CO2: 24 MMOL/L (ref 22–29)
COLOR, POC: YELLOW
CREAT SERPL-MCNC: 0.5 MG/DL (ref 0.5–0.9)
EOSINOPHILS ABSOLUTE: 0.3 K/UL (ref 0–0.6)
EOSINOPHILS RELATIVE PERCENT: 2.7 % (ref 0–5)
GFR NON-AFRICAN AMERICAN: >60
GLUCOSE BLD-MCNC: 80 MG/DL (ref 74–109)
GLUCOSE URINE, POC: NORMAL
HBA1C MFR BLD: 5.8 % (ref 4–6)
HCT VFR BLD CALC: 34.2 % (ref 37–47)
HDLC SERPL-MCNC: 58 MG/DL (ref 65–121)
HEMOGLOBIN: 9.6 G/DL (ref 12–16)
HYPOCHROMIA: ABNORMAL
IMMATURE GRANULOCYTES #: 0 K/UL
KETONES, POC: NORMAL
LDL CHOLESTEROL CALCULATED: 63 MG/DL
LEUKOCYTE EST, POC: NORMAL
LYMPHOCYTES ABSOLUTE: 3.2 K/UL (ref 1.1–4.5)
LYMPHOCYTES RELATIVE PERCENT: 33.1 % (ref 20–40)
MCH RBC QN AUTO: 22.3 PG (ref 27–31)
MCHC RBC AUTO-ENTMCNC: 28.1 G/DL (ref 33–37)
MCV RBC AUTO: 79.4 FL (ref 81–99)
MICROCYTES: ABNORMAL
MONOCYTES ABSOLUTE: 0.6 K/UL (ref 0–0.9)
MONOCYTES RELATIVE PERCENT: 6.1 % (ref 0–10)
NEUTROPHILS ABSOLUTE: 5.5 K/UL (ref 1.5–7.5)
NEUTROPHILS RELATIVE PERCENT: 56.7 % (ref 50–65)
NITRITE, POC: NORMAL
OVALOCYTES: ABNORMAL
PDW BLD-RTO: 17.2 % (ref 11.5–14.5)
PH, POC: 7
PLATELET # BLD: 354 K/UL (ref 130–400)
PLATELET SLIDE REVIEW: ADEQUATE
PMV BLD AUTO: 11 FL (ref 9.4–12.3)
POLYCHROMASIA: ABNORMAL
POTASSIUM SERPL-SCNC: 4.6 MMOL/L (ref 3.5–5)
PROTEIN, POC: NORMAL
RBC # BLD: 4.31 M/UL (ref 4.2–5.4)
SODIUM BLD-SCNC: 142 MMOL/L (ref 136–145)
SPECIFIC GRAVITY, POC: 1.01
STOMATOCYTES: ABNORMAL
T4 FREE: 0.9 NG/DL (ref 0.9–1.7)
TOTAL PROTEIN: 7.5 G/DL (ref 6.6–8.7)
TRIGL SERPL-MCNC: 125 MG/DL (ref 0–149)
TSH SERPL DL<=0.05 MIU/L-ACNC: 3.01 UIU/ML (ref 0.27–4.2)
UROBILINOGEN, POC: 0.2
VITAMIN B-12: 240 PG/ML (ref 211–946)
VITAMIN D 25-HYDROXY: 17.1 NG/ML
WBC # BLD: 9.7 K/UL (ref 4.8–10.8)

## 2019-10-22 PROCEDURE — 70486 CT MAXILLOFACIAL W/O DYE: CPT

## 2019-10-22 PROCEDURE — 90471 IMMUNIZATION ADMIN: CPT | Performed by: NURSE PRACTITIONER

## 2019-10-22 PROCEDURE — 73560 X-RAY EXAM OF KNEE 1 OR 2: CPT

## 2019-10-22 PROCEDURE — 90715 TDAP VACCINE 7 YRS/> IM: CPT | Performed by: NURSE PRACTITIONER

## 2019-10-22 PROCEDURE — 73080 X-RAY EXAM OF ELBOW: CPT

## 2019-10-22 PROCEDURE — 99284 EMERGENCY DEPT VISIT MOD MDM: CPT

## 2019-10-22 PROCEDURE — 90686 IIV4 VACC NO PRSV 0.5 ML IM: CPT | Performed by: NURSE PRACTITIONER

## 2019-10-22 PROCEDURE — 72125 CT NECK SPINE W/O DYE: CPT

## 2019-10-22 PROCEDURE — 6370000000 HC RX 637 (ALT 250 FOR IP): Performed by: NURSE PRACTITIONER

## 2019-10-22 PROCEDURE — 6360000002 HC RX W HCPCS: Performed by: NURSE PRACTITIONER

## 2019-10-22 PROCEDURE — G0008 ADMIN INFLUENZA VIRUS VAC: HCPCS | Performed by: NURSE PRACTITIONER

## 2019-10-22 PROCEDURE — 70450 CT HEAD/BRAIN W/O DYE: CPT

## 2019-10-22 PROCEDURE — 99214 OFFICE O/P EST MOD 30 MIN: CPT | Performed by: NURSE PRACTITIONER

## 2019-10-22 PROCEDURE — 81002 URINALYSIS NONAUTO W/O SCOPE: CPT | Performed by: NURSE PRACTITIONER

## 2019-10-22 RX ORDER — ALBUTEROL SULFATE 90 UG/1
2 AEROSOL, METERED RESPIRATORY (INHALATION) EVERY 6 HOURS PRN
Qty: 3 INHALER | Refills: 3 | Status: SHIPPED | OUTPATIENT
Start: 2019-10-22 | End: 2020-07-30 | Stop reason: SDUPTHER

## 2019-10-22 RX ORDER — ONDANSETRON 4 MG/1
4 TABLET, ORALLY DISINTEGRATING ORAL ONCE
Status: COMPLETED | OUTPATIENT
Start: 2019-10-22 | End: 2019-10-22

## 2019-10-22 RX ORDER — ERGOCALCIFEROL (VITAMIN D2) 1250 MCG
50000 CAPSULE ORAL WEEKLY
Qty: 11 CAPSULE | Refills: 0 | Status: SHIPPED | OUTPATIENT
Start: 2019-10-22 | End: 2019-12-03 | Stop reason: SDUPTHER

## 2019-10-22 RX ORDER — CELECOXIB 200 MG/1
CAPSULE ORAL
Qty: 90 CAPSULE | Refills: 3 | Status: SHIPPED | OUTPATIENT
Start: 2019-10-22 | End: 2019-12-03

## 2019-10-22 RX ORDER — MIRTAZAPINE 15 MG/1
15 TABLET, FILM COATED ORAL NIGHTLY
Qty: 30 TABLET | Refills: 1 | Status: SHIPPED | OUTPATIENT
Start: 2019-10-22 | End: 2019-12-03 | Stop reason: SDUPTHER

## 2019-10-22 RX ORDER — NYSTATIN 100000 [USP'U]/G
POWDER TOPICAL
Qty: 60 G | Refills: 2 | Status: SHIPPED | OUTPATIENT
Start: 2019-10-22 | End: 2019-12-03

## 2019-10-22 RX ORDER — OXYCODONE HYDROCHLORIDE AND ACETAMINOPHEN 5; 325 MG/1; MG/1
1 TABLET ORAL ONCE
Status: COMPLETED | OUTPATIENT
Start: 2019-10-22 | End: 2019-10-22

## 2019-10-22 RX ORDER — ATORVASTATIN CALCIUM 20 MG/1
20 TABLET, FILM COATED ORAL DAILY
Qty: 90 TABLET | Refills: 3 | Status: SHIPPED | OUTPATIENT
Start: 2019-10-22 | End: 2019-12-03 | Stop reason: SDUPTHER

## 2019-10-22 RX ORDER — LANOLIN ALCOHOL/MO/W.PET/CERES
3 CREAM (GRAM) TOPICAL NIGHTLY
Qty: 30 TABLET | Refills: 1 | Status: SHIPPED | OUTPATIENT
Start: 2019-10-22 | End: 2019-12-03 | Stop reason: SDUPTHER

## 2019-10-22 RX ORDER — CHOLECALCIFEROL (VITAMIN D3) 125 MCG
500 CAPSULE ORAL DAILY
Qty: 30 TABLET | Refills: 2 | Status: ON HOLD | OUTPATIENT
Start: 2019-10-22 | End: 2022-05-24

## 2019-10-22 RX ORDER — FLUTICASONE PROPIONATE 50 MCG
2 SPRAY, SUSPENSION (ML) NASAL DAILY
Qty: 1 BOTTLE | Refills: 3 | Status: SHIPPED | OUTPATIENT
Start: 2019-10-22

## 2019-10-22 RX ORDER — ACETAZOLAMIDE 500 MG/1
CAPSULE, EXTENDED RELEASE ORAL
Qty: 180 CAPSULE | Refills: 3 | Status: SHIPPED | OUTPATIENT
Start: 2019-10-22 | End: 2019-12-03 | Stop reason: SDUPTHER

## 2019-10-22 RX ORDER — LANCETS
EACH MISCELLANEOUS
Qty: 300 EACH | Refills: 3 | Status: SHIPPED | OUTPATIENT
Start: 2019-10-22

## 2019-10-22 RX ORDER — ARIPIPRAZOLE 5 MG/1
5 TABLET ORAL DAILY
Qty: 30 TABLET | Refills: 1 | Status: SHIPPED | OUTPATIENT
Start: 2019-10-22 | End: 2019-10-22

## 2019-10-22 RX ORDER — CIPROFLOXACIN 500 MG/1
500 TABLET, FILM COATED ORAL 2 TIMES DAILY
Qty: 20 TABLET | Refills: 0 | Status: SHIPPED | OUTPATIENT
Start: 2019-10-22 | End: 2019-11-01

## 2019-10-22 RX ORDER — BENZTROPINE MESYLATE 1 MG/1
1 TABLET ORAL 2 TIMES DAILY
Qty: 60 TABLET | Refills: 11 | Status: SHIPPED | OUTPATIENT
Start: 2019-10-22 | End: 2019-12-03 | Stop reason: SDUPTHER

## 2019-10-22 RX ORDER — FLUCONAZOLE 150 MG/1
150 TABLET ORAL DAILY
Qty: 3 TABLET | Refills: 0 | Status: SHIPPED | OUTPATIENT
Start: 2019-10-22 | End: 2019-10-25

## 2019-10-22 RX ORDER — CYCLOBENZAPRINE HCL 10 MG
10 TABLET ORAL 3 TIMES DAILY PRN
Qty: 30 TABLET | Refills: 0 | Status: SHIPPED | OUTPATIENT
Start: 2019-10-22 | End: 2019-11-01

## 2019-10-22 RX ORDER — NAPROXEN 500 MG/1
500 TABLET ORAL 2 TIMES DAILY
Qty: 20 TABLET | Refills: 0 | Status: SHIPPED | OUTPATIENT
Start: 2019-10-22 | End: 2019-12-03

## 2019-10-22 RX ADMIN — TETANUS TOXOID, REDUCED DIPHTHERIA TOXOID AND ACELLULAR PERTUSSIS VACCINE, ADSORBED 0.5 ML: 5; 2.5; 8; 8; 2.5 SUSPENSION INTRAMUSCULAR at 16:43

## 2019-10-22 RX ADMIN — ONDANSETRON 4 MG: 4 TABLET, ORALLY DISINTEGRATING ORAL at 16:43

## 2019-10-22 RX ADMIN — OXYCODONE HYDROCHLORIDE AND ACETAMINOPHEN 1 TABLET: 5; 325 TABLET ORAL at 16:43

## 2019-10-22 ASSESSMENT — PAIN SCALES - GENERAL
PAINLEVEL_OUTOF10: 10
PAINLEVEL_OUTOF10: 10

## 2019-10-22 ASSESSMENT — ENCOUNTER SYMPTOMS
SHORTNESS OF BREATH: 0
WHEEZING: 0
ABDOMINAL PAIN: 0
COUGH: 0
FACIAL SWELLING: 1
EYES NEGATIVE: 1
TROUBLE SWALLOWING: 0
BACK PAIN: 0

## 2019-10-22 ASSESSMENT — PAIN DESCRIPTION - PAIN TYPE: TYPE: ACUTE PAIN

## 2019-10-22 ASSESSMENT — PAIN DESCRIPTION - DESCRIPTORS: DESCRIPTORS: BURNING

## 2019-10-23 RX ORDER — ARIPIPRAZOLE 5 MG/1
5 TABLET ORAL DAILY
Qty: 90 TABLET | Refills: 1 | OUTPATIENT
Start: 2019-10-23

## 2019-10-23 RX ORDER — ERGOCALCIFEROL 1.25 MG/1
CAPSULE ORAL
Qty: 13 CAPSULE | Refills: 0 | OUTPATIENT
Start: 2019-10-23

## 2019-11-05 DIAGNOSIS — G89.29 OTHER CHRONIC PAIN: Primary | ICD-10-CM

## 2019-11-05 RX ORDER — DICLOFENAC SODIUM 75 MG/1
75 TABLET, DELAYED RELEASE ORAL 2 TIMES DAILY
Qty: 60 TABLET | Refills: 5 | Status: SHIPPED | OUTPATIENT
Start: 2019-11-05 | End: 2019-12-03 | Stop reason: SDUPTHER

## 2019-12-03 ENCOUNTER — OFFICE VISIT (OUTPATIENT)
Dept: PRIMARY CARE CLINIC | Age: 64
End: 2019-12-03
Payer: MEDICARE

## 2019-12-03 VITALS
SYSTOLIC BLOOD PRESSURE: 134 MMHG | OXYGEN SATURATION: 98 % | HEIGHT: 61 IN | DIASTOLIC BLOOD PRESSURE: 70 MMHG | WEIGHT: 214.5 LBS | TEMPERATURE: 98.4 F | HEART RATE: 78 BPM | BODY MASS INDEX: 40.5 KG/M2

## 2019-12-03 DIAGNOSIS — F31.78 BIPOLAR DISORDER, IN FULL REMISSION, MOST RECENT EPISODE MIXED (HCC): ICD-10-CM

## 2019-12-03 DIAGNOSIS — M19.90 ARTHRITIS: ICD-10-CM

## 2019-12-03 DIAGNOSIS — E11.9 TYPE 2 DIABETES MELLITUS WITHOUT COMPLICATION, UNSPECIFIED WHETHER LONG TERM INSULIN USE (HCC): ICD-10-CM

## 2019-12-03 DIAGNOSIS — K59.00 CONSTIPATION, UNSPECIFIED CONSTIPATION TYPE: ICD-10-CM

## 2019-12-03 DIAGNOSIS — G89.29 OTHER CHRONIC PAIN: ICD-10-CM

## 2019-12-03 DIAGNOSIS — R06.02 SHORTNESS OF BREATH: ICD-10-CM

## 2019-12-03 DIAGNOSIS — R52 ACUTE PAIN: ICD-10-CM

## 2019-12-03 DIAGNOSIS — F30.9 MANIC EPISODE (HCC): Primary | ICD-10-CM

## 2019-12-03 DIAGNOSIS — Z99.81 OXYGEN DEPENDENT: ICD-10-CM

## 2019-12-03 PROCEDURE — 99215 OFFICE O/P EST HI 40 MIN: CPT | Performed by: NURSE PRACTITIONER

## 2019-12-03 PROCEDURE — 94618 PULMONARY STRESS TESTING: CPT | Performed by: NURSE PRACTITIONER

## 2019-12-03 RX ORDER — ATORVASTATIN CALCIUM 20 MG/1
20 TABLET, FILM COATED ORAL DAILY
Qty: 90 TABLET | Refills: 3 | Status: SHIPPED | OUTPATIENT
Start: 2019-12-03 | End: 2020-11-04 | Stop reason: SDUPTHER

## 2019-12-03 RX ORDER — LANOLIN ALCOHOL/MO/W.PET/CERES
3 CREAM (GRAM) TOPICAL NIGHTLY
Qty: 30 TABLET | Refills: 1 | Status: SHIPPED | OUTPATIENT
Start: 2019-12-03 | End: 2020-02-05 | Stop reason: SDUPTHER

## 2019-12-03 RX ORDER — BENZTROPINE MESYLATE 1 MG/1
1 TABLET ORAL 2 TIMES DAILY
Qty: 60 TABLET | Refills: 11 | Status: ON HOLD
Start: 2019-12-03 | End: 2020-06-29 | Stop reason: HOSPADM

## 2019-12-03 RX ORDER — DICLOFENAC SODIUM 75 MG/1
75 TABLET, DELAYED RELEASE ORAL 2 TIMES DAILY
Qty: 60 TABLET | Refills: 5 | Status: SHIPPED | OUTPATIENT
Start: 2019-12-03 | End: 2020-06-04

## 2019-12-03 RX ORDER — MIRTAZAPINE 15 MG/1
15 TABLET, FILM COATED ORAL NIGHTLY
Qty: 30 TABLET | Refills: 1 | Status: ON HOLD
Start: 2019-12-03 | End: 2020-06-29 | Stop reason: HOSPADM

## 2019-12-03 RX ORDER — KETOROLAC TROMETHAMINE 30 MG/ML
30 INJECTION, SOLUTION INTRAMUSCULAR; INTRAVENOUS ONCE
Qty: 1 ML | Refills: 0 | Status: ON HOLD
Start: 2019-12-03 | End: 2020-06-29 | Stop reason: HOSPADM

## 2019-12-03 RX ORDER — ERGOCALCIFEROL (VITAMIN D2) 1250 MCG
50000 CAPSULE ORAL WEEKLY
Qty: 11 CAPSULE | Refills: 0 | Status: SHIPPED | OUTPATIENT
Start: 2019-12-03 | End: 2020-02-05

## 2019-12-03 RX ORDER — ACETAZOLAMIDE 500 MG/1
CAPSULE, EXTENDED RELEASE ORAL
Qty: 180 CAPSULE | Refills: 3 | Status: ON HOLD
Start: 2019-12-03 | End: 2020-06-29 | Stop reason: HOSPADM

## 2019-12-03 RX ORDER — MIRTAZAPINE 15 MG/1
30 TABLET, FILM COATED ORAL NIGHTLY
Qty: 30 TABLET | Refills: 3 | Status: SHIPPED | OUTPATIENT
Start: 2019-12-03 | End: 2020-03-04 | Stop reason: ALTCHOICE

## 2019-12-03 ASSESSMENT — ENCOUNTER SYMPTOMS
COUGH: 0
WHEEZING: 0
BACK PAIN: 0
SHORTNESS OF BREATH: 0
EYES NEGATIVE: 1
ABDOMINAL PAIN: 0
TROUBLE SWALLOWING: 0

## 2019-12-07 DIAGNOSIS — G25.81 RLS (RESTLESS LEGS SYNDROME): ICD-10-CM

## 2019-12-09 RX ORDER — SERTRALINE HYDROCHLORIDE 100 MG/1
150 TABLET, FILM COATED ORAL NIGHTLY
Refills: 2 | OUTPATIENT
Start: 2019-12-09

## 2019-12-09 RX ORDER — PRAMIPEXOLE DIHYDROCHLORIDE 0.25 MG/1
0.25 TABLET ORAL 3 TIMES DAILY
Refills: 2 | OUTPATIENT
Start: 2019-12-09

## 2020-01-09 RX ORDER — IRBESARTAN 150 MG/1
150 TABLET ORAL NIGHTLY
Qty: 1 TABLET | Refills: 2 | Status: SHIPPED | OUTPATIENT
Start: 2020-01-09 | End: 2020-01-24 | Stop reason: SDUPTHER

## 2020-01-09 NOTE — TELEPHONE ENCOUNTER
Requested Prescriptions     Pending Prescriptions Disp Refills    irbesartan (AVAPRO) 150 MG tablet [Pharmacy Med Name: [address] [place] [address] IRBESARTAN 150 MG TAB (Bottle, 30.0 Tablets)] 1 tablet 2     Sig: Take 1 tablet by mouth nightly

## 2020-01-24 RX ORDER — IRBESARTAN 150 MG/1
150 TABLET ORAL NIGHTLY
Qty: 30 TABLET | Refills: 2 | Status: SHIPPED | OUTPATIENT
Start: 2020-01-24 | End: 2020-01-27 | Stop reason: SDUPTHER

## 2020-01-27 RX ORDER — IRBESARTAN 150 MG/1
150 TABLET ORAL NIGHTLY
Qty: 30 TABLET | Refills: 2 | Status: SHIPPED | OUTPATIENT
Start: 2020-01-27 | End: 2020-04-06

## 2020-01-30 NOTE — TELEPHONE ENCOUNTER
Archana from [place] [address] calls and says that Rx on file for Latuda is 20 mg daily. She said that pt told them that it was supposed to go up to 80mg daily. Please advise.

## 2020-02-05 RX ORDER — LANOLIN ALCOHOL/MO/W.PET/CERES
3 CREAM (GRAM) TOPICAL NIGHTLY
Qty: 90 TABLET | Refills: 3 | Status: SHIPPED | OUTPATIENT
Start: 2020-02-05 | End: 2020-12-28 | Stop reason: SDUPTHER

## 2020-02-05 RX ORDER — ERGOCALCIFEROL 1.25 MG/1
50000 CAPSULE ORAL WEEKLY
Qty: 11 CAPSULE | Refills: 0 | Status: SHIPPED | OUTPATIENT
Start: 2020-02-05 | End: 2020-03-05 | Stop reason: ALTCHOICE

## 2020-03-04 ENCOUNTER — OFFICE VISIT (OUTPATIENT)
Dept: PRIMARY CARE CLINIC | Age: 65
End: 2020-03-04
Payer: MEDICARE

## 2020-03-04 VITALS
DIASTOLIC BLOOD PRESSURE: 58 MMHG | SYSTOLIC BLOOD PRESSURE: 110 MMHG | OXYGEN SATURATION: 98 % | HEART RATE: 88 BPM | WEIGHT: 212.12 LBS | BODY MASS INDEX: 40.05 KG/M2 | TEMPERATURE: 97.3 F | HEIGHT: 61 IN

## 2020-03-04 DIAGNOSIS — E55.9 VITAMIN D DEFICIENCY: ICD-10-CM

## 2020-03-04 DIAGNOSIS — K92.1 MELENA: ICD-10-CM

## 2020-03-04 DIAGNOSIS — E11.42 TYPE 2 DIABETES MELLITUS WITH DIABETIC POLYNEUROPATHY, WITHOUT LONG-TERM CURRENT USE OF INSULIN (HCC): ICD-10-CM

## 2020-03-04 DIAGNOSIS — R53.82 CHRONIC FATIGUE: ICD-10-CM

## 2020-03-04 DIAGNOSIS — R23.1 PALE: ICD-10-CM

## 2020-03-04 LAB
ALBUMIN SERPL-MCNC: 4.4 G/DL (ref 3.5–5.2)
ALP BLD-CCNC: 196 U/L (ref 35–104)
ALT SERPL-CCNC: 11 U/L (ref 5–33)
ANION GAP SERPL CALCULATED.3IONS-SCNC: 17 MMOL/L (ref 7–19)
AST SERPL-CCNC: 20 U/L (ref 5–32)
BASOPHILS ABSOLUTE: 0.1 K/UL (ref 0–0.2)
BASOPHILS RELATIVE PERCENT: 1.1 % (ref 0–1)
BILIRUB SERPL-MCNC: <0.2 MG/DL (ref 0.2–1.2)
BUN BLDV-MCNC: 24 MG/DL (ref 8–23)
CALCIUM SERPL-MCNC: 9.2 MG/DL (ref 8.8–10.2)
CHLORIDE BLD-SCNC: 111 MMOL/L (ref 98–111)
CO2: 16 MMOL/L (ref 22–29)
CREAT SERPL-MCNC: 0.7 MG/DL (ref 0.5–0.9)
EOSINOPHILS ABSOLUTE: 0.2 K/UL (ref 0–0.6)
EOSINOPHILS RELATIVE PERCENT: 2.9 % (ref 0–5)
FERRITIN: 6.6 NG/ML (ref 13–150)
GFR NON-AFRICAN AMERICAN: >60
GLUCOSE BLD-MCNC: 103 MG/DL (ref 74–109)
HBA1C MFR BLD: 5.9 % (ref 4–6)
HCT VFR BLD CALC: 34.7 % (ref 37–47)
HEMOGLOBIN: 9.9 G/DL (ref 12–16)
HYPOCHROMIA: ABNORMAL
IMMATURE GRANULOCYTES #: 0 K/UL
IRON SATURATION: 4 % (ref 14–50)
IRON: 22 UG/DL (ref 37–145)
LYMPHOCYTES ABSOLUTE: 2.9 K/UL (ref 1.1–4.5)
LYMPHOCYTES RELATIVE PERCENT: 36.4 % (ref 20–40)
MCH RBC QN AUTO: 22.3 PG (ref 27–31)
MCHC RBC AUTO-ENTMCNC: 28.5 G/DL (ref 33–37)
MCV RBC AUTO: 78.3 FL (ref 81–99)
MONOCYTES ABSOLUTE: 0.8 K/UL (ref 0–0.9)
MONOCYTES RELATIVE PERCENT: 10.2 % (ref 0–10)
NEUTROPHILS ABSOLUTE: 3.9 K/UL (ref 1.5–7.5)
NEUTROPHILS RELATIVE PERCENT: 49 % (ref 50–65)
PDW BLD-RTO: 17.6 % (ref 11.5–14.5)
PLATELET # BLD: 363 K/UL (ref 130–400)
PLATELET SLIDE REVIEW: ADEQUATE
PMV BLD AUTO: 10.6 FL (ref 9.4–12.3)
POTASSIUM SERPL-SCNC: 4.9 MMOL/L (ref 3.5–5)
RBC # BLD: 4.43 M/UL (ref 4.2–5.4)
SODIUM BLD-SCNC: 144 MMOL/L (ref 136–145)
TOTAL IRON BINDING CAPACITY: 556 UG/DL (ref 250–400)
TOTAL PROTEIN: 7.8 G/DL (ref 6.6–8.7)
TSH SERPL DL<=0.05 MIU/L-ACNC: 2.8 UIU/ML (ref 0.27–4.2)
VITAMIN B-12: >2000 PG/ML (ref 211–946)
VITAMIN D 25-HYDROXY: 24.8 NG/ML
WBC # BLD: 7.9 K/UL (ref 4.8–10.8)

## 2020-03-04 PROCEDURE — 99214 OFFICE O/P EST MOD 30 MIN: CPT | Performed by: NURSE PRACTITIONER

## 2020-03-04 PROCEDURE — 96372 THER/PROPH/DIAG INJ SC/IM: CPT | Performed by: NURSE PRACTITIONER

## 2020-03-04 RX ORDER — CYANOCOBALAMIN 1000 UG/ML
1000 INJECTION INTRAMUSCULAR; SUBCUTANEOUS ONCE
Status: COMPLETED | OUTPATIENT
Start: 2020-03-04 | End: 2020-03-04

## 2020-03-04 RX ADMIN — CYANOCOBALAMIN 1000 MCG: 1000 INJECTION INTRAMUSCULAR; SUBCUTANEOUS at 12:24

## 2020-03-04 NOTE — PROGRESS NOTES
Iron and TIBC     Ferritin   4. Type 2 diabetes mellitus with diabetic polyneuropathy, without long-term current use of insulin (HCC) E11.42 CBC Auto Differential     Comprehensive Metabolic Panel     Hemoglobin A1C     TSH without Reflex   5. Vitamin D deficiency E55.9 Vitamin D 25 Hydroxy   6. Melena K92.1 Iron and TIBC     Ferritin   7. B12 deficiency E53.8 cyanocobalamin injection 1,000 mcg       Plan:    gave her a fit test  Increase latuda to 80 mg. Check lab work. Need to recheck lab work. Return in about 2 weeks (around 3/18/2020) for with hazel pollack. Orders Placed This Encounter   Procedures    CBC Auto Differential     Standing Status:   Future     Number of Occurrences:   1     Standing Expiration Date:   3/4/2021    Comprehensive Metabolic Panel     Standing Status:   Future     Number of Occurrences:   1     Standing Expiration Date:   3/4/2021    Hemoglobin A1C     Standing Status:   Future     Number of Occurrences:   1     Standing Expiration Date:   3/4/2021    TSH without Reflex     Standing Status:   Future     Number of Occurrences:   1     Standing Expiration Date:   3/4/2021    Vitamin D 25 Hydroxy     Standing Status:   Future     Number of Occurrences:   1     Standing Expiration Date:   3/4/2021    Vitamin B12     Standing Status:   Future     Number of Occurrences:   1     Standing Expiration Date:   3/4/2021    Iron and TIBC     Standing Status:   Future     Number of Occurrences:   1     Standing Expiration Date:   3/4/2021     Order Specific Question:   Is Patient Fasting? Answer:   yes     Order Specific Question:   No of Hours?      Answer:   8    Ferritin     Standing Status:   Future     Number of Occurrences:   1     Standing Expiration Date:   3/4/2021     Orders Placed This Encounter   Medications    lurasidone (LATUDA) 80 MG TABS tablet     Sig: Take 1 tablet by mouth daily     Dispense:  90 tablet     Refill:  3    cyanocobalamin injection 1,000 mcg Discussed use, benefit, and side effectsof prescribed medications. All patient questions answered. Pt voiced understanding. Reviewed health maintenance. .  Patient agreed with treatment plan. Follow up asdirected. There are no Patient Instructions on file for this visit.       Electronically signed by JORGE Urena on3/5/2020 at 12:59 PM

## 2020-03-05 ENCOUNTER — TELEPHONE (OUTPATIENT)
Dept: PRIMARY CARE CLINIC | Age: 65
End: 2020-03-05

## 2020-03-05 RX ORDER — FERROUS SULFATE 325(65) MG
325 TABLET ORAL
Qty: 90 TABLET | Refills: 1 | Status: SHIPPED | OUTPATIENT
Start: 2020-03-05 | End: 2020-08-31

## 2020-03-05 RX ORDER — ERGOCALCIFEROL 1.25 MG/1
50000 CAPSULE ORAL WEEKLY
Qty: 12 CAPSULE | Refills: 0 | Status: SHIPPED | OUTPATIENT
Start: 2020-03-05 | End: 2020-07-06

## 2020-03-05 ASSESSMENT — ENCOUNTER SYMPTOMS
RHINORRHEA: 0
WHEEZING: 0
CONSTIPATION: 0
ABDOMINAL PAIN: 0
BLOOD IN STOOL: 0
SORE THROAT: 0
TROUBLE SWALLOWING: 0
EYE REDNESS: 0
DIARRHEA: 0
COUGH: 0
EYE DISCHARGE: 0

## 2020-03-05 NOTE — TELEPHONE ENCOUNTER
----- Message from JORGE Calloway sent at 3/4/2020  5:05 PM CST -----  Please inform patient results show  She is anemic but this is stable from 4 mo ago  Iron is low. I need the fit test returned to check for blood loss in stool. Start iron supplement ferrous sulfate 325 mg daily. cmp is stable. Vitamin d is low. Start 75532 u once weekly for 3 months. Recheck cbc in 6 weeks.

## 2020-04-06 RX ORDER — IRBESARTAN 150 MG/1
150 TABLET ORAL NIGHTLY
Qty: 30 TABLET | Refills: 1 | Status: SHIPPED | OUTPATIENT
Start: 2020-04-06 | End: 2020-06-04

## 2020-04-14 ENCOUNTER — TELEPHONE (OUTPATIENT)
Dept: PRIMARY CARE CLINIC | Age: 65
End: 2020-04-14

## 2020-04-30 ENCOUNTER — HOSPITAL ENCOUNTER (EMERGENCY)
Age: 65
Discharge: LEFT BEFORE DISCHARGE | End: 2020-04-30
Payer: MEDICARE

## 2020-04-30 VITALS — TEMPERATURE: 99.2 F

## 2020-04-30 PROCEDURE — 4500000002 HC ER NO CHARGE

## 2020-05-27 ENCOUNTER — NURSE TRIAGE (OUTPATIENT)
Dept: CALL CENTER | Facility: HOSPITAL | Age: 65
End: 2020-05-27

## 2020-05-28 NOTE — TELEPHONE ENCOUNTER
"    Reason for Disposition  • SEVERE pain (e.g., excruciating)    Additional Information  • Negative: Sounds like a life-threatening emergency to the triager  • Negative: Small growth, spot, bump, or pigmented area of skin (e.g., moles, skin tags, wart, melanoma, skin cancer)  • Negative: Inguinal hernia previously diagnosed by a physician  • Negative: Followed a skin injury  • Negative: Follows an insect bite  • Negative: Swelling of lymph node suspected  • Negative: Swelling of vaccination site  • Negative: Swelling of tongue  • Negative: Swelling of lip  • Negative: Swelling of eye  • Negative: Swelling of entire face  • Negative: Swelling of scrotum  • Negative: Swelling of labia  • Negative: Swelling of surgical incision  • Negative: Swelling of ankle joint  • Negative: Swelling of elbow joint  • Negative: Swelling of knee joint  • Negative: Swelling with a skin rash  • Negative: Patient sounds very sick or weak to the triager    Answer Assessment - Initial Assessment Questions  1. APPEARANCE of SWELLING: \"What does it look like?\" (e.g., lymph node, insect bite, mole)     Caller states her daughter gave her a shot into her left buttock and now there is a large swelling the size of a child sized football and it is very painful.  2. SIZE: \"How large is the swelling?\" (inches, cm or compare to coins)      As large as a child sized football.   3. LOCATION: \"Where is the swelling located?\"      Left buttock.    4. ONSET: \"When did the swelling start?\"      She received the injection 4 or 5 days ago,    5. PAIN: \"Is it painful?\" If so, ask: \"How much?\"      Severely painful.  She states she is unable to walk.    6. ITCH: \"Does it itch?\" If so, ask: \"How much?\"      No itching.    7. CAUSE: \"What do you think caused the swelling?\"      Daughter gave her an injection into her buttock 4 or 5 days ago.    8. OTHER SYMPTOMS: \"Do you have any other symptoms?\" (e.g., fever)     Unknown, she does not have a " thermometer.    Protocols used: SKIN LUMP OR LOCALIZED SWELLING-ADULT-AH

## 2020-05-29 ENCOUNTER — HOSPITAL ENCOUNTER (OUTPATIENT)
Facility: HOSPITAL | Age: 65
Discharge: HOME | End: 2020-05-31
Attending: EMERGENCY MEDICINE | Admitting: SPECIALIST

## 2020-05-29 ENCOUNTER — ANESTHESIA EVENT (OUTPATIENT)
Dept: PERIOP | Facility: HOSPITAL | Age: 65
End: 2020-05-29

## 2020-05-29 ENCOUNTER — APPOINTMENT (OUTPATIENT)
Dept: CT IMAGING | Facility: HOSPITAL | Age: 65
End: 2020-05-29

## 2020-05-29 ENCOUNTER — ANESTHESIA (OUTPATIENT)
Dept: PERIOP | Facility: HOSPITAL | Age: 65
End: 2020-05-29

## 2020-05-29 DIAGNOSIS — L02.91 ABSCESS: Primary | ICD-10-CM

## 2020-05-29 LAB
ANION GAP SERPL CALCULATED.3IONS-SCNC: 13 MMOL/L (ref 5–15)
BASOPHILS # BLD AUTO: 0.08 10*3/MM3 (ref 0–0.2)
BASOPHILS NFR BLD AUTO: 0.6 % (ref 0–1.5)
BUN BLD-MCNC: 21 MG/DL (ref 8–23)
BUN/CREAT SERPL: 41.2 (ref 7–25)
CALCIUM SPEC-SCNC: 8.5 MG/DL (ref 8.6–10.5)
CHLORIDE SERPL-SCNC: 108 MMOL/L (ref 98–107)
CO2 SERPL-SCNC: 18 MMOL/L (ref 22–29)
CREAT BLD-MCNC: 0.51 MG/DL (ref 0.57–1)
D-LACTATE SERPL-SCNC: 0.5 MMOL/L (ref 0.5–2)
DEPRECATED RDW RBC AUTO: 65.9 FL (ref 37–54)
EOSINOPHIL # BLD AUTO: 0.39 10*3/MM3 (ref 0–0.4)
EOSINOPHIL NFR BLD AUTO: 3 % (ref 0.3–6.2)
ERYTHROCYTE [DISTWIDTH] IN BLOOD BY AUTOMATED COUNT: 23.3 % (ref 12.3–15.4)
GFR SERPL CREATININE-BSD FRML MDRD: 122 ML/MIN/1.73
GLUCOSE BLD-MCNC: 106 MG/DL (ref 65–99)
GLUCOSE BLDC GLUCOMTR-MCNC: 163 MG/DL (ref 70–130)
HCT VFR BLD AUTO: 32.6 % (ref 34–46.6)
HGB BLD-MCNC: 9.6 G/DL (ref 12–15.9)
IMM GRANULOCYTES # BLD AUTO: 0.04 10*3/MM3 (ref 0–0.05)
IMM GRANULOCYTES NFR BLD AUTO: 0.3 % (ref 0–0.5)
LYMPHOCYTES # BLD AUTO: 2.56 10*3/MM3 (ref 0.7–3.1)
LYMPHOCYTES NFR BLD AUTO: 20 % (ref 19.6–45.3)
MCH RBC QN AUTO: 23.4 PG (ref 26.6–33)
MCHC RBC AUTO-ENTMCNC: 29.4 G/DL (ref 31.5–35.7)
MCV RBC AUTO: 79.5 FL (ref 79–97)
MONOCYTES # BLD AUTO: 1.15 10*3/MM3 (ref 0.1–0.9)
MONOCYTES NFR BLD AUTO: 9 % (ref 5–12)
NEUTROPHILS # BLD AUTO: 8.6 10*3/MM3 (ref 1.7–7)
NEUTROPHILS NFR BLD AUTO: 67.1 % (ref 42.7–76)
NRBC BLD AUTO-RTO: 0 /100 WBC (ref 0–0.2)
PLATELET # BLD AUTO: 332 10*3/MM3 (ref 140–450)
PMV BLD AUTO: 10.4 FL (ref 6–12)
POTASSIUM BLD-SCNC: 4.4 MMOL/L (ref 3.5–5.2)
RBC # BLD AUTO: 4.1 10*6/MM3 (ref 3.77–5.28)
SARS-COV-2 RNA RESP QL NAA+PROBE: NOT DETECTED
SODIUM BLD-SCNC: 139 MMOL/L (ref 136–145)
WBC NRBC COR # BLD: 12.82 10*3/MM3 (ref 3.4–10.8)

## 2020-05-29 PROCEDURE — 96376 TX/PRO/DX INJ SAME DRUG ADON: CPT

## 2020-05-29 PROCEDURE — 87040 BLOOD CULTURE FOR BACTERIA: CPT | Performed by: EMERGENCY MEDICINE

## 2020-05-29 PROCEDURE — 94799 UNLISTED PULMONARY SVC/PX: CPT

## 2020-05-29 PROCEDURE — 25010000002 VANCOMYCIN: Performed by: EMERGENCY MEDICINE

## 2020-05-29 PROCEDURE — 87205 SMEAR GRAM STAIN: CPT | Performed by: SPECIALIST

## 2020-05-29 PROCEDURE — A9270 NON-COVERED ITEM OR SERVICE: HCPCS | Performed by: SPECIALIST

## 2020-05-29 PROCEDURE — 94664 DEMO&/EVAL PT USE INHALER: CPT

## 2020-05-29 PROCEDURE — 96367 TX/PROPH/DG ADDL SEQ IV INF: CPT

## 2020-05-29 PROCEDURE — G0378 HOSPITAL OBSERVATION PER HR: HCPCS

## 2020-05-29 PROCEDURE — 25010000002 PROPOFOL 10 MG/ML EMULSION: Performed by: NURSE ANESTHETIST, CERTIFIED REGISTERED

## 2020-05-29 PROCEDURE — 25010000002 HYDROMORPHONE PER 4 MG: Performed by: SPECIALIST

## 2020-05-29 PROCEDURE — 88304 TISSUE EXAM BY PATHOLOGIST: CPT | Performed by: SPECIALIST

## 2020-05-29 PROCEDURE — 87147 CULTURE TYPE IMMUNOLOGIC: CPT | Performed by: SPECIALIST

## 2020-05-29 PROCEDURE — 25010000002 ONDANSETRON PER 1 MG: Performed by: SPECIALIST

## 2020-05-29 PROCEDURE — 63710000001 LOSARTAN 50 MG TABLET: Performed by: SPECIALIST

## 2020-05-29 PROCEDURE — 25010000003 HYDROMORPHONE 1 MG/ML SOLUTION: Performed by: EMERGENCY MEDICINE

## 2020-05-29 PROCEDURE — 25010000002 FENTANYL CITRATE (PF) 100 MCG/2ML SOLUTION: Performed by: NURSE ANESTHETIST, CERTIFIED REGISTERED

## 2020-05-29 PROCEDURE — 83605 ASSAY OF LACTIC ACID: CPT | Performed by: EMERGENCY MEDICINE

## 2020-05-29 PROCEDURE — 80048 BASIC METABOLIC PNL TOTAL CA: CPT | Performed by: EMERGENCY MEDICINE

## 2020-05-29 PROCEDURE — 96366 THER/PROPH/DIAG IV INF ADDON: CPT

## 2020-05-29 PROCEDURE — A9270 NON-COVERED ITEM OR SERVICE: HCPCS | Performed by: ANESTHESIOLOGY

## 2020-05-29 PROCEDURE — 25010000002 DEXAMETHASONE PER 1 MG: Performed by: NURSE ANESTHETIST, CERTIFIED REGISTERED

## 2020-05-29 PROCEDURE — 99284 EMERGENCY DEPT VISIT MOD MDM: CPT

## 2020-05-29 PROCEDURE — 63710000001 FUROSEMIDE 20 MG TABLET: Performed by: SPECIALIST

## 2020-05-29 PROCEDURE — 63710000001 NICOTINE 21 MG/24HR PATCH 24 HOUR: Performed by: SPECIALIST

## 2020-05-29 PROCEDURE — 96375 TX/PRO/DX INJ NEW DRUG ADDON: CPT

## 2020-05-29 PROCEDURE — 25010000002 ERTAPENEM 1 GM/100ML SOLUTION: Performed by: SPECIALIST

## 2020-05-29 PROCEDURE — 85025 COMPLETE CBC W/AUTO DIFF WBC: CPT | Performed by: EMERGENCY MEDICINE

## 2020-05-29 PROCEDURE — 25010000002 PIPERACILLIN SOD-TAZOBACTAM PER 1 G: Performed by: SPECIALIST

## 2020-05-29 PROCEDURE — 25010000002 PIPERACILLIN SOD-TAZOBACTAM PER 1 G: Performed by: EMERGENCY MEDICINE

## 2020-05-29 PROCEDURE — 96361 HYDRATE IV INFUSION ADD-ON: CPT

## 2020-05-29 PROCEDURE — 63710000001 OXYCODONE-ACETAMINOPHEN 7.5-325 MG TABLET: Performed by: ANESTHESIOLOGY

## 2020-05-29 PROCEDURE — 99406 BEHAV CHNG SMOKING 3-10 MIN: CPT

## 2020-05-29 PROCEDURE — 87635 SARS-COV-2 COVID-19 AMP PRB: CPT | Performed by: EMERGENCY MEDICINE

## 2020-05-29 PROCEDURE — 25010000002 SUCCINYLCHOLINE PER 20 MG: Performed by: NURSE ANESTHETIST, CERTIFIED REGISTERED

## 2020-05-29 PROCEDURE — 87070 CULTURE OTHR SPECIMN AEROBIC: CPT | Performed by: SPECIALIST

## 2020-05-29 PROCEDURE — 25010000002 ONDANSETRON PER 1 MG: Performed by: NURSE ANESTHETIST, CERTIFIED REGISTERED

## 2020-05-29 PROCEDURE — 63710000001 OXYCODONE-ACETAMINOPHEN 5-325 MG TABLET: Performed by: SPECIALIST

## 2020-05-29 PROCEDURE — 25010000002 IOPAMIDOL 61 % SOLUTION: Performed by: EMERGENCY MEDICINE

## 2020-05-29 PROCEDURE — 25010000002 VANCOMYCIN 10 G RECONSTITUTED SOLUTION: Performed by: SPECIALIST

## 2020-05-29 PROCEDURE — 96365 THER/PROPH/DIAG IV INF INIT: CPT

## 2020-05-29 PROCEDURE — 72193 CT PELVIS W/DYE: CPT

## 2020-05-29 PROCEDURE — 25010000003 HYDROMORPHONE 1 MG/ML SOLUTION: Performed by: SPECIALIST

## 2020-05-29 PROCEDURE — 63710000001 FLUTICASONE 50 MCG/ACT SUSPENSION 16 G BOTTLE: Performed by: SPECIALIST

## 2020-05-29 PROCEDURE — 82962 GLUCOSE BLOOD TEST: CPT

## 2020-05-29 PROCEDURE — 63710000001 MIRTAZAPINE 15 MG TABLET: Performed by: SPECIALIST

## 2020-05-29 PROCEDURE — 25010000002 ONDANSETRON PER 1 MG: Performed by: EMERGENCY MEDICINE

## 2020-05-29 PROCEDURE — 63710000001 DONEPEZIL 10 MG TABLET: Performed by: SPECIALIST

## 2020-05-29 RX ORDER — DONEPEZIL HYDROCHLORIDE 10 MG/1
10 TABLET, FILM COATED ORAL NIGHTLY
Status: DISCONTINUED | OUTPATIENT
Start: 2020-05-29 | End: 2020-05-31 | Stop reason: HOSPADM

## 2020-05-29 RX ORDER — MIDAZOLAM HYDROCHLORIDE 1 MG/ML
1 INJECTION INTRAMUSCULAR; INTRAVENOUS
Status: DISCONTINUED | OUTPATIENT
Start: 2020-05-29 | End: 2020-05-29 | Stop reason: HOSPADM

## 2020-05-29 RX ORDER — LIDOCAINE HYDROCHLORIDE 40 MG/ML
SOLUTION TOPICAL AS NEEDED
Status: DISCONTINUED | OUTPATIENT
Start: 2020-05-29 | End: 2020-05-29 | Stop reason: SURG

## 2020-05-29 RX ORDER — LABETALOL HYDROCHLORIDE 5 MG/ML
5 INJECTION, SOLUTION INTRAVENOUS
Status: DISCONTINUED | OUTPATIENT
Start: 2020-05-29 | End: 2020-05-29 | Stop reason: HOSPADM

## 2020-05-29 RX ORDER — SODIUM CHLORIDE 0.9 % (FLUSH) 0.9 %
10 SYRINGE (ML) INJECTION AS NEEDED
Status: DISCONTINUED | OUTPATIENT
Start: 2020-05-29 | End: 2020-05-29 | Stop reason: HOSPADM

## 2020-05-29 RX ORDER — ONDANSETRON 2 MG/ML
4 INJECTION INTRAMUSCULAR; INTRAVENOUS ONCE
Status: COMPLETED | OUTPATIENT
Start: 2020-05-29 | End: 2020-05-29

## 2020-05-29 RX ORDER — ONDANSETRON 2 MG/ML
4 INJECTION INTRAMUSCULAR; INTRAVENOUS EVERY 4 HOURS PRN
Status: DISCONTINUED | OUTPATIENT
Start: 2020-05-29 | End: 2020-05-31 | Stop reason: HOSPADM

## 2020-05-29 RX ORDER — FENTANYL CITRATE 50 UG/ML
25 INJECTION, SOLUTION INTRAMUSCULAR; INTRAVENOUS
Status: DISCONTINUED | OUTPATIENT
Start: 2020-05-29 | End: 2020-05-29 | Stop reason: HOSPADM

## 2020-05-29 RX ORDER — NYSTATIN 100000 [USP'U]/G
POWDER TOPICAL
COMMUNITY
Start: 2018-03-06

## 2020-05-29 RX ORDER — OXYCODONE HYDROCHLORIDE AND ACETAMINOPHEN 5; 325 MG/1; MG/1
1 TABLET ORAL EVERY 4 HOURS PRN
Status: DISCONTINUED | OUTPATIENT
Start: 2020-05-29 | End: 2020-05-29

## 2020-05-29 RX ORDER — FLUTICASONE PROPIONATE 50 MCG
2 SPRAY, SUSPENSION (ML) NASAL
COMMUNITY
Start: 2019-10-22 | End: 2021-03-13

## 2020-05-29 RX ORDER — NICOTINE 21 MG/24HR
1 PATCH, TRANSDERMAL 24 HOURS TRANSDERMAL
Status: DISCONTINUED | OUTPATIENT
Start: 2020-05-29 | End: 2020-05-31 | Stop reason: HOSPADM

## 2020-05-29 RX ORDER — ROPINIROLE 1 MG/1
2 TABLET, FILM COATED ORAL DAILY
Status: DISCONTINUED | OUTPATIENT
Start: 2020-05-29 | End: 2020-05-31 | Stop reason: HOSPADM

## 2020-05-29 RX ORDER — ALBUTEROL SULFATE 90 UG/1
2 AEROSOL, METERED RESPIRATORY (INHALATION)
COMMUNITY
Start: 2019-10-22

## 2020-05-29 RX ORDER — FLUMAZENIL 0.1 MG/ML
0.2 INJECTION INTRAVENOUS AS NEEDED
Status: DISCONTINUED | OUTPATIENT
Start: 2020-05-29 | End: 2020-05-29 | Stop reason: HOSPADM

## 2020-05-29 RX ORDER — CHOLECALCIFEROL (VITAMIN D3) 125 MCG
500 CAPSULE ORAL
COMMUNITY
Start: 2019-10-22 | End: 2021-03-13

## 2020-05-29 RX ORDER — IPRATROPIUM BROMIDE AND ALBUTEROL SULFATE 2.5; .5 MG/3ML; MG/3ML
3 SOLUTION RESPIRATORY (INHALATION)
COMMUNITY
Start: 2018-10-02

## 2020-05-29 RX ORDER — OXYCODONE HYDROCHLORIDE AND ACETAMINOPHEN 5; 325 MG/1; MG/1
2 TABLET ORAL EVERY 4 HOURS PRN
Status: DISCONTINUED | OUTPATIENT
Start: 2020-05-29 | End: 2020-05-31 | Stop reason: HOSPADM

## 2020-05-29 RX ORDER — SODIUM CHLORIDE, SODIUM LACTATE, POTASSIUM CHLORIDE, CALCIUM CHLORIDE 600; 310; 30; 20 MG/100ML; MG/100ML; MG/100ML; MG/100ML
9 INJECTION, SOLUTION INTRAVENOUS CONTINUOUS
Status: DISCONTINUED | OUTPATIENT
Start: 2020-05-29 | End: 2020-05-29

## 2020-05-29 RX ORDER — IRBESARTAN 150 MG/1
150 TABLET ORAL
COMMUNITY
Start: 2020-04-06

## 2020-05-29 RX ORDER — ERGOCALCIFEROL 1.25 MG/1
50000 CAPSULE ORAL
COMMUNITY
Start: 2020-03-05 | End: 2021-03-13

## 2020-05-29 RX ORDER — HYDROMORPHONE HYDROCHLORIDE 1 MG/ML
0.5 INJECTION, SOLUTION INTRAMUSCULAR; INTRAVENOUS; SUBCUTANEOUS
Status: DISCONTINUED | OUTPATIENT
Start: 2020-05-29 | End: 2020-05-31 | Stop reason: HOSPADM

## 2020-05-29 RX ORDER — MIDAZOLAM HYDROCHLORIDE 1 MG/ML
2 INJECTION INTRAMUSCULAR; INTRAVENOUS
Status: DISCONTINUED | OUTPATIENT
Start: 2020-05-29 | End: 2020-05-29 | Stop reason: HOSPADM

## 2020-05-29 RX ORDER — FLASH GLUCOSE SENSOR
1 KIT MISCELLANEOUS
COMMUNITY
Start: 2018-08-21

## 2020-05-29 RX ORDER — ATORVASTATIN CALCIUM 20 MG/1
20 TABLET, FILM COATED ORAL
COMMUNITY
Start: 2019-12-03

## 2020-05-29 RX ORDER — VANCOMYCIN HYDROCHLORIDE 1 G/200ML
1 INJECTION, SOLUTION INTRAVENOUS ONCE
Status: DISCONTINUED | OUTPATIENT
Start: 2020-05-29 | End: 2020-05-29

## 2020-05-29 RX ORDER — FLUTICASONE PROPIONATE 50 MCG
2 SPRAY, SUSPENSION (ML) NASAL 2 TIMES DAILY
Status: DISCONTINUED | OUTPATIENT
Start: 2020-05-29 | End: 2020-05-31 | Stop reason: HOSPADM

## 2020-05-29 RX ORDER — NICOTINE POLACRILEX 4 MG
15 LOZENGE BUCCAL
Status: DISCONTINUED | OUTPATIENT
Start: 2020-05-29 | End: 2020-05-31 | Stop reason: HOSPADM

## 2020-05-29 RX ORDER — FENTANYL CITRATE 50 UG/ML
INJECTION, SOLUTION INTRAMUSCULAR; INTRAVENOUS AS NEEDED
Status: DISCONTINUED | OUTPATIENT
Start: 2020-05-29 | End: 2020-05-29 | Stop reason: SURG

## 2020-05-29 RX ORDER — FUROSEMIDE 20 MG/1
20 TABLET ORAL
COMMUNITY
Start: 2019-10-09

## 2020-05-29 RX ORDER — ROCURONIUM BROMIDE 10 MG/ML
INJECTION, SOLUTION INTRAVENOUS AS NEEDED
Status: DISCONTINUED | OUTPATIENT
Start: 2020-05-29 | End: 2020-05-29 | Stop reason: SURG

## 2020-05-29 RX ORDER — ONDANSETRON 2 MG/ML
4 INJECTION INTRAMUSCULAR; INTRAVENOUS AS NEEDED
Status: DISCONTINUED | OUTPATIENT
Start: 2020-05-29 | End: 2020-05-29 | Stop reason: HOSPADM

## 2020-05-29 RX ORDER — LIDOCAINE HYDROCHLORIDE 20 MG/ML
INJECTION, SOLUTION INFILTRATION; PERINEURAL AS NEEDED
Status: DISCONTINUED | OUTPATIENT
Start: 2020-05-29 | End: 2020-05-29 | Stop reason: SURG

## 2020-05-29 RX ORDER — DEXTROSE MONOHYDRATE 25 G/50ML
25 INJECTION, SOLUTION INTRAVENOUS
Status: DISCONTINUED | OUTPATIENT
Start: 2020-05-29 | End: 2020-05-31 | Stop reason: HOSPADM

## 2020-05-29 RX ORDER — OXYCODONE AND ACETAMINOPHEN 7.5; 325 MG/1; MG/1
2 TABLET ORAL ONCE AS NEEDED
Status: COMPLETED | OUTPATIENT
Start: 2020-05-29 | End: 2020-05-29

## 2020-05-29 RX ORDER — DEXAMETHASONE SODIUM PHOSPHATE 4 MG/ML
INJECTION, SOLUTION INTRA-ARTICULAR; INTRALESIONAL; INTRAMUSCULAR; INTRAVENOUS; SOFT TISSUE AS NEEDED
Status: DISCONTINUED | OUTPATIENT
Start: 2020-05-29 | End: 2020-05-29 | Stop reason: SURG

## 2020-05-29 RX ORDER — SODIUM CHLORIDE 0.9 % (FLUSH) 0.9 %
10 SYRINGE (ML) INJECTION EVERY 12 HOURS SCHEDULED
Status: DISCONTINUED | OUTPATIENT
Start: 2020-05-29 | End: 2020-05-29 | Stop reason: HOSPADM

## 2020-05-29 RX ORDER — BENZTROPINE MESYLATE 0.5 MG/1
1 TABLET ORAL DAILY
Status: DISCONTINUED | OUTPATIENT
Start: 2020-05-29 | End: 2020-05-31 | Stop reason: HOSPADM

## 2020-05-29 RX ORDER — OXYCODONE AND ACETAMINOPHEN 10; 325 MG/1; MG/1
1 TABLET ORAL ONCE AS NEEDED
Status: DISCONTINUED | OUTPATIENT
Start: 2020-05-29 | End: 2020-05-29 | Stop reason: HOSPADM

## 2020-05-29 RX ORDER — DICLOFENAC SODIUM 75 MG/1
75 TABLET, DELAYED RELEASE ORAL
COMMUNITY
Start: 2019-12-03 | End: 2021-03-13

## 2020-05-29 RX ORDER — FUROSEMIDE 20 MG/1
20 TABLET ORAL DAILY
Status: DISCONTINUED | OUTPATIENT
Start: 2020-05-29 | End: 2020-05-31 | Stop reason: HOSPADM

## 2020-05-29 RX ORDER — MIRTAZAPINE 15 MG/1
15 TABLET, FILM COATED ORAL NIGHTLY
Status: DISCONTINUED | OUTPATIENT
Start: 2020-05-29 | End: 2020-05-31 | Stop reason: HOSPADM

## 2020-05-29 RX ORDER — LOSARTAN POTASSIUM 50 MG/1
50 TABLET ORAL
Status: DISCONTINUED | OUTPATIENT
Start: 2020-05-29 | End: 2020-05-31 | Stop reason: HOSPADM

## 2020-05-29 RX ORDER — KETOROLAC TROMETHAMINE 30 MG/ML
30 INJECTION, SOLUTION INTRAMUSCULAR; INTRAVENOUS EVERY 6 HOURS PRN
Status: DISCONTINUED | OUTPATIENT
Start: 2020-05-29 | End: 2020-05-31 | Stop reason: HOSPADM

## 2020-05-29 RX ORDER — SODIUM CHLORIDE, SODIUM LACTATE, POTASSIUM CHLORIDE, CALCIUM CHLORIDE 600; 310; 30; 20 MG/100ML; MG/100ML; MG/100ML; MG/100ML
100 INJECTION, SOLUTION INTRAVENOUS CONTINUOUS
Status: DISCONTINUED | OUTPATIENT
Start: 2020-05-29 | End: 2020-05-29

## 2020-05-29 RX ORDER — MIRTAZAPINE 15 MG/1
15 TABLET, FILM COATED ORAL
COMMUNITY
Start: 2019-12-03

## 2020-05-29 RX ORDER — LIDOCAINE HYDROCHLORIDE 10 MG/ML
0.5 INJECTION, SOLUTION EPIDURAL; INFILTRATION; INTRACAUDAL; PERINEURAL ONCE AS NEEDED
Status: DISCONTINUED | OUTPATIENT
Start: 2020-05-29 | End: 2020-05-29 | Stop reason: HOSPADM

## 2020-05-29 RX ORDER — SODIUM CHLORIDE, SODIUM LACTATE, POTASSIUM CHLORIDE, CALCIUM CHLORIDE 600; 310; 30; 20 MG/100ML; MG/100ML; MG/100ML; MG/100ML
125 INJECTION, SOLUTION INTRAVENOUS CONTINUOUS
Status: DISCONTINUED | OUTPATIENT
Start: 2020-05-29 | End: 2020-05-31 | Stop reason: HOSPADM

## 2020-05-29 RX ORDER — LURASIDONE HYDROCHLORIDE 80 MG/1
80 TABLET, FILM COATED ORAL
COMMUNITY
Start: 2020-03-04

## 2020-05-29 RX ORDER — ONDANSETRON 2 MG/ML
INJECTION INTRAMUSCULAR; INTRAVENOUS AS NEEDED
Status: DISCONTINUED | OUTPATIENT
Start: 2020-05-29 | End: 2020-05-29 | Stop reason: SURG

## 2020-05-29 RX ORDER — MAGNESIUM HYDROXIDE 1200 MG/15ML
LIQUID ORAL AS NEEDED
Status: DISCONTINUED | OUTPATIENT
Start: 2020-05-29 | End: 2020-05-29 | Stop reason: HOSPADM

## 2020-05-29 RX ORDER — BENZTROPINE MESYLATE 1 MG/1
1 TABLET ORAL
COMMUNITY
Start: 2019-12-03

## 2020-05-29 RX ORDER — PROPOFOL 10 MG/ML
VIAL (ML) INTRAVENOUS AS NEEDED
Status: DISCONTINUED | OUTPATIENT
Start: 2020-05-29 | End: 2020-05-29 | Stop reason: SURG

## 2020-05-29 RX ORDER — CLOBETASOL PROPIONATE 0.5 MG/G
OINTMENT TOPICAL
COMMUNITY
Start: 2018-11-05

## 2020-05-29 RX ORDER — OXYCODONE HYDROCHLORIDE AND ACETAMINOPHEN 5; 325 MG/1; MG/1
1 TABLET ORAL EVERY 4 HOURS PRN
Status: DISCONTINUED | OUTPATIENT
Start: 2020-05-29 | End: 2020-05-31 | Stop reason: HOSPADM

## 2020-05-29 RX ORDER — DEXTROSE MONOHYDRATE 25 G/50ML
12.5 INJECTION, SOLUTION INTRAVENOUS AS NEEDED
Status: DISCONTINUED | OUTPATIENT
Start: 2020-05-29 | End: 2020-05-29 | Stop reason: HOSPADM

## 2020-05-29 RX ORDER — ROPINIROLE 2 MG/1
2 TABLET, FILM COATED ORAL
COMMUNITY
Start: 2019-10-09

## 2020-05-29 RX ORDER — SUCCINYLCHOLINE CHLORIDE 20 MG/ML
INJECTION INTRAMUSCULAR; INTRAVENOUS AS NEEDED
Status: DISCONTINUED | OUTPATIENT
Start: 2020-05-29 | End: 2020-05-29 | Stop reason: SURG

## 2020-05-29 RX ORDER — DONEPEZIL HYDROCHLORIDE 10 MG/1
TABLET, FILM COATED ORAL
COMMUNITY
Start: 2019-10-09

## 2020-05-29 RX ORDER — NALOXONE HCL 0.4 MG/ML
0.04 VIAL (ML) INJECTION AS NEEDED
Status: DISCONTINUED | OUTPATIENT
Start: 2020-05-29 | End: 2020-05-29 | Stop reason: HOSPADM

## 2020-05-29 RX ADMIN — OXYCODONE HYDROCHLORIDE AND ACETAMINOPHEN 1 TABLET: 5; 325 TABLET ORAL at 16:47

## 2020-05-29 RX ADMIN — LIDOCAINE HYDROCHLORIDE 1 EACH: 40 SOLUTION TOPICAL at 13:06

## 2020-05-29 RX ADMIN — OXYCODONE HYDROCHLORIDE AND ACETAMINOPHEN 1 TABLET: 5; 325 TABLET ORAL at 21:06

## 2020-05-29 RX ADMIN — VANCOMYCIN HYDROCHLORIDE 2000 MG: 1 INJECTION, POWDER, LYOPHILIZED, FOR SOLUTION INTRAVENOUS at 03:55

## 2020-05-29 RX ADMIN — TAZOBACTAM SODIUM AND PIPERACILLIN SODIUM 3.38 G: 375; 3 INJECTION, SOLUTION INTRAVENOUS at 06:51

## 2020-05-29 RX ADMIN — TAZOBACTAM SODIUM AND PIPERACILLIN SODIUM 3.38 G: 375; 3 INJECTION, SOLUTION INTRAVENOUS at 20:12

## 2020-05-29 RX ADMIN — DEXAMETHASONE SODIUM PHOSPHATE 4 MG: 4 INJECTION, SOLUTION INTRAMUSCULAR; INTRAVENOUS at 13:06

## 2020-05-29 RX ADMIN — HYDROMORPHONE HYDROCHLORIDE 1 MG: 1 INJECTION, SOLUTION INTRAMUSCULAR; INTRAVENOUS; SUBCUTANEOUS at 01:27

## 2020-05-29 RX ADMIN — HYDROMORPHONE HYDROCHLORIDE 1 MG: 1 INJECTION, SOLUTION INTRAMUSCULAR; INTRAVENOUS; SUBCUTANEOUS at 03:38

## 2020-05-29 RX ADMIN — LOSARTAN POTASSIUM 50 MG: 50 TABLET, FILM COATED ORAL at 16:44

## 2020-05-29 RX ADMIN — ONDANSETRON HYDROCHLORIDE 4 MG: 2 SOLUTION INTRAMUSCULAR; INTRAVENOUS at 11:33

## 2020-05-29 RX ADMIN — LIDOCAINE HYDROCHLORIDE 100 MG: 20 INJECTION, SOLUTION INFILTRATION; PERINEURAL at 13:06

## 2020-05-29 RX ADMIN — MIRTAZAPINE 15 MG: 15 TABLET, FILM COATED ORAL at 20:12

## 2020-05-29 RX ADMIN — PROPOFOL 200 MG: 10 INJECTION, EMULSION INTRAVENOUS at 13:06

## 2020-05-29 RX ADMIN — ONDANSETRON HYDROCHLORIDE 4 MG: 2 SOLUTION INTRAMUSCULAR; INTRAVENOUS at 13:06

## 2020-05-29 RX ADMIN — HYDROMORPHONE HYDROCHLORIDE 0.5 MG: 1 INJECTION, SOLUTION INTRAMUSCULAR; INTRAVENOUS; SUBCUTANEOUS at 11:33

## 2020-05-29 RX ADMIN — IOPAMIDOL 100 ML: 612 INJECTION, SOLUTION INTRAVENOUS at 03:07

## 2020-05-29 RX ADMIN — DONEPEZIL HYDROCHLORIDE 10 MG: 10 TABLET, FILM COATED ORAL at 20:12

## 2020-05-29 RX ADMIN — HYDROMORPHONE HYDROCHLORIDE 1 MG: 1 INJECTION, SOLUTION INTRAMUSCULAR; INTRAVENOUS; SUBCUTANEOUS at 09:00

## 2020-05-29 RX ADMIN — ROPINIROLE HYDROCHLORIDE 2 MG: 1 TABLET, FILM COATED ORAL at 08:49

## 2020-05-29 RX ADMIN — SODIUM CHLORIDE, POTASSIUM CHLORIDE, SODIUM LACTATE AND CALCIUM CHLORIDE 100 ML/HR: 600; 310; 30; 20 INJECTION, SOLUTION INTRAVENOUS at 05:32

## 2020-05-29 RX ADMIN — OXYCODONE HYDROCHLORIDE AND ACETAMINOPHEN 2 TABLET: 7.5; 325 TABLET ORAL at 14:12

## 2020-05-29 RX ADMIN — VANCOMYCIN HYDROCHLORIDE 1500 MG: 10 INJECTION, POWDER, LYOPHILIZED, FOR SOLUTION INTRAVENOUS at 16:45

## 2020-05-29 RX ADMIN — FUROSEMIDE 20 MG: 20 TABLET ORAL at 16:44

## 2020-05-29 RX ADMIN — ERTAPENEM SODIUM 1 G: 1 INJECTION, POWDER, LYOPHILIZED, FOR SOLUTION INTRAMUSCULAR; INTRAVENOUS at 13:10

## 2020-05-29 RX ADMIN — SUCCINYLCHOLINE CHLORIDE 200 MG: 20 INJECTION, SOLUTION INTRAMUSCULAR; INTRAVENOUS at 13:06

## 2020-05-29 RX ADMIN — ROCURONIUM BROMIDE 10 MG: 10 INJECTION INTRAVENOUS at 13:06

## 2020-05-29 RX ADMIN — ONDANSETRON HYDROCHLORIDE 4 MG: 2 SOLUTION INTRAMUSCULAR; INTRAVENOUS at 06:28

## 2020-05-29 RX ADMIN — BENZTROPINE MESYLATE 1 MG: 0.5 TABLET ORAL at 08:49

## 2020-05-29 RX ADMIN — SODIUM CHLORIDE, POTASSIUM CHLORIDE, SODIUM LACTATE AND CALCIUM CHLORIDE 125 ML/HR: 600; 310; 30; 20 INJECTION, SOLUTION INTRAVENOUS at 19:03

## 2020-05-29 RX ADMIN — PIPERACILLIN SODIUM AND TAZOBACTAM SODIUM 3.38 G: 3; .375 INJECTION, POWDER, LYOPHILIZED, FOR SOLUTION INTRAVENOUS at 01:27

## 2020-05-29 RX ADMIN — FLUTICASONE PROPIONATE 2 SPRAY: 50 SPRAY, METERED NASAL at 20:12

## 2020-05-29 RX ADMIN — FENTANYL CITRATE 100 MCG: 50 INJECTION, SOLUTION INTRAMUSCULAR; INTRAVENOUS at 13:06

## 2020-05-29 RX ADMIN — NICOTINE 1 PATCH: 21 PATCH, EXTENDED RELEASE TRANSDERMAL at 16:45

## 2020-05-29 RX ADMIN — ONDANSETRON HYDROCHLORIDE 4 MG: 2 SOLUTION INTRAMUSCULAR; INTRAVENOUS at 01:26

## 2020-05-29 RX ADMIN — HYDROMORPHONE HYDROCHLORIDE 0.5 MG: 1 INJECTION, SOLUTION INTRAMUSCULAR; INTRAVENOUS; SUBCUTANEOUS at 06:28

## 2020-05-29 RX ADMIN — SODIUM CHLORIDE, POTASSIUM CHLORIDE, SODIUM LACTATE AND CALCIUM CHLORIDE 125 ML/HR: 600; 310; 30; 20 INJECTION, SOLUTION INTRAVENOUS at 06:30

## 2020-05-29 RX ADMIN — SODIUM CHLORIDE, POTASSIUM CHLORIDE, SODIUM LACTATE AND CALCIUM CHLORIDE 9 ML/HR: 600; 310; 30; 20 INJECTION, SOLUTION INTRAVENOUS at 12:33

## 2020-05-29 NOTE — ANESTHESIA PROCEDURE NOTES
Airway  Urgency: elective    Date/Time: 5/29/2020 1:09 PM  Airway not difficult    General Information and Staff    Patient location during procedure: OR  CRNA: Sofy Redmond CRNA    Indications and Patient Condition  Indications for airway management: airway protection    Preoxygenated: yes  MILS maintained throughout  Mask difficulty assessment: 1 - vent by mask    Final Airway Details  Final airway type: endotracheal airway      Successful airway: ETT  Cuffed: yes   Successful intubation technique: direct laryngoscopy  Facilitating devices/methods: intubating stylet  Endotracheal tube insertion site: oral  Blade: Garza  Blade size: 2  ETT size (mm): 7.0  Cormack-Lehane Classification: grade I - full view of glottis  Placement verified by: chest auscultation, capnometry and palpation of cuff   Cuff volume (mL): 6  Measured from: gums  ETT/EBT to gums (cm): 20  Number of attempts at approach: 1  Assessment: lips, teeth, and gum same as pre-op and atraumatic intubation

## 2020-05-29 NOTE — ANESTHESIA PREPROCEDURE EVALUATION
Anesthesia Evaluation     Patient summary reviewed   no history of anesthetic complications:  NPO Solid Status: > 8 hours             Airway   Mallampati: II  TM distance: >3 FB  Neck ROM: full  Dental    (+) edentulous    Pulmonary    (+) a smoker, COPD, home oxygen (3LNC),   (-) asthma, sleep apnea  Cardiovascular   Exercise tolerance: good (4-7 METS)    (+) hypertension, hyperlipidemia,   (-) pacemaker, past MI, angina, cardiac stents      Neuro/Psych  (-) seizures, TIA, CVA  GI/Hepatic/Renal/Endo    (+) morbid obesity,  diabetes mellitus,   (-) GERD, liver disease, no renal disease    Musculoskeletal     Abdominal    Substance History      OB/GYN          Other                        Anesthesia Plan    ASA 3     general     intravenous induction     Anesthetic plan, all risks, benefits, and alternatives have been provided, discussed and informed consent has been obtained with: patient.

## 2020-05-29 NOTE — ANESTHESIA POSTPROCEDURE EVALUATION
"Patient: Heather Hernandez    Procedure Summary     Date:  05/29/20 Room / Location:  [facility] OR 08 /  [facility] OR    Anesthesia Start:  1259 Anesthesia Stop:  1341    Procedure:  INCISION AND DRAINAGE ABSCESS (N/A ) Diagnosis:       Abscess      (Abscess [L02.91])    Surgeon:  Kim Guardado MD Provider:  Sofy Redmond CRNA    Anesthesia Type:  general ASA Status:  3          Anesthesia Type: general    Vitals  Vitals Value Taken Time   /72 5/29/2020  2:17 PM   Temp 98.5 °F (36.9 °C) 5/29/2020  2:09 PM   Pulse 84 5/29/2020  2:18 PM   Resp 16 5/29/2020  2:09 PM   SpO2 100 % 5/29/2020  2:18 PM   Vitals shown include unvalidated device data.        Post Anesthesia Care and Evaluation    Patient location during evaluation: PACU  Patient participation: complete - patient participated  Level of consciousness: awake and awake and alert  Pain score: 0  Pain management: adequate  Airway patency: patent  Anesthetic complications: No anesthetic complications  PONV Status: none  Cardiovascular status: acceptable  Respiratory status: acceptable  Hydration status: acceptable    Comments: Patient discharged according to acceptable Samina score per RN assessment. See nursing records for further information.     Blood pressure 110/67, pulse 80, temperature 98.4 °F (36.9 °C), temperature source Oral, resp. rate 16, height 152.4 cm (60\"), weight 96.2 kg (212 lb), SpO2 99 %, not currently breastfeeding.        "

## 2020-05-30 LAB
ANION GAP SERPL CALCULATED.3IONS-SCNC: 12 MMOL/L (ref 5–15)
BUN BLD-MCNC: 14 MG/DL (ref 8–23)
BUN/CREAT SERPL: 28 (ref 7–25)
CALCIUM SPEC-SCNC: 8.4 MG/DL (ref 8.6–10.5)
CHLORIDE SERPL-SCNC: 106 MMOL/L (ref 98–107)
CO2 SERPL-SCNC: 21 MMOL/L (ref 22–29)
CREAT BLD-MCNC: 0.5 MG/DL (ref 0.57–1)
DEPRECATED RDW RBC AUTO: 67.2 FL (ref 37–54)
ERYTHROCYTE [DISTWIDTH] IN BLOOD BY AUTOMATED COUNT: 23.2 % (ref 12.3–15.4)
GFR SERPL CREATININE-BSD FRML MDRD: 125 ML/MIN/1.73
GLUCOSE BLD-MCNC: 142 MG/DL (ref 65–99)
GLUCOSE BLDC GLUCOMTR-MCNC: 102 MG/DL (ref 70–130)
GLUCOSE BLDC GLUCOMTR-MCNC: 125 MG/DL (ref 70–130)
GLUCOSE BLDC GLUCOMTR-MCNC: 141 MG/DL (ref 70–130)
HCT VFR BLD AUTO: 28 % (ref 34–46.6)
HGB BLD-MCNC: 8.1 G/DL (ref 12–15.9)
MCH RBC QN AUTO: 23.1 PG (ref 26.6–33)
MCHC RBC AUTO-ENTMCNC: 28.9 G/DL (ref 31.5–35.7)
MCV RBC AUTO: 80 FL (ref 79–97)
PLATELET # BLD AUTO: 297 10*3/MM3 (ref 140–450)
PMV BLD AUTO: 10.5 FL (ref 6–12)
POTASSIUM BLD-SCNC: 4.3 MMOL/L (ref 3.5–5.2)
RBC # BLD AUTO: 3.5 10*6/MM3 (ref 3.77–5.28)
SODIUM BLD-SCNC: 139 MMOL/L (ref 136–145)
VANCOMYCIN TROUGH SERPL-MCNC: 19.5 MCG/ML (ref 5–20)
WBC NRBC COR # BLD: 8.88 10*3/MM3 (ref 3.4–10.8)

## 2020-05-30 PROCEDURE — 25010000002 VANCOMYCIN 10 G RECONSTITUTED SOLUTION: Performed by: SPECIALIST

## 2020-05-30 PROCEDURE — 63710000001 MIRTAZAPINE 15 MG TABLET: Performed by: SPECIALIST

## 2020-05-30 PROCEDURE — 80202 ASSAY OF VANCOMYCIN: CPT | Performed by: SPECIALIST

## 2020-05-30 PROCEDURE — 63710000001 ROPINIROLE 1 MG TABLET: Performed by: SPECIALIST

## 2020-05-30 PROCEDURE — 63710000001 BENZTROPINE 0.5 MG TABLET: Performed by: SPECIALIST

## 2020-05-30 PROCEDURE — 80048 BASIC METABOLIC PNL TOTAL CA: CPT | Performed by: SPECIALIST

## 2020-05-30 PROCEDURE — A9270 NON-COVERED ITEM OR SERVICE: HCPCS | Performed by: SPECIALIST

## 2020-05-30 PROCEDURE — 63710000001 NICOTINE 21 MG/24HR PATCH 24 HOUR: Performed by: SPECIALIST

## 2020-05-30 PROCEDURE — 82962 GLUCOSE BLOOD TEST: CPT

## 2020-05-30 PROCEDURE — 63710000001 LOSARTAN 50 MG TABLET: Performed by: SPECIALIST

## 2020-05-30 PROCEDURE — 63710000001 INSULIN LISPRO (HUMAN) PER 5 UNITS: Performed by: SPECIALIST

## 2020-05-30 PROCEDURE — 25010000002 HYDROMORPHONE PER 4 MG: Performed by: SPECIALIST

## 2020-05-30 PROCEDURE — 25010000002 KETOROLAC TROMETHAMINE PER 15 MG: Performed by: SPECIALIST

## 2020-05-30 PROCEDURE — 63710000001 DONEPEZIL 10 MG TABLET: Performed by: SPECIALIST

## 2020-05-30 PROCEDURE — 25010000002 ONDANSETRON PER 1 MG: Performed by: SPECIALIST

## 2020-05-30 PROCEDURE — G0378 HOSPITAL OBSERVATION PER HR: HCPCS

## 2020-05-30 PROCEDURE — 63710000001 SODIUM HYPOCHLORITE 0.125 % SOLUTION 473 ML BOTTLE: Performed by: SPECIALIST

## 2020-05-30 PROCEDURE — 63710000001 OXYCODONE-ACETAMINOPHEN 5-325 MG TABLET: Performed by: SPECIALIST

## 2020-05-30 PROCEDURE — 63710000001 FUROSEMIDE 20 MG TABLET: Performed by: SPECIALIST

## 2020-05-30 PROCEDURE — 25010000002 VANCOMYCIN PER 500 MG: Performed by: SPECIALIST

## 2020-05-30 PROCEDURE — 85027 COMPLETE CBC AUTOMATED: CPT | Performed by: SPECIALIST

## 2020-05-30 PROCEDURE — 25010000002 PIPERACILLIN SOD-TAZOBACTAM PER 1 G: Performed by: SPECIALIST

## 2020-05-30 RX ORDER — VANCOMYCIN HYDROCHLORIDE 1 G/200ML
1000 INJECTION, SOLUTION INTRAVENOUS EVERY 12 HOURS
Status: DISCONTINUED | OUTPATIENT
Start: 2020-05-30 | End: 2020-05-31 | Stop reason: HOSPADM

## 2020-05-30 RX ORDER — SODIUM HYPOCHLORITE 1.25 MG/ML
SOLUTION TOPICAL EVERY 12 HOURS
Status: DISCONTINUED | OUTPATIENT
Start: 2020-05-30 | End: 2020-05-31 | Stop reason: HOSPADM

## 2020-05-30 RX ORDER — VANCOMYCIN HYDROCHLORIDE 1 G/200ML
1000 INJECTION, SOLUTION INTRAVENOUS EVERY 12 HOURS
Status: DISCONTINUED | OUTPATIENT
Start: 2020-05-30 | End: 2020-05-30

## 2020-05-30 RX ADMIN — DONEPEZIL HYDROCHLORIDE 10 MG: 10 TABLET, FILM COATED ORAL at 21:26

## 2020-05-30 RX ADMIN — NICOTINE 1 PATCH: 21 PATCH, EXTENDED RELEASE TRANSDERMAL at 09:37

## 2020-05-30 RX ADMIN — TAZOBACTAM SODIUM AND PIPERACILLIN SODIUM 3.38 G: 375; 3 INJECTION, SOLUTION INTRAVENOUS at 06:08

## 2020-05-30 RX ADMIN — SODIUM CHLORIDE, POTASSIUM CHLORIDE, SODIUM LACTATE AND CALCIUM CHLORIDE 125 ML/HR: 600; 310; 30; 20 INJECTION, SOLUTION INTRAVENOUS at 05:05

## 2020-05-30 RX ADMIN — TAZOBACTAM SODIUM AND PIPERACILLIN SODIUM 3.38 G: 375; 3 INJECTION, SOLUTION INTRAVENOUS at 00:14

## 2020-05-30 RX ADMIN — LOSARTAN POTASSIUM 50 MG: 50 TABLET, FILM COATED ORAL at 09:36

## 2020-05-30 RX ADMIN — ONDANSETRON HYDROCHLORIDE 4 MG: 2 SOLUTION INTRAMUSCULAR; INTRAVENOUS at 03:51

## 2020-05-30 RX ADMIN — DAKIN'S SOLUTION 0.125% (QUARTER STRENGTH): 0.12 SOLUTION at 13:00

## 2020-05-30 RX ADMIN — FUROSEMIDE 20 MG: 20 TABLET ORAL at 09:37

## 2020-05-30 RX ADMIN — TAZOBACTAM SODIUM AND PIPERACILLIN SODIUM 3.38 G: 375; 3 INJECTION, SOLUTION INTRAVENOUS at 12:54

## 2020-05-30 RX ADMIN — OXYCODONE HYDROCHLORIDE AND ACETAMINOPHEN 1 TABLET: 5; 325 TABLET ORAL at 03:51

## 2020-05-30 RX ADMIN — TAZOBACTAM SODIUM AND PIPERACILLIN SODIUM 3.38 G: 375; 3 INJECTION, SOLUTION INTRAVENOUS at 18:43

## 2020-05-30 RX ADMIN — INSULIN LISPRO 2 UNITS: 100 INJECTION, SOLUTION INTRAVENOUS; SUBCUTANEOUS at 00:14

## 2020-05-30 RX ADMIN — FLUTICASONE PROPIONATE 2 SPRAY: 50 SPRAY, METERED NASAL at 09:39

## 2020-05-30 RX ADMIN — ROPINIROLE HYDROCHLORIDE 2 MG: 1 TABLET, FILM COATED ORAL at 09:36

## 2020-05-30 RX ADMIN — VANCOMYCIN HYDROCHLORIDE 1000 MG: 1 INJECTION, SOLUTION INTRAVENOUS at 21:25

## 2020-05-30 RX ADMIN — SODIUM CHLORIDE, POTASSIUM CHLORIDE, SODIUM LACTATE AND CALCIUM CHLORIDE 125 ML/HR: 600; 310; 30; 20 INJECTION, SOLUTION INTRAVENOUS at 15:14

## 2020-05-30 RX ADMIN — FLUTICASONE PROPIONATE 2 SPRAY: 50 SPRAY, METERED NASAL at 21:26

## 2020-05-30 RX ADMIN — BENZTROPINE MESYLATE 1 MG: 0.5 TABLET ORAL at 09:36

## 2020-05-30 RX ADMIN — HYDROMORPHONE HYDROCHLORIDE 0.5 MG: 1 INJECTION, SOLUTION INTRAMUSCULAR; INTRAVENOUS; SUBCUTANEOUS at 13:05

## 2020-05-30 RX ADMIN — ONDANSETRON HYDROCHLORIDE 4 MG: 2 SOLUTION INTRAMUSCULAR; INTRAVENOUS at 18:39

## 2020-05-30 RX ADMIN — VANCOMYCIN HYDROCHLORIDE 1500 MG: 10 INJECTION, POWDER, LYOPHILIZED, FOR SOLUTION INTRAVENOUS at 03:51

## 2020-05-30 RX ADMIN — OXYCODONE HYDROCHLORIDE AND ACETAMINOPHEN 2 TABLET: 5; 325 TABLET ORAL at 17:18

## 2020-05-30 RX ADMIN — KETOROLAC TROMETHAMINE 30 MG: 30 INJECTION, SOLUTION INTRAMUSCULAR; INTRAVENOUS at 09:44

## 2020-05-30 RX ADMIN — MIRTAZAPINE 15 MG: 15 TABLET, FILM COATED ORAL at 21:26

## 2020-05-30 RX ADMIN — DAKIN'S SOLUTION 0.125% (QUARTER STRENGTH): 0.12 SOLUTION at 21:31

## 2020-05-31 VITALS
OXYGEN SATURATION: 98 % | HEIGHT: 60 IN | RESPIRATION RATE: 18 BRPM | DIASTOLIC BLOOD PRESSURE: 83 MMHG | WEIGHT: 212 LBS | SYSTOLIC BLOOD PRESSURE: 123 MMHG | HEART RATE: 74 BPM | TEMPERATURE: 98.7 F | BODY MASS INDEX: 41.62 KG/M2

## 2020-05-31 LAB
ANION GAP SERPL CALCULATED.3IONS-SCNC: 12 MMOL/L (ref 5–15)
BACTERIA SPEC AEROBE CULT: ABNORMAL
BUN BLD-MCNC: 14 MG/DL (ref 8–23)
BUN/CREAT SERPL: 22.6 (ref 7–25)
CALCIUM SPEC-SCNC: 8.6 MG/DL (ref 8.6–10.5)
CHLORIDE SERPL-SCNC: 106 MMOL/L (ref 98–107)
CO2 SERPL-SCNC: 24 MMOL/L (ref 22–29)
CREAT BLD-MCNC: 0.62 MG/DL (ref 0.57–1)
DEPRECATED RDW RBC AUTO: 65.4 FL (ref 37–54)
ERYTHROCYTE [DISTWIDTH] IN BLOOD BY AUTOMATED COUNT: 23.1 % (ref 12.3–15.4)
GFR SERPL CREATININE-BSD FRML MDRD: 97 ML/MIN/1.73
GLUCOSE BLD-MCNC: 88 MG/DL (ref 65–99)
GLUCOSE BLDC GLUCOMTR-MCNC: 115 MG/DL (ref 70–130)
GLUCOSE BLDC GLUCOMTR-MCNC: 87 MG/DL (ref 70–130)
GRAM STN SPEC: ABNORMAL
GRAM STN SPEC: ABNORMAL
HCT VFR BLD AUTO: 28.5 % (ref 34–46.6)
HGB BLD-MCNC: 8.6 G/DL (ref 12–15.9)
MCH RBC QN AUTO: 23.8 PG (ref 26.6–33)
MCHC RBC AUTO-ENTMCNC: 30.2 G/DL (ref 31.5–35.7)
MCV RBC AUTO: 78.7 FL (ref 79–97)
PLATELET # BLD AUTO: 305 10*3/MM3 (ref 140–450)
PMV BLD AUTO: 11.1 FL (ref 6–12)
POTASSIUM BLD-SCNC: 4.6 MMOL/L (ref 3.5–5.2)
RBC # BLD AUTO: 3.62 10*6/MM3 (ref 3.77–5.28)
SODIUM BLD-SCNC: 142 MMOL/L (ref 136–145)
WBC NRBC COR # BLD: 7.03 10*3/MM3 (ref 3.4–10.8)

## 2020-05-31 PROCEDURE — G0378 HOSPITAL OBSERVATION PER HR: HCPCS

## 2020-05-31 PROCEDURE — 63710000001 FUROSEMIDE 20 MG TABLET: Performed by: SPECIALIST

## 2020-05-31 PROCEDURE — A9270 NON-COVERED ITEM OR SERVICE: HCPCS | Performed by: SPECIALIST

## 2020-05-31 PROCEDURE — 25010000002 HYDROMORPHONE PER 4 MG: Performed by: SPECIALIST

## 2020-05-31 PROCEDURE — 85027 COMPLETE CBC AUTOMATED: CPT | Performed by: SPECIALIST

## 2020-05-31 PROCEDURE — 80048 BASIC METABOLIC PNL TOTAL CA: CPT | Performed by: SPECIALIST

## 2020-05-31 PROCEDURE — 63710000001 OXYCODONE-ACETAMINOPHEN 5-325 MG TABLET: Performed by: SPECIALIST

## 2020-05-31 PROCEDURE — 63710000001 ROPINIROLE 1 MG TABLET: Performed by: SPECIALIST

## 2020-05-31 PROCEDURE — 63710000001 SODIUM HYPOCHLORITE 0.125 % SOLUTION 473 ML BOTTLE: Performed by: SPECIALIST

## 2020-05-31 PROCEDURE — 25010000002 VANCOMYCIN PER 500 MG: Performed by: SPECIALIST

## 2020-05-31 PROCEDURE — 63710000001 LOSARTAN 50 MG TABLET: Performed by: SPECIALIST

## 2020-05-31 PROCEDURE — 82962 GLUCOSE BLOOD TEST: CPT

## 2020-05-31 PROCEDURE — 63710000001 NICOTINE 21 MG/24HR PATCH 24 HOUR: Performed by: SPECIALIST

## 2020-05-31 PROCEDURE — 25010000002 ONDANSETRON PER 1 MG: Performed by: SPECIALIST

## 2020-05-31 PROCEDURE — 63710000001 BENZTROPINE 0.5 MG TABLET: Performed by: SPECIALIST

## 2020-05-31 PROCEDURE — 25010000002 PIPERACILLIN SOD-TAZOBACTAM PER 1 G: Performed by: SPECIALIST

## 2020-05-31 RX ORDER — SULFAMETHOXAZOLE AND TRIMETHOPRIM 800; 160 MG/1; MG/1
1 TABLET ORAL 2 TIMES DAILY
Qty: 28 TABLET | Refills: 0 | OUTPATIENT
Start: 2020-05-31 | End: 2020-06-07 | Stop reason: HOSPADM

## 2020-05-31 RX ORDER — HYDROCODONE BITARTRATE AND ACETAMINOPHEN 5; 325 MG/1; MG/1
1 TABLET ORAL EVERY 4 HOURS PRN
Qty: 30 TABLET | Refills: 0 | Status: SHIPPED | OUTPATIENT
Start: 2020-05-31 | End: 2021-03-13

## 2020-05-31 RX ORDER — ONDANSETRON HCL 8 MG
8 TABLET ORAL EVERY 8 HOURS PRN
Qty: 10 TABLET | Refills: 1 | Status: SHIPPED | OUTPATIENT
Start: 2020-05-31 | End: 2021-03-13

## 2020-05-31 RX ORDER — METHYLCELLULOSE 2 G/19G
2 POWDER, FOR SOLUTION ORAL DAILY
Qty: 60 G | Refills: 6 | Status: SHIPPED | OUTPATIENT
Start: 2020-05-31 | End: 2020-06-30

## 2020-05-31 RX ADMIN — TAZOBACTAM SODIUM AND PIPERACILLIN SODIUM 3.38 G: 375; 3 INJECTION, SOLUTION INTRAVENOUS at 06:06

## 2020-05-31 RX ADMIN — SODIUM CHLORIDE, POTASSIUM CHLORIDE, SODIUM LACTATE AND CALCIUM CHLORIDE 125 ML/HR: 600; 310; 30; 20 INJECTION, SOLUTION INTRAVENOUS at 00:45

## 2020-05-31 RX ADMIN — ONDANSETRON HYDROCHLORIDE 4 MG: 2 SOLUTION INTRAMUSCULAR; INTRAVENOUS at 06:17

## 2020-05-31 RX ADMIN — TAZOBACTAM SODIUM AND PIPERACILLIN SODIUM 3.38 G: 375; 3 INJECTION, SOLUTION INTRAVENOUS at 00:45

## 2020-05-31 RX ADMIN — HYDROMORPHONE HYDROCHLORIDE 0.5 MG: 1 INJECTION, SOLUTION INTRAMUSCULAR; INTRAVENOUS; SUBCUTANEOUS at 00:46

## 2020-05-31 RX ADMIN — VANCOMYCIN HYDROCHLORIDE 1000 MG: 1 INJECTION, SOLUTION INTRAVENOUS at 08:23

## 2020-05-31 RX ADMIN — NICOTINE 1 PATCH: 21 PATCH, EXTENDED RELEASE TRANSDERMAL at 08:23

## 2020-05-31 RX ADMIN — ROPINIROLE HYDROCHLORIDE 2 MG: 1 TABLET, FILM COATED ORAL at 08:23

## 2020-05-31 RX ADMIN — FUROSEMIDE 20 MG: 20 TABLET ORAL at 08:23

## 2020-05-31 RX ADMIN — ONDANSETRON HYDROCHLORIDE 4 MG: 2 SOLUTION INTRAMUSCULAR; INTRAVENOUS at 00:45

## 2020-05-31 RX ADMIN — OXYCODONE HYDROCHLORIDE AND ACETAMINOPHEN 2 TABLET: 5; 325 TABLET ORAL at 03:34

## 2020-05-31 RX ADMIN — HYDROMORPHONE HYDROCHLORIDE 0.5 MG: 1 INJECTION, SOLUTION INTRAMUSCULAR; INTRAVENOUS; SUBCUTANEOUS at 06:12

## 2020-05-31 RX ADMIN — BENZTROPINE MESYLATE 1 MG: 0.5 TABLET ORAL at 08:23

## 2020-05-31 RX ADMIN — LOSARTAN POTASSIUM 50 MG: 50 TABLET, FILM COATED ORAL at 08:24

## 2020-05-31 RX ADMIN — DAKIN'S SOLUTION 0.125% (QUARTER STRENGTH): 0.12 SOLUTION at 10:00

## 2020-06-01 ENCOUNTER — TELEPHONE (OUTPATIENT)
Dept: PRIMARY CARE CLINIC | Age: 65
End: 2020-06-01

## 2020-06-01 NOTE — TELEPHONE ENCOUNTER
[facility] Transitions Initial Follow Up Call    Outreach made within 2 business days of discharge: Yes    Patient: Cristian Braswell Patient : 1955   MRN: [id number]  Reason for Admission: There are no discharge diagnoses documented for the most recent discharge. Discharge Date: 20       Spoke with: no one. VM full    Discharge department/facility: [facility]        Scheduled appointment with PCP within 7-14 days    Follow Up  No future appointments.     Tamela Regan, [address]

## 2020-06-02 NOTE — DISCHARGE SUMMARY
Date of Discharge:  5/31/2020     Discharge Diagnosis: Left buttock abscess  Presenting Problem/History of Present Illness     Incision and drainage left buttock abscess with debridement of skin and subcutaneous tissues; pulse irrigation   Surgeon:  Kim Guardado MD  Anesthesia:  Get loc  Ebl:  Minimal  Ivf:  See anesthesia  Indications: the patient is a 65-year-old female who presents complaining of left buttock abscess for I and D. The risks, benefits, complications, and possible alternatives were discussed with the patient who agreed to proceed.  Description of procedure: the patient was laid left side down. The left buttock was prepped and draped.  A stab incision was created and purulent discharge was expressed. Cultures were taken.  The skin was sharply debrided around the perimeter to healthy adipose tissue.  The wound did tract to the muscle layer.  It was copiously irrigated with three liters of sterile saline using a pulse .  The wound was then packed with saline soaked gauze and then dry dressings were applied. The sponge, needle, and instrument counts were correct.     She was subsequently admitted had a abscess in her left buttock and gluteal region from a Abilify injection.  This was opened and drained  She is currently 3 days out from this drainage she is done well currently we will look toward discharge today follow-up with Dr. Guardado in 1 week.  Electrolytes within normal limits, white count is reduced to 7.0 hematocrit 28.     Procedures Performed  Procedure(s):  INCISION AND DRAINAGE ABSCESS     Consults:       Consults      No orders found from 4/30/2020 to 5/30/2020.             Pertinent Test Results:            Lab Results (last 24 hours)      Procedure Component Value Units Date/Time     POC Glucose Once [[id number]]  (Normal) Collected:  05/31/20 0616     Specimen:  Blood Updated:  05/31/20 0644       Glucose 115 mg/dL         Comment: : [id number] [place] ID:  [id number]        Basic Metabolic Panel [[id number]]  (Normal) Collected:  05/31/20 0359     Specimen:  Blood Updated:  05/31/20 0444       Glucose 88 mg/dL         BUN 14 mg/dL         Creatinine 0.62 mg/dL         Sodium 142 mmol/L         Potassium 4.6 mmol/L         Chloride 106 mmol/L         CO2 24.0 mmol/L         Calcium 8.6 mg/dL         eGFR Non African Amer 97 mL/min/1.73         BUN/Creatinine Ratio 22.6       Anion Gap 12.0 mmol/L       Narrative:        GFR Normal >60  Chronic Kidney Disease <60  Kidney Failure <15        CBC (No Diff) [[id number]]  (Abnormal) Collected:  05/31/20 0359     Specimen:  Blood Updated:  05/31/20 0433       WBC 7.03 10*3/mm3         RBC 3.62 10*6/mm3         Hemoglobin 8.6 g/dL         Hematocrit 28.5 %         MCV 78.7 fL         MCH 23.8 pg         MCHC 30.2 g/dL         RDW 23.1 %         RDW-SD 65.4 fl         MPV 11.1 fL         Platelets 305 10*3/mm3       Blood Culture - Blood, Wrist, Right [[id number]] Collected:  05/29/20 0119     Specimen:  Blood from Wrist, Right Updated:  05/31/20 0130       Blood Culture No growth at 2 days     Blood Culture - Blood, Arm, Left [[id number]] Collected:  05/29/20 0119     Specimen:  Blood from Arm, Left Updated:  05/31/20 0130       Blood Culture No growth at 2 days     POC Glucose Once [[id number]]  (Normal) Collected:  05/30/20 2351     Specimen:  Blood Updated:  05/31/20 0003       Glucose 87 mg/dL         Comment: : [id number] [place] ID: [id number]        POC Glucose Once [[id number]]  (Normal) Collected:  05/30/20 1645     Specimen:  Blood Updated:  05/30/20 1655       Glucose 102 mg/dL         Comment: : [id number] João Beyer ([place] ID: [id number]        Vancomycin, Trough Please collect 30-60 minutes prior to dose due 05/30/20 at 1600 [[id number]]  (Normal) Collected:  05/30/20 1525     Specimen:  Blood Updated:  05/30/20 1613       Vancomycin Trough 19.50 mcg/mL       POC Glucose Once [[id number]]   (Normal) Collected:  05/30/20 1223     Specimen:  Blood Updated:  05/30/20 1234       Glucose 125 mg/dL         Comment: : [id number] Yon CallaMeter ID: [id number]        Wound Culture - Surgical Site, Hip, Left [[id number]]  (Abnormal) Collected:  05/29/20 1335     Specimen:  Surgical Site from Hip, Left Updated:  05/30/20 1141       Wound Culture Moderate growth (3+) Streptococcus agalactiae (Group B)       Comment:    This organism is considered to be universally susceptible to penicillin.  No further antibiotic testing will be performed. If Clindamycin or Erythromycin is the drug of choice, notify the laboratory within 7 days to request susceptibility testing.          Gram Stain Many (4+) WBCs seen         Many (4+) Gram positive cocci in pairs and chains                Condition on Discharge: Discharged to home        Discharge Disposition follow-up with Dr. Guardado in 1 week.     Discharge Medications      Discharge Medications            Continue These Medications      Instructions Start Date   FreeStyle Duke Houstonia device    1 Units, Does not apply                      ASK your doctor about these medications      Instructions Start Date   ABILIFY IM    Intramuscular        albuterol sulfate  (90 Base) MCG/ACT inhaler  Commonly known as:  PROVENTIL HFA;VENTOLIN HFA;PROAIR HFA    2 puffs, Inhalation        ARIPiprazole Lauroxil  MG/3.2ML intramuscular injection  Commonly known as:  ARISTADA    882 mg, Intramuscular        atorvastatin 20 MG tablet  Commonly known as:  LIPITOR    20 mg, Oral        benztropine 1 MG tablet  Commonly known as:  COGENTIN    1 mg, Oral        clobetasol 0.05 % ointment  Commonly known as:  TEMOVATE    Apply topically 2 times daily.        diclofenac 75 MG EC tablet  Commonly known as:  VOLTAREN    75 mg, Oral        donepezil 10 MG tablet  Commonly known as:  ARICEPT    Take 1 tablet by mouth every night.        fluticasone 50 MCG/ACT nasal spray  Commonly  known as:  FLONASE    2 sprays, Nasal        furosemide 20 MG tablet  Commonly known as:  LASIX    20 mg, Oral        ipratropium-albuterol 0.5-2.5 mg/3 ml nebulizer  Commonly known as:  DUO-NEB    3 mL, Inhalation        irbesartan 150 MG tablet  Commonly known as:  AVAPRO    150 mg, Oral        Liraglutide 18 MG/3ML solution pen-injector injection  Commonly known as:  VICTOZA    1.8 mg, Subcutaneous        Lurasidone HCl 80 MG tablet    80 mg, Oral        MEPROBAMATE PO    500 mg, Oral, 2 Times Daily        metFORMIN 500 MG tablet  Commonly known as:  GLUCOPHAGE    500 mg, Oral        mirtazapine 15 MG tablet  Commonly known as:  REMERON    15 mg, Oral        nystatin 469189 UNIT/GM powder  Commonly known as:  MYCOSTATIN    Apply 3 times daily.        rOPINIRole 2 MG tablet  Commonly known as:  REQUIP    2 mg, Oral        vitamin B-12 500 MCG tablet  Commonly known as:  CYANOCOBALAMIN    500 mcg, Oral        vitamin D 1.25 MG (23982 UT) capsule capsule  Commonly known as:  ERGOCALCIFEROL    50,000 Units, Oral                  Discharge Diet: Regular diet     Activity at Discharge: No limitations on activity but change the dressing on her left hip with quarter percent Dakin's moistened solution twice a day.     Follow-up Appointments  No future appointments.  [unfilled]     Test Results Pending at Discharge  [unfilled]     Vel Larios MD  05/31/20  09:26

## 2020-06-02 NOTE — TELEPHONE ENCOUNTER
[facility] Transitions Initial Follow Up Call    Outreach made within 2 business days of discharge: Yes    Patient: Agusto Rosas Patient : 1955   MRN: [id number]  Reason for Admission: abscess Discharge Date: 20       Spoke with: no one. LMTCB    Discharge department/facility: [facility] [place]    Scheduled appointment with PCP within 7-14 days    Follow Up  No future appointments.     Chase Ortega, [place]

## 2020-06-03 LAB
BACTERIA SPEC AEROBE CULT: NORMAL
BACTERIA SPEC AEROBE CULT: NORMAL
CYTO UR: NORMAL
LAB AP CASE REPORT: NORMAL
PATH REPORT.FINAL DX SPEC: NORMAL
PATH REPORT.GROSS SPEC: NORMAL

## 2020-06-04 ENCOUNTER — TELEPHONE (OUTPATIENT)
Dept: PRIMARY CARE CLINIC | Age: 65
End: 2020-06-04

## 2020-06-04 NOTE — TELEPHONE ENCOUNTER
Received fax from pharmacy requesting refill on pts medication(s). Pt was last seen in office on 3/4/2020  and has a follow up scheduled for Visit date not found. Will send request to  Delf[facility] Sánchez  for authorization.      Requested Prescriptions     Pending Prescriptions Disp Refills    diclofenac (VOLTAREN) 75 MG EC tablet [Pharmacy Med Name: Diclofenac Sodium 75mg Delayed-Release Tablet] 60 tablet 4     Sig: Take 1 tablet by mouth 2 times daily

## 2020-06-05 RX ORDER — DICLOFENAC SODIUM 75 MG/1
75 TABLET, DELAYED RELEASE ORAL 2 TIMES DAILY
Qty: 60 TABLET | Refills: 4 | Status: SHIPPED | OUTPATIENT
Start: 2020-06-05 | End: 2020-11-04 | Stop reason: SDUPTHER

## 2020-06-05 RX ORDER — IRBESARTAN 150 MG/1
150 TABLET ORAL NIGHTLY
Qty: 30 TABLET | Refills: 3 | Status: SHIPPED | OUTPATIENT
Start: 2020-06-05 | End: 2020-09-29

## 2020-06-07 ENCOUNTER — HOSPITAL ENCOUNTER (EMERGENCY)
Facility: HOSPITAL | Age: 65
Discharge: HOME | End: 2020-06-07
Attending: EMERGENCY MEDICINE | Admitting: EMERGENCY MEDICINE

## 2020-06-07 ENCOUNTER — NURSE TRIAGE (OUTPATIENT)
Dept: CALL CENTER | Facility: HOSPITAL | Age: 65
End: 2020-06-07

## 2020-06-07 VITALS
TEMPERATURE: 99 F | BODY MASS INDEX: 38.89 KG/M2 | SYSTOLIC BLOOD PRESSURE: 115 MMHG | OXYGEN SATURATION: 96 % | RESPIRATION RATE: 17 BRPM | HEIGHT: 61 IN | WEIGHT: 206 LBS | HEART RATE: 93 BPM | DIASTOLIC BLOOD PRESSURE: 64 MMHG

## 2020-06-07 DIAGNOSIS — T14.8XXA SURGICAL WOUND PRESENT: Primary | ICD-10-CM

## 2020-06-07 PROCEDURE — 99282 EMERGENCY DEPT VISIT SF MDM: CPT

## 2020-06-07 RX ORDER — HYDROXYZINE PAMOATE 50 MG/1
50 CAPSULE ORAL 3 TIMES DAILY PRN
Qty: 12 CAPSULE | Refills: 0 | OUTPATIENT
Start: 2020-06-07 | End: 2021-03-13

## 2020-06-07 RX ORDER — AMOXICILLIN AND CLAVULANATE POTASSIUM 875; 125 MG/1; MG/1
1 TABLET, FILM COATED ORAL EVERY 12 HOURS
Qty: 14 TABLET | Refills: 0 | Status: SHIPPED | OUTPATIENT
Start: 2020-06-07 | End: 2021-03-13

## 2020-06-07 NOTE — ED PROVIDER NOTES
"Subjective   History of Present Illness    Patient is a pleasant 65-year-old female chief complaint of misplacing her antibiotic for recent abscess completed last week.  The patient describes developing a left gluteal abscess that required surgical intervention.  She was hospitalized at this facility.  Dr. Kim Guardado, general surgeon, completed an I&D last week.  The patient was discharged home with Bactrim.  She reports she misplaced it several days ago.  Her daughter changed her dressing for the first time today and noticed \"slimy green stuff.\"  This scared her so she wanted to come the ER to get checked out.  She denies any associated measured fever.  She does have follow-up appointment with her general surgeon tomorrow.  She reports that her glucose has been controlled.  She denies any other complaints.    Review of Systems   All other systems reviewed and are negative.      Past Medical History:   Diagnosis Date   • Arthritis    • Asthma    • Bipolar 1 disorder (CMS/HCC)    • COPD (chronic obstructive pulmonary disease) (CMS/HCC)    • Diabetes mellitus (CMS/HCC)    • Hypotension    • Migraine    • Urinary tract infection        Allergies   Allergen Reactions   • Morphine Shortness Of Breath   • Codeine GI Intolerance       Past Surgical History:   Procedure Laterality Date   • ADENOIDECTOMY     • APPENDECTOMY     •  SECTION     • CHOLECYSTECTOMY     • GASTRIC BYPASS     • INCISION AND DRAINAGE ABSCESS N/A 2020    Procedure: INCISION AND DRAINAGE ABSCESS;  Surgeon: Kim Guardado MD;  Location: [facility];  Service: General;  Laterality: N/A;   • TONSILLECTOMY     • TUBAL ABDOMINAL LIGATION     • TUMOR REMOVAL Right     wrist       History reviewed. No pertinent family history.    Social History     Socioeconomic History   • Marital status:      Spouse name: Not on file   • Number of children: Not on file   • Years of education: Not on file   • Highest education level: Not on file "   Tobacco Use   • Smoking status: Heavy Tobacco Smoker     Packs/day: 0.50   Substance and Sexual Activity   • Alcohol use: No     Frequency: Never   • Drug use: No       Prior to Admission medications    Medication Sig Start Date End Date Taking? Authorizing Provider   albuterol sulfate  (90 Base) MCG/ACT inhaler Inhale 2 puffs. 10/22/19   Janell Katz MD   ARIPiprazole (ABILIFY IM) Inject  into the appropriate muscle as directed by prescriber.    Janell Katz MD   ARIPiprazole Lauroxil ER (ARISTADA) 882 MG/3.2ML intramuscular injection Inject 882 mg into the appropriate muscle as directed by prescriber. 12/3/19   Janell Katz MD   atorvastatin (LIPITOR) 20 MG tablet Take 20 mg by mouth. 12/3/19   Janell Katz MD   benztropine (COGENTIN) 1 MG tablet Take 1 mg by mouth. 12/3/19   Janell Katz MD   CITRUCEL oral powder Take 2 application by mouth Daily for 30 doses. 5/31/20 6/30/20  Vel Larios MD   clobetasol (TEMOVATE) 0.05 % ointment Apply topically 2 times daily. 11/5/18   Janell Katz MD   Continuous Blood Gluc  (FREESTYLE JOSE READER) device 1 Units. 8/21/18   Janell Katz MD   diclofenac (VOLTAREN) 75 MG EC tablet Take 75 mg by mouth. 12/3/19   Janell Katz MD   donepezil (ARICEPT) 10 MG tablet Take 1 tablet by mouth every night. 10/9/19   Janell Katz MD   fluticasone (FLONASE) 50 MCG/ACT nasal spray 2 sprays into the nostril(s) as directed by provider. 10/22/19   Janell Katz MD   furosemide (LASIX) 20 MG tablet Take 20 mg by mouth. 10/9/19   Janell Katz MD   HYDROcodone-acetaminophen (NORCO) 5-325 MG per tablet Take 1 tablet by mouth Every 4 (Four) Hours As Needed for Mild Pain  for up to 30 doses. 5/31/20   Vel Larios MD   ipratropium-albuterol (DUO-NEB) 0.5-2.5 mg/3 ml nebulizer Inhale 3 mL. 10/2/18   Janell Katz MD   irbesartan (AVAPRO) 150 MG tablet Take 150 mg by  "mouth. 4/6/20   Janell Katz MD   Liraglutide (VICTOZA) 18 MG/3ML solution pen-injector injection Inject 1.8 mg under the skin into the appropriate area as directed. 3/6/20   Janell Katz MD   Lurasidone HCl 80 MG tablet Take 80 mg by mouth. 3/4/20   Janell Katz MD   MEPROBAMATE PO Take 500 mg by mouth 2 (Two) Times a Day.    Janell Katz MD   metFORMIN (GLUCOPHAGE) 500 MG tablet Take 500 mg by mouth. 12/3/19   Janell Katz MD   mirtazapine (REMERON) 15 MG tablet Take 15 mg by mouth. 12/3/19   Janell Katz MD   nystatin (MYCOSTATIN) 814479 UNIT/GM powder Apply 3 times daily. 3/6/18   Janell Katz MD   rOPINIRole (REQUIP) 2 MG tablet Take 2 mg by mouth. 10/9/19   Janell Katz MD   sulfamethoxazole-trimethoprim (Bactrim DS) 800-160 MG per tablet Take 1 tablet by mouth 2 (Two) Times a Day for 14 days. 5/31/20 6/14/20  Vel Larios MD   vitamin B-12 (CYANOCOBALAMIN) 500 MCG tablet Take 500 mcg by mouth. 10/22/19   Janell Katz MD   vitamin D (ERGOCALCIFEROL) 1.25 MG (69571 UT) capsule capsule Take 50,000 Units by mouth. 3/5/20   Janell Katz MD   ZOFRAN 8 MG tablet Take 1 tablet by mouth Every 8 (Eight) Hours As Needed for Nausea or Vomiting for up to 10 doses. 5/31/20   Vel Larios MD       Medications - No data to display    /64 (BP Location: Left arm, Patient Position: Sitting)   Pulse 93   Temp 99 °F (37.2 °C) (Oral)   Resp 17   Ht 154.9 cm (61\")   Wt 93.4 kg (206 lb)   SpO2 96%   BMI 38.92 kg/m²       Objective   Physical Exam   Constitutional: She is oriented to person, place, and time. She appears well-developed and well-nourished. No distress.   HENT:   Head: Normocephalic and atraumatic.   Eyes: Pupils are equal, round, and reactive to light. Conjunctivae and EOM are normal.   Neck: Normal range of motion. Neck supple. No tracheal deviation present.   Cardiovascular: Normal rate, regular rhythm, " normal heart sounds and intact distal pulses.   No murmur heard.  Pulmonary/Chest: Effort normal and breath sounds normal.   Abdominal: Soft. Bowel sounds are normal. She exhibits no distension and no mass. There is no tenderness. There is no rebound and no guarding.   Musculoskeletal: Normal range of motion. She exhibits no edema.   Neurological: She is alert and oriented to person, place, and time. She has normal reflexes.   Skin: Skin is warm and dry. Capillary refill takes less than 2 seconds. She is not diaphoretic.   Patient has a surgical wound measuring about the size of a small orange on her left gluteal region.  This is healing by secondary intention with a depth about 5 cm.  She does have beefy granulation tissue with scant clear discharge.  Edges are not curled in at this point.  There is no excessive purulent drainage.  There is no erythema.  Is mildly tender to touch.   Psychiatric: She has a normal mood and affect. Her behavior is normal. Judgment and thought content normal.   Nursing note and vitals reviewed.      Procedures         Lab Results (last 24 hours)     ** No results found for the last 24 hours. **          Ct Pelvis With Contrast    Result Date: 5/29/2020  Narrative: EXAMINATION: CT PELVIS W CONTRAST- 5/29/2020 6:49 AM CDT  HISTORY: Left gluteal abscess status post injection, elevated white blood cell count  COMPARISON: None  DOSE: 708 mGy-cm  TECHNIQUE: Sequential imaging was performed through the pelvis after the administration of IV contrast.  Sagittal and coronal reformations were made from the original source data and reviewed. Automated exposure control was also utilized to decrease patient radiation dose.  FINDINGS: The visualized portion of the liver is unremarkable. The inferior aspect of the kidneys appear grossly normal.  The visualized abdominal aorta appears normal in caliber. There are scattered atherosclerotic calcifications of the aorta and its branch vessels. The origin  of the inferior mesenteric artery enhances normally.  There is a moderate amount of stool within the visualized colon. Visualized small bowel does not appear overly dilated. No free air or free fluid is seen in the abdomen. There is a small fat-containing umbilical hernia.  No pathologically enlarged retroperitoneal lymph nodes are identified.  The urinary bladder is partially distended and grossly normal in appearance. A few mildly prominent lymph nodes are noted in the left inguinal region which measure up to 10 mm in short axis.  Multiple hyperdense, round areas are seen within the subcutaneous soft tissues of the buttocks bilaterally, presumably areas of fat necrosis related to prior injections. There is some skin thickening on the left with underlying inflammatory change. There is a developing fluid collection which measures approximately 6.1 x 3.1 cm in size, though there is no evidence of rim enhancement to suggest an organized abscess.  Review of the visualized osseous structures demonstrates no acute or aggressive lesions.      Impression: 1. Organizing fluid collection in the subcutaneous fat of the left buttock suggests phlegmon/developing abscess. There is no rim enhancement to suggest a drainable abscess.  2. Additional high density lesions in the subcutaneous fat bilaterally are felt to most likely represent areas of developing fat necrosis/injection granulomas from prior injections. 3. Reactive left inguinal lymphadenopathy.  A preliminary report was provided by stat rad.   This report was finalized on 05/29/2020 06:53 by Dr. Huber Garza MD.      ED Course  ED Course as of Jun 07 1311   Sun Jun 07, 2020   1308 I have educated the patient and her daughter that the wound is healing appropriately.  I did review the cultures completed last week and the infection is susceptible to penicillin.  Therefore, I will prescribe Augmentin.  She does have a follow-up point with Dr. Guardado tomorrow.  Advised  patient continue with appointment tomorrow.  Strict return precautions advised.  We will go ahead and repeat a wet-to-dry dressing for the patient.    [TK]      ED Course User Index  [TK] Hernan Gutierrez PA        Wound Culture - Surgical Site, Hip, Left [[id number]]  (Abnormal) Collected:  05/29/20 1335       Specimen:  Surgical Site from Hip, Left Updated:  05/30/20 1141        Wound Culture Moderate growth (3+) Streptococcus agalactiae (Group B)        Comment:    This organism is considered to be universally susceptible to penicillin.  No further antibiotic testing will be performed. If Clindamycin or Erythromycin is the drug of choice, notify the laboratory within 7 days to request susceptibility testing.          Gram Stain Many (4+) WBCs seen          Many (4+) Gram positive cocci in pairs and chains        MDM    Final diagnoses:   Surgical wound present          Hernan Gutierrez PA  06/07/20 1311

## 2020-06-07 NOTE — TELEPHONE ENCOUNTER
"Reviewed guideline with caller, advises she be evaluated in ED. Caller states she will come later this morning because she is unable to drive at night.     Reason for Disposition  • Severe pain in the incision    Additional Information  • Negative: Major abdominal surgical incision and wound gaping open with visible internal organs  • Negative: Sounds like a life-threatening emergency to the triager  • Negative: Bleeding from incision and won't stop after 10 minutes of direct pressure  • Negative: Widespread rash and bright red, sunburn-like    Answer Assessment - Initial Assessment Questions  1. SYMPTOM: \"What's the main symptom you're concerned about?\" (e.g., redness, pain, drainage)      Drainage   2. ONSET: \"When did drainage  start?\"      today  3. SURGERY: \"What surgery was performed?\"      Abscess I&D  4. DATE of SURGERY: \"When was surgery performed?\"       Thursday of last week   5. INCISION SITE: \"Where is the incision located?\"       Left buttock  6. REDNESS: \"Is there any redness at the incision site?\" If yes, ask: \"How wide across is the redness?\" (Inches, centimeters)       no  7. PAIN: \"Is there any pain?\" If so, ask: \"How bad is it?\"  (Scale 1-10; or mild, moderate, severe)      Yes, 10/10  8. BLEEDING: \"Is there any bleeding?\" If so, ask: \"How much?\" and \"Where?\"      no  9. DRAINAGE: \"Is there any drainage from the incision site?\" If yes, ask: \"What color and how much?\" (e.g., red, cloudy, pus; drops, teaspoon)      Slimy, yellow-green drainage, alot  10. FEVER: \"Do you have a fever?\" If so, ask: \"What is your temperature, how was it measured, and when did it start?\"        Yes, 99.5  11. OTHER SYMPTOMS: \"Do you have any other symptoms?\" (e.g., shaking chills, weakness, rash elsewhere on body)        Shweta craig    Protocols used: POST-OP INCISION SYMPTOMS AND QUESTIONS-ADULT-[place]      "

## 2020-06-25 ENCOUNTER — HOSPITAL ENCOUNTER (OUTPATIENT)
Age: 65
Setting detail: OBSERVATION
Discharge: PSYCHIATRIC OR SUBSTANCE USE FACILITY | DRG: 918 | End: 2020-06-26
Attending: EMERGENCY MEDICINE | Admitting: HOSPITALIST
Payer: MEDICARE

## 2020-06-25 PROBLEM — T50.902A DRUG OVERDOSE, INTENTIONAL SELF-HARM, INITIAL ENCOUNTER (HCC): Status: ACTIVE | Noted: 2020-06-25

## 2020-06-25 LAB
ACETAMINOPHEN LEVEL: <15 UG/ML
ALBUMIN SERPL-MCNC: 3.9 G/DL (ref 3.5–5.2)
ALP BLD-CCNC: 150 U/L (ref 35–104)
ALT SERPL-CCNC: 13 U/L (ref 5–33)
AMPHETAMINE SCREEN, URINE: NEGATIVE
ANION GAP SERPL CALCULATED.3IONS-SCNC: 14 MMOL/L (ref 7–19)
AST SERPL-CCNC: 17 U/L (ref 5–32)
BARBITURATE SCREEN URINE: NEGATIVE
BENZODIAZEPINE SCREEN, URINE: NEGATIVE
BILIRUB SERPL-MCNC: <0.2 MG/DL (ref 0.2–1.2)
BUN BLDV-MCNC: 37 MG/DL (ref 8–23)
CALCIUM SERPL-MCNC: 8.1 MG/DL (ref 8.8–10.2)
CANNABINOID SCREEN URINE: NEGATIVE
CHLORIDE BLD-SCNC: 108 MMOL/L (ref 98–111)
CO2: 20 MMOL/L (ref 22–29)
COCAINE METABOLITE SCREEN URINE: NEGATIVE
CREAT SERPL-MCNC: 1 MG/DL (ref 0.5–0.9)
ETHANOL: <10 MG/DL (ref 0–0.08)
GFR NON-AFRICAN AMERICAN: 56
GLUCOSE BLD-MCNC: 103 MG/DL (ref 70–99)
GLUCOSE BLD-MCNC: 60 MG/DL
GLUCOSE BLD-MCNC: 60 MG/DL (ref 70–99)
GLUCOSE BLD-MCNC: 64 MG/DL (ref 74–109)
HCT VFR BLD CALC: 31.2 % (ref 37–47)
HEMOGLOBIN: 9.4 G/DL (ref 12–16)
Lab: NORMAL
MCH RBC QN AUTO: 25.3 PG (ref 27–31)
MCHC RBC AUTO-ENTMCNC: 30.1 G/DL (ref 33–37)
MCV RBC AUTO: 83.9 FL (ref 81–99)
OPIATE SCREEN URINE: NEGATIVE
PDW BLD-RTO: 20.2 % (ref 11.5–14.5)
PERFORMED ON: ABNORMAL
PERFORMED ON: ABNORMAL
PLATELET # BLD: 257 K/UL (ref 130–400)
PMV BLD AUTO: 11.7 FL (ref 9.4–12.3)
POTASSIUM SERPL-SCNC: 3.7 MMOL/L (ref 3.5–5)
RBC # BLD: 3.72 M/UL (ref 4.2–5.4)
SALICYLATE, SERUM: <3 MG/DL (ref 3–10)
SODIUM BLD-SCNC: 142 MMOL/L (ref 136–145)
TOTAL PROTEIN: 6.2 G/DL (ref 6.6–8.7)
TROPONIN: <0.01 NG/ML (ref 0–0.03)
WBC # BLD: 8.2 K/UL (ref 4.8–10.8)

## 2020-06-25 PROCEDURE — 93005 ELECTROCARDIOGRAM TRACING: CPT

## 2020-06-25 PROCEDURE — 85027 COMPLETE CBC AUTOMATED: CPT

## 2020-06-25 PROCEDURE — 99285 EMERGENCY DEPT VISIT HI MDM: CPT

## 2020-06-25 PROCEDURE — 2580000003 HC RX 258: Performed by: EMERGENCY MEDICINE

## 2020-06-25 PROCEDURE — G0480 DRUG TEST DEF 1-7 CLASSES: HCPCS

## 2020-06-25 PROCEDURE — G0378 HOSPITAL OBSERVATION PER HR: HCPCS

## 2020-06-25 PROCEDURE — 2580000003 HC RX 258: Performed by: HOSPITALIST

## 2020-06-25 PROCEDURE — 96361 HYDRATE IV INFUSION ADD-ON: CPT

## 2020-06-25 PROCEDURE — 80307 DRUG TEST PRSMV CHEM ANLYZR: CPT

## 2020-06-25 PROCEDURE — 96360 HYDRATION IV INFUSION INIT: CPT

## 2020-06-25 PROCEDURE — 80053 COMPREHEN METABOLIC PANEL: CPT

## 2020-06-25 PROCEDURE — 82947 ASSAY GLUCOSE BLOOD QUANT: CPT

## 2020-06-25 PROCEDURE — 6370000000 HC RX 637 (ALT 250 FOR IP): Performed by: HOSPITALIST

## 2020-06-25 PROCEDURE — 84484 ASSAY OF TROPONIN QUANT: CPT

## 2020-06-25 PROCEDURE — 36415 COLL VENOUS BLD VENIPUNCTURE: CPT

## 2020-06-25 RX ORDER — 0.9 % SODIUM CHLORIDE 0.9 %
1000 INTRAVENOUS SOLUTION INTRAVENOUS ONCE
Status: COMPLETED | OUTPATIENT
Start: 2020-06-25 | End: 2020-06-25

## 2020-06-25 RX ORDER — POLYETHYLENE GLYCOL 3350 17 G
2 POWDER IN PACKET (EA) ORAL PRN
Status: DISCONTINUED | OUTPATIENT
Start: 2020-06-25 | End: 2020-06-26 | Stop reason: HOSPADM

## 2020-06-25 RX ORDER — NICOTINE 21 MG/24HR
1 PATCH, TRANSDERMAL 24 HOURS TRANSDERMAL DAILY
Status: DISCONTINUED | OUTPATIENT
Start: 2020-06-26 | End: 2020-06-26 | Stop reason: HOSPADM

## 2020-06-25 RX ORDER — ACETAMINOPHEN 500 MG
1000 TABLET ORAL EVERY 6 HOURS PRN
Status: DISCONTINUED | OUTPATIENT
Start: 2020-06-25 | End: 2020-06-26 | Stop reason: HOSPADM

## 2020-06-25 RX ORDER — PROMETHAZINE HYDROCHLORIDE 25 MG/1
12.5 TABLET ORAL EVERY 6 HOURS PRN
Status: DISCONTINUED | OUTPATIENT
Start: 2020-06-25 | End: 2020-06-26 | Stop reason: HOSPADM

## 2020-06-25 RX ORDER — DEXTROSE, SODIUM CHLORIDE, SODIUM LACTATE, POTASSIUM CHLORIDE, AND CALCIUM CHLORIDE 5; .6; .31; .03; .02 G/100ML; G/100ML; G/100ML; G/100ML; G/100ML
INJECTION, SOLUTION INTRAVENOUS CONTINUOUS
Status: DISCONTINUED | OUTPATIENT
Start: 2020-06-25 | End: 2020-06-26 | Stop reason: HOSPADM

## 2020-06-25 RX ORDER — SODIUM CHLORIDE 0.9 % (FLUSH) 0.9 %
10 SYRINGE (ML) INJECTION EVERY 12 HOURS SCHEDULED
Status: DISCONTINUED | OUTPATIENT
Start: 2020-06-25 | End: 2020-06-26 | Stop reason: HOSPADM

## 2020-06-25 RX ORDER — SODIUM CHLORIDE 0.9 % (FLUSH) 0.9 %
10 SYRINGE (ML) INJECTION PRN
Status: DISCONTINUED | OUTPATIENT
Start: 2020-06-25 | End: 2020-06-26 | Stop reason: HOSPADM

## 2020-06-25 RX ORDER — ONDANSETRON 2 MG/ML
4 INJECTION INTRAMUSCULAR; INTRAVENOUS EVERY 6 HOURS PRN
Status: DISCONTINUED | OUTPATIENT
Start: 2020-06-25 | End: 2020-06-26 | Stop reason: HOSPADM

## 2020-06-25 RX ORDER — ACETAMINOPHEN 650 MG/1
650 SUPPOSITORY RECTAL EVERY 6 HOURS PRN
Status: DISCONTINUED | OUTPATIENT
Start: 2020-06-25 | End: 2020-06-25 | Stop reason: DRUGHIGH

## 2020-06-25 RX ORDER — ACETAMINOPHEN 325 MG/1
650 TABLET ORAL EVERY 6 HOURS PRN
Status: DISCONTINUED | OUTPATIENT
Start: 2020-06-25 | End: 2020-06-25 | Stop reason: DRUGHIGH

## 2020-06-25 RX ADMIN — SODIUM CHLORIDE 1000 ML: 9 INJECTION, SOLUTION INTRAVENOUS at 15:40

## 2020-06-25 RX ADMIN — SODIUM CHLORIDE, SODIUM LACTATE, POTASSIUM CHLORIDE, CALCIUM CHLORIDE AND DEXTROSE MONOHYDRATE: 5; 600; 310; 30; 20 INJECTION, SOLUTION INTRAVENOUS at 17:38

## 2020-06-25 RX ADMIN — ACETAMINOPHEN 1000 MG: 500 TABLET, FILM COATED ORAL at 23:19

## 2020-06-25 RX ADMIN — NICOTINE POLACRILEX 2 MG: 2 LOZENGE ORAL at 23:20

## 2020-06-25 ASSESSMENT — ENCOUNTER SYMPTOMS
VOMITING: 0
EYE PAIN: 0
DIARRHEA: 0
SHORTNESS OF BREATH: 0
ABDOMINAL PAIN: 0

## 2020-06-25 ASSESSMENT — PAIN SCALES - GENERAL
PAINLEVEL_OUTOF10: 6
PAINLEVEL_OUTOF10: 0

## 2020-06-25 NOTE — PROGRESS NOTES
4 Eyes Skin Assessment    Ju Major is being assessed upon: Admission    I agree that I, Susan Bay, along with Padmini Mccurdy, RN (either 2 RN's or 1 LPN and 1 RN) have performed a thorough Head to Toe Skin Assessment on the patient. ALL assessment sites listed below have been assessed. Areas assessed by both nurses:     [x]   Head, Face, and Ears   [x]   Shoulders, Back, and Chest  [x]   Arms, Elbows, and Hands   [x]   Coccyx, Sacrum, and Ischium  [x]   Legs, Feet, and Heels    Does the Patient have Skin Breakdown? Yes, wound(s) noted upon assessment. It is the responsibility of the Primary Nurse to assure that the following documentation, preventions, orders, and consults are complete on the above noted wound(s): Wound LDA initiated. LDA Flowsheet Documentation includes the Ekaterina-wound, Wound Assessment, Measurements, Dressing Treatment, Drainage, and Color.     Pedro Prevention initiated: Yes  Wound Care Orders initiated: Yes    [address]  nurse consulted for Pressure Injury (Stage 3,4, Unstageable, DTI, NWPT, and Complex wounds) and New or Established Ostomies: NA        Primary Nurse eSignature: Susan Bay RN on 6/25/2020 at 5:45 PM      Co-Signer eSignature: {Esignature:[id number]}

## 2020-06-25 NOTE — ED PROVIDER NOTES
[facility] EMERGENCY DEPT  eMERGENCY dEPARTMENT eNCOUnter      Pt Name: Audrey Rios  MRN: [id number]  [place] 1955  Date of evaluation: 6/25/2020  Provider: Ciro Abrams MD    CHIEF COMPLAINT       Chief Complaint   Patient presents with    Suicide Attempt     26 benedryl @ aprox 1330         HISTORY OF PRESENT ILLNESS   (Location/Symptom, Timing/Onset,Context/Setting, Quality, Duration, Modifying Factors, Severity)  Note limiting factors. Audrey Rios is a 63 y.o. female who presents to the emergency department due to overdose. Took 26 benadryl at approximately 130PM.  Patient has healing wound in her left buttock where she had an abscess drained previously. Home health was going to change her dressing at her home and patient told home health nurse that patient had overdosed on Benadryl. After this the nurse called 911 and patient was brought here. She has no complaints at this time. She seems mildly drowsy. Tells me she was not trying to harm her self and was just trying to relax. Report from home health nurse who called 911 was that the patient had voiced suicidal ideation. Patient resting comfortably without complaints at this time. HPI    NursingNotes were reviewed. REVIEW OF SYSTEMS    (2-9 systems for level 4, 10 or more for level 5)     Review of Systems   Constitutional: Negative for fever. Eyes: Negative for pain. Respiratory: Negative for shortness of breath. Cardiovascular: Negative for chest pain and palpitations. Gastrointestinal: Negative for abdominal pain, diarrhea and vomiting. Genitourinary: Negative for dysuria. Skin: Negative for rash. Neurological: Negative for weakness and headaches. Psychiatric/Behavioral: Positive for suicidal ideas (per report from home health nurse). All other systems reviewed and are negative. A complete review of systems was performed and is negative except as noted above in the HPI.        PAST MEDICAL HISTORY     Past CONTINUOUS BLOOD GLUC  (FREESTYLE ABDOULAYE READER) MARCIA    1 Units by Does not apply route 2 times daily    CONTINUOUS BLOOD GLUC SENSOR (FREESTYLE ABDOULAYE SENSOR SYSTEM) MISC    1 Units by Does not apply route 2 times daily    DICLOFENAC (VOLTAREN) 75 MG EC TABLET    Take 1 tablet by mouth 2 times daily    DONEPEZIL (ARICEPT) 10 MG TABLET    Take 1 tablet by mouth every night. FERROUS SULFATE 325 (65 FE) MG TABLET    Take 1 tablet by mouth daily (with breakfast)    FLUTICASONE (FLONASE) 50 MCG/ACT NASAL SPRAY    2 sprays by Nasal route daily    FUROSEMIDE (LASIX) 20 MG TABLET    Take 1 tablet by mouth daily    INSULIN PEN NEEDLE (B-D ULTRAFINE III SHORT PEN) 31G X 8 MM MISC    Inject 1 each as directed daily    IPRATROPIUM-ALBUTEROL (DUONEB) 0.5-2.5 (3) MG/3ML SOLN NEBULIZER SOLUTION    Inhale 3 mLs into the lungs every 6 hours as needed for Shortness of Breath    IRBESARTAN (AVAPRO) 150 MG TABLET    Take 1 tablet by mouth nightly    KETOROLAC (TORADOL) 30 MG/ML INJECTION    Inject 1 mL into the muscle once for 1 dose    LINACLOTIDE (LINZESS) 72 MCG CAPS CAPSULE    Take 1 capsule by mouth every morning (before breakfast)    LIRAGLUTIDE (VICTOZA) 18 MG/3ML SOPN SC INJECTION    Inject 1.8 mg into the skin daily    LURASIDONE (LATUDA) 80 MG TABS TABLET    Take 1 tablet by mouth daily    MELATONIN 3 MG TABS TABLET    Take 1 tablet by mouth nightly    METFORMIN (GLUCOPHAGE) 500 MG TABLET    Take 1 tablet by mouth 2 times daily (with meals)    MIRTAZAPINE (REMERON) 15 MG TABLET    Take 1 tablet by mouth nightly    NYSTATIN (MYCOSTATIN) 140069 UNIT/GM POWDER    Apply 3 times daily. OXYGEN    Inhale 3 L into the lungs    PANTOPRAZOLE (PROTONIX) 40 MG TABLET    Take 1 tablet by mouth daily.     ROPINIROLE (REQUIP) 2 MG TABLET    Take 1 tablet by mouth 2 times daily    VITAMIN B-12 (CYANOCOBALAMIN) 500 MCG TABLET    Take 1 tablet by mouth daily    VITAMIN D (ERGOCALCIFEROL) 1.25 MG (97662 UT) CAPS CAPSULE    Take 1 capsule by mouth once a week       ALLERGIES     Morphine and Codeine    FAMILY HISTORY       Family History   Problem Relation Age of Onset    Diabetes Mother     Kidney Disease Mother     High Blood Pressure Mother     Cancer Mother     Diabetes Father     High Blood Pressure Father     Heart Disease Father     Diabetes Brother     High Blood Pressure Brother     Diabetes Sister     High Blood Pressure Sister     Diabetes Brother     High Blood Pressure Brother     Cancer Maternal Aunt     Colon Cancer Neg Hx     Colon Polyps Neg Hx     Esophageal Cancer Neg Hx     Liver Cancer Neg Hx     Liver Disease Neg Hx     Rectal Cancer Neg Hx     Stomach Cancer Neg Hx           SOCIAL HISTORY       Social History     Socioeconomic History    Marital status:      Spouse name: None    Number of children: None    Years of education: None    Highest education level: None   Occupational History    None   Social Needs    Financial resource strain: None    Food insecurity     Worry: None     Inability: None    Transportation needs     Medical: None     Non-medical: None   Tobacco Use    Smoking status: Current Every Day Smoker     Packs/day: 0.50     Types: Cigarettes    Smokeless tobacco: Never Used   Substance and Sexual Activity    Alcohol use: Yes     Comment: occ    Drug use: Yes     Types: Marijuana     Comment: occ    Sexual activity: Not Currently   Lifestyle    Physical activity     Days per week: None     Minutes per session: None    Stress: None   Relationships    Social connections     Talks on phone: None     Gets together: None     Attends [employer] service: None     Active member of club or organization: None     Attends meetings of clubs or organizations: None     Relationship status: None    Intimate partner violence     Fear of current or ex partner: None     Emotionally abused: None     Physically abused: None     Forced sexual activity: None   Other Topics Concern  None   Social History Narrative    None       SCREENINGS             PHYSICAL EXAM    (up to 7 for level 4, 8 or more for level 5)     ED Triage Vitals [06/25/20 1441]   BP Temp Temp src Pulse Resp SpO2 Height Weight   (!) 122/56 98.3 °F (36.8 °C) -- 100 18 97 % 5' (1.524 m) 207 lb (93.9 kg)       Physical Exam  Vitals signs reviewed. Constitutional:       General: She is not in acute distress. Appearance: She is well-developed. HENT:      Head: Normocephalic and atraumatic. Mouth/Throat:      Mouth: Mucous membranes are dry. Pharynx: Oropharynx is clear. Eyes:      General: No scleral icterus. Pupils: Pupils are equal, round, and reactive to light. Neck:      Musculoskeletal: Normal range of motion and neck supple. Vascular: No JVD. Cardiovascular:      Rate and Rhythm: Normal rate and regular rhythm. Heart sounds: Normal heart sounds. Pulmonary:      Effort: Pulmonary effort is normal. No respiratory distress. Breath sounds: Normal breath sounds. Abdominal:      General: There is no distension. Palpations: Abdomen is soft. Tenderness: There is no abdominal tenderness. There is no guarding or rebound. Musculoskeletal:         General: No tenderness. Legs:    Skin:     General: Skin is warm and dry. Capillary Refill: Capillary refill takes less than 2 seconds. Neurological:      General: No focal deficit present. Mental Status: She is alert and oriented to person, place, and time. GCS: GCS eye subscore is 4. GCS verbal subscore is 5. GCS motor subscore is 6. Sensory: Sensation is intact. Motor: Motor function is intact. Psychiatric:         Attention and Perception: Attention and perception normal.         Mood and Affect: Affect is blunt and inappropriate. Speech: Speech normal.         Behavior: Behavior normal.         Thought Content:  Thought content normal.      Comments: Odd affect         DIAGNOSTIC RESULTS     EKG: All EKG's are interpreted by the Emergency Department Physician who either signs or Co-signs this chart in the absence of a cardiologist.    Normal sinus rhythm. Normal QT. No signs of acute ischemia compared to previous EKG. LABS:  Labs Reviewed   CBC - Abnormal; Notable for the following components:       Result Value    RBC 3.72 (*)     Hemoglobin 9.4 (*)     Hematocrit 31.2 (*)     MCH 25.3 (*)     MCHC 30.1 (*)     RDW 20.2 (*)     All other components within normal limits   COMPREHENSIVE METABOLIC PANEL - Abnormal; Notable for the following components:    CO2 20 (*)     Glucose 64 (*)     BUN 37 (*)     CREATININE 1.0 (*)     GFR Non-African American 56 (*)     Calcium 8.1 (*)     Total Protein 6.2 (*)     Alkaline Phosphatase 150 (*)     All other components within normal limits   POCT GLUCOSE - Abnormal; Notable for the following components:    POC Glucose 60 (*)     All other components within normal limits   POCT GLUCOSE - Normal   ACETAMINOPHEN LEVEL   ETHANOL   SALICYLATE LEVEL   TROPONIN   URINE DRUG SCREEN       All other labs were within normal range or not returned as of this dictation. EMERGENCY DEPARTMENT COURSE and DIFFERENTIALDIAGNOSIS/MDM:   Vitals:    Vitals:    06/25/20 1441 06/25/20 1502 06/25/20 1533   BP: (!) 122/56 (!) 153/125 (!) 91/56   Pulse: 100 84 84   Resp: 18 22 22   Temp: 98.3 °F (36.8 °C)     SpO2: 97% 96% 94%   Weight: 207 lb (93.9 kg)     Height: 5' (1.524 m)         MDM  Case discussed with poison control who recommended supportive care, benzos as needed for agitation/seizures. Said to call back if patient declines for further input from [facility]. Plan will be for psychiatric evaluation once medically clear. Patient has mild BRIE. Fluid bolus ordered. Patient's glucose is borderline low. She does not appear to be having any symptoms from this at all but said she is hungry.   We will give her something to eat and recheck her glucose. Uncertain as far as patient's intentions when she took the Benadryl but concerned that this was a suicide attempt so will place her on a 72-hour hold. Patient's case discussed with Dr. Niles Man, hospitalist, who will admit the patient to the ICU for close monitoring. Patient resting comfortably at this time and nontoxic on exam.    CONSULTS:  None    PROCEDURES:  Unless otherwise notedbelow, none     Procedures    FINAL IMPRESSION     1. Drug overdose, undetermined intent, initial encounter    2.  BRIE (acute kidney injury) ([facility]          DISPOSITION/PLAN   DISPOSITION        PATIENT REFERRED TO:  @FUP@    DISCHARGE MEDICATIONS:  New Prescriptions    No medications on file          (Please note that portions of this note were completed with a voice recognition program.  Efforts were made to edit the dictations butoccasionally words are mis-transcribed.)    Kirill Chin MD (electronically signed)  AttendingEmergency Physician        Kirill Chin MD  06/25/20 8259

## 2020-06-25 NOTE — ED NOTES
ICU states to wait 15 minuets before transferring patient to them.         Shyla Jimenez RN  06/25/20 1640

## 2020-06-25 NOTE — ED NOTES
Bed: 02-A  Expected date:   Expected time:   Means of arrival:   Comments:  [address]ar, ANTONIO  06/25/20 6939

## 2020-06-25 NOTE — PROGRESS NOTES
Ju Martinez arrived to room # 145. Presented with: suicidal  Mental Status: Patient is oriented and alert. Vitals:    06/25/20 1710   BP: (!) 118/58   Pulse: 74   Resp:    Temp: 98.1 °F (36.7 °C)   SpO2: 99%     Patient safety contract and falls prevention contract reviewed with patient Yes. Oriented Patient to room. Call light within reach. Yes. Needs, issues or concerns expressed at this time: yes,  Wants dinner.       Electronically signed by Nikita Sahu RN on 6/25/2020 at 5:46 PM

## 2020-06-25 NOTE — ED NOTES
Talked to Francisco Hoyos from [facility]. She states patient must be under observation for 4-6 hours, and has to void before she can be discharged. She also states that symptoms could include hypertension, tachycardia, dry mouth and thirst. Poison control states they will call back to check on patient.          Tricia Saint, RN  06/25/20 3390

## 2020-06-25 NOTE — ED NOTES
Patient states that she became very anxious so decided to \"throw back the bottle\" in order to calm herself down. Patient states she is not suicidal and this was not a suicide attempt. Patient has had a prior attempt in 2009. She states that she has had recent suicidal thoughts and has plans such as antifreeze and taking several of her blood pressure medication. Patient vitals are stable, alert, oriented and able to follow commands. Will continue to monitor.       Tony Zarate RN  06/25/20 3397

## 2020-06-26 ENCOUNTER — HOSPITAL ENCOUNTER (INPATIENT)
Age: 65
LOS: 3 days | Discharge: HOME HEALTH | DRG: 885 | End: 2020-06-29
Attending: PSYCHIATRY & NEUROLOGY | Admitting: PSYCHIATRY & NEUROLOGY
Payer: MEDICARE

## 2020-06-26 VITALS
HEIGHT: 60 IN | BODY MASS INDEX: 40.8 KG/M2 | DIASTOLIC BLOOD PRESSURE: 50 MMHG | HEART RATE: 99 BPM | WEIGHT: 207.8 LBS | RESPIRATION RATE: 18 BRPM | OXYGEN SATURATION: 96 % | SYSTOLIC BLOOD PRESSURE: 120 MMHG | TEMPERATURE: 98.2 F

## 2020-06-26 LAB
GLUCOSE BLD-MCNC: 143 MG/DL (ref 70–99)
GLUCOSE BLD-MCNC: 144 MG/DL (ref 70–99)
PERFORMED ON: ABNORMAL
PERFORMED ON: ABNORMAL

## 2020-06-26 PROCEDURE — 2000000000 HC ICU R&B

## 2020-06-26 PROCEDURE — 6360000002 HC RX W HCPCS: Performed by: PSYCHIATRY & NEUROLOGY

## 2020-06-26 PROCEDURE — 82947 ASSAY GLUCOSE BLOOD QUANT: CPT

## 2020-06-26 PROCEDURE — 2500000003 HC RX 250 WO HCPCS: Performed by: PSYCHIATRY & NEUROLOGY

## 2020-06-26 PROCEDURE — 1240000000 HC EMOTIONAL WELLNESS R&B

## 2020-06-26 PROCEDURE — 6370000000 HC RX 637 (ALT 250 FOR IP): Performed by: HOSPITALIST

## 2020-06-26 PROCEDURE — G0378 HOSPITAL OBSERVATION PER HR: HCPCS

## 2020-06-26 PROCEDURE — 99221 1ST HOSP IP/OBS SF/LOW 40: CPT | Performed by: PSYCHIATRY & NEUROLOGY

## 2020-06-26 PROCEDURE — 6370000000 HC RX 637 (ALT 250 FOR IP): Performed by: PSYCHIATRY & NEUROLOGY

## 2020-06-26 RX ORDER — ONDANSETRON 2 MG/ML
4 INJECTION INTRAMUSCULAR; INTRAVENOUS EVERY 6 HOURS PRN
Status: CANCELLED | OUTPATIENT
Start: 2020-06-26

## 2020-06-26 RX ORDER — DONEPEZIL HYDROCHLORIDE 5 MG/1
10 TABLET, FILM COATED ORAL NIGHTLY
Status: DISCONTINUED | OUTPATIENT
Start: 2020-06-26 | End: 2020-06-29

## 2020-06-26 RX ORDER — ACETAMINOPHEN 325 MG/1
650 TABLET ORAL EVERY 4 HOURS PRN
Status: DISCONTINUED | OUTPATIENT
Start: 2020-06-26 | End: 2020-06-26

## 2020-06-26 RX ORDER — IPRATROPIUM BROMIDE AND ALBUTEROL SULFATE 2.5; .5 MG/3ML; MG/3ML
1 SOLUTION RESPIRATORY (INHALATION)
Status: DISCONTINUED | OUTPATIENT
Start: 2020-06-27 | End: 2020-06-26

## 2020-06-26 RX ORDER — PANTOPRAZOLE SODIUM 40 MG/1
40 TABLET, DELAYED RELEASE ORAL
Status: DISCONTINUED | OUTPATIENT
Start: 2020-06-27 | End: 2020-06-29 | Stop reason: HOSPADM

## 2020-06-26 RX ORDER — POLYETHYLENE GLYCOL 3350 17 G
2 POWDER IN PACKET (EA) ORAL PRN
Status: CANCELLED | OUTPATIENT
Start: 2020-06-26

## 2020-06-26 RX ORDER — VALPROIC ACID 250 MG/1
500 CAPSULE, LIQUID FILLED ORAL 2 TIMES DAILY
Status: DISCONTINUED | OUTPATIENT
Start: 2020-06-26 | End: 2020-06-27

## 2020-06-26 RX ORDER — POLYETHYLENE GLYCOL 3350 17 G/17G
17 POWDER, FOR SOLUTION ORAL DAILY PRN
Status: DISCONTINUED | OUTPATIENT
Start: 2020-06-26 | End: 2020-06-29 | Stop reason: HOSPADM

## 2020-06-26 RX ORDER — DEXTROSE, SODIUM CHLORIDE, SODIUM LACTATE, POTASSIUM CHLORIDE, AND CALCIUM CHLORIDE 5; .6; .31; .03; .02 G/100ML; G/100ML; G/100ML; G/100ML; G/100ML
INJECTION, SOLUTION INTRAVENOUS CONTINUOUS
Status: CANCELLED | OUTPATIENT
Start: 2020-06-26

## 2020-06-26 RX ORDER — FUROSEMIDE 20 MG/1
20 TABLET ORAL DAILY
Status: DISCONTINUED | OUTPATIENT
Start: 2020-06-27 | End: 2020-06-29 | Stop reason: HOSPADM

## 2020-06-26 RX ORDER — ACETAMINOPHEN 500 MG
1000 TABLET ORAL EVERY 6 HOURS PRN
Status: CANCELLED | OUTPATIENT
Start: 2020-06-26

## 2020-06-26 RX ORDER — SODIUM CHLORIDE 0.9 % (FLUSH) 0.9 %
10 SYRINGE (ML) INJECTION PRN
Status: CANCELLED | OUTPATIENT
Start: 2020-06-26

## 2020-06-26 RX ORDER — IPRATROPIUM BROMIDE AND ALBUTEROL SULFATE 2.5; .5 MG/3ML; MG/3ML
1 SOLUTION RESPIRATORY (INHALATION) EVERY 6 HOURS PRN
Status: DISCONTINUED | OUTPATIENT
Start: 2020-06-26 | End: 2020-06-29 | Stop reason: HOSPADM

## 2020-06-26 RX ORDER — FERROUS SULFATE 325(65) MG
325 TABLET ORAL
Status: DISCONTINUED | OUTPATIENT
Start: 2020-06-27 | End: 2020-06-29 | Stop reason: HOSPADM

## 2020-06-26 RX ORDER — ACETAMINOPHEN 325 MG/1
650 TABLET ORAL EVERY 4 HOURS PRN
Status: DISCONTINUED | OUTPATIENT
Start: 2020-06-26 | End: 2020-06-29 | Stop reason: HOSPADM

## 2020-06-26 RX ORDER — LANOLIN ALCOHOL/MO/W.PET/CERES
3 CREAM (GRAM) TOPICAL NIGHTLY
Status: DISCONTINUED | OUTPATIENT
Start: 2020-06-26 | End: 2020-06-29 | Stop reason: HOSPADM

## 2020-06-26 RX ORDER — RISPERIDONE 1 MG/1
2 TABLET, FILM COATED ORAL 2 TIMES DAILY
Status: DISCONTINUED | OUTPATIENT
Start: 2020-06-26 | End: 2020-06-27

## 2020-06-26 RX ORDER — CHLORPROMAZINE HYDROCHLORIDE 25 MG/ML
50 INJECTION INTRAMUSCULAR EVERY 6 HOURS PRN
Status: DISCONTINUED | OUTPATIENT
Start: 2020-06-26 | End: 2020-06-26

## 2020-06-26 RX ORDER — ERGOCALCIFEROL 1.25 MG/1
50000 CAPSULE ORAL WEEKLY
Status: DISCONTINUED | OUTPATIENT
Start: 2020-06-27 | End: 2020-06-29 | Stop reason: HOSPADM

## 2020-06-26 RX ORDER — NICOTINE 21 MG/24HR
1 PATCH, TRANSDERMAL 24 HOURS TRANSDERMAL DAILY
Status: DISCONTINUED | OUTPATIENT
Start: 2020-06-26 | End: 2020-06-29 | Stop reason: HOSPADM

## 2020-06-26 RX ORDER — RISPERIDONE 1 MG/1
2 TABLET, ORALLY DISINTEGRATING ORAL EVERY 4 HOURS PRN
Status: DISCONTINUED | OUTPATIENT
Start: 2020-06-26 | End: 2020-06-29 | Stop reason: HOSPADM

## 2020-06-26 RX ORDER — NICOTINE 21 MG/24HR
1 PATCH, TRANSDERMAL 24 HOURS TRANSDERMAL DAILY
Status: DISCONTINUED | OUTPATIENT
Start: 2020-06-27 | End: 2020-06-26

## 2020-06-26 RX ORDER — PROMETHAZINE HYDROCHLORIDE 12.5 MG/1
12.5 TABLET ORAL EVERY 6 HOURS PRN
Status: CANCELLED | OUTPATIENT
Start: 2020-06-26

## 2020-06-26 RX ORDER — NICOTINE 21 MG/24HR
1 PATCH, TRANSDERMAL 24 HOURS TRANSDERMAL DAILY
Status: CANCELLED | OUTPATIENT
Start: 2020-06-27

## 2020-06-26 RX ORDER — TRAZODONE HYDROCHLORIDE 50 MG/1
50 TABLET ORAL NIGHTLY
Status: DISCONTINUED | OUTPATIENT
Start: 2020-06-26 | End: 2020-06-29 | Stop reason: HOSPADM

## 2020-06-26 RX ORDER — CHLORPROMAZINE HYDROCHLORIDE 25 MG/ML
50 INJECTION INTRAMUSCULAR EVERY 6 HOURS PRN
Status: DISCONTINUED | OUTPATIENT
Start: 2020-06-26 | End: 2020-06-29 | Stop reason: HOSPADM

## 2020-06-26 RX ORDER — SODIUM CHLORIDE 0.9 % (FLUSH) 0.9 %
10 SYRINGE (ML) INJECTION EVERY 12 HOURS SCHEDULED
Status: CANCELLED | OUTPATIENT
Start: 2020-06-26

## 2020-06-26 RX ORDER — LORAZEPAM 2 MG/ML
2 INJECTION INTRAMUSCULAR EVERY 6 HOURS PRN
Status: DISCONTINUED | OUTPATIENT
Start: 2020-06-26 | End: 2020-06-26

## 2020-06-26 RX ADMIN — ACETAMINOPHEN 650 MG: 325 TABLET, FILM COATED ORAL at 21:28

## 2020-06-26 RX ADMIN — MELATONIN 3 MG: at 21:28

## 2020-06-26 RX ADMIN — VALPROIC ACID 500 MG: 250 CAPSULE, LIQUID FILLED ORAL at 21:29

## 2020-06-26 RX ADMIN — TRAZODONE HYDROCHLORIDE 50 MG: 50 TABLET ORAL at 21:29

## 2020-06-26 RX ADMIN — CHLORPROMAZINE HYDROCHLORIDE 50 MG: 25 INJECTION INTRAMUSCULAR at 14:55

## 2020-06-26 RX ADMIN — DONEPEZIL HYDROCHLORIDE 10 MG: 5 TABLET, FILM COATED ORAL at 21:28

## 2020-06-26 RX ADMIN — RISPERIDONE 2 MG: 1 TABLET ORAL at 21:29

## 2020-06-26 RX ADMIN — INSULIN LISPRO 1 UNITS: 100 INJECTION, SOLUTION INTRAVENOUS; SUBCUTANEOUS at 21:47

## 2020-06-26 RX ADMIN — LORAZEPAM 2 MG: 2 INJECTION INTRAMUSCULAR; INTRAVENOUS at 14:55

## 2020-06-26 RX ADMIN — MICONAZOLE NITRATE: 2 POWDER TOPICAL at 21:46

## 2020-06-26 ASSESSMENT — SLEEP AND FATIGUE QUESTIONNAIRES
DO YOU HAVE DIFFICULTY SLEEPING: YES
RESTFUL SLEEP: NO
DO YOU USE A SLEEP AID: NO
DIFFICULTY STAYING ASLEEP: YES
SLEEP PATTERN: DIFFICULTY FALLING ASLEEP
DIFFICULTY ARISING: NO
DIFFICULTY FALLING ASLEEP: YES

## 2020-06-26 ASSESSMENT — PAIN SCALES - GENERAL
PAINLEVEL_OUTOF10: 0
PAINLEVEL_OUTOF10: 8
PAINLEVEL_OUTOF10: 0

## 2020-06-26 ASSESSMENT — PAIN DESCRIPTION - ONSET: ONSET: ON-GOING

## 2020-06-26 ASSESSMENT — PAIN DESCRIPTION - LOCATION: LOCATION: BACK;HIP

## 2020-06-26 ASSESSMENT — LIFESTYLE VARIABLES: HISTORY_ALCOHOL_USE: NO

## 2020-06-26 ASSESSMENT — PAIN DESCRIPTION - FREQUENCY: FREQUENCY: INTERMITTENT

## 2020-06-26 ASSESSMENT — PAIN DESCRIPTION - PROGRESSION: CLINICAL_PROGRESSION: RAPIDLY WORSENING

## 2020-06-26 ASSESSMENT — PAIN DESCRIPTION - DESCRIPTORS: DESCRIPTORS: DISCOMFORT;CONSTANT

## 2020-06-26 ASSESSMENT — PAIN - FUNCTIONAL ASSESSMENT: PAIN_FUNCTIONAL_ASSESSMENT: PREVENTS OR INTERFERES SOME ACTIVE ACTIVITIES AND ADLS

## 2020-06-26 ASSESSMENT — PAIN DESCRIPTION - ORIENTATION: ORIENTATION: RIGHT;LEFT;UPPER;LOWER;MID

## 2020-06-26 NOTE — PROGRESS NOTES
[facility]   Nursing Admission Note              Patient Active Problem List   Diagnosis    Hypertension    Osteoarthritis    Psoriasis    COPD [facility]chronic obstructive [facility] disease) [facility]    Bipolar disorder ([facility]    [facility]    Diabetes [facility]    Sleep apnea    Obesity    Depression    Anxiety    History of kidney infection    Elevated liver enzymes    Headache    Frequent falls    Neuropathy    Dementia (HCC)    Oxygen dependent    Anemia    Alternating constipation and diarrhea    Chronic nausea    S/P gastric bypass    Polypharmacy    Diabetic neuropathy associated with type 2 diabetes mellitus (HCC)    Microcytic anemia    Chest pressure    Bipolar disorder, in full remission, most recent episode mixed ([facility]    Suicidal intent    Bipolar I disorder, most recent episode mixed, severe with psychotic features ([facility]    Drug overdose, intentional self-harm, initial encounter ([facility]       Pt admitted from Dr. Rosalba Hodgson care on ICU to [address] [place] [address] room # 620. Arrived on unit via [facility] with staff escort. Pt appropriately attired in paper scrubs. Body assessment completed by RN with no contraband discovered. All tubes, lines, and drains were appropriately discontinued by RN staff [facility]. Pt belongings and valuables inventoried and cataloged, stored per policy. Pt oriented to surroundings, program expectations, and copy pt rights given. Received admit packet: [address], Visitation Info, Fall Prevention, Restraints Info. Consents reviewed, signed Pt Rights, Handbook Acceptance, Visit/Call Acceptance, PHI Release, Social Info Release, and Treatment Agreement. Pt verbalizes understanding. Identifies stressors.

## 2020-06-26 NOTE — CARE COORDINATION
Patient claims that home health nurse who found her at home stated that she would bring her, her green bag. She wants me to call said nurse, but does not recall her name. I explained that this time of the morning would make it difficult to find her. She is upset. She is refusing lab sticks, saying that she would like to sign out AMA. I explained that she has a 72 hour hold on her currently for her safety. Patient requested coffee. Beverage provided.

## 2020-06-26 NOTE — PLAN OF CARE
Problem: Suicide risk  Goal: Provide patient with safe environment  Description: Provide patient with safe environment  Outcome: Ongoing     Problem: Falls - Risk of:  Goal: Will remain free from falls  Description: Will remain free from falls  Outcome: Ongoing

## 2020-06-26 NOTE — CARE COORDINATION
Called sister per patient request and updated her on the patient's hospitalization here in ICU. Password established. Questions answered.

## 2020-06-26 NOTE — BH NOTE
Patient arrived to psychiatric unit following her transfer from medical services. She became aggressive, agitated, used a lot of profanity, has been disrespectful to medical staff, stated \"nobody told me that I cannot use my cell phone\". Patient requested to be discharged from the hospital now. I personally wanted to discuss patient condition's with her and came to patient's room with patient's nurse. However, patient refused to talk to me and stated \"I do not get a shit. I am leaving this place now\". Patient continues to be disrespectful, uses inappropriate language, and refused to follow direction of the medical staff. Injection of Thorazine 50 mg and lorazepam 2 mg IM has been ordered to patient for aggression and agitation.

## 2020-06-26 NOTE — CARE COORDINATION
Daughter called requesting information on her mother. Password confirmed. Updated on patient condition.

## 2020-06-26 NOTE — PROGRESS NOTES
Pt arrived on unit and sat in dining area dressed in scrubs. Pt stated \"Im not signing anything until I know where my phone is\". Pt educated on policies and procedures of [address] [place] [address] and pt started screaming stating \"I am leaving\" pt went to room and slammed the door while cursing staff. Pt stated \"I want my phone\". Dr Javi Zambrano came to pts room and attempted to talk with pt and pt continued to scream and curse at medical staff. Pt given Thorazine 50mg and Ativan 2mg IM per Dr. Javi Zambrano. Will continue to monitor.

## 2020-06-26 NOTE — CONSULTS
[facility]  Psychiatry Consult    Reason for Consult: Concern for overdose    62-year-old white female with history of bipolar disorder and neurocognitive disorder, known to our service, admitted for overdose on Benadryl. Patient was seen by a home health nurse at home for the wound in her left buttock where she had an abscess drained. Patient told the nurse that she took about 26 Benadryl pills. UDS negative, BAL less than 10. Patient is medically stable now as per RN. Patient is observed sitting in a chair when seen today. She presents with labile affect. Mildly agitated, however, cooperative. She is tangential and somewhat pressured. States she has been taking a large amount of Benadryl daily which is \"the only way\" for her \"to stay calm. \"  She reports history of previous overdoses. States this time around she was not trying to hurt herself, however, she voices hopelessness and helplessness. Admits that she needs psychiatric help. She has been depressed and has been seeing \"ghosts\" at home. She denies auditory hallucinations. She is not sleeping. Reports racing thoughts and mood swings. Unable to function at home and take care of her place and herself. She is currently staying with her daughter. Patient reports losing follow-up with psychiatry. Previously saw Dr. Luiz Bernal. She has been off her medications for a long time. Psychiatric History:    Diagnoses: Bipolar disorder, dementia, restless leg syndrome, borderline personality disorder  Suicide attempts/gestures: Once in 2016 by overdose of \"45 antihypertensive pills, 25 pills of Ativan, 9 tabs of Lortabs, with alcohol and glass of antifreeze\". History of cutting, specifically both wrists since early childhood, last time in 2000.   Prior hospitalizations: 4-5 times with last time at [address] in 2019  Medication trials: Remeron, Zoloft, BuSpar, Vistaril, donepezil, Latuda, Abilify  Mental health contact: Lost to follow-up     Substance Use History:  Denies alcohol use. Reports occasional marijuana use. Smokes 1 PPD. Allergies:  Morphine and Codeine    Medical History:  Past Medical History:   Diagnosis Date    Alcohol overdose     history of    Anemia     Anxiety     [facility]     Brain tumor ([facility]     Chronic back pain     ruptured discs    COPD (chronic obstructive pulmonary disease) (HCC)     Dementia (HCC)     Depression     Diabetes ([facility]     Elevated liver enzymes     Frequent falls     Headache     History of kidney infection     Hyperlipidemia     Hypertension     Neuropathy     Obesity     Osteoarthritis     Oxygen dependent     Psoriasis     Scoliosis     Sleep apnea     UTI (urinary tract infection)        Family History:  Family History   Problem Relation Age of Onset    Diabetes Mother     Kidney Disease Mother     High Blood Pressure Mother     Cancer Mother     Diabetes Father     High Blood Pressure Father     Heart Disease Father     Diabetes Brother     High Blood Pressure Brother     Diabetes Sister     High Blood Pressure Sister     Diabetes Brother     High Blood Pressure Brother     Cancer Maternal Aunt     Colon Cancer Neg Hx     Colon Polyps Neg Hx     Esophageal Cancer Neg Hx     Liver Cancer Neg Hx     Liver Disease Neg Hx     Rectal Cancer Neg Hx     Stomach Cancer Neg Hx        Social History:  Patient lives with her daughter. 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    Review of Systems: 14 point review of systems negative except as described above    Vitals:    06/26/20 1100   BP: (!) 120/50   Pulse: 69   Resp: 13   Temp:    SpO2: 95%       Mental Status  Appearance: Disheveled and in hospital attire. Made good eye contact. Behavior: Mildly agitated, cooperative. Speech: Pressured  Mood: \"Depressed\"   Affect: Mood congruent. Thought Process: Appears tangential  Thought Content: Denies active suicidal and homicidal ideation. Mildly paranoid. Perceptions: Denies auditory or visual hallucinations at present time. Not responding to internal stimuli. Concentration: Poor. Orientation: to person, place, date, and situation. Language: Intact. Fund of information: Intact. Memory: Recent and remote appear intact. Impulsivity: Questionable. Neurovegitative: Poor appetite and sleep. Insight: Impaired. Judgment: Impaired. Assessment  DSM-5 DIAGNOSIS:  Impression   Bipolar disorder, most recent episode mixed, severe, with psychotic features  Status post overdose  History of dementia   Cannabis use disorder by history  Tobacco use disorder    Patient presents with affective instability, paranoia, hopelessness and helplessness. She is posing danger to self in her current state and is meeting the criteria for inpatient psychiatric treatment. Thank you for consulting our service. Please call us with questions.       Suzanna Montes De Oca MD

## 2020-06-26 NOTE — CARE COORDINATION
Patient is current with [facility] for [facility]. Will follow. Please advise when patient discharges. [address] [place] [address] [phone]. [phone].     Valeria Edgar RN, Home Care Liaison  [phone] P  Electronically signed by Tim Bowers RN on 6/26/2020 at 9:02 AM

## 2020-06-26 NOTE — DISCHARGE SUMMARY
Hospitalist   Discharge Summary    Stan Walter  :  1955  MRN:  [id number]    Admit date:  2020  Discharge date:  2020    Admitting Physician:  Dee Flower MD    Advance Directive: Full Code    Consults: psychiatry    Primary Care Physician:  Suni Proctor, JORGE    Discharge Diagnoses: Active Problems:    Drug overdose, intentional self-harm, initial encounter [facility])  Resolved Problems:    * No resolved hospital problems. *      Significant Diagnostic Studies:   No results found. Labs:    CBC:   Recent Labs     20  1456   WBC 8.2   HGB 9.4*        BMP:    Recent Labs     20  1456 20  1546     --    K 3.7  --      --    CO2 20*  --    BUN 37*  --    CREATININE 1.0*  --    GLUCOSE 64* 60     Hepatic:   Recent Labs     20  1456   AST 17   ALT 13   BILITOT <0.2   ALKPHOS 150*     Troponin:   Recent Labs     20  1456   TROPONINI <0.01     BNP: No results for input(s): BNP in the last 72 hours. Lipids: No results for input(s): CHOL, HDL in the last 72 hours. Invalid input(s): LDLCALCU  INR: No results for input(s): INR in the last 72 hours. ABG: No results for input(s): PHART, BAY3UZN, PO2ART, ORF7ZGO, P0SDMBKE, BEART in the last 72 hours. 30 Day lookback of cultures:    Blood Culture Recent: No results for input(s): BC in the last 720 hours. Gram Stain Recent: No results for input(s): LABGRAM in the last 720 hours. Resp Culture Recent: No results for input(s): CULTRESP in the last 720 hours. Body Fluid Recent : No results for input(s): BFCX in the last 720 hours. MRSA Recent : No results for input(s): [facility] SYSTEM in the last 720 hours. Urine Culture Recent : No results for input(s): LABURIN in the last 720 hours. Organism Recent : No results for input(s): ORG in the last 720 hours. Hospital Course:   63 y. o. female who presents to the emergency department due to overdose likely intentional. She Took 26 benadryl at approximately 130PM.  Patient has healing wound in her left buttock where she had an abscess drained previously. [facility] was going to change her dressing at her home and patient told home health nurse that patient had overdosed on Benadryl.  After this the nurse called 911 and patient was brought here. Lesvia Gavin has no complaints at this time. Lesvia Gavin seems mildly drowsy.  Tells me she was not trying to harm her self and was just trying to relax.  Report from home health nurse who called 911 was that the patient had voiced suicidal ideation.  Patient resting comfortably without complaints at this time. Her BS is 60, she showed mild renal insufficiency. She was given a bolu sof NS in ER. Poison control was notified. She will close neuro monitoring. Pt was placed in ICU overngiht and was monitored closely. Pt was medically cleared with no overnight acute events. Psych was consulted and deemed pt unfit to be discharged home safey and recommended inpatient Psych as patient is having visual halucinations. Physical Exam:    Vitals: BP (!) 120/50   Pulse 69   Temp 98 °F (36.7 °C) (Temporal)   Resp 13   Ht 5' (1.524 m)   Wt 207 lb 12.8 oz (94.3 kg)   SpO2 95%   BMI 40.58 kg/m²   Constitutional:       General: She is not in acute distress.     Appearance: She is well-developed. HENT:      Head: Normocephalic and atraumatic.      Mouth/Throat:      Mouth: Mucous membranes are dry.      Pharynx: Oropharynx is clear. Eyes:      General: No scleral icterus.     Pupils: Pupils are equal, round, and reactive to light. Neck:      Musculoskeletal: Normal range of motion and neck supple.      Vascular: No JVD. Cardiovascular:      Rate and Rhythm: Normal rate and regular rhythm.      Heart sounds: Normal heart sounds. Pulmonary:      Effort: Pulmonary effort is normal. No respiratory distress.      Breath sounds: Normal breath sounds. Abdominal:      General: There is no distension.      Palpations: Abdomen is soft. vitamin D (ERGOCALCIFEROL) 1.25 MG (54332 UT) CAPS capsule  Take 1 capsule by mouth once a week                 Discharge Instructions: Follow up with JORGE Chavarria     Take medications as directed. Resume activity as tolerated. Diet: DIET GENERAL;     Disposition: Patient is medically stable and will be dischargedto inpt psych. Time spent on discharge 35 minutes.     Signed:  Faizan Pereira MD  Hospitalist service  6/26/2020  12:18 PM

## 2020-06-26 NOTE — PROGRESS NOTES
[id number] [facility]      Patient:  Judson Varghese  YOB: 1955  Date of Service: 6/26/2020  MRN: [id number]   Acct: [de-identified]   Primary Care Physician: JORGE Vasquez  Advance Directive: Full Code  Admit Date: 6/25/2020       Hospital Day: 0    Chief Complaint:   Chief Complaint   Patient presents with    Suicide Attempt     26 benedryl @ aprox 1330       Subjective: pt seen and examined. Doing well, no new events overnight. Objective:   VITALS:  /77   Pulse 90   Temp 98 °F (36.7 °C) (Temporal)   Resp 22   Ht 5' (1.524 m)   Wt 207 lb 12.8 oz (94.3 kg)   SpO2 90%   BMI 40.58 kg/m²   24HR INTAKE/OUTPUT:      Intake/Output Summary (Last 24 hours) at 6/26/2020 0946  Last data filed at 6/26/2020 0100  Gross per 24 hour   Intake 1306.67 ml   Output --   Net 1306.67 ml     Constitutional: Oriented to person, place, and time. Well-developed and well-nourished. No distress. HENT:    Head: Normocephalic and atraumatic. Mouth/Throat: Oropharynx is clear and moist. No oropharyngeal exudate. Eyes: Conjunctivae and EOM are normal. Pupils are equal, round, and reactive to light. No scleral icterus. Neck: Normal range of motion. Neck supple. No JVD present. No tracheal deviation present. No thyromegaly present. Cardiovascular: Normal rate, regular rhythm and normal heart sounds. Exam reveals no gallop and no friction rub. Pulmonary/Chest: Effort normal and breath sounds normal. No stridor. No respiratory distress. No wheezes. No rales. Abdominal: Soft. Bowel sounds are normal. No distension and no mass. There is no tenderness. There is no rebound and no guarding. Musculoskeletal: Normal range of motion. There is no edema or tenderness. Extremities: No edema  Neurological: Alert and oriented to person, place, and time. No cranial nerve deficit. Skin: Skin is warm and dry. No rash noted. Not diaphoretic. No erythema. No pallor. Psychiatric: Normal mood and affect. Behavior is normal. Judgment and thought content normal.     Medications:      dextrose 5% in lactated ringers Stopped (06/26/20 0000)      sodium chloride flush  10 mL Intravenous 2 times per day    nicotine  1 patch Transdermal Daily     sodium chloride flush, magnesium hydroxide, promethazine **OR** ondansetron, nicotine polacrilex, acetaminophen  DIET GENERAL;         Lab and other Data:     Recent Labs     06/25/20  1456   WBC 8.2   HGB 9.4*        Recent Labs     06/25/20  1456 06/25/20  1546     --    K 3.7  --      --    CO2 20*  --    BUN 37*  --    CREATININE 1.0*  --    GLUCOSE 64* 60     Recent Labs     06/25/20  1456   AST 17   ALT 13   BILITOT <0.2   ALKPHOS 150*     Troponin T:   Recent Labs     06/25/20  1456   TROPONINI <0.01     Pro-BNP: No results for input(s): BNP in the last 72 hours. INR: No results for input(s): INR in the last 72 hours. ABGs: No results found for: PHART, PO2ART, LTX2WWE  UA:No results for input(s): NITRITE, COLORU, PHUR, LABCAST, WBCUA, RBCUA, MUCUS, TRICHOMONAS, YEAST, BACTERIA, CLARITYU, SPECGRAV, LEUKOCYTESUR, UROBILINOGEN, BILIRUBINUR, BLOODU, GLUCOSEU, AMORPHOUS in the last 72 hours.     Invalid input(s): Cathryn Naya    Imaging:   No orders to display     Micro: No results found for: BC, No components found for: LABURINE  Patient Active Problem List    Diagnosis Date Noted    Drug overdose, intentional self-harm, initial encounter ([facility] 06/25/2020    Bipolar I disorder, most recent episode mixed, severe with psychotic features ([facility] 07/08/2019    Suicidal intent 07/04/2019    Bipolar disorder, in full remission, most recent episode mixed ([facility] 10/03/2017    Chest pressure 08/19/2017 6/27/2011  Holter NsR  8/20/17  lexiscan negative for myocardial ischemia, EF 71  %        Microcytic anemia 07/06/2017    Diabetic neuropathy associated with type 2 diabetes mellitus ([facility] 06/26/2017    Alternating constipation and diarrhea 07/05/2016    Chronic nausea 07/05/2016    S/P gastric bypass 07/05/2016    Polypharmacy 07/05/2016    Neuropathy 12/01/2015    Dementia ([facility] 12/01/2015    Oxygen dependent 12/01/2015    Anemia 12/01/2015    Hypertension     Osteoarthritis     Psoriasis     COPD (chronic obstructive pulmonary disease) (HCC)     Bipolar disorder ([facility]     [facility] tumor ([facility]     Diabetes ([facility]     Sleep apnea     Obesity     Depression     Anxiety     History of kidney infection     Elevated liver enzymes     Headache     Frequent falls        Assessment/plan:   Suicidal Ideations  Intentional Drug Overdose    Plan:   Pt medically cleared  Pysch Consulted    Pt stable for transfer to floor.     DVT Prophylaxis: Leona Levine MD  Hospitalist Service  6/26/2020  9:46 AM

## 2020-06-27 PROBLEM — F31.9 BIPOLAR DISORDER, UNSPECIFIED (HCC): Status: ACTIVE | Noted: 2020-06-27

## 2020-06-27 LAB
ANION GAP SERPL CALCULATED.3IONS-SCNC: 13 MMOL/L (ref 7–19)
BASOPHILS ABSOLUTE: 0.1 K/UL (ref 0–0.2)
BASOPHILS RELATIVE PERCENT: 1.1 % (ref 0–1)
BILIRUBIN URINE: NEGATIVE
BLOOD, URINE: NEGATIVE
BUN BLDV-MCNC: 24 MG/DL (ref 8–23)
CALCIUM SERPL-MCNC: 8.7 MG/DL (ref 8.8–10.2)
CHLORIDE BLD-SCNC: 110 MMOL/L (ref 98–111)
CHOLESTEROL, TOTAL: 115 MG/DL (ref 160–199)
CLARITY: CLEAR
CO2: 18 MMOL/L (ref 22–29)
COLOR: YELLOW
CREAT SERPL-MCNC: 0.6 MG/DL (ref 0.5–0.9)
EOSINOPHILS ABSOLUTE: 0.4 K/UL (ref 0–0.6)
EOSINOPHILS RELATIVE PERCENT: 6.4 % (ref 0–5)
GFR NON-AFRICAN AMERICAN: >60
GLUCOSE BLD-MCNC: 103 MG/DL (ref 70–99)
GLUCOSE BLD-MCNC: 136 MG/DL (ref 70–99)
GLUCOSE BLD-MCNC: 138 MG/DL (ref 70–99)
GLUCOSE BLD-MCNC: 149 MG/DL (ref 74–109)
GLUCOSE BLD-MCNC: 80 MG/DL (ref 70–99)
GLUCOSE URINE: NEGATIVE MG/DL
HBA1C MFR BLD: 5.1 % (ref 4–6)
HCT VFR BLD CALC: 30.4 % (ref 37–47)
HDLC SERPL-MCNC: 38 MG/DL (ref 65–121)
HEMOGLOBIN: 8.9 G/DL (ref 12–16)
IMMATURE GRANULOCYTES #: 0 K/UL
KETONES, URINE: NEGATIVE MG/DL
LDL CHOLESTEROL CALCULATED: 51 MG/DL
LEUKOCYTE ESTERASE, URINE: NEGATIVE
LYMPHOCYTES ABSOLUTE: 1.7 K/UL (ref 1.1–4.5)
LYMPHOCYTES RELATIVE PERCENT: 28.5 % (ref 20–40)
MCH RBC QN AUTO: 24.9 PG (ref 27–31)
MCHC RBC AUTO-ENTMCNC: 29.3 G/DL (ref 33–37)
MCV RBC AUTO: 85.2 FL (ref 81–99)
MONOCYTES ABSOLUTE: 0.7 K/UL (ref 0–0.9)
MONOCYTES RELATIVE PERCENT: 10.6 % (ref 0–10)
NEUTROPHILS ABSOLUTE: 3.2 K/UL (ref 1.5–7.5)
NEUTROPHILS RELATIVE PERCENT: 53.1 % (ref 50–65)
NITRITE, URINE: NEGATIVE
PDW BLD-RTO: 20.1 % (ref 11.5–14.5)
PERFORMED ON: ABNORMAL
PERFORMED ON: NORMAL
PH UA: 5.5 (ref 5–8)
PLATELET # BLD: 224 K/UL (ref 130–400)
PMV BLD AUTO: 11.8 FL (ref 9.4–12.3)
POTASSIUM SERPL-SCNC: 4.1 MMOL/L (ref 3.5–5)
PROTEIN UA: NEGATIVE MG/DL
RBC # BLD: 3.57 M/UL (ref 4.2–5.4)
SODIUM BLD-SCNC: 141 MMOL/L (ref 136–145)
SPECIFIC GRAVITY UA: 1.01 (ref 1–1.03)
TRIGL SERPL-MCNC: 128 MG/DL (ref 0–149)
TSH REFLEX FT4: 3.38 UIU/ML (ref 0.35–5.5)
UROBILINOGEN, URINE: 0.2 E.U./DL
VITAMIN B-12: 230 PG/ML (ref 211–946)
VITAMIN D 25-HYDROXY: 20.6 NG/ML
WBC # BLD: 6.1 K/UL (ref 4.8–10.8)

## 2020-06-27 PROCEDURE — 1240000000 HC EMOTIONAL WELLNESS R&B

## 2020-06-27 PROCEDURE — 80061 LIPID PANEL: CPT

## 2020-06-27 PROCEDURE — 80048 BASIC METABOLIC PNL TOTAL CA: CPT

## 2020-06-27 PROCEDURE — 6370000000 HC RX 637 (ALT 250 FOR IP): Performed by: PSYCHIATRY & NEUROLOGY

## 2020-06-27 PROCEDURE — 99223 1ST HOSP IP/OBS HIGH 75: CPT | Performed by: PSYCHIATRY & NEUROLOGY

## 2020-06-27 PROCEDURE — 81003 URINALYSIS AUTO W/O SCOPE: CPT

## 2020-06-27 PROCEDURE — 82947 ASSAY GLUCOSE BLOOD QUANT: CPT

## 2020-06-27 PROCEDURE — 82306 VITAMIN D 25 HYDROXY: CPT

## 2020-06-27 PROCEDURE — 36415 COLL VENOUS BLD VENIPUNCTURE: CPT

## 2020-06-27 PROCEDURE — 85025 COMPLETE CBC W/AUTO DIFF WBC: CPT

## 2020-06-27 PROCEDURE — 84443 ASSAY THYROID STIM HORMONE: CPT

## 2020-06-27 PROCEDURE — 82607 VITAMIN B-12: CPT

## 2020-06-27 PROCEDURE — 83036 HEMOGLOBIN GLYCOSYLATED A1C: CPT

## 2020-06-27 RX ORDER — QUETIAPINE FUMARATE 200 MG/1
200 TABLET, FILM COATED ORAL NIGHTLY
Status: DISCONTINUED | OUTPATIENT
Start: 2020-06-27 | End: 2020-06-29 | Stop reason: HOSPADM

## 2020-06-27 RX ORDER — GABAPENTIN 300 MG/1
300 CAPSULE ORAL 3 TIMES DAILY
Status: DISCONTINUED | OUTPATIENT
Start: 2020-06-27 | End: 2020-06-29 | Stop reason: HOSPADM

## 2020-06-27 RX ORDER — HYDROXYZINE HYDROCHLORIDE 25 MG/1
25 TABLET, FILM COATED ORAL 3 TIMES DAILY PRN
Status: DISCONTINUED | OUTPATIENT
Start: 2020-06-27 | End: 2020-06-29 | Stop reason: HOSPADM

## 2020-06-27 RX ORDER — LIDOCAINE 4 G/G
1 PATCH TOPICAL DAILY
Status: DISCONTINUED | OUTPATIENT
Start: 2020-06-27 | End: 2020-06-29 | Stop reason: HOSPADM

## 2020-06-27 RX ORDER — DIVALPROEX SODIUM 500 MG/1
1000 TABLET, EXTENDED RELEASE ORAL NIGHTLY
Status: DISCONTINUED | OUTPATIENT
Start: 2020-06-28 | End: 2020-06-29

## 2020-06-27 RX ORDER — DEXTROSE MONOHYDRATE 25 G/50ML
12.5 INJECTION, SOLUTION INTRAVENOUS PRN
Status: DISCONTINUED | OUTPATIENT
Start: 2020-06-27 | End: 2020-06-29 | Stop reason: HOSPADM

## 2020-06-27 RX ORDER — VALPROIC ACID 250 MG/1
500 CAPSULE, LIQUID FILLED ORAL 2 TIMES DAILY
Status: COMPLETED | OUTPATIENT
Start: 2020-06-27 | End: 2020-06-27

## 2020-06-27 RX ORDER — CHOLECALCIFEROL (VITAMIN D3) 125 MCG
500 CAPSULE ORAL DAILY
Status: DISCONTINUED | OUTPATIENT
Start: 2020-06-27 | End: 2020-06-29 | Stop reason: HOSPADM

## 2020-06-27 RX ORDER — NICOTINE POLACRILEX 4 MG
15 LOZENGE BUCCAL PRN
Status: DISCONTINUED | OUTPATIENT
Start: 2020-06-27 | End: 2020-06-29 | Stop reason: HOSPADM

## 2020-06-27 RX ORDER — DEXTROSE MONOHYDRATE 50 MG/ML
100 INJECTION, SOLUTION INTRAVENOUS PRN
Status: DISCONTINUED | OUTPATIENT
Start: 2020-06-27 | End: 2020-06-29 | Stop reason: HOSPADM

## 2020-06-27 RX ADMIN — RISPERIDONE 2 MG: 1 TABLET, ORALLY DISINTEGRATING ORAL at 21:01

## 2020-06-27 RX ADMIN — MICONAZOLE NITRATE: 2 POWDER TOPICAL at 09:11

## 2020-06-27 RX ADMIN — MICONAZOLE NITRATE: 2 POWDER TOPICAL at 21:04

## 2020-06-27 RX ADMIN — METFORMIN HYDROCHLORIDE 500 MG: 500 TABLET, FILM COATED ORAL at 17:39

## 2020-06-27 RX ADMIN — TRAZODONE HYDROCHLORIDE 50 MG: 50 TABLET ORAL at 21:01

## 2020-06-27 RX ADMIN — GABAPENTIN 300 MG: 300 CAPSULE ORAL at 12:58

## 2020-06-27 RX ADMIN — RISPERIDONE 2 MG: 1 TABLET ORAL at 09:02

## 2020-06-27 RX ADMIN — PANTOPRAZOLE SODIUM 40 MG: 40 TABLET, DELAYED RELEASE ORAL at 06:22

## 2020-06-27 RX ADMIN — VALPROIC ACID 500 MG: 250 CAPSULE, LIQUID FILLED ORAL at 09:02

## 2020-06-27 RX ADMIN — METFORMIN HYDROCHLORIDE 500 MG: 500 TABLET, FILM COATED ORAL at 09:02

## 2020-06-27 RX ADMIN — MELATONIN 3 MG: at 21:01

## 2020-06-27 RX ADMIN — ACETAMINOPHEN 650 MG: 325 TABLET, FILM COATED ORAL at 21:01

## 2020-06-27 RX ADMIN — FERROUS SULFATE TAB 325 MG (65 MG ELEMENTAL FE) 325 MG: 325 (65 FE) TAB at 09:02

## 2020-06-27 RX ADMIN — ERGOCALCIFEROL 50000 UNITS: 1.25 CAPSULE ORAL at 09:02

## 2020-06-27 RX ADMIN — GABAPENTIN 300 MG: 300 CAPSULE ORAL at 21:01

## 2020-06-27 RX ADMIN — CYANOCOBALAMIN TAB 500 MCG 500 MCG: 500 TAB at 09:03

## 2020-06-27 RX ADMIN — QUETIAPINE FUMARATE 200 MG: 200 TABLET, FILM COATED ORAL at 21:01

## 2020-06-27 RX ADMIN — SALINE NASAL SPRAY 1 SPRAY: 1.5 SOLUTION NASAL at 16:34

## 2020-06-27 RX ADMIN — FUROSEMIDE 20 MG: 20 TABLET ORAL at 09:03

## 2020-06-27 RX ADMIN — DONEPEZIL HYDROCHLORIDE 10 MG: 5 TABLET, FILM COATED ORAL at 21:01

## 2020-06-27 RX ADMIN — HYDROXYZINE HYDROCHLORIDE 25 MG: 25 TABLET, FILM COATED ORAL at 21:01

## 2020-06-27 RX ADMIN — VALPROIC ACID 500 MG: 250 CAPSULE, LIQUID FILLED ORAL at 22:47

## 2020-06-27 ASSESSMENT — PAIN DESCRIPTION - FREQUENCY
FREQUENCY: INTERMITTENT

## 2020-06-27 ASSESSMENT — PAIN DESCRIPTION - PAIN TYPE
TYPE: CHRONIC PAIN

## 2020-06-27 ASSESSMENT — PAIN DESCRIPTION - DESCRIPTORS
DESCRIPTORS: ACHING;DISCOMFORT
DESCRIPTORS: ACHING;DISCOMFORT

## 2020-06-27 ASSESSMENT — PAIN DESCRIPTION - PROGRESSION
CLINICAL_PROGRESSION: GRADUALLY WORSENING
CLINICAL_PROGRESSION: GRADUALLY WORSENING

## 2020-06-27 ASSESSMENT — PAIN DESCRIPTION - ORIENTATION
ORIENTATION: LOWER

## 2020-06-27 ASSESSMENT — PAIN SCALES - GENERAL
PAINLEVEL_OUTOF10: 9
PAINLEVEL_OUTOF10: 9
PAINLEVEL_OUTOF10: 0

## 2020-06-27 ASSESSMENT — PAIN DESCRIPTION - LOCATION
LOCATION: BACK

## 2020-06-27 ASSESSMENT — PAIN DESCRIPTION - ONSET
ONSET: ON-GOING

## 2020-06-27 ASSESSMENT — PAIN - FUNCTIONAL ASSESSMENT
PAIN_FUNCTIONAL_ASSESSMENT: ACTIVITIES ARE NOT PREVENTED

## 2020-06-27 ASSESSMENT — PAIN SCALES - WONG BAKER: WONGBAKER_NUMERICALRESPONSE: 0

## 2020-06-27 NOTE — PROGRESS NOTES
[facility] Daily Shift Assessment  Nursing Progress Note    Room: 0617/617-01 Name: Cleavon Bence Age: 63 y.o. Gender: female   Dx: <principal problem not specified>  Precautions: suicide risk and fall risk  Target Symptoms:   Accu-Chek: Yes 80Sleep: Yes,Sleep Quality Very good SI No AVH denies [facility]  ADLs: Yes Speech: normal Depression: \"better\" Anxiety: \"better\"   Participation LevelActive Listener  Appetite: Very good  Visitation: No   Participation QualityAppropriate    Notes: pt calm and cooperative during assessment this am. Pt gets irritated at times, but is easily redirected. Pt states she feels \"so much better\" since she has got some sleep. Pt does ADLs, is med compliant and denies SI,HI and AVH at this time. Will continue to monitor pt and redirect as needed.     Signature: Estevan Cameron

## 2020-06-27 NOTE — PROGRESS NOTES
Admission Note      Reason for admission/Target Symptom: Patient admitted to [facility] due to overdose, pt took 26 benadryl, pt reported to home health nurse that she overdosed on benadryl, home health nurse called 911 and pt brought to the ED, pt denies overdose and reported she was trying to relax. Diagnoses: Drug overdose, undetermined intent, initial encounter, BRIE (acute kidney injury) ([facility]   UDS: Negative  BAL:  Negative <10    SW met with treatment team to discuss patient's treatment including care planning, discharge planning, and follow-up needs. Pt has been admitted to [facility]. Treatment team has identified patient's discharge needs as medication management and outpatient therapy/counseling. Pt confirmed  the need for ongoing treatment post inpatient stay. Pt was also provided a handout of contact information for drug and alcohol treatment centers and other community support service such as EL, AA, and [facility].     Electronically signed by Cam Lynch, [id number] on 6/27/2020 at 8:56 AM

## 2020-06-27 NOTE — PLAN OF CARE
Problem: Depressive Behavior With or Without Suicide Precautions:  Goal: Able to verbalize and/or display a decrease in depressive symptoms  Description: Able to verbalize and/or display a decrease in depressive symptoms  6/27/2020 1141 by Ruth Dee LPC  Outcome: Ongoing  Note:                                                                     Group Therapy Note    Date: 6/27/2020  Start Time: 1000  End Time:  1130  Number of Participants: 3    Type of Group: Psychotherapy    Patient's Goal: Pt will attend group as scheduled, identify an issue pt would like to work on regarding bettering relationships with others and/or self, pt will be participate in group discussion. Note: Pt attended group as scheduled. Pt participated by identifying an issue pt would like to work on today regarding interacting/relating with others to better relationships. Pt participated in group discussion exploring issues identified by group members and pt. Status After Intervention:  Improved    Participation Level:  Active Listener and Interactive    Participation Quality: Appropriate, Attentive, and Sharing      Speech:  normal      Thought Process/Content: Logical      Affective Functioning: Congruent      Mood: depressed      Level of consciousness:  Attentive      Response to Learning: Able to verbalize current knowledge/experience and Able to verbalize/acknowledge new learning      Endings: None Reported    Modes of Intervention: Education, Support, and Socialization      Discipline Responsible: /Counselor      Signature:  Gigi Lang

## 2020-06-27 NOTE — PROGRESS NOTES
Dressing to Left buttocks removed and wound cleaned with NS and wet/dry dressing applied. No s/s of infection noted. Pt has no complaints of pain at this time. Will continue to monitor.

## 2020-06-27 NOTE — PROGRESS NOTES
Nutrition Assessment    Type and Reason for Visit: Initial, Positive Nutrition Screen    Nutrition Recommendations: continue current POC    Nutrition Assessment: Pt appears well nourished and not at risk for nutritional compromise. Pt receiving double protein portions to help with wound healing. Malnutrition Assessment:  · Malnutrition Status: No malnutrition  · Context: Chronic illness  · Findings of the 6 clinical characteristics of malnutrition (Minimum of 2 out of 6 clinical characteristics is required to make the diagnosis of moderate or severe Protein Calorie Malnutrition based on AND/ASPEN Guidelines):  1. Energy Intake-Greater than 75% of estimated energy requirement, Greater than or equal to 5 days    2. Weight Loss-2% loss or greater, in 3 months  3. Fat Loss-No significant subcutaneous fat loss,    4. Muscle Loss-No significant muscle mass loss,    5. Fluid Accumulation-No significant fluid accumulation,    6.   Strength-Not measured    Nutrition Risk Level: Low    Nutrition Diagnosis:   · Problem: Increased nutrient needs  · Etiology: related to Increased demand for energy/nutrients     Signs and symptoms:  as evidenced by Presence of wounds    Objective Information:  · Nutrition-Focused Physical Findings: well nourished  · Wound Type: Open Wounds  · Current Nutrition Therapies:  · Oral Diet Orders: Carb Control 4 Carbs/Meal(doubloe protein portions)   · Oral Diet intake: %  · Oral Nutrition Supplement (ONS) Orders: None    · Anthropometric Measures:  · Ht: 5' (152.4 cm)   · Current Body Wt: 203 lb 6 oz (92.3 kg)  · Admission Body Wt: 203 lb 6 oz (92.3 kg)  · Usual Body Wt: 212 lb (96.2 kg)(3/2020)  · % Weight Change:  ,  4% weight loss in 3 months  · Ideal Body Wt: 100 lb (45.4 kg), % Ideal Body 203.6  · BMI Classification: BMI 35.0 - 39.9 Obese Class II    Nutrition Interventions:   Continue current diet  Continued Inpatient Monitoring    Nutrition Evaluation:   · Evaluation: Goals set   · Goals: po intake 75% or greater. Weight stable.   Wound healing    · Monitoring: Meal Intake, Diet Tolerance, Skin Integrity, Wound Healing, Weight, Pertinent Labs      Electronically signed by Karry Phalen, MS, RD, LD on 6/27/20 at 12:33 PM CDT    Contact Number: [phone]

## 2020-06-27 NOTE — PLAN OF CARE
Problem: Falls - Risk of:  Goal: Will remain free from falls  Description: Will remain free from falls  6/27/2020 1036 by Franco Fajardo RN  Outcome: Ongoing  6/27/2020 0306 by Quiana Ballesteros RN  Outcome: Ongoing  Goal: Absence of physical injury  Description: Absence of physical injury  6/27/2020 1036 by Franco Fajardo RN  Outcome: Ongoing  6/27/2020 0306 by Quiana Ballesteros RN  Outcome: Ongoing     Problem: Health Behavior:  Goal: Ability to verbalize adaptive coping strategies to use when suicidal feelings occur will improve  Description: Ability to verbalize adaptive coping strategies to use when suicidal feelings occur will improve  6/27/2020 1036 by Franco Fajardo RN  Outcome: Ongoing  6/27/2020 0306 by Quiana Ballesteros RN  Outcome: Ongoing  Goal: Ability to verbalize adaptive coping strategies to use when the urge to self-mutilate occurs will improve  Description: Ability to verbalize adaptive coping strategies to use when the urge to self-mutilate occurs will improve  6/27/2020 1036 by Franco Fajardo RN  Outcome: Ongoing  6/27/2020 0306 by Quiana Ballesteros RN  Outcome: Ongoing  Goal: Identification of resources available to assist in meeting health care needs will improve  Description: Identification of resources available to assist in meeting health care needs will improve  6/27/2020 1036 by Franco Fajardo RN  Outcome: Ongoing  6/27/2020 0306 by Quiana Ballesteros RN  Outcome: Ongoing     Problem: Safety:  Goal: Ability to contract for his/her safety will improve  Description: Ability to contract for his/her safety will improve  6/27/2020 1036 by Franco Fajardo RN  Outcome: Ongoing  6/27/2020 0306 by Quiana Ballesteros RN  Outcome: Ongoing     Problem: Pain:  Description: Pain management should include both nonpharmacologic and pharmacologic interventions.   Goal: Pain level will decrease  Description: Pain level will decrease  6/27/2020 1036 by Franco Fajardo RN  Outcome: Ongoing  6/27/2020 0306 by Rene Espinosa Lori Gordon RN  Outcome: Ongoing  Goal: Control of acute pain  Description: Control of acute pain  6/27/2020 1036 by Bess Armando RN  Outcome: Ongoing  6/27/2020 0306 by Viktoria Gabriel RN  Outcome: Ongoing  Goal: Control of chronic pain  Description: Control of chronic pain  6/27/2020 1036 by Bess Armando RN  Outcome: Ongoing  6/27/2020 0306 by Viktoria Gabriel RN  Outcome: Ongoing     Problem: Discharge Planning:  Goal: Discharged to appropriate level of care  Description: Discharged to appropriate level of care  6/27/2020 1036 by Bess Armando RN  Outcome: Ongoing  6/27/2020 0306 by Viktoria Gabriel RN  Outcome: Ongoing     Problem: Serum Glucose Level - Abnormal:  Goal: Ability to maintain appropriate glucose levels will improve  Description: Ability to maintain appropriate glucose levels will improve  6/27/2020 1036 by Bess Armando RN  Outcome: Ongoing  6/27/2020 0306 by Viktoria Gabriel RN  Outcome: Ongoing     Problem: Sensory Perception - Impaired:  Goal: Ability to maintain a stable neurologic state will improve  Description: Ability to maintain a stable neurologic state will improve  6/27/2020 1036 by Bess Armando RN  Outcome: Ongoing  6/27/2020 0306 by Viktoria Gabriel RN  Outcome: Ongoing     Problem:  Activity:  Goal: Ability to tolerate increased activity will improve  Description: Ability to tolerate increased activity will improve  6/27/2020 1036 by Bess Armando RN  Outcome: Ongoing  6/27/2020 0306 by Viktoria Gabriel RN  Outcome: Ongoing     Problem: Coping:  Goal: Ability to identify and develop effective coping behavior will improve  Description: Ability to identify and develop effective coping behavior will improve  6/27/2020 1036 by Bess Armando RN  Outcome: Ongoing  6/27/2020 0306 by Viktoria Gabriel RN  Outcome: Ongoing     Problem: Depressive Behavior With or Without Suicide Precautions:  Goal: Able to verbalize acceptance of life and situations over which he or she has no control  Description: Able to verbalize acceptance of life and situations over which he or she has no control  6/27/2020 1036 by Mimi Paige RN  Outcome: Ongoing  6/27/2020 0306 by Erika Martins RN  Outcome: Ongoing  Goal: Able to verbalize and/or display a decrease in depressive symptoms  Description: Able to verbalize and/or display a decrease in depressive symptoms  6/27/2020 1036 by Mimi Paige RN  Outcome: Ongoing  6/27/2020 0306 by Erika Martins RN  Outcome: Ongoing  Goal: Ability to disclose and discuss suicidal ideas will improve  Description: Ability to disclose and discuss suicidal ideas will improve  6/27/2020 1036 by Mimi Paige RN  Outcome: Ongoing  6/27/2020 0306 by Erika Martins RN  Outcome: Ongoing  Goal: Able to verbalize support systems  Description: Able to verbalize support systems  6/27/2020 1036 by Mimi Paige RN  Outcome: Ongoing  6/27/2020 0306 by Erika Martisn RN  Outcome: Ongoing  Goal: Absence of self-harm  Description: Absence of self-harm  6/27/2020 1036 by Mimi Paige RN  Outcome: Ongoing  6/27/2020 0306 by Erika Martins RN  Outcome: Ongoing  Goal: Patient specific goal  Description: Patient specific goal  6/27/2020 1036 by Mimi Paige RN  Outcome: Ongoing  6/27/2020 0306 by Erika Martins RN  Outcome: Ongoing  Goal: Participates in care planning  Description: Participates in care planning  6/27/2020 1036 by Mimi Paige RN  Outcome: Ongoing  6/27/2020 0306 by Erika Martins RN  Outcome: Ongoing     Problem: Risk of Harm:  Goal: Ability to remain free from injury will improve  Description: Ability to remain free from injury will improve  6/27/2020 1036 by Mimi Paige RN  Outcome: Ongoing  6/27/2020 0306 by Erika Martins RN  Outcome: Ongoing     Problem: Altered Mood, Manic Behavior:  Goal: Able to sleep  Description: Able to sleep  6/27/2020 1036 by Mimi Paige RN  Outcome: Ongoing  6/27/2020 0306 by Erika Martins RN  Outcome: Ongoing  Goal: Able to verbalize decrease in frequency and intensity of racing thoughts  Description: Able to verbalize decrease in frequency and intensity of racing thoughts  6/27/2020 1036 by Sunday Jones RN  Outcome: Ongoing  6/27/2020 0306 by Kita Short RN  Outcome: Ongoing  Goal: Ability to disclose and discuss suicidal ideas will improve  Description: Ability to disclose and discuss suicidal ideas will improve  6/27/2020 1036 by Sunday Jones RN  Outcome: Ongoing  6/27/2020 0306 by Kita Short RN  Outcome: Ongoing  Goal: Absence of self-harm  Description: Absence of self-harm  6/27/2020 1036 by Sunday Jones RN  Outcome: Ongoing  6/27/2020 0306 by Kita Short RN  Outcome: Ongoing  Goal: Ability to achieve adequate nutritional intake will improve  Description: Ability to achieve adequate nutritional intake will improve  6/27/2020 1036 by Sunday Jones RN  Outcome: Ongoing  6/27/2020 0306 by Kita Short RN  Outcome: Ongoing  Goal: Ability to interact with others will improve  Description: Ability to interact with others will improve  6/27/2020 1036 by Sunday Jones RN  Outcome: Ongoing  6/27/2020 0306 by Kita Short RN  Outcome: Ongoing  Goal: Ability to demonstrate self-control will improve  Description: Ability to demonstrate self-control will improve  6/27/2020 1036 by Sunday Jones RN  Outcome: Ongoing  6/27/2020 0306 by Kita Short RN  Outcome: Ongoing  Goal: Mood stable  Description: Mood stable  6/27/2020 1036 by Sunday Jones RN  Outcome: Ongoing  6/27/2020 0306 by Kita Short RN  Outcome: Ongoing  Goal: Patient specific goal  Description: Patient specific goal  6/27/2020 1036 by Sunday Jones RN  Outcome: Ongoing  6/27/2020 0306 by Kita Short RN  Outcome: Ongoing

## 2020-06-27 NOTE — PROGRESS NOTES
Group Therapy Note    Date: 6/27/2020  Start Time: 3105  End Time:  1630  Number of Participants: 3    Type of Group: Healthy Living/Wellness    Status After Intervention:  Improved    Participation Level: Minimal    Participation Quality: Appropriate      Speech:  normal      Thought Process/Content: Logical      Affective Functioning: Congruent      Mood: anxious and depressed      Level of consciousness:  Oriented x4      Response to Learning: Able to verbalize current knowledge/experience      Endings: None Reported    Modes of Intervention: Support      Discipline Responsible: Registered Nurse      Signature:  Jeff Parnell RN

## 2020-06-27 NOTE — H&P
HISTORY and PHYSICAL      CHIEF COMPLAINT:  SA    Reason for Admission:  SA    History Obtained From:  Patient, chart    HISTORY OF PRESENT ILLNESS:      The patient is a 63 y.o. female who is admitted to the [place] [address] unit with worsening mood issues. She has no c/o CP or SOA. No abdominal pain or N/V. No dysuria. No weakness or HA. No fevers.      Past Medical History:        Diagnosis Date    Alcohol overdose     history of    Anemia     Anxiety     Bipolar disorder ([facility] [address]     Brain tumor ([facility] [address]     Chronic back pain     ruptured discs    COPD (chronic obstructive pulmonary disease) (HCC)     Dementia (HCC)     Depression     Diabetes ([facility] [place] [address]     Elevated liver enzymes     Frequent falls     Headache     History of kidney infection     Hyperlipidemia     Hypertension     Neuropathy     Obesity     Osteoarthritis     Oxygen dependent     Psoriasis     Scoliosis     Sleep apnea     UTI (urinary tract infection)      Past Surgical History:        Procedure Laterality Date    APPENDECTOMY       SECTION      CHOLECYSTECTOMY      GASTRIC BYPASS SURGERY      RETROPHYARYNGEAL ABCESS INCISION/DRAIN      TONSILLECTOMY      TUBAL LIGATION      TUMOR REMOVAL      bone tumor, r wrist     UPPER GASTROINTESTINAL ENDOSCOPY  2015    Tarik: sm hiatal hernia; s/p balbir-en-y; esoph plaques, pos yeast; neg CS         Medications Prior to Admission:    Medications Prior to Admission: diclofenac (VOLTAREN) 75 MG EC tablet, Take 1 tablet by mouth 2 times daily  irbesartan (AVAPRO) 150 MG tablet, Take 1 tablet by mouth nightly  Liraglutide (VICTOZA) 18 MG/3ML SOPN SC injection, Inject 1.8 mg into the skin daily  vitamin D (ERGOCALCIFEROL) 1.25 MG (37669 UT) CAPS capsule, Take 1 capsule by mouth once a week  ferrous sulfate 325 (65 Fe) MG tablet, Take 1 tablet by mouth daily (with breakfast)  lurasidone (LATUDA) 80 MG TABS tablet, Take 1 tablet by mouth daily  melatonin 3 MG TABS tablet, Take 1 tablet by mouth nightly  ketorolac (TORADOL) 30 MG/ML injection, Inject 1 mL into the muscle once for 1 dose  mirtazapine (REMERON) 15 MG tablet, Take 1 tablet by mouth nightly  benztropine (COGENTIN) 1 MG tablet, Take 1 tablet by mouth 2 times daily  atorvastatin (LIPITOR) 20 MG tablet, Take 1 tablet by mouth daily  acetaZOLAMIDE (DIAMOX) 500 MG extended release capsule, TAKE 1 CAPSULE TWICE DAILY  ARIPiprazole lauroxil (ARISTADA) 882 MG/3.2ML PRSY injection, Inject 3.2 mLs into the muscle every 30 days  metFORMIN (GLUCOPHAGE) 500 MG tablet, Take 1 tablet by mouth 2 times daily (with meals)  linaCLOtide (LINZESS) 72 MCG CAPS capsule, Take 1 capsule by mouth every morning (before breakfast)  Insulin Pen Needle (B-D ULTRAFINE III SHORT PEN) 31G X 8 MM MISC, Inject 1 each as directed daily  albuterol sulfate HFA (VENTOLIN HFA) 108 (90 Base) MCG/ACT inhaler, Inhale 2 puffs into the lungs every 6 hours as needed for Wheezing (Patient not taking: Reported on 3/4/2020)  ACCU-CHEK SOFTCLIX LANCETS MISC, CHECK BLOOD SUGAR TWICE DAILY AND AS NEEDED  fluticasone (FLONASE) 50 MCG/ACT nasal spray, 2 sprays by Nasal route daily  vitamin B-12 (CYANOCOBALAMIN) 500 MCG tablet, Take 1 tablet by mouth daily  donepezil (ARICEPT) 10 MG tablet, Take 1 tablet by mouth every night.  furosemide (LASIX) 20 MG tablet, Take 1 tablet by mouth daily  pantoprazole (PROTONIX) 40 MG tablet, Take 1 tablet by mouth daily. rOPINIRole (REQUIP) 2 MG tablet, Take 1 tablet by mouth 2 times daily  blood glucose monitor kit and supplies, Test 3 times a day & as needed for symptoms of irregular blood glucose. Dx:  blood glucose monitor strips, Test 3 times a day & as needed for symptoms of irregular blood glucose. OXYGEN, Inhale 3 L into the lungs  clobetasol (TEMOVATE) 0.05 % ointment, Apply topically 2 times daily.   ipratropium-albuterol (DUONEB) 0.5-2.5 (3) MG/3ML SOLN nebulizer solution, Inhale 3 mLs disorder, unspecified, SA---follow with Psych    Anemia    HTN---follow with BP    Hyperglcemia           Wendy Rodas MD  1:35 PM 6/27/2020

## 2020-06-27 NOTE — PROGRESS NOTES
SW completed psychosocial and CSSR-S on this date. Pt long and short term goals discussed. Pt voiced understanding. Treatment plan sheet not signed. Pt verbalized understanding of the treatment plan. Pt participated in goals and objectives of the treatment plan. Pt completed safety plan with , pt received copy of plan, and original was placed into pt's chart. Pt refused/declined to sign any paperwork. It was identified that pt will require outpatient follow up appointments at local [facility] behavioral health facility such as[address]. Pt validated need for appointments. Pt was also provided a handout of contact information for drug and alcohol treatment centers and other community support service such as [facility] and [facility]. In the last 6 months has the pt been danger to self: Yes  In the last 6 months has the pt been danger to others: No  Legal Guardian/POA: No      Provided pt with [employer] handout entitled \"Quitting Smoking,\" reviewed handout with pt addressing dangers of smoking, developing coping skills, and providing basic information about quitting.  Patient received all components / Patient refused/declined practical counseling of tobacco practical counseling during the hospital stay

## 2020-06-27 NOTE — PROGRESS NOTES
Pt has wound to left buttock cheek, removed bandage, cleansed area redish, indented area with sterile saline, then applied wet to dry bandage to area, and applied bandaged over wound, pt had no complaint of pain or discomfort, pt fell asleep while performing procedure. Will continue to monitor patient for safety. Wound care to follow up on care of wound. Also applied Nystatin powder to abd fold areas that were red and contained odor.

## 2020-06-27 NOTE — H&P
Department of Psychiatry  Attending History and Physical        CHIEF COMPLAINT:  \"I feel better\"    History obtained from: patient, chart    HISTORY OF PRESENT ILLNESS:    72-year-old white female with history of bipolar disorder and neurocognitive disorder, known to our service, admitted for overdose on Benadryl. Patient was seen by a home health nurse at home for the wound in her left buttock where she had an abscess drained. Patient told the nurse that she took about 26 Benadryl pills. UDS negative, BAL less than 10. Patient is medically stable now as per RN.     Patient presented with labile affect when seen in the ICU by our consult service. She was tangential and somewhat pressured. Stated she has been taking a large amount of Benadryl daily which is \"the only way\" for her \"to stay calm. \"  She reported history of previous overdoses. She voiced hopelessness and helplessness. Admitted that she needs psychiatric help. She has been depressed and has been seeing \"ghosts\" at home. She denied auditory hallucinations. She was not sleeping at home. Reported racing thoughts and mood swings. Unable to function at home and take care of her place and herself. She has been off her medications for a long time. Patient was upset and became agitated yesterday upon transfer to the unit after she was told that she will not be able to use her cell phone. She required PRN Thorazine after which she was calm and cooperative. She has been med compliant. No issues overnight. Patient is observed resting in bed this morning. She appears calmer today. Thought processes more organized. States she finally got good rest last night, and her mood has somewhat improved. \"I really needed to be restarted on medications. \"  She denies suicidal ideation. Denies auditory and visual hallucinations. States Risperdal caused side effects in the past.  Agreed to switch to Seroquel. Complains of back pain.   Agreed to try diclofenac (VOLTAREN) 75 MG EC tablet Take 1 tablet by mouth 2 times daily 6/5/20   JORGE Lovell   irbesartan (AVAPRO) 150 MG tablet Take 1 tablet by mouth nightly 6/5/20   JORGE Lovell   Liraglutide (VICTOZA) 18 MG/3ML SOPN SC injection Inject 1.8 mg into the skin daily 3/6/20   JORGE Lovell   vitamin D (ERGOCALCIFEROL) 1.25 MG (52117 UT) CAPS capsule Take 1 capsule by mouth once a week 3/5/20   JORGE Lovell   ferrous sulfate 325 (65 Fe) MG tablet Take 1 tablet by mouth daily (with breakfast) 3/5/20   JORGE Lovell   lurasidone (LATUDA) 80 MG TABS tablet Take 1 tablet by mouth daily 3/4/20   JORGE Simental   melatonin 3 MG TABS tablet Take 1 tablet by mouth nightly 2/5/20   JUAN ANTONIO Hunter DO   ketorolac (TORADOL) 30 MG/ML injection Inject 1 mL into the muscle once for 1 dose 12/3/19 12/3/19  JORGE Lovell   mirtazapine (REMERON) 15 MG tablet Take 1 tablet by mouth nightly 12/3/19   JORGE Lovell   benztropine (COGENTIN) 1 MG tablet Take 1 tablet by mouth 2 times daily 12/3/19   JORGE Lovell   atorvastatin (LIPITOR) 20 MG tablet Take 1 tablet by mouth daily 12/3/19   JORGE Lovell   acetaZOLAMIDE (DIAMOX) 500 MG extended release capsule TAKE 1 CAPSULE TWICE DAILY 12/3/19   JORGE Lovell   ARIPiprazole lauroxil (ARISTADA) 0477 11 28 98 MG/3.2ML PRSY injection Inject 3.2 mLs into the muscle every 30 days 12/3/19   JORGE Lovell   metFORMIN (GLUCOPHAGE) 500 MG tablet Take 1 tablet by mouth 2 times daily (with meals) 12/3/19   JORGE Lovell   linaCLOtide Archana Corry) 72 MCG CAPS capsule Take 1 capsule by mouth every morning (before breakfast) 12/3/19   JORGE Lovell   Insulin Pen Needle (B-D ULTRAFINE III SHORT PEN) 31G X 8 MM MISC Inject 1 each as directed daily 10/22/19   JORGE Lovell   albuterol sulfate HFA (VENTOLIN HFA) 108 (90 Base) MCG/ACT inhaler Inhale 2 puffs into the lungs every 6 hours as needed for Wheezing  Patient not taking: Reported on 3/4/2020 10/22/19   JORGE Tolliver   ACCU-CHEK SOFTCLIX LANCETS MISC CHECK BLOOD SUGAR TWICE DAILY AND AS NEEDED 10/22/19   JORGE Tolliver   fluticasone [facility]) 50 MCG/ACT nasal spray 2 sprays by Nasal route daily 10/22/19   JORGE Tolliver   vitamin B-12 (CYANOCOBALAMIN) 500 MCG tablet Take 1 tablet by mouth daily 10/22/19 1/20/20  JORGE Tolliver   donepezil (ARICEPT) 10 MG tablet Take 1 tablet by mouth every night. 10/9/19   JORGE Guevara   furosemide (LASIX) 20 MG tablet Take 1 tablet by mouth daily 10/9/19   JORGE Guevara   pantoprazole (PROTONIX) 40 MG tablet Take 1 tablet by mouth daily. 10/9/19   JORGE Guevara   rOPINIRole (REQUIP) 2 MG tablet Take 1 tablet by mouth 2 times daily 10/9/19   JORGE Guevara   blood glucose monitor kit and supplies Test 3 times a day & as needed for symptoms of irregular blood glucose. Dx: 1/3/19   JORGE Tolliver   blood glucose monitor strips Test 3 times a day & as needed for symptoms of irregular blood glucose. 1/3/19   JORGE Tolliver   OXYGEN Inhale 3 L into the lungs    Historical Provider, MD   clobetasol (TEMOVATE) 0.05 % ointment Apply topically 2 times daily. 11/5/18   JORGE Tolliver   ipratropium-albuterol (DUONEB) 0.5-2.5 (3) MG/3ML SOLN nebulizer solution Inhale 3 mLs into the lungs every 6 hours as needed for Shortness of Breath 10/2/18   JORGE Tolliver   Continuous Blood Gluc  (FREESTYLE ABDOULAYE READER) MARCIA 1 Units by Does not apply route 2 times daily 8/21/18   JORGE Tolliver   Continuous Blood Gluc Sensor (FREESTYLE ABDOULAYE SENSOR SYSTEM) MISC 1 Units by Does not apply route 2 times daily 8/21/18   JORGE Tolliver   nystatin (MYCOSTATIN) 936092 UNIT/GM powder Apply 3 times daily.  3/6/18   JORGE Tolliver Allergies:  Haldol [haloperidol lactate]; Morphine; and Codeine    Social History:  Patient lives with her daughter. 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    Family History:   Family History   Problem Relation Age of Onset    Diabetes Mother     Kidney Disease Mother     High Blood Pressure Mother     Cancer Mother     Diabetes Father     High Blood Pressure Father     Heart Disease Father     Diabetes Brother     High Blood Pressure Brother     Diabetes Sister     High Blood Pressure Sister     Diabetes Brother     High Blood Pressure Brother     Cancer Maternal Aunt     Colon Cancer Neg Hx     Colon Polyps Neg Hx     Esophageal Cancer Neg Hx     Liver Cancer Neg Hx     Liver Disease Neg Hx     Rectal Cancer Neg Hx     Stomach Cancer Neg Hx      No history of psychiatric illness or suicide attempts. REVIEW OF SYSTEMS:  General: No fevers, chills, night sweats, no recent weight loss or gain. Head: No headache, no migraine. Eyes: No recent visual changes. Ears: No recent hearing changes. Nose: No increased congestion or change in sense of smell. Throat: No sore throat, no trouble swallowing. Cardiovascular: No chest pain or palpitations, or dizziness. Respiratory: No cough, wheezes, congestion, or shortness of breath. Gastrointestinal: No abdominal pain, nausea or vomiting, no diarrhea or constipation. Musculo-skeletal: No edema, deformities, or loss of functions. Chronic back pain. Neurological: No loss of consciousness, abnormal sensations, or weakness. Skin: No rash, abrasions or bruises. PHYSICAL EXAM:  On observation  GENERAL APPEARANCE: Stated age, in NAD. HEAD: Normocephalic, atraumatic. CHEST: No deformities. MUSCULOSKELTAL: No obvious deformities, clubbing, cyanosis or edema. NEUROLOGICAL: Alert, oriented x 3, CN II-XII grossly intact. No abnormal movements or tremors. Gait stable. SKIN: Warm, dry, intact, no rash, abrasions, bruises.     Vitals:  BP 130/72   Pulse 95   Temp 98.3 °F (36.8 °C) (Temporal)   Resp 16   Ht 5' (1.524 m)   Wt 203 lb 6 oz (92.3 kg)   SpO2 96%   BMI 39.72 kg/m²     Mental Status Examination:    Appearance: Stated age, hygiene improved. Behavior: Calm, cooperative. Speech: Nonpressured  Mood: \" Better\"   Affect: Mood congruent. Thought Process: More organized   Thought Content: Denies suicidal and homicidal ideation. No overt delusions or paranoia appreciated. Perceptions: Denies auditory or visual hallucinations at present time. Not responding to internal stimuli. Concentration: Improved. Orientation: to person, place, date, and situation. Language: Intact. Fund of information: Intact. Memory: Recent and remote appear intact. Neurovegitative: Improved appetite and sleep. Insight: Improving. Judgment: Improving. DATA:  Lab Results   Component Value Date    WBC 6.1 06/27/2020    HGB 8.9 (L) 06/27/2020    HCT 30.4 (L) 06/27/2020    MCV 85.2 06/27/2020     06/27/2020     Lab Results   Component Value Date     06/27/2020    K 4.1 06/27/2020     06/27/2020    CO2 18 (L) 06/27/2020    BUN 24 (H) 06/27/2020    CREATININE 0.6 06/27/2020    GLUCOSE 149 (H) 06/27/2020    CALCIUM 8.7 (L) 06/27/2020    PROT 6.2 (L) 06/25/2020    LABALBU 3.9 06/25/2020    BILITOT <0.2 06/25/2020    ALKPHOS 150 (H) 06/25/2020    AST 17 06/25/2020    ALT 13 06/25/2020    LABGLOM >60 06/27/2020    GLOB 3.1 05/09/2017       ASSESSMENT AND PLAN:  DSM-5 DIAGNOSIS:   Impression:  Bipolar disorder, most recent episode mixed, severe, with psychotic features  Status post overdose  History of cognitive impairment  Cannabis use disorder by history  Tobacco use disorder    Pertinent medical issues  Vitamin B12 deficiency  Vitamin D deficiency  Anemia  Elevated alkaline phosphatase  COPD  Sleep apnea  History of diabetes    Paranoia resolved. Thought processes more organized today. Continue medication adjustment.   Patient is meeting the criteria for inpatient psychiatric treatment. Plan:   -[facility] and monitor on 15 minute checks. Suicide and fall precautions.  Foreign Crisostomocal reviewed. -Gather collateral information from family with release.  -Medical monitoring to be performed by Dr. Herrera Shea and associates. Monitor hemoglobin. Order UA to rule out UTI. Ensure glycemic control. Lidocaine patches and gabapentin for pain. Vitamin supplementation. History of cognitive impairment - SLUMS tomorrow.   -Acclimate to the unit. Provide supportive psychotherapy.  -Encourage participation in groups and therapeutic activities as appropriate. Work on coping skills. -Medications:    Switch Risperdal to Seroquel for mood stabilization. Discussed alternatives, benefits, & risks, including - but not limited to - black box warning regarding increased all-cause mortality in elderly with dementia (incl. cardiovascular & infectious risks), extra-pyramidal symptoms/tardive dyskinesia, metabolic side effects (incl. metabolic syndrome/hyperglycemia/dyslipidemia), cardiac side effects including sudden cardiac death, hypotension & increased fall risk. Pt was instructed regarding the risks and benefits of antipsychotic therapy including but not limited to Neuroleptic malignant syndrome (tetrad of distinctive clinical features: fever, rigidity, mental status changes, and autonomic instability), EPS (Akathisia, Parkinsonism, Tardive dyskinesia [repetitive movements of mouth, tongue, face, trunk, or extremities], nausea, constipation, abdominal pain, galactorrhea, gynecomastia, Dizziness, Sedation, Anticholinergic effects, Hypotension, Weight gain, DM, DSL, hyperglycemia, QTc prolongation. Additionally, in regards to tardive dyskinesia pt was instructed about increase risk of permanency of these movements. Continue on Depakote, may switch to ER.   Discussed benefits, alternatives, and risks including black box warnings of life-threatening

## 2020-06-27 NOTE — PLAN OF CARE
Problem: Falls - Risk of:  Goal: Will remain free from falls  Description: Will remain free from falls  Outcome: Ongoing  Goal: Absence of physical injury  Description: Absence of physical injury  Outcome: Ongoing     Problem: Health Behavior:  Goal: Ability to verbalize adaptive coping strategies to use when suicidal feelings occur will improve  Description: Ability to verbalize adaptive coping strategies to use when suicidal feelings occur will improve  Outcome: Ongoing  Goal: Ability to verbalize adaptive coping strategies to use when the urge to self-mutilate occurs will improve  Description: Ability to verbalize adaptive coping strategies to use when the urge to self-mutilate occurs will improve  Outcome: Ongoing  Goal: Identification of resources available to assist in meeting health care needs will improve  Description: Identification of resources available to assist in meeting health care needs will improve  Outcome: Ongoing     Problem: Safety:  Goal: Ability to contract for his/her safety will improve  Description: Ability to contract for his/her safety will improve  Outcome: Ongoing     Problem: Pain:  Goal: Pain level will decrease  Description: Pain level will decrease  Outcome: Ongoing  Goal: Control of acute pain  Description: Control of acute pain  Outcome: Ongoing  Goal: Control of chronic pain  Description: Control of chronic pain  Outcome: Ongoing     Problem: Discharge Planning:  Goal: Discharged to appropriate level of care  Description: Discharged to appropriate level of care  Outcome: Ongoing     Problem: Serum Glucose Level - Abnormal:  Goal: Ability to maintain appropriate glucose levels will improve  Description: Ability to maintain appropriate glucose levels will improve  Outcome: Ongoing     Problem: Sensory Perception - Impaired:  Goal: Ability to maintain a stable neurologic state will improve  Description: Ability to maintain a stable neurologic state will improve  Outcome: Ongoing Problem:  Activity:  Goal: Ability to tolerate increased activity will improve  Description: Ability to tolerate increased activity will improve  Outcome: Ongoing     Problem: Coping:  Goal: Ability to identify and develop effective coping behavior will improve  Description: Ability to identify and develop effective coping behavior will improve  Outcome: Ongoing     Problem: Depressive Behavior With or Without Suicide Precautions:  Goal: Able to verbalize acceptance of life and situations over which he or she has no control  Description: Able to verbalize acceptance of life and situations over which he or she has no control  Outcome: Ongoing  Goal: Able to verbalize and/or display a decrease in depressive symptoms  Description: Able to verbalize and/or display a decrease in depressive symptoms  Outcome: Ongoing  Goal: Ability to disclose and discuss suicidal ideas will improve  Description: Ability to disclose and discuss suicidal ideas will improve  Outcome: Ongoing  Goal: Able to verbalize support systems  Description: Able to verbalize support systems  Outcome: Ongoing  Goal: Absence of self-harm  Description: Absence of self-harm  Outcome: Ongoing  Goal: Patient specific goal  Description: Patient specific goal  Outcome: Ongoing  Goal: Participates in care planning  Description: Participates in care planning  Outcome: Ongoing     Problem: Risk of Harm:  Goal: Ability to remain free from injury will improve  Description: Ability to remain free from injury will improve  Outcome: Ongoing     Problem: Altered Mood, Manic Behavior:  Goal: Able to sleep  Description: Able to sleep  Outcome: Ongoing  Goal: Able to verbalize decrease in frequency and intensity of racing thoughts  Description: Able to verbalize decrease in frequency and intensity of racing thoughts  Outcome: Ongoing  Goal: Ability to disclose and discuss suicidal ideas will improve  Description: Ability to disclose and discuss suicidal ideas will improve  Outcome: Ongoing  Goal: Absence of self-harm  Description: Absence of self-harm  Outcome: Ongoing  Goal: Ability to achieve adequate nutritional intake will improve  Description: Ability to achieve adequate nutritional intake will improve  Outcome: Ongoing  Goal: Ability to interact with others will improve  Description: Ability to interact with others will improve  Outcome: Ongoing  Goal: Ability to demonstrate self-control will improve  Description: Ability to demonstrate self-control will improve  Outcome: Ongoing  Goal: Mood stable  Description: Mood stable  Outcome: Ongoing  Goal: Patient specific goal  Description: Patient specific goal  Outcome: Ongoing

## 2020-06-27 NOTE — PROGRESS NOTES
Pt in scrubs on unit, pt on phone twice with daughter and cursing on phone and getting upset at daughter, pt had to be re-oriented on unit rules and not bring food in room, pt began to curse more and then did do as told about the food supplied. Pt request to have medication for agitation and nurse told patient that new orders for medications were being entered per Dr Jose Collins as we speak, pt was med complaint and pacing back and forth. After medications administered pt calmed.

## 2020-06-27 NOTE — PROGRESS NOTES
Pt calm and cooperative at this time. Pt states she is going to lay down after she drinks some water. Pt vitals are WNL. Will continue to monitor.

## 2020-06-28 LAB
GLUCOSE BLD-MCNC: 146 MG/DL (ref 70–99)
GLUCOSE BLD-MCNC: 178 MG/DL (ref 70–99)
GLUCOSE BLD-MCNC: 185 MG/DL (ref 70–99)
GLUCOSE BLD-MCNC: 87 MG/DL (ref 70–99)
PERFORMED ON: ABNORMAL
PERFORMED ON: NORMAL

## 2020-06-28 PROCEDURE — 6370000000 HC RX 637 (ALT 250 FOR IP): Performed by: PSYCHIATRY & NEUROLOGY

## 2020-06-28 PROCEDURE — 82947 ASSAY GLUCOSE BLOOD QUANT: CPT

## 2020-06-28 PROCEDURE — 1240000000 HC EMOTIONAL WELLNESS R&B

## 2020-06-28 RX ADMIN — TRAZODONE HYDROCHLORIDE 50 MG: 50 TABLET ORAL at 20:47

## 2020-06-28 RX ADMIN — METFORMIN HYDROCHLORIDE 500 MG: 500 TABLET, FILM COATED ORAL at 08:08

## 2020-06-28 RX ADMIN — GABAPENTIN 300 MG: 300 CAPSULE ORAL at 08:08

## 2020-06-28 RX ADMIN — DIVALPROEX SODIUM 1000 MG: 500 TABLET, EXTENDED RELEASE ORAL at 20:48

## 2020-06-28 RX ADMIN — SALINE NASAL SPRAY 1 SPRAY: 1.5 SOLUTION NASAL at 08:09

## 2020-06-28 RX ADMIN — METFORMIN HYDROCHLORIDE 500 MG: 500 TABLET, FILM COATED ORAL at 16:57

## 2020-06-28 RX ADMIN — DONEPEZIL HYDROCHLORIDE 10 MG: 5 TABLET, FILM COATED ORAL at 20:48

## 2020-06-28 RX ADMIN — GABAPENTIN 300 MG: 300 CAPSULE ORAL at 20:48

## 2020-06-28 RX ADMIN — FUROSEMIDE 20 MG: 20 TABLET ORAL at 08:08

## 2020-06-28 RX ADMIN — HYDROXYZINE HYDROCHLORIDE 25 MG: 25 TABLET, FILM COATED ORAL at 20:48

## 2020-06-28 RX ADMIN — ACETAMINOPHEN 650 MG: 325 TABLET, FILM COATED ORAL at 20:48

## 2020-06-28 RX ADMIN — RISPERIDONE 2 MG: 1 TABLET, ORALLY DISINTEGRATING ORAL at 20:48

## 2020-06-28 RX ADMIN — HYDROXYZINE HYDROCHLORIDE 25 MG: 25 TABLET, FILM COATED ORAL at 12:53

## 2020-06-28 RX ADMIN — FERROUS SULFATE TAB 325 MG (65 MG ELEMENTAL FE) 325 MG: 325 (65 FE) TAB at 08:08

## 2020-06-28 RX ADMIN — INSULIN LISPRO 1 UNITS: 100 INJECTION, SOLUTION INTRAVENOUS; SUBCUTANEOUS at 16:58

## 2020-06-28 RX ADMIN — MELATONIN 3 MG: at 20:47

## 2020-06-28 RX ADMIN — ACETAMINOPHEN 650 MG: 325 TABLET, FILM COATED ORAL at 14:43

## 2020-06-28 RX ADMIN — MICONAZOLE NITRATE: 2 POWDER TOPICAL at 20:54

## 2020-06-28 RX ADMIN — CYANOCOBALAMIN TAB 500 MCG 500 MCG: 500 TAB at 08:08

## 2020-06-28 RX ADMIN — PANTOPRAZOLE SODIUM 40 MG: 40 TABLET, DELAYED RELEASE ORAL at 06:10

## 2020-06-28 RX ADMIN — SALINE NASAL SPRAY 1 SPRAY: 1.5 SOLUTION NASAL at 17:35

## 2020-06-28 RX ADMIN — MICONAZOLE NITRATE: 2 POWDER TOPICAL at 08:20

## 2020-06-28 RX ADMIN — INSULIN LISPRO 1 UNITS: 100 INJECTION, SOLUTION INTRAVENOUS; SUBCUTANEOUS at 21:39

## 2020-06-28 RX ADMIN — GABAPENTIN 300 MG: 300 CAPSULE ORAL at 14:00

## 2020-06-28 RX ADMIN — QUETIAPINE FUMARATE 200 MG: 200 TABLET, FILM COATED ORAL at 21:40

## 2020-06-28 ASSESSMENT — PAIN DESCRIPTION - FREQUENCY
FREQUENCY: CONTINUOUS

## 2020-06-28 ASSESSMENT — PAIN DESCRIPTION - LOCATION
LOCATION: BACK

## 2020-06-28 ASSESSMENT — PAIN DESCRIPTION - PROGRESSION
CLINICAL_PROGRESSION: GRADUALLY IMPROVING
CLINICAL_PROGRESSION: GRADUALLY WORSENING
CLINICAL_PROGRESSION: GRADUALLY IMPROVING
CLINICAL_PROGRESSION: GRADUALLY WORSENING

## 2020-06-28 ASSESSMENT — PAIN DESCRIPTION - DESCRIPTORS
DESCRIPTORS: ACHING;DISCOMFORT
DESCRIPTORS: ACHING
DESCRIPTORS: ACHING;DISCOMFORT
DESCRIPTORS: ACHING

## 2020-06-28 ASSESSMENT — PAIN DESCRIPTION - ONSET
ONSET: ON-GOING

## 2020-06-28 ASSESSMENT — PAIN DESCRIPTION - PAIN TYPE
TYPE: CHRONIC PAIN

## 2020-06-28 ASSESSMENT — PAIN - FUNCTIONAL ASSESSMENT
PAIN_FUNCTIONAL_ASSESSMENT: PREVENTS OR INTERFERES SOME ACTIVE ACTIVITIES AND ADLS

## 2020-06-28 ASSESSMENT — PAIN SCALES - GENERAL
PAINLEVEL_OUTOF10: 6
PAINLEVEL_OUTOF10: 10
PAINLEVEL_OUTOF10: 5
PAINLEVEL_OUTOF10: 6

## 2020-06-28 ASSESSMENT — PAIN DESCRIPTION - ORIENTATION
ORIENTATION: LOWER

## 2020-06-28 NOTE — PROGRESS NOTES
WRAP UP GROUP NOTE    Patient's Goal:  Providing feedback as to their own progress in the care-plan provided. Patients have an opportunity to explore self-reflective skills and share any additional cares and concerns not yet addressed. Pt effectively participated. Energy Level:high  Appetite:good  Concentration:improving  Hallucinations:on/off  Depression:improved  Anxiety:improved  How I worked today:tried a little  What helps me sleep:read  Any questions/complaints/comments:\"no everything is great!! Except I should be able to use my cell phone I was lied to\"    Group/activities that helped me today were:   One-to-One with Staff: Quinton George, all so nice\"    Electronically signed by Jemal Avalos RN on 6/27/2020 at 8:37 PM

## 2020-06-28 NOTE — PROGRESS NOTES
Dressing to left buttocks removed and wound cleaned with NS and wet/dry dressing applied. No s/s of infection noted. Pt had no complaints during dressing change. Will continue to monitor.

## 2020-06-28 NOTE — PROGRESS NOTES
[facility] Daily Shift Assessment-Geriatric Unit  Nursing Progress Note          Room: [facility]– [place]/617-01   Name: Romy Castrejon   Age: 63 y.o. Gender: female   Dx: <principal problem not specified>  Precautions: suicide risk and fall risk  Inpatient Status: voluntary     SLEEP:    Sleep: Yes,   Sleep Quality Good   Hours Slept: 6   Sleep Medications: Yes  PRN Sleep Meds: No       MEDICAL:      Other PRN Meds: Yes   Med Compliant: Yes   Accu-Chek: Yes    Oxygen/CPAP/BiPAP: Yes  CIWA/CINA: No   PAIN Assessment: present - adequately treated  Side Effects from medication: No      PSYCH:     SI denies suicidal ideation    HI Negative for homicidal ideation        AVH:  On/off      Depression:   Improved    Anxiety:   Improved        GENERAL:      Appetite: good  Social: Yes Speech: normal   Appearance:appropriately dressed  Assistive Devices: noneLevel of Assist: Independent      GROUP:    Group Participation: Yes  Participation LevelMinimal    Participation QualityAppropriate    Notes:   Patient in room at beginning of the shift. Patient asked for some popcorn and was told would get her some. Patient went to the day area and got a snack and something to drink. Patient calm and cooperative with staff. Patient colored and painted some things and left them to dry. Patient took medication and then went to bed. Patient placed on O2 as ordered. Patient resting in bed at this time with eyes closed. Patient is a line of sight when wearing the oxygen. Will continue to monitor for safety.      Lisa Jackson RN

## 2020-06-28 NOTE — PLAN OF CARE
Problem: Depressive Behavior With or Without Suicide Precautions:  Goal: Able to verbalize acceptance of life and situations over which he or she has no control  Description: Able to verbalize acceptance of life and situations over which he or she has no control  6/28/2020 1118 by Ann-Marie Lugo LPC  Outcome: Ongoing  Note:                                                                     Group Therapy Note    Date: 6/28/2020  Start Time: 1115  End Time:    Number of Participants: 0    Type of Group: Psychoeducation    Wellness Binder Information  Module Name:  Emotional wellness  Session Number:  5    Patient's Goal:  Obstacles to emotional wellness    Notes:  Pt did not attend group as scheduled. Pt was invited and encouraged to attend group, however, pt sleeping. Nursing notified. Status After Intervention:      Participation Level:     Participation Quality:       Speech:         Thought Process/Content:       Affective Functioning:       Mood:       Level of consciousness:        Response to Learning:       Endings:     Modes of Intervention:       Discipline Responsible: /Counselor      Signature:  Gigi Horvath

## 2020-06-28 NOTE — PROGRESS NOTES
[facility] Daily Shift Assessment  Nursing Progress Note    Room: 0617/617-01 Name: Stan Walter Age: 63 y.o. Gender: female   Dx: <principal problem not specified>  Precautions: suicide risk and fall risk  Target Symptoms:   Accu-Chek: Yes 146Sleep: Yes,Sleep Quality Very good SI No AVH denies [facility] [place]  ADLs: Yes Speech: normal Depression: \"improved\" Anxiety: \"improved\"   Participation LevelMinimal  Appetite: Very good  Visitation: No   Participation QualityAppropriate    Notes: pt calm and cooperative this am during assessment. Pt states she slept well last night and is feeling \"great\" today. Pt is bright and has been in the day area doing crafts with the other patients. Pt is med compliant, does ADLs and has no complaints at this time. Pt denies SI,HI and AVH. Will continue to monitor.      Signature: Shen Bro

## 2020-06-28 NOTE — PROGRESS NOTES
Progress Note  Melody Angelena Lefort  6/28/2020 10:58 AM  Subjective:   Admit Date:   6/26/2020      CC/ADMIT DX:       Interval History:   Reviewed overnight events and nursing notes. No new physical complaints. I have reviewed all labs/diagnostics from the last 24hrs. ROS:   I have done a 10 point ROS and all are negative, except what is mentioned in the HPI. DIET CARB CONTROL;    Medications:      dextrose        vitamin B-12  500 mcg Oral Daily    lidocaine  1 patch Transdermal Daily    gabapentin  300 mg Oral TID    QUEtiapine  200 mg Oral Nightly    divalproex  1,000 mg Oral Nightly    nicotine  1 patch Transdermal Daily    insulin lispro  0-6 Units Subcutaneous TID WC    insulin lispro  0-3 Units Subcutaneous Nightly    metFORMIN  500 mg Oral BID WC    traZODone  50 mg Oral Nightly    furosemide  20 mg Oral Daily    melatonin  3 mg Oral Nightly    ferrous sulfate  325 mg Oral Daily with breakfast    donepezil  10 mg Oral Nightly    pantoprazole  40 mg Oral QAM AC    vitamin D  50,000 Units Oral Weekly    miconazole   Topical BID           Objective:   Vitals: /67   Pulse 81   Temp 97.4 °F (36.3 °C) (Temporal)   Resp 18   Ht 5' (1.524 m)   Wt 203 lb 6 oz (92.3 kg)   SpO2 94%   BMI 39.72 kg/m²  No intake or output data in the 24 hours ending 06/28/20 1058  General appearance: alert and cooperative with exam  Extremities: extremities normal, atraumatic, no cyanosis or edema  Neurologic:  No obvious focal neurologic deficits. Skin: no rashes    Assessment and Plan: Active Problems:    Tobacco use disorder    Bipolar disorder, unspecified ([facility] [address]  Resolved Problems:    * No resolved hospital problems. *      Plan:  1. Continue present medication(s)   2. She is medically stable. I will monitor for any changes or concerns. 3.  Follow with Psych      Discharge planning:   her home     Reviewed treatment plans with the patient and/or family.              Electronically

## 2020-06-28 NOTE — PLAN OF CARE
Problem: Falls - Risk of:  Goal: Will remain free from falls  Description: Will remain free from falls  6/28/2020 0944 by Dylon Gale RN  Outcome: Ongoing  6/28/2020 0110 by Nemesio Reyes RN  Outcome: Met This Shift  Goal: Absence of physical injury  Description: Absence of physical injury  6/28/2020 0944 by Dylon Gale RN  Outcome: Ongoing  6/28/2020 0110 by Nemesio Reyes RN  Outcome: Met This Shift     Problem: Health Behavior:  Goal: Ability to verbalize adaptive coping strategies to use when suicidal feelings occur will improve  Description: Ability to verbalize adaptive coping strategies to use when suicidal feelings occur will improve  6/28/2020 0944 by Dylon Gale RN  Outcome: Ongoing  6/28/2020 0110 by Nemesio Reyes RN  Outcome: Ongoing  Goal: Ability to verbalize adaptive coping strategies to use when the urge to self-mutilate occurs will improve  Description: Ability to verbalize adaptive coping strategies to use when the urge to self-mutilate occurs will improve  6/28/2020 0944 by Dylon Gale RN  Outcome: Ongoing  6/28/2020 0110 by Nemesio Reyes RN  Outcome: Ongoing  Goal: Identification of resources available to assist in meeting health care needs will improve  Description: Identification of resources available to assist in meeting health care needs will improve  6/28/2020 0944 by Dylon Gale RN  Outcome: Ongoing  6/28/2020 0110 by Nemesio Reyes RN  Outcome: Ongoing     Problem: Safety:  Goal: Ability to contract for his/her safety will improve  Description: Ability to contract for his/her safety will improve  6/28/2020 0944 by Dylon Gale RN  Outcome: Ongoing  6/28/2020 0110 by Nemesio Reyes RN  Outcome: Ongoing     Problem: Pain:  Description: Pain management should include both nonpharmacologic and pharmacologic interventions.   Goal: Pain level will decrease  Description: Pain level will decrease  6/28/2020 0944 by Dylon Gale RN  Outcome: Ongoing  6/28/2020 0110 by Khadra Lopez ANTONIO Church  Outcome: Ongoing  Goal: Control of acute pain  Description: Control of acute pain  6/28/2020 0944 by Javon Velásquez RN  Outcome: Ongoing  6/28/2020 0110 by Akanksha Alvarez RN  Outcome: Ongoing  Goal: Control of chronic pain  Description: Control of chronic pain  6/28/2020 0944 by Javon Velásquez RN  Outcome: Ongoing  6/28/2020 0110 by Akanksha Alvarez RN  Outcome: Ongoing     Problem: Discharge Planning:  Goal: Discharged to appropriate level of care  Description: Discharged to appropriate level of care  6/28/2020 0944 by Javon Velásquez RN  Outcome: Ongoing  6/28/2020 0110 by Akanksha Alvarez RN  Outcome: Ongoing     Problem: Serum Glucose Level - Abnormal:  Goal: Ability to maintain appropriate glucose levels will improve  Description: Ability to maintain appropriate glucose levels will improve  6/28/2020 0944 by Javon Velásquez RN  Outcome: Ongoing  6/28/2020 0110 by Akanksha Alvarez RN  Outcome: Ongoing     Problem: Sensory Perception - Impaired:  Goal: Ability to maintain a stable neurologic state will improve  Description: Ability to maintain a stable neurologic state will improve  6/28/2020 0944 by Javon Velásquez RN  Outcome: Ongoing  6/28/2020 0110 by Akanksha Alvarez RN  Outcome: Ongoing     Problem:  Activity:  Goal: Ability to tolerate increased activity will improve  Description: Ability to tolerate increased activity will improve  6/28/2020 0944 by Javon Velásquez RN  Outcome: Ongoing  6/28/2020 0110 by Akanksha Alvarez RN  Outcome: Ongoing     Problem: Coping:  Goal: Ability to identify and develop effective coping behavior will improve  Description: Ability to identify and develop effective coping behavior will improve  6/28/2020 0944 by Javon Velásquez RN  Outcome: Ongoing  6/28/2020 0110 by Akanksha Alvarez RN  Outcome: Ongoing     Problem: Depressive Behavior With or Without Suicide Precautions:  Goal: Able to verbalize acceptance of life and situations over which he or she has no control  Description: Able to verbalize acceptance of life and situations over which he or she has no control  6/28/2020 0944 by Javon Velásquez RN  Outcome: Ongoing  6/28/2020 0110 by Akanksha Alvarez RN  Outcome: Ongoing  Goal: Able to verbalize and/or display a decrease in depressive symptoms  Description: Able to verbalize and/or display a decrease in depressive symptoms  6/28/2020 0944 by Javon Velásquez RN  Outcome: Ongoing  6/28/2020 0110 by Akanksha Alvarez RN  Outcome: Ongoing  Goal: Ability to disclose and discuss suicidal ideas will improve  Description: Ability to disclose and discuss suicidal ideas will improve  6/28/2020 0944 by Javon Velásquez RN  Outcome: Ongoing  6/28/2020 0110 by Akanksha Alvarez RN  Outcome: Ongoing  Goal: Able to verbalize support systems  Description: Able to verbalize support systems  6/28/2020 0944 by Javon Velásquez RN  Outcome: Ongoing  6/28/2020 0110 by Akanksha Alvarez RN  Outcome: Ongoing  Goal: Absence of self-harm  Description: Absence of self-harm  6/28/2020 0944 by Javon Velásquez RN  Outcome: Ongoing  6/28/2020 0110 by Akanksha Alvarez RN  Outcome: Met This Shift  Goal: Patient specific goal  Description: Patient specific goal  6/28/2020 0944 by Javon Velásquez RN  Outcome: Ongoing  6/28/2020 0110 by Akanksha Alvarez RN  Outcome: Ongoing  Goal: Participates in care planning  Description: Participates in care planning  6/28/2020 0944 by Javon Velásquez RN  Outcome: Ongoing  6/28/2020 0110 by Akanksha Alvarez RN  Outcome: Ongoing     Problem: Risk of Harm:  Goal: Ability to remain free from injury will improve  Description: Ability to remain free from injury will improve  6/28/2020 0944 by Javon Velásquez RN  Outcome: Ongoing  6/28/2020 0110 by Akanksha Alvarez RN  Outcome: Ongoing     Problem: Altered Mood, Manic Behavior:  Goal: Able to sleep  Description: Able to sleep  6/28/2020 0944 by Javon Velásquez RN  Outcome: Ongoing  6/28/2020 0110 by Akanksha Alvarez RN  Outcome: Met This Shift  Goal: Able to verbalize decrease in frequency and intensity of racing thoughts  Description: Able to verbalize decrease in frequency and intensity of racing thoughts  6/28/2020 0944 by Meagan Sharp RN  Outcome: Ongoing  6/28/2020 0110 by Rj Irwin RN  Outcome: Ongoing  Goal: Ability to disclose and discuss suicidal ideas will improve  Description: Ability to disclose and discuss suicidal ideas will improve  6/28/2020 0944 by Meagan Sharp RN  Outcome: Ongoing  6/28/2020 0110 by Rj Irwin RN  Outcome: Ongoing  Goal: Absence of self-harm  Description: Absence of self-harm  6/28/2020 0944 by Meagan Sharp RN  Outcome: Ongoing  6/28/2020 0110 by Rj Irwin RN  Outcome: Met This Shift  Goal: Ability to achieve adequate nutritional intake will improve  Description: Ability to achieve adequate nutritional intake will improve  6/28/2020 0944 by Meagan Sharp RN  Outcome: Ongoing  6/28/2020 0110 by Rj Irwin RN  Outcome: Ongoing  Goal: Ability to interact with others will improve  Description: Ability to interact with others will improve  6/28/2020 0944 by Meagan Sharp RN  Outcome: Ongoing  6/28/2020 0110 by Rj Irwin RN  Outcome: Ongoing  Goal: Ability to demonstrate self-control will improve  Description: Ability to demonstrate self-control will improve  6/28/2020 0944 by Meagan Sharp RN  Outcome: Ongoing  6/28/2020 0110 by Rj Irwin RN  Outcome: Ongoing  Goal: Mood stable  Description: Mood stable  6/28/2020 0944 by Meagan Sharp RN  Outcome: Ongoing  6/28/2020 0110 by Rj Irwin RN  Outcome: Ongoing  Goal: Patient specific goal  Description: Patient specific goal  6/28/2020 0944 by Meagan Sharp RN  Outcome: Ongoing  6/28/2020 0110 by Rj Irwin RN  Outcome: Ongoing

## 2020-06-29 ENCOUNTER — TELEPHONE (OUTPATIENT)
Dept: PRIMARY CARE CLINIC | Age: 65
End: 2020-06-29

## 2020-06-29 VITALS
HEIGHT: 60 IN | HEART RATE: 88 BPM | OXYGEN SATURATION: 97 % | RESPIRATION RATE: 16 BRPM | TEMPERATURE: 98.1 F | SYSTOLIC BLOOD PRESSURE: 120 MMHG | BODY MASS INDEX: 41.82 KG/M2 | WEIGHT: 213 LBS | DIASTOLIC BLOOD PRESSURE: 79 MMHG

## 2020-06-29 PROBLEM — F31.78 BIPOLAR DISORDER, IN FULL REMISSION, MOST RECENT EPISODE MIXED (HCC): Status: RESOLVED | Noted: 2017-10-03 | Resolved: 2020-06-29

## 2020-06-29 PROBLEM — T50.902A DRUG OVERDOSE, INTENTIONAL SELF-HARM, INITIAL ENCOUNTER (HCC): Status: RESOLVED | Noted: 2020-06-25 | Resolved: 2020-06-29

## 2020-06-29 PROBLEM — D50.9 MICROCYTIC ANEMIA: Status: RESOLVED | Noted: 2017-07-06 | Resolved: 2020-06-29

## 2020-06-29 PROBLEM — R45.851 SUICIDAL INTENT: Status: RESOLVED | Noted: 2019-07-04 | Resolved: 2020-06-29

## 2020-06-29 PROBLEM — R07.89 CHEST PRESSURE: Status: RESOLVED | Noted: 2017-08-19 | Resolved: 2020-06-29

## 2020-06-29 PROBLEM — F31.9 BIPOLAR DISORDER, UNSPECIFIED (HCC): Status: RESOLVED | Noted: 2020-06-27 | Resolved: 2020-06-29

## 2020-06-29 LAB
EKG P AXIS: 61 DEGREES
EKG P-R INTERVAL: 176 MS
EKG Q-T INTERVAL: 350 MS
EKG QRS DURATION: 86 MS
EKG QTC CALCULATION (BAZETT): 407 MS
EKG T AXIS: 50 DEGREES
GLUCOSE BLD-MCNC: 91 MG/DL (ref 70–99)
PERFORMED ON: NORMAL
VALPROIC ACID LEVEL: 26.6 UG/ML (ref 50–100)

## 2020-06-29 PROCEDURE — 6370000000 HC RX 637 (ALT 250 FOR IP): Performed by: PSYCHIATRY & NEUROLOGY

## 2020-06-29 PROCEDURE — 99239 HOSP IP/OBS DSCHRG MGMT >30: CPT | Performed by: PSYCHIATRY & NEUROLOGY

## 2020-06-29 PROCEDURE — 36415 COLL VENOUS BLD VENIPUNCTURE: CPT

## 2020-06-29 PROCEDURE — 82947 ASSAY GLUCOSE BLOOD QUANT: CPT

## 2020-06-29 PROCEDURE — 80164 ASSAY DIPROPYLACETIC ACD TOT: CPT

## 2020-06-29 RX ORDER — DIVALPROEX SODIUM 500 MG/1
1500 TABLET, EXTENDED RELEASE ORAL NIGHTLY
Qty: 90 TABLET | Refills: 1 | Status: SHIPPED | OUTPATIENT
Start: 2020-06-29 | End: 2020-06-29

## 2020-06-29 RX ORDER — QUETIAPINE FUMARATE 200 MG/1
200 TABLET, FILM COATED ORAL NIGHTLY
Qty: 30 TABLET | Refills: 1 | Status: SHIPPED | OUTPATIENT
Start: 2020-06-29 | End: 2020-06-29

## 2020-06-29 RX ORDER — DIVALPROEX SODIUM 500 MG/1
1500 TABLET, EXTENDED RELEASE ORAL NIGHTLY
Qty: 90 TABLET | Refills: 1 | Status: SHIPPED | OUTPATIENT
Start: 2020-06-29 | End: 2020-07-21 | Stop reason: SDUPTHER

## 2020-06-29 RX ORDER — TRAZODONE HYDROCHLORIDE 50 MG/1
50 TABLET ORAL NIGHTLY PRN
Qty: 30 TABLET | Refills: 1 | Status: SHIPPED | OUTPATIENT
Start: 2020-06-29 | End: 2020-07-21 | Stop reason: SDUPTHER

## 2020-06-29 RX ORDER — QUETIAPINE FUMARATE 200 MG/1
200 TABLET, FILM COATED ORAL NIGHTLY
Qty: 30 TABLET | Refills: 1 | Status: SHIPPED | OUTPATIENT
Start: 2020-06-29 | End: 2020-07-21 | Stop reason: SDUPTHER

## 2020-06-29 RX ORDER — GABAPENTIN 300 MG/1
300 CAPSULE ORAL 3 TIMES DAILY
Qty: 42 CAPSULE | Refills: 0 | Status: SHIPPED | OUTPATIENT
Start: 2020-06-29 | End: 2020-07-21 | Stop reason: SDUPTHER

## 2020-06-29 RX ORDER — DIVALPROEX SODIUM 500 MG/1
1500 TABLET, EXTENDED RELEASE ORAL NIGHTLY
Status: DISCONTINUED | OUTPATIENT
Start: 2020-06-29 | End: 2020-06-29 | Stop reason: HOSPADM

## 2020-06-29 RX ORDER — TRAZODONE HYDROCHLORIDE 50 MG/1
50 TABLET ORAL NIGHTLY PRN
Qty: 30 TABLET | Refills: 1 | Status: SHIPPED | OUTPATIENT
Start: 2020-06-29 | End: 2020-06-29

## 2020-06-29 RX ORDER — GABAPENTIN 300 MG/1
300 CAPSULE ORAL 3 TIMES DAILY
Qty: 42 CAPSULE | Refills: 0 | Status: SHIPPED | OUTPATIENT
Start: 2020-06-29 | End: 2020-06-29

## 2020-06-29 RX ADMIN — ACETAMINOPHEN 650 MG: 325 TABLET, FILM COATED ORAL at 06:46

## 2020-06-29 RX ADMIN — PANTOPRAZOLE SODIUM 40 MG: 40 TABLET, DELAYED RELEASE ORAL at 06:12

## 2020-06-29 RX ADMIN — MICONAZOLE NITRATE: 2 POWDER TOPICAL at 08:35

## 2020-06-29 RX ADMIN — CYANOCOBALAMIN TAB 500 MCG 500 MCG: 500 TAB at 08:29

## 2020-06-29 RX ADMIN — METFORMIN HYDROCHLORIDE 500 MG: 500 TABLET, FILM COATED ORAL at 08:29

## 2020-06-29 RX ADMIN — FERROUS SULFATE TAB 325 MG (65 MG ELEMENTAL FE) 325 MG: 325 (65 FE) TAB at 08:29

## 2020-06-29 RX ADMIN — GABAPENTIN 300 MG: 300 CAPSULE ORAL at 08:29

## 2020-06-29 RX ADMIN — FUROSEMIDE 20 MG: 20 TABLET ORAL at 08:29

## 2020-06-29 ASSESSMENT — PAIN DESCRIPTION - DESCRIPTORS
DESCRIPTORS: ACHING;DISCOMFORT
DESCRIPTORS: ACHING;DISCOMFORT

## 2020-06-29 ASSESSMENT — PAIN DESCRIPTION - LOCATION
LOCATION: BACK
LOCATION: BACK

## 2020-06-29 ASSESSMENT — PAIN DESCRIPTION - FREQUENCY
FREQUENCY: INTERMITTENT
FREQUENCY: INTERMITTENT

## 2020-06-29 ASSESSMENT — PAIN DESCRIPTION - PAIN TYPE
TYPE: CHRONIC PAIN
TYPE: CHRONIC PAIN

## 2020-06-29 ASSESSMENT — PAIN - FUNCTIONAL ASSESSMENT
PAIN_FUNCTIONAL_ASSESSMENT: ACTIVITIES ARE NOT PREVENTED
PAIN_FUNCTIONAL_ASSESSMENT: PREVENTS OR INTERFERES SOME ACTIVE ACTIVITIES AND ADLS

## 2020-06-29 ASSESSMENT — PAIN DESCRIPTION - ORIENTATION
ORIENTATION: LOWER
ORIENTATION: LOWER

## 2020-06-29 ASSESSMENT — PAIN DESCRIPTION - PROGRESSION
CLINICAL_PROGRESSION: NOT CHANGED
CLINICAL_PROGRESSION: GRADUALLY IMPROVING

## 2020-06-29 ASSESSMENT — PAIN DESCRIPTION - ONSET
ONSET: ON-GOING
ONSET: ON-GOING

## 2020-06-29 ASSESSMENT — PAIN SCALES - GENERAL
PAINLEVEL_OUTOF10: 10
PAINLEVEL_OUTOF10: 7

## 2020-06-29 NOTE — PROGRESS NOTES
Spoke with pt about follow up appointments. Pt reported she would like to see [phone] called to make pt follow up appointment as requested. It was reported that they do not file insurance and pt would have to pay out of pocket. Informed pt of this and pt reported [id number] would be fine but she did not want to see Dr. Tawny Saenz. SW voiced understanding.      Electronically signed by Wendy Beal, [id number] on 6/29/2020 at 9:06 AM

## 2020-06-29 NOTE — DISCHARGE SUMMARY
Discharge Summary     Patient ID:  Tennille Alfredo  [id number]  59 y.o.  1955    Admit date: 6/26/2020  Discharge date: 6/29/2020    Admitting Physician: Flora Choudhury MD   Attending Physician: Flora Choudhury MD   Discharge Provider: Flora Choudhury MD     Admission Diagnoses:   Bipolar disorder, most recent episode mixed, severe, with psychotic features  Status post overdose  History of cognitive impairment  Cannabis use disorder by history  Tobacco use disorder  Vitamin B12 deficiency  Vitamin D deficiency  Anemia  Elevated alkaline phosphatase  COPD  Sleep apnea  History of diabetes  History of gastric bypass    Discharge Diagnoses:   Bipolar disorder, most recent episode mixed, severe, with psychotic features  Cannabis use disorder, in remission  Tobacco use disorder  Vitamin B12 deficiency  Vitamin D deficiency  Anemia  Elevated alkaline phosphatase  COPD  Sleep apnea  History of diabetes  History of gastric bypass    Admission Condition: poor    Discharged Condition: stable    Indication for Admission: Psychosis    CHIEF COMPLAINT:  \"I feel better\"     History obtained from: patient, chart     HISTORY OF PRESENT ILLNESS:    81-year-old white female with history of bipolar disorder and neurocognitive disorder, known to our service, admitted for overdose on Benadryl.  Patient was seen by [facility] nurse at home for the wound in her left buttock where she had an abscess drained.  Patient told the nurse that she took about 26 Benadryl pills.  UDS negative, BAL less than 10.  Patient is medically stable now as per RN.     Patient presented with labile affect when seen in the ICU by our consult service. Aung Malone was tangential and somewhat pressured.  Stated she has been taking a large amount of Benadryl daily which is \"the only way\" for her \"to stay calm. \"  She reported history of previous overdoses.  She voiced hopelessness and helplessness.  Admitted that she needs psychiatric help. Aung Malone has been depressed and has been seeing \"ghosts\" at home.  She denied auditory hallucinations. Silvestre Batres was not sleeping at home.  Reported racing thoughts and mood swings.  Unable to function at home and take care of her place and herself.  She has been off her medications for a long time.     Patient was upset and became agitated yesterday upon transfer to the unit after she was told that she will not be able to use her cell phone. She required PRN Thorazine after which she was calm and cooperative. She has been med compliant. No issues overnight.     Patient is observed resting in bed this morning. She appears calmer today. Thought processes more organized. States she finally got good rest last night, and her mood has somewhat improved. \"I really needed to be restarted on medications. \"  She denies suicidal ideation. Denies auditory and visual hallucinations. States Risperdal caused side effects in the past.  Agreed to switch to Seroquel. Complains of back pain. Agreed to try lidocaine patch. Planning to read today. States she talked her daughter who wants her to come back home.     Psychiatric History:    Diagnoses: Bipolar disorder, dementia, restless leg syndrome, borderline personality disorder  Suicide attempts/gestures: Once in 2016 by overdose of \"45 antihypertensive pills, 25 pills of Ativan, 9 tabs of Lortabs, with alcohol and glass of antifreeze\". History of cutting, specifically both wrists since early childhood, last time in 2000. Prior hospitalizations: 4-5 times with last time at  in 2019  Medication trials: Remeron, Zoloft, BuSpar, Vistaril, donepezil, Latuda, Abilify  Mental health contact: Lost to follow-up      Substance Use History:  Denies alcohol use.  Reports occasional marijuana use.  Smokes 1 PPD. Hospital Course:  Patient was admitted to the behavioral health floor and was acclimated to the unit. He was placed on suicide precautions.   Labs were reviewed and physical exam was completed by  Brody Berrios and associates. Home medications were reconciled. DENISSE was obtained and reviewed. Patient was started on Depakote for mood stabilization and Risperdal for psychotic features. Risperdal was later switched to Seroquel to help with insomnia and psychosis. Patient reported receiving monthly long-acting Abilify injections, with the last one given 3 weeks prior to admission. Patient scored 27 out of 30 on SLUMS, therefore, the diagnosis of dementia was questioned. Aricept was discontinued in order to limit polypharmacy. Glycemic control was ensured. Patient was given lidocaine patches and started on gabapentin for pain. She tolerated all her medications well, without side effects, and was compliant. Patient came with elevated alkaline phosphatase and anemia. Follow-up with the outpatient primary care provider was recommended. Patient initially presented with paranoia and agitation, and required PRN Thorazine. With treatment, psychosis and agitation resolved. Mood stabilized, and affect brightened. There was no evidence of suicidality. She became more socially appropriate. Patient attended and participated in groups. All interactions with the peers and staff members were appropriate. Behaviorally, he was not a problem. Sleep and appetite improved. With the above-mentioned medications changes as well as psychotherapeutic interventions, patient reported considerable improvement in his condition and requested discharge. It was felt that patient is at his baseline. She was future oriented, and was looking forward to spending time with her daughter at home. Collateral information is obtained from patient's daughter who agreed with discharge. Patient has no access to guns at home. On 6/29/2020 it was therefore decided to discharge the patient, as it was felt that he received maximal benefit from his hospitalization. This patient is not suicidal, homicidal or psychotic at discharge.  He does found for: [facility]  Lab Results   Component Value Date    LABA1C 5.1 06/27/2020     No results found for: EAG  Lab Results   Component Value Date    TSHFT4 3.38 06/27/2020    TSH 2.800 03/04/2020       Treatments: RN and SW    Mental status examination at the time of discharge:  Alert, Oriented X 4  Appearance:  Improved Hygiene  Speech with Regular Rate and Rhythm  Eye Contact:  Good  No Psychomotor Agitation/Retardation Noted  Attitude:  Cooperative  Mood:  \"Good\"  Affective: Congruent, appropriate to the situation, with a normal range and intensity  Thought Processes:  Coherently communicated, logical and goal oriented  Thought Content:  No Suicidal Ideation, No Homicidal Ideation, No Auditory or Visual Hallucinations, NO Overt Delusions  Insight: Improved  Judgement: Improved  Memory is intact for both remote and recent  Intellectual Functioning:  Within the [address] of Knowledge:  Adequate  Attention and Concentration:  Adequate    Discharge Exam:  Please, see medical note    Disposition: home    Patient Instructions:   Discharge Medication List as of 6/29/2020 10:59 AM      START taking these medications    Details   divalproex (DEPAKOTE ER) 500 MG extended release tablet Take 3 tablets by mouth nightly, Disp-90 tablet, R-1Normal      gabapentin (NEURONTIN) 300 MG capsule Take 1 capsule by mouth 3 times daily for 14 days. , Disp-42 capsule, R-0Normal      traZODone (DESYREL) 50 MG tablet Take 1 tablet by mouth nightly as needed for Sleep, Disp-30 tablet, R-1Normal      QUEtiapine (SEROQUEL) 200 MG tablet Take 1 tablet by mouth nightly, Disp-30 tablet, R-1Normal         CONTINUE these medications which have NOT CHANGED    Details   diclofenac (VOLTAREN) 75 MG EC tablet Take 1 tablet by mouth 2 times daily, Disp-60 tablet, R-4Normal      irbesartan (AVAPRO) 150 MG tablet Take 1 tablet by mouth nightly, Disp-30 tablet, R-3Normal      Liraglutide (VICTOZA) 18 MG/3ML SOPN SC injection Inject 1.8 mg into the skin daily, Disp-9 mL, R-5Normal      vitamin D (ERGOCALCIFEROL) 1.25 MG (81908 UT) CAPS capsule Take 1 capsule by mouth once a week, Disp-12 capsule, R-0Normal      ferrous sulfate 325 (65 Fe) MG tablet Take 1 tablet by mouth daily (with breakfast), Disp-90 tablet, R-1Normal      melatonin 3 MG TABS tablet Take 1 tablet by mouth nightly, Disp-90 tablet, R-3Normal      atorvastatin (LIPITOR) 20 MG tablet Take 1 tablet by mouth daily, Disp-90 tablet, R-3Normal      ARIPiprazole lauroxil (ARISTADA) 882 MG/3.2ML PRSY injection Inject 3.2 mLs into the muscle every 30 days, Disp-3 Syringe, R-3Normal      metFORMIN (GLUCOPHAGE) 500 MG tablet Take 1 tablet by mouth 2 times daily (with meals), Disp-180 tablet, R-2Normal      linaCLOtide (LINZESS) 72 MCG CAPS capsule Take 1 capsule by mouth every morning (before breakfast), Disp-90 capsule, R-3Normal      Insulin Pen Needle (B-D ULTRAFINE III SHORT PEN) 31G X 8 MM MISC DAILY Starting Tue 10/22/2019, Disp-100 each, R-5, Normal      albuterol sulfate HFA (VENTOLIN HFA) 108 (90 Base) MCG/ACT inhaler Inhale 2 puffs into the lungs every 6 hours as needed for Wheezing, Disp-3 Inhaler, R-3Normal      ACCU-CHEK SOFTCLIX LANCETS MISC Disp-300 each, R-3, NormalCHECK BLOOD SUGAR TWICE DAILY AND AS NEEDED      fluticasone (FLONASE) 50 MCG/ACT nasal spray 2 sprays by Nasal route daily, Disp-1 Bottle, R-3Normal      vitamin B-12 (CYANOCOBALAMIN) 500 MCG tablet Take 1 tablet by mouth daily, Disp-30 tablet, R-2Normal      furosemide (LASIX) 20 MG tablet Take 1 tablet by mouth daily, Disp-90 tablet, R-2Normal      pantoprazole (PROTONIX) 40 MG tablet Take 1 tablet by mouth daily. , Disp-90 tablet, R-2Normal      donepezil (ARICEPT) 10 MG tablet Take 1 tablet by mouth every night., Disp-90 tablet, R-2Normal      blood glucose monitor kit and supplies Test 3 times a day & as needed for symptoms of irregular blood glucose.  Dx:, Disp-1 kit, R-0, Normal      blood glucose monitor strips Test 3 times a day & as needed for symptoms of irregular blood glucose., Disp-100 strip, R-5, Normal      OXYGEN Inhale 3 L into the lungsHistorical Med      clobetasol (TEMOVATE) 0.05 % ointment Apply topically 2 times daily. , Disp-60 g, R-3, Print      ipratropium-albuterol (DUONEB) 0.5-2.5 (3) MG/3ML SOLN nebulizer solution Inhale 3 mLs into the lungs every 6 hours as needed for Shortness of Breath, Disp-810 mL, R-2Normal      Continuous Blood Gluc  (FREESTYLE ABDOULAYE READER) MARCIA 1 Units by Does not apply route 2 times daily, Disp-1 Device, R-0Normal      Continuous Blood Gluc Sensor (FREESTYLE ABDOULAYE SENSOR SYSTEM) MISC 1 Units by Does not apply route 2 times daily, Disp-1 each, R-0Normal      nystatin (MYCOSTATIN) 494041 UNIT/GM powder Apply 3 times daily. , Disp-45 g, R-3, Normal         STOP taking these medications       lurasidone (LATUDA) 80 MG TABS tablet Comments:   Reason for Stopping:         ketorolac (TORADOL) 30 MG/ML injection Comments:   Reason for Stopping:         mirtazapine (REMERON) 15 MG tablet Comments:   Reason for Stopping:         benztropine (COGENTIN) 1 MG tablet Comments:   Reason for Stopping:         acetaZOLAMIDE (DIAMOX) 500 MG extended release capsule Comments:   Reason for Stopping:         rOPINIRole (REQUIP) 2 MG tablet Comments:   Reason for Stopping:               Activity: as tolerated  Diet: diabetic diet  Wound Care: none needed    Follow-up:  Follow up with JORGE Bowman in 1 week(s)   on follow up with PCP, please review labs/imaging done during this Hospital stay, and discuss any additional/repeat testing or treatment needed with your PCP  [facility]     Jul7 Go to [address],[facility]   Tuesday Jul 7, 2020   Therapy appointment at 3 PM with Jose Roberto Alfonso. Appointment will be over phone with provider.   [facility]   [address] [facility], [address] Phone [phone]   Fax [phone]   CRISIS LINE: [phone]     Isaias Kim Go to [address],[facility] [address]   Wednesday Jul 8, 2020   Med management appointment at 9 AM with Elena. Appointment will be over phone with provider.   [facility]   [address] [place] [address], [address]   Phone [phone]   Fax [phone]   CRISIS LINE: [phone]          Time worked: More than 31 minutes    Participation: good    Electronically signed by Humberto Herrera MD on 6/29/2020 at 1:45 PM

## 2020-06-29 NOTE — PROGRESS NOTES
Pt requested to be weighed this morning. Weight was 213.0 pounds.      Electronically signed by Jemal Avalos RN on 6/29/2020 at 6:31 AM

## 2020-06-29 NOTE — PROGRESS NOTES
Group Therapy Note    Date: 6/29/2020  Start Time: 0800  End Time:  0830  Number of Participants: 5    Type of Group: Community Meeting    Patient's Goal:  \"my bipolar\"    Status After Intervention:  Improved    Participation Level: Active Listener and Interactive    Participation Quality: Appropriate and Attentive      Speech:  normal      Thought Process/Content: Logical  Linear      Affective Functioning: Congruent      Mood: anxious and depressed      Level of consciousness:  Alert, Oriented x4 and Attentive      Response to Learning: Progressing to goal      Endings: None Reported    Modes of Intervention: Exploration      Discipline Responsible: Registered Nurse      Signature:   Inez Espinoza RN

## 2020-06-29 NOTE — PLAN OF CARE
Problem: Falls - Risk of:  Goal: Will remain free from falls  Description: Will remain free from falls  Outcome: Met This Shift  Goal: Absence of physical injury  Description: Absence of physical injury  Outcome: Met This Shift     Problem: Depressive Behavior With or Without Suicide Precautions:  Goal: Absence of self-harm  Description: Absence of self-harm  Outcome: Met This Shift     Problem: Altered Mood, Manic Behavior:  Goal: Able to sleep  Description: Able to sleep  Outcome: Met This Shift  Goal: Absence of self-harm  Description: Absence of self-harm  Outcome: Met This Shift     Problem: Health Behavior:  Goal: Ability to verbalize adaptive coping strategies to use when suicidal feelings occur will improve  Description: Ability to verbalize adaptive coping strategies to use when suicidal feelings occur will improve  Outcome: Ongoing  Goal: Ability to verbalize adaptive coping strategies to use when the urge to self-mutilate occurs will improve  Description: Ability to verbalize adaptive coping strategies to use when the urge to self-mutilate occurs will improve  Outcome: Ongoing  Goal: Identification of resources available to assist in meeting health care needs will improve  Description: Identification of resources available to assist in meeting health care needs will improve  Outcome: Ongoing     Problem: Safety:  Goal: Ability to contract for his/her safety will improve  Description: Ability to contract for his/her safety will improve  Outcome: Ongoing     Problem: Pain:  Goal: Pain level will decrease  Description: Pain level will decrease  Outcome: Ongoing  Goal: Control of acute pain  Description: Control of acute pain  Outcome: Ongoing  Goal: Control of chronic pain  Description: Control of chronic pain  Outcome: Ongoing     Problem: Discharge Planning:  Goal: Discharged to appropriate level of care  Description: Discharged to appropriate level of care  Outcome: Ongoing     Problem: Serum Glucose Level - Abnormal:  Goal: Ability to maintain appropriate glucose levels will improve  Description: Ability to maintain appropriate glucose levels will improve  Outcome: Ongoing     Problem: Sensory Perception - Impaired:  Goal: Ability to maintain a stable neurologic state will improve  Description: Ability to maintain a stable neurologic state will improve  Outcome: Ongoing     Problem: Activity:  Goal: Ability to tolerate increased activity will improve  Description: Ability to tolerate increased activity will improve  Outcome: Ongoing     Problem: Coping:  Goal: Ability to identify and develop effective coping behavior will improve  Description: Ability to identify and develop effective coping behavior will improve  Outcome: Ongoing     Problem: Depressive Behavior With or Without Suicide Precautions:  Goal: Able to verbalize acceptance of life and situations over which he or she has no control  Description: Able to verbalize acceptance of life and situations over which he or she has no control  6/29/2020 0104 by Rj Irwin RN  Outcome: Ongoing  6/28/2020 1118 by Amisha Hernandez LPC  Outcome: Ongoing  Note:                                                                     Group Therapy Note    Date: 6/28/2020  Start Time: 1115  End Time:    Number of Participants: 0    Type of Group: Psychoeducation    Wellness Binder Information  Module Name:  Emotional wellness  Session Number:  5    Patient's Goal:  Obstacles to emotional wellness    Notes:  Pt did not attend group as scheduled. Pt was invited and encouraged to attend group, however, pt sleeping. Nursing notified. Status After Intervention:      Participation Level:     Participation Quality:       Speech:         Thought Process/Content:       Affective Functioning:       Mood:       Level of consciousness:        Response to Learning:       Endings:     Modes of Intervention:       Discipline Responsible: /Counselor      Signature:  Nica Hopkins

## 2020-06-29 NOTE — PROGRESS NOTES
Discharge Note    Pt discharging on this date. Pt denies SI, HI, and AVH at this time. Pt reports improvement in behavior and is leaving unit in overall good condition. SW and pt discussed pt's follow up appointments and importance of attending appointments as scheduled, pt voiced understanding and agreement. Pt able to verbally identify: warning signs, coping strategies, places and people that help make the pt feel better/distract negative thoughts, friends/family/agencies/professionals the pt can reach out to in a crisis, and something that is important to the pt/worth living for. Pt provided the national suicide prevention hotline number (8-221-022-495-731-1853) as well as local community behavioral health [facility]) crisis number for emergencies (4-592-034-705-732-7871). Pt to follow up with [address] [place] [address] Drive,[facility] [address] for a therapy appointment on 7/7/2020 at 3 PM with Davide and a med management appointment on 7/8/2020 at 9 AM with Niles Drummond. SW offered to assist pt with transportation, pt reports needing transportation. Deidra hussein will be transporting. Cab voucher provided. Referral to out patient tobacco cessation counseling treatment: Patient refused tobacco cessation counseling. Per research note, SW spoke with pt's daughter Maxwell Collet about weapons in home. Daughter reported no weapons in home. Pt denies use of substances. Referral refused/declined.      Electronically signed by [facility] on 6/29/2020 at 9:55 AM

## 2020-06-29 NOTE — PROGRESS NOTES
[facility] Daily Shift Assessment-Geriatric Unit  Nursing Progress Note          Room: [facility]617-01   Name: Herminio Griffin   Age: 63 y.o. Gender: female   Dx: <principal problem not specified>  Precautions: suicide risk and fall risk  Inpatient Status: voluntary     SLEEP:    Sleep: Yes,   Sleep Quality Fair   Hours Slept: 5   Sleep Medications: Yes  PRN Sleep Meds: No       MEDICAL:      Other PRN Meds: Yes   Med Compliant: Yes   Accu-Chek: Yes    Oxygen/CPAP/BiPAP: Yes  CIWA/CINA: No   PAIN Assessment: present - adequately treated  Side Effects from medication: No      PSYCH:     SI denies suicidal ideation    HI Negative for homicidal ideation        AVH:Absent      Depression:  Worse    Anxiety:  On/off       GENERAL:      Appetite: improved  Social: Yes Speech: normal   Appearance:appropriately dressed  Assistive Devices: noneLevel of Assist: Independent      GROUP:    Group Participation: Yes  Participation LevelMinimal    Participation QualityAppropriate    Notes:   Patient had been less cooperative tonight than previous night. Patient came and got food from the frig and then headed to her room with the food. Patient was redirected that she was not allowed to take food to her room and needed to return to the day area with the food. Patient complied, ate snack and then went to her room. Patient has refused her oxygen tonight because she complained of nose being stopped up and she was not able to use the nose spray again. Patient also complained of chest pain, was placed on O2 and pain was relieved. Then patient decided she did not want the oxygen tonight. Will continue to monitor for safety.     Gregg Smallwood RN

## 2020-06-29 NOTE — PLAN OF CARE
Problem: Health Behavior:  Goal: Ability to verbalize adaptive coping strategies to use when suicidal feelings occur will improve  Description: Ability to verbalize adaptive coping strategies to use when suicidal feelings occur will improve  6/29/2020 Trisha by Toño Sahu  Outcome: Ongoing  Note:                                                                     Group Therapy Note    Date: 6/29/2020  Start Time: 1000  End Time:  [phone]  Number of Participants: 4    Type of Group: Psychoeducation    Wellness Binder Information  Module Name:  Relapse Prevention  Session Number:  5    Group Goal for Pt: To improve knowledge of strategies to prevent relapse    Notes:  Pt demonstrated improved knowledge of strategies to prevent relapse by actively participating in group discussion.     Status After Intervention:  Unchanged    Participation Level: Minimal    Participation Quality: Resistant      Speech:  loud      Thought Process/Content: Linear      Affective Functioning: Congruent      Mood: anxious and depressed      Level of consciousness:  Alert and Oriented x4      Response to Learning: Able to verbalize current knowledge/experience, Able to verbalize/acknowledge new learning, and Progressing to goal      Endings: None Reported    Modes of Intervention: Education      Discipline Responsible: Psychoeducational Specialist      Signature:  Toño Sahu

## 2020-06-30 NOTE — TELEPHONE ENCOUNTER
[facility] Transitions Initial Follow Up Call    Outreach made within 2 business days of discharge: Yes    Patient: Gaudencio Cobb Patient : 1955   MRN: [id number]  Reason for Admission: drug overdose, intentional self harm  Discharge Date: 20       Spoke with: no one. Mail box is full    Discharge department/facility: [facility]      Scheduled appointment with PCP within 7-14 days    Follow Up  No future appointments.     Phillip Esparza

## 2020-06-30 NOTE — PROGRESS NOTES
Progress Note  Ju Whittington  6/29/2020 10:35 PM  Subjective:   Admit Date:   6/26/2020      CC/ADMIT DX:       Interval History:   Reviewed overnight events and nursing notes. She has no new medical issues. I have reviewed all labs/diagnostics from the last 24hrs. ROS:   I have done a 10 point ROS and all are negative, except what is mentioned in the HPI. No diet orders on file    Medications:               Objective:   Vitals: /79   Pulse 88   Temp 98.1 °F (36.7 °C) (Temporal)   Resp 16   Ht 5' (1.524 m)   Wt 213 lb (96.6 kg)   SpO2 97%   BMI 41.60 kg/m²  No intake or output data in the 24 hours ending 06/29/20 2235  General appearance: alert and cooperative with exam  Extremities: extremities normal, atraumatic, no cyanosis or edema  Neurologic:  No obvious focal neurologic deficits. Skin: no rashes    Assessment and Plan:   Principal Problem:    Bipolar I disorder, most recent episode mixed, severe with psychotic features ([address]  Active Problems:    Tobacco use disorder  Resolved Problems:    Bipolar disorder, unspecified ([address]      Plan:  1. Continue present medication(s)   2. She remains medically stable. I will monitor for any changes or new concerns. 3.  Follow with Psych      Discharge planning:   her home     Reviewed treatment plans with the patient and/or family.              Electronically signed by Gregory Longoria MD on 6/29/2020 at 10:35 PM

## 2020-07-01 NOTE — TELEPHONE ENCOUNTER
[facility] Trans[facility] Initial Follow Up Call    Outreach made within 2 business days of discharge: Yes    Patient: Ariana Jack Patient : 1955   MRN: [id number]  Reason for Admission: There are no discharge diagnoses documented for the most recent discharge. Discharge Date: 20       Spoke with: no one. Mailbox is full    [facility]        Scheduled appointment with PCP within 7-14 days    Follow Up  No future appointments.     [facility] [place] [place]

## 2020-07-06 RX ORDER — PANTOPRAZOLE SODIUM 40 MG/1
TABLET, DELAYED RELEASE ORAL
Qty: 90 TABLET | Refills: 2 | Status: SHIPPED | OUTPATIENT
Start: 2020-07-06 | End: 2021-03-31

## 2020-07-06 RX ORDER — FUROSEMIDE 20 MG/1
20 TABLET ORAL DAILY
Qty: 90 TABLET | Refills: 2 | Status: SHIPPED | OUTPATIENT
Start: 2020-07-06 | End: 2021-03-31

## 2020-07-06 RX ORDER — MIRTAZAPINE 15 MG/1
15 TABLET, FILM COATED ORAL NIGHTLY
Qty: 30 TABLET | Refills: 0 | OUTPATIENT
Start: 2020-07-06

## 2020-07-06 RX ORDER — DONEPEZIL HYDROCHLORIDE 10 MG/1
TABLET, FILM COATED ORAL
Qty: 90 TABLET | Refills: 1 | OUTPATIENT
Start: 2020-07-06

## 2020-07-06 RX ORDER — ROPINIROLE 2 MG/1
2 TABLET, FILM COATED ORAL 2 TIMES DAILY
Qty: 180 TABLET | Refills: 1 | OUTPATIENT
Start: 2020-07-06

## 2020-07-06 RX ORDER — ERGOCALCIFEROL 1.25 MG/1
50000 CAPSULE ORAL WEEKLY
Qty: 12 CAPSULE | Refills: 0 | Status: SHIPPED | OUTPATIENT
Start: 2020-07-06 | End: 2020-09-29

## 2020-07-06 NOTE — TELEPHONE ENCOUNTER
Received fax from pharmacy requesting refill on pts medication(s). Pt was last seen in office on 3/4/2020  and has a follow up scheduled for Visit date not found. Will send request to  Sylwia Campos  for patient.      Requested Prescriptions     Pending Prescriptions Disp Refills    mirtazapine (REMERON) 15 MG tablet [Pharmacy Med Name: Mirtazapine 15mg Tablet] 30 tablet 0     Sig: Take 1 tablet by mouth nightly    vitamin D (ERGOCALCIFEROL) 1.25 MG (56425 UT) CAPS capsule [Pharmacy Med Name: Vitamin D 1.25mg Capsule] 12 capsule 0     Sig: Take 1 capsule by mouth once a week

## 2020-07-21 ENCOUNTER — OFFICE VISIT (OUTPATIENT)
Dept: PRIMARY CARE CLINIC | Age: 65
End: 2020-07-21
Payer: MEDICARE

## 2020-07-21 VITALS
SYSTOLIC BLOOD PRESSURE: 110 MMHG | HEART RATE: 104 BPM | DIASTOLIC BLOOD PRESSURE: 74 MMHG | WEIGHT: 202.5 LBS | OXYGEN SATURATION: 98 % | BODY MASS INDEX: 39.55 KG/M2 | TEMPERATURE: 97 F

## 2020-07-21 PROCEDURE — 99214 OFFICE O/P EST MOD 30 MIN: CPT | Performed by: NURSE PRACTITIONER

## 2020-07-21 RX ORDER — TRAZODONE HYDROCHLORIDE 50 MG/1
50 TABLET ORAL NIGHTLY PRN
Qty: 30 TABLET | Refills: 1 | Status: SHIPPED | OUTPATIENT
Start: 2020-07-21 | End: 2020-07-21

## 2020-07-21 RX ORDER — DIVALPROEX SODIUM 500 MG/1
1500 TABLET, EXTENDED RELEASE ORAL NIGHTLY
Qty: 90 TABLET | Refills: 1 | Status: SHIPPED | OUTPATIENT
Start: 2020-07-21 | End: 2020-08-06 | Stop reason: SDUPTHER

## 2020-07-21 RX ORDER — GABAPENTIN 300 MG/1
300 CAPSULE ORAL 3 TIMES DAILY
Qty: 42 CAPSULE | Refills: 0 | Status: SHIPPED | OUTPATIENT
Start: 2020-07-21 | End: 2020-08-06 | Stop reason: SDUPTHER

## 2020-07-21 RX ORDER — MIRTAZAPINE 30 MG/1
30 TABLET, FILM COATED ORAL NIGHTLY
Qty: 90 TABLET | Refills: 1 | Status: SHIPPED | OUTPATIENT
Start: 2020-07-21 | End: 2020-08-06 | Stop reason: SDUPTHER

## 2020-07-21 RX ORDER — BENZTROPINE MESYLATE 1 MG/1
1 TABLET ORAL NIGHTLY
Qty: 30 TABLET | Refills: 5 | Status: SHIPPED | OUTPATIENT
Start: 2020-07-21 | End: 2020-08-06 | Stop reason: SDUPTHER

## 2020-07-21 RX ORDER — QUETIAPINE FUMARATE 200 MG/1
200 TABLET, FILM COATED ORAL NIGHTLY
Qty: 30 TABLET | Refills: 1 | Status: SHIPPED | OUTPATIENT
Start: 2020-07-21 | End: 2020-08-06 | Stop reason: SDUPTHER

## 2020-07-21 ASSESSMENT — ENCOUNTER SYMPTOMS
SORE THROAT: 0
TROUBLE SWALLOWING: 0
WHEEZING: 0
COUGH: 0
SHORTNESS OF BREATH: 0

## 2020-07-21 NOTE — PATIENT INSTRUCTIONS
Patient Education        Bipolar Disorder: Care Instructions  Your Care Instructions     Bipolar disorder is an illness that causes extreme mood changes, from times of very high energy (manic episodes) to times of depression. But many people with bipolar disorder show only the symptoms of depression. These moods may cause problems with your work, school, family life, friendships, and how well you function. This disease is also called manic-depression. There is no cure for bipolar disorder, but it can be helped with medicines. Counseling may also help. It is important to take your medicines exactly as prescribed, even when you feel well. You may need lifelong treatment. Follow-up care is a key part of your treatment and safety. Be sure to make and go to all appointments, and call your doctor if you are having problems. It's also a good idea to know your test results and keep a list of the medicines you take. How can you care for yourself at home? · Be safe with medicines. Take your medicines exactly as prescribed. Do not stop or change a medicine without talking to your doctor first. Daria Ballesteros and your doctor may need to try different combinations of medicines to find what works for you. · Take your medicines on schedule to keep your moods even. When you feel good, you may think that you do not need your medicines. But it is important to keep taking them. · Go to your counseling sessions. Call and talk with your counselor if you can't go to a session or if you don't think the sessions are helping. Do not just stop going. · Get at least 30 minutes of activity on most days of the week. Walking is a good choice. You also may want to do other things, such as running, swimming, or cycling. · Get enough sleep. Keep your room dark and quiet. Try to go to bed at the same time every night. · Eat a healthy diet. This includes whole grains, dairy, fruits, vegetables, and protein.  Eat foods from each of these groups. · Try to lower your stress. Manage your time, build a strong support system, and lead a healthy lifestyle. To lower your stress, try physical activity, slow deep breathing, or getting a massage. · Do not use alcohol or illegal drugs. · Learn the early signs of your mood changes. You can then take steps to help yourself feel better. · Ask for help from friends and family when you need it. You may need help with daily chores when you are depressed. When you are manic, you may need support to control your high energy levels. What should you do if someone in your family has bipolar disorder? · Learn about the disease and the signs that it is getting worse. · Remind your family member that you love him or her. · Make a plan with all family members about how to take care of your loved one when his or her symptoms are bad. · Talk about your fears and concerns and those of other family members. Seek counseling if needed. · Do not focus attention only on the person who is in treatment. · Remind yourself that it will take time for changes to occur. · Do not blame yourself for the disease. · Know your legal rights and the legal rights of your family member. Support groups or counselors can help you with this information. · Take care of yourself. Keep up with your own interests, such as your career, hobbies, and friends. Use exercise, positive self-talk, deep breathing, and other relaxing exercises to help lower your stress. · Give yourself time to grieve. You may need to deal with emotions such as anger, fear, and frustration. After you work through your feelings, you will be better able to care for yourself and your family. · If you are having a hard time with your feelings or with your relationship with your family member, talk with a counselor. When should you call for help? [id number] anytime you think you may need emergency care.  For example, call if:  · You feel like hurting yourself or someone else.  · Someone who has bipolar disorder displays dangerous behavior, and you think the person might hurt himself or herself or someone else. Call your doctor now or seek immediate medical care if:  · You hear voices. · Someone you know has bipolar disorder and talks about suicide. Keep the numbers for these national suicide hotlines: 7-781-282-TALK (0-683.160.4026) and 2-736-DLQYZWH (1-510.534.4419). If a suicide threat seems real, with a specific plan and a way to carry it out, stay with the person, or ask someone you trust to stay with the person, until you can get help. · Someone you know has bipolar disorder and:  ? Starts to give away possessions. ? Is using illegal drugs or drinking alcohol heavily. ? Talks or writes about death, including writing suicide notes or talking about guns, knives, or pills. ? Talks or writes about hurting someone else. ? Starts to spend a lot of time alone. ? Acts very aggressively or suddenly appears calm. ? Talks about beliefs that are not based in reality (delusions). Watch closely for changes in your health, and be sure to contact your doctor if:  · You cannot go to your counseling sessions. Where can you learn more? Go to https://[employer].Guide. org and sign in to your [employer] account. Enter K052 in the [facility] box to learn more about \"Bipolar Disorder: Care Instructions. \"     If you do not have an account, please click on the \"Sign Up Now\" link. Current as of: January 31, 2020               Content Version: 12.5  © 8876-7012 [employer]. Care instructions adapted under license by [facility] ([facility]). If you have questions about a medical condition or this instruction, always ask your healthcare professional. [address] any warranty or liability for your use of this information.

## 2020-07-21 NOTE — PROGRESS NOTES
[address]  [place], [address] [place] [address] Rd  Phone [phone]   Fax [phone]      OFFICE VISIT: 2020    Ju Ibarra- : 1955      Reason For Visit:  Rito Schilder is a 63 y.o. femalewho is here for Follow-up (on mood)         Health Maintenance     HPI      Patient is here for mood follow up  Reports that she cant get some of her meds  And they arent aware of which ones    Reports that they changed her meds and she feeling fine  And feeling great    She feels like \"like shit so doesn't matter\"  Reports that she isn't sure what she is taking  She has increase movement and trying to stay busy  Reports she isn't sleeping may get 2 hours of sleep at night   She would give anything to sleep    She constantly moves  And not able to sleep well  Is on depakote ER 3 at bedtime   neurontin 300mg three times a day  trazadone 50mg bedtime  seroquel 200mg bedtime           weight is 202 lb 8 oz (91.9 kg). Her temporal temperature is 97 °F (36.1 °C). Her blood pressure is 110/74 and her pulse is 104. Her oxygen saturation is 98%. Body mass index is 39.55 kg/m².     Results for orders placed or performed during the hospital encounter of 28/59/10   Basic Metabolic Panel   Result Value Ref Range    Sodium 141 136 - 145 mmol/L    Potassium 4.1 3.5 - 5.0 mmol/L    Chloride 110 98 - 111 mmol/L    CO2 18 (L) 22 - 29 mmol/L    Anion Gap 13 7 - 19 mmol/L    Glucose 149 (H) 74 - 109 mg/dL    BUN 24 (H) 8 - 23 mg/dL    CREATININE 0.6 0.5 - 0.9 mg/dL    GFR Non-African American >60 >60    Calcium 8.7 (L) 8.8 - 10.2 mg/dL   CBC Auto Differential   Result Value Ref Range    WBC 6.1 4.8 - 10.8 K/uL    RBC 3.57 (L) 4.20 - 5.40 M/uL    Hemoglobin 8.9 (L) 12.0 - 16.0 g/dL    Hematocrit 30.4 (L) 37.0 - 47.0 %    MCV 85.2 81.0 - 99.0 fL    MCH 24.9 (L) 27.0 - 31.0 pg    MCHC 29.3 (L) 33.0 - 37.0 g/dL    RDW 20.1 (H) 11.5 - 14.5 %    Platelets 207 911 - 712 K/uL    MPV 11.8 9.4 - 12.3 fL    Neutrophils % 53.1 50.0 - 65.0 %    Lymphocytes % 28.5 20.0 - 40.0 %    Monocytes % 10.6 (H) 0.0 - 10.0 %    Eosinophils % 6.4 (H) 0.0 - 5.0 %    Basophils % 1.1 (H) 0.0 - 1.0 %    Neutrophils Absolute 3.2 1.5 - 7.5 K/uL    Immature Granulocytes # 0.0 K/uL    Lymphocytes Absolute 1.7 1.1 - 4.5 K/uL    Monocytes Absolute 0.70 0.00 - 0.90 K/uL    Eosinophils Absolute 0.40 0.00 - 0.60 K/uL    Basophils Absolute 0.10 0.00 - 0.20 K/uL   Vitamin D 25 Hydroxy   Result Value Ref Range    Vit D, 25-Hydroxy 20.6 (L) >=30 ng/mL   Vitamin B12   Result Value Ref Range    Vitamin B-12 230 211 - 946 pg/mL   TSH WITH REFLEX TO FT4   Result Value Ref Range    TSH Reflex FT4 3.38 0.35 - 5.50 uIU/mL   Lipid Panel   Result Value Ref Range    Cholesterol, Total 115 (L) 160 - 199 mg/dL    Triglycerides 128 0 - 149 mg/dL    HDL 38 (L) 65 - 121 mg/dL    LDL Calculated 51 <100 mg/dL   HEMOGLOBIN A1C   Result Value Ref Range    Hemoglobin A1C 5.1 4.0 - 6.0 %   Urinalysis   Result Value Ref Range    Color, UA YELLOW Straw/Yellow    Clarity, UA Clear Clear    Glucose, Ur Negative Negative mg/dL    Bilirubin Urine Negative Negative    Ketones, Urine Negative Negative mg/dL    Specific Gravity, UA 1.007 1.005 - 1.030    Blood, Urine Negative Negative    pH, UA 5.5 5.0 - 8.0    Protein, UA Negative Negative mg/dL    Urobilinogen, Urine 0.2 <2.0 E.U./dL    Nitrite, Urine Negative Negative    Leukocyte Esterase, Urine Negative Negative   Valproic Acid Level, Total   Result Value Ref Range    Valproic Acid Lvl 26.6 (L) 50.0 - 100.0 ug/mL   POCT Glucose   Result Value Ref Range    POC Glucose 143 (H) 70 - 99 mg/dl    Performed on [facility]    POCT Glucose   Result Value Ref Range    POC Glucose 144 (H) 70 - 99 mg/dl    Performed on [facility]    POCT Glucose   Result Value Ref Range    POC Glucose 103 (H) 70 - 99 mg/dl    Performed on [facility]    POCT Glucose   Result Value Ref Range    POC Glucose 138 (H) 70 - 99 mg/dl    Performed on [facility]    POCT Glucose   Result Value Ref Range    POC Glucose 80 70 - 99 mg/dl    Performed on [facility]    POCT Glucose   Result Value Ref Range    POC Glucose 136 (H) 70 - 99 mg/dl    Performed on [facility]    POCT Glucose   Result Value Ref Range    POC Glucose 146 (H) 70 - 99 mg/dl    Performed on [facility]    POCT Glucose   Result Value Ref Range    POC Glucose 87 70 - 99 mg/dl    Performed on [facility]    POCT Glucose   Result Value Ref Range    POC Glucose 185 (H) 70 - 99 mg/dl    Performed on [facility]    POCT Glucose   Result Value Ref Range    POC Glucose 178 (H) 70 - 99 mg/dl    Performed on [facility]    POCT Glucose   Result Value Ref Range    POC Glucose 91 70 - 99 mg/dl    Performed on [facility]        I have reviewed the following with the Ms. Bueno   Lab Review   Admission on 06/26/2020, Discharged on 06/29/2020   Component Date Value    POC Glucose 06/26/2020 143*    Performed on 06/26/2020 [facility]     Sodium 06/27/2020 141     Potassium 06/27/2020 4.1     Chloride 06/27/2020 110     CO2 06/27/2020 18*    Anion Gap 06/27/2020 13     Glucose 06/27/2020 149*    BUN 06/27/2020 24*    CREATININE 06/27/2020 0.6     GFR Non- 06/27/2020 >60     Calcium 06/27/2020 8.7*    WBC 06/27/2020 6.1     RBC 06/27/2020 3.57*    Hemoglobin 06/27/2020 8.9*    Hematocrit 06/27/2020 30.4*    MCV 06/27/2020 85.2     MCH 06/27/2020 24.9*    MCHC 06/27/2020 29.3*    RDW 06/27/2020 20.1*    Platelets 00/30/5204 224     MPV 06/27/2020 11.8     Neutrophils % 06/27/2020 53.1     Lymphocytes % 06/27/2020 28.5     Monocytes % 06/27/2020 10.6*    Eosinophils % 06/27/2020 6.4*    Basophils % 06/27/2020 1.1*    Neutrophils Absolute 06/27/2020 3.2     Immature Granulocytes # 06/27/2020 0.0     Lymphocytes Absolute 06/27/2020 1.7     Monocytes Absolute 06/27/2020 0.70     Eosinophils Absolute 06/27/2020 0.40     Basophils Absolute 06/27/2020 0.10     Vit D, 25-Hydroxy 06/27/2020 20.6*    Vitamin B-12 06/27/2020 230     TSH Reflex FT4 06/27/2020 3.38     Cholesterol, Total 06/27/2020 115*    Triglycerides 06/27/2020 128     HDL 06/27/2020 38*    LDL Calculated 06/27/2020 51     Hemoglobin A1C 06/27/2020 5.1     POC Glucose 06/26/2020 144*    Performed on 06/26/2020 [facility]     POC Glucose 06/27/2020 103*    Performed on 06/27/2020 [facility]     POC Glucose 06/27/2020 138*    Performed on 06/27/2020 [facility]     Color, UA 06/27/2020 YELLOW     Clarity, UA 06/27/2020 Clear     Glucose, Ur 06/27/2020 Negative     Bilirubin Urine 06/27/2020 Negative     Ketones, Urine 06/27/2020 Negative     Specific Gravity, UA 06/27/2020 1.007     Blood, Urine 06/27/2020 Negative     pH, UA 06/27/2020 5.5     Protein, UA 06/27/2020 Negative     Urobilinogen, Urine 06/27/2020 0.2     Nitrite, Urine 06/27/2020 Negative     Leukocyte Esterase, Urine 06/27/2020 Negative     POC Glucose 06/27/2020 80     Performed on 06/27/2020 [facility]     POC Glucose 06/27/2020 136*    Performed on 06/27/2020 [facility]     POC Glucose 06/28/2020 146*    Performed on 06/28/2020 [facility]     POC Glucose 06/28/2020 87     Performed on 06/28/2020 [facility]     POC Glucose 06/28/2020 185*    Performed on 06/28/2020 [facility]     Valproic Acid Lvl 06/29/2020 26.6*    POC Glucose 06/28/2020 178*    Performed on 06/28/2020 [facility]     POC Glucose 06/29/2020 91     Performed on 06/29/2020 [facility]    Admission on 06/25/2020, Discharged on 06/26/2020   Component Date Value    Acetaminophen Level 06/25/2020 <15     WBC 06/25/2020 8.2     RBC 06/25/2020 3.72*    Hemoglobin 06/25/2020 9.4*    Hematocrit 06/25/2020 31.2*    MCV 06/25/2020 83.9     MCH 06/25/2020 25.3*    MCHC 06/25/2020 30.1*    RDW 06/25/2020 20.2*    Platelets 40/49/9033 257     MPV 06/25/2020 11.7     Sodium 06/25/2020 142     Potassium 06/25/2020 3.7     Chloride 06/25/2020 108     CO2 06/25/2020 20*    Anion Gap 06/25/2020 14     Glucose 06/25/2020 64*    BUN 06/25/2020 37*    CREATININE 06/25/2020 1.0*    GFR Non- 06/25/2020 56*    Calcium 06/25/2020 8.1*    Total Protein 06/25/2020 6.2*    Alb 06/25/2020 3.9     Total Bilirubin 06/25/2020 <0.2     Alkaline Phosphatase 06/25/2020 150*    ALT 06/25/2020 13     AST 06/25/2020 17     Ethanol Lvl 06/25/2020 <10     Amphetamine Screen, Urine 06/25/2020 Negative     Barbiturate Screen, Ur 06/25/2020 Negative     Benzodiazepine Screen, U* 06/25/2020 Negative     Cannabinoid Scrn, Ur 06/25/2020 Negative     Cocaine Metabolite Scree* 06/25/2020 Negative     Opiate Scrn, Ur 06/25/2020 Negative     Drug Screen Comment: 06/25/2020 see below     Salicylate, Serum 94/43/9110 <3.0     P-R Interval 06/25/2020 176     QRS Duration 06/25/2020 86     Q-T Interval 06/25/2020 350     QTc Calculation (Bazett) 06/25/2020 407     P Axis 06/25/2020 61     T Axis 06/25/2020 50     Troponin 06/25/2020 <0.01     Glucose 06/25/2020 60     POC Glucose 06/25/2020 60*    Performed on 06/25/2020 [facility]     POC Glucose 06/25/2020 103*    Performed on 06/25/2020 [facility]    Orders Only on 03/04/2020   Component Date Value    Ferritin 03/04/2020 6.6*    Iron 03/04/2020 22*    TIBC 03/04/2020 556*    Iron Saturation 03/04/2020 4*    Vitamin B-12 03/04/2020 >2000*    Vit D, 25-Hydroxy 03/04/2020 24.8*    TSH 03/04/2020 2.800     Hemoglobin A1C 03/04/2020 5.9     Sodium 03/04/2020 144     Potassium 03/04/2020 4.9     Chloride 03/04/2020 111     CO2 03/04/2020 16*    Anion Gap 03/04/2020 17     Glucose 03/04/2020 103     BUN 03/04/2020 24*    CREATININE 03/04/2020 0.7     GFR Non- 03/04/2020 >60     Calcium 03/04/2020 9.2     Total Protein 03/04/2020 7.8     Alb 03/04/2020 4.4     Total Bilirubin 03/04/2020 <0.2     Alkaline Phosphatase 03/04/2020 196*    ALT 03/04/2020 11     AST 03/04/2020 20     WBC 03/04/2020 7.9     RBC 03/04/2020 4.43     Hemoglobin 03/04/2020 9.9*    Hematocrit 03/04/2020 34.7*    MCV 03/04/2020 78.3*    MCH 03/04/2020 22.3*    MCHC 03/04/2020 28.5*    RDW 03/04/2020 17.6*    Platelets 05/27/6551 363     MPV 03/04/2020 10.6     PLATELET SLIDE REVIEW 03/04/2020 Adequate     Neutrophils % 03/04/2020 49.0*    Lymphocytes % 03/04/2020 36.4     Monocytes % 03/04/2020 10.2*    Eosinophils % 03/04/2020 2.9     Basophils % 03/04/2020 1.1*    Neutrophils Absolute 03/04/2020 3.9     Immature Granulocytes # 03/04/2020 0.0     Lymphocytes Absolute 03/04/2020 2.9     Monocytes Absolute 03/04/2020 0.80     Eosinophils Absolute 03/04/2020 0.20     Basophils Absolute 03/04/2020 0.10     Hypochromia 03/04/2020 2+*     Copies of these are in the chart. Prior to Visit Medications    Medication Sig Taking? Authorizing Provider   divalproex (DEPAKOTE ER) 500 MG extended release tablet Take 3 tablets by mouth nightly Yes JORGE Kwok   gabapentin (NEURONTIN) 300 MG capsule Take 1 capsule by mouth 3 times daily for 14 days. Yes JORGE Kwok   QUEtiapine (SEROQUEL) 200 MG tablet Take 1 tablet by mouth nightly Yes JORGE Kwok   mirtazapine (REMERON) 30 MG tablet Take 1 tablet by mouth nightly Yes JORGE Kwok   benztropine (COGENTIN) 1 MG tablet Take 1 tablet by mouth nightly Yes JORGE Kwok   furosemide (LASIX) 20 MG tablet Take 1 tablet by mouth daily Yes JORGE Finney   pantoprazole (PROTONIX) 40 MG tablet Take 1 tablet by mouth daily.  Yes JORGE Finney   vitamin D (ERGOCALCIFEROL) 1.25 MG (38109 UT) CAPS capsule Take 1 capsule by mouth once a week Yes JORGE Finney   diclofenac (VOLTAREN) 75 MG EC tablet Take 1 tablet by mouth 2 times daily Yes JORGE Kwok   irbesartan (AVAPRO) 150 MG tablet Take 1 tablet by mouth nightly Yes JORGE Kwok   Liraglutide (VICTOZA) 18 MG/3ML SOPN SC injection Inject 1.8 mg into the skin daily Yes Adan Gonzalez JORGE Macdonald   ferrous sulfate 325 (65 Fe) MG tablet Take 1 tablet by mouth daily (with breakfast) Yes JORGE Riggins   melatonin 3 MG TABS tablet Take 1 tablet by mouth nightly Yes JUAN ANTONIO Guadalupeer,    atorvastatin (LIPITOR) 20 MG tablet Take 1 tablet by mouth daily Yes JORGE Riggins   ARIPiprazole lauroxil (ARISTADA) 882 MG/3.2ML PRSY injection Inject 3.2 mLs into the muscle every 30 days Yes JORGE Riggins   metFORMIN (GLUCOPHAGE) 500 MG tablet Take 1 tablet by mouth 2 times daily (with meals) Yes JORGE Riggins   linaCLOtide (LINZESS) 72 MCG CAPS capsule Take 1 capsule by mouth every morning (before breakfast) Yes JORGE Riggins   Insulin Pen Needle (B-D ULTRAFINE III SHORT PEN) 31G X 8 MM MISC Inject 1 each as directed daily Yes JORGE Riggins   albuterol sulfate HFA (VENTOLIN HFA) 108 (90 Base) MCG/ACT inhaler Inhale 2 puffs into the lungs every 6 hours as needed for Wheezing Yes JORGE Riggins   ACCU-CHEK SOFTCLIX LANCETS MISC CHECK BLOOD SUGAR TWICE DAILY AND AS NEEDED Yes JORGE Riggins   fluticasone (FLONASE) 50 MCG/ACT nasal spray 2 sprays by Nasal route daily Yes JORGE Riggins   blood glucose monitor kit and supplies Test 3 times a day & as needed for symptoms of irregular blood glucose. Dx: JORGE Dangelo   blood glucose monitor strips Test 3 times a day & as needed for symptoms of irregular blood glucose. Yes JORGE Riggins   OXYGEN Inhale 3 L into the lungs Yes Historical Provider, MD   clobetasol (TEMOVATE) 0.05 % ointment Apply topically 2 times daily.  Yes JORGE Riggins   ipratropium-albuterol (DUONEB) 0.5-2.5 (3) MG/3ML SOLN nebulizer solution Inhale 3 mLs into the lungs every 6 hours as needed for Shortness of Breath Yes JORGE Riggins   Continuous Blood Gluc  (FREESTYLE ABDOULAYE READER) MARCIA 1 Units by Does not apply route 2 times daily Yes JORGE Riggins Continuous Blood Gluc Sensor (FREESTYLE ABDOULAYE SENSOR SYSTEM) MISC 1 Units by Does not apply route 2 times daily Yes JORGE Lane   nystatin (MYCOSTATIN) 443500 UNIT/GM powder Apply 3 times daily. Yes JORGE Lane   vitamin B-12 (CYANOCOBALAMIN) 500 MCG tablet Take 1 tablet by mouth daily  JORGE Lane       Allergies: Haldol [haloperidol lactate]; Morphine; and Codeine    Past Medical History:   Diagnosis Date    Alcohol overdose     history of    Anemia     Anxiety     [facility]     Brain tumor (HCC)     Chronic back pain     ruptured discs    COPD (chronic obstructive pulmonary disease) (HCC)     Dementia (HCC)     Depression     Diabetes ([facility] [address] [facility]     Elevated liver enzymes     Frequent falls     Headache     History of kidney infection     Hyperlipidemia     Hypertension     Neuropathy     Obesity     Osteoarthritis     Oxygen dependent     Psoriasis     Scoliosis     Sleep apnea     UTI (urinary tract infection)        Past Surgical History:   Procedure Laterality Date    APPENDECTOMY       SECTION      CHOLECYSTECTOMY      GASTRIC BYPASS SURGERY      RETROPHYARYNGEAL ABCESS INCISION/DRAIN      TONSILLECTOMY      TUBAL LIGATION      TUMOR REMOVAL      bone tumor, r wrist     UPPER GASTROINTESTINAL ENDOSCOPY  2015    [place]: sm hiatal hernia; s/p balbir-en-y; esoph plaques, pos yeast; neg CS       Social History     Tobacco Use    Smoking status: Current Every Day Smoker     Packs/day: 0.50     Types: Cigarettes    Smokeless tobacco: Never Used   Substance Use Topics    Alcohol use: Yes     Comment: occ       Review of Systems   Constitutional: Positive for activity change. Negative for appetite change, fatigue and fever. HENT: Negative for congestion, postnasal drip, sore throat and trouble swallowing. Respiratory: Negative for cough, shortness of breath and wheezing.     Cardiovascular: Negative for chest pain and palpitations. Genitourinary: Negative for difficulty urinating. Skin: Negative for rash and wound. Neurological: Negative for syncope, speech difficulty, weakness and headaches. Hematological: Negative for adenopathy. Psychiatric/Behavioral: Positive for agitation, self-injury and sleep disturbance. The patient is nervous/anxious. Physical Exam  Constitutional:       Appearance: Normal appearance. She is obese. Comments: Moving around in office   Cardiovascular:      Rate and Rhythm: Normal rate and regular rhythm. Pulses: Normal pulses. Heart sounds: No murmur. Pulmonary:      Effort: Pulmonary effort is normal. No respiratory distress. Breath sounds: Normal breath sounds. No wheezing or rhonchi. Neurological:      General: No focal deficit present. Mental Status: She is alert and oriented to person, place, and time. Psychiatric:         Mood and Affect: Mood normal.         Behavior: Behavior normal.         ASSESSMENT/ PLAN    1. Other chronic pain  Will refill  - gabapentin (NEURONTIN) 300 MG capsule; Take 1 capsule by mouth 3 times daily for 14 days. Dispense: 42 capsule; Refill: 0    2. Manic episode ([address]  Reports  She is anxious and not doing as well  She has been angry  - traZODone (DESYREL) 50 MG tablet; Take 1 tablet by mouth nightly as needed for Sleep  Dispense: 30 tablet; Refill: 1  - QUEtiapine (SEROQUEL) 200 MG tablet; Take 1 tablet by mouth nightly  Dispense: 30 tablet; Refill: 1    3. Bipolar disorder, in full remission, most recent episode [facility])  Having issues with mood  And now vicious  [place] now  And worse at present  - traZODone (DESYREL) 50 MG tablet; Take 1 tablet by mouth nightly as needed for Sleep  Dispense: 30 tablet; Refill: 1  - QUEtiapine (SEROQUEL) 200 MG tablet; Take 1 tablet by mouth nightly  Dispense: 30 tablet; Refill: 1      No orders of the defined types were placed in this encounter.        Return in about 2 weeks (around 8/4/2020) for mood follow up . Patient Instructions     Patient Education        Bipolar Disorder: Care Instructions  Your Care Instructions     Bipolar disorder is an illness that causes extreme mood changes, from times of very high energy (manic episodes) to times of depression. But many people with bipolar disorder show only the symptoms of depression. These moods may cause problems with your work, school, family life, friendships, and how well you function. This disease is also called manic-depression. There is no cure for bipolar disorder, but it can be helped with medicines. Counseling may also help. It is important to take your medicines exactly as prescribed, even when you feel well. You may need lifelong treatment. Follow-up care is a key part of your treatment and safety. Be sure to make and go to all appointments, and call your doctor if you are having problems. It's also a good idea to know your test results and keep a list of the medicines you take. How can you care for yourself at home? · Be safe with medicines. Take your medicines exactly as prescribed. Do not stop or change a medicine without talking to your doctor first. Neymar Olivo and your doctor may need to try different combinations of medicines to find what works for you. · Take your medicines on schedule to keep your moods even. When you feel good, you may think that you do not need your medicines. But it is important to keep taking them. · Go to your counseling sessions. Call and talk with your counselor if you can't go to a session or if you don't think the sessions are helping. Do not just stop going. · Get at least 30 minutes of activity on most days of the week. Walking is a good choice. You also may want to do other things, such as running, swimming, or cycling. · Get enough sleep. Keep your room dark and quiet. Try to go to bed at the same time every night. · Eat a healthy diet.  This includes whole grains, dairy, fruits, vegetables, and protein. Eat foods from each of these groups. · Try to lower your stress. Manage your time, build a strong support system, and lead a healthy lifestyle. To lower your stress, try physical activity, slow deep breathing, or getting a massage. · Do not use alcohol or illegal drugs. · Learn the early signs of your mood changes. You can then take steps to help yourself feel better. · Ask for help from friends and family when you need it. You may need help with daily chores when you are depressed. When you are manic, you may need support to control your high energy levels. What should you do if someone in your family has bipolar disorder? · Learn about the disease and the signs that it is getting worse. · Remind your family member that you love him or her. · Make a plan with all family members about how to take care of your loved one when his or her symptoms are bad. · Talk about your fears and concerns and those of other family members. Seek counseling if needed. · Do not focus attention only on the person who is in treatment. · Remind yourself that it will take time for changes to occur. · Do not blame yourself for the disease. · Know your legal rights and the legal rights of your family member. Support groups or counselors can help you with this information. · Take care of yourself. Keep up with your own interests, such as your career, hobbies, and friends. Use exercise, positive self-talk, deep breathing, and other relaxing exercises to help lower your stress. · Give yourself time to grieve. You may need to deal with emotions such as anger, fear, and frustration. After you work through your feelings, you will be better able to care for yourself and your family. · If you are having a hard time with your feelings or with your relationship with your family member, talk with a counselor. When should you call for help? [id number] anytime you think you may need emergency care.  For example, Additional Instructions: As always, patient is advisedto bring in medication bottles in order to correctly reconcile with our current list.      Ju received counseling on the following healthy behaviors: none    Patient giveneducational materials on plan of care    I have instructed Ju to complete a self tracking handout on none and instructed them to bring it with them to her next appointment. Discussed use, benefit, and side effects of prescribed medications. Barriers to medication compliance addressed. All patient questions answered. Pt voiced understanding.      JORGE Echols

## 2020-07-27 ENCOUNTER — TELEPHONE (OUTPATIENT)
Dept: PRIMARY CARE CLINIC | Age: 65
End: 2020-07-27

## 2020-07-27 NOTE — TELEPHONE ENCOUNTER
Nellie from [facility] called because they received a rx for seroquel 200 mg on pt and wanted to know if this rx was in place of [address] or if it was to coincide with [facility]. After looking at pts chart I see that pt was to stop Latuda at discharge from hospital. Returned call to let them know.

## 2020-07-29 NOTE — TELEPHONE ENCOUNTER
Received fax from pharmacy requesting refill on pts medication(s). Pt was last seen in office on 7/21/2020  and has a follow up scheduled for 8/6/2020. Will send request to  [facility]  for authorization. Requested Prescriptions     Pending Prescriptions Disp Refills    nystatin (MYCOSTATIN) 370336 UNIT/GM powder 45 g 3     Sig: Apply 3 times daily.  clobetasol (TEMOVATE) 0.05 % ointment 60 g 3     Sig: Apply topically 2 times daily.     albuterol sulfate HFA (VENTOLIN HFA) 108 (90 Base) MCG/ACT inhaler 3 Inhaler 3     Sig: Inhale 2 puffs into the lungs every 6 hours as needed for Wheezing

## 2020-07-30 RX ORDER — CLOBETASOL PROPIONATE 0.5 MG/G
OINTMENT TOPICAL
Qty: 60 G | Refills: 3 | Status: SHIPPED | OUTPATIENT
Start: 2020-07-30 | End: 2021-01-19 | Stop reason: SDUPTHER

## 2020-07-30 RX ORDER — ALBUTEROL SULFATE 90 UG/1
2 AEROSOL, METERED RESPIRATORY (INHALATION) EVERY 6 HOURS PRN
Qty: 3 INHALER | Refills: 3 | Status: SHIPPED | OUTPATIENT
Start: 2020-07-30

## 2020-07-30 RX ORDER — NYSTATIN 100000 [USP'U]/G
POWDER TOPICAL
Qty: 45 G | Refills: 3 | Status: ON HOLD | OUTPATIENT
Start: 2020-07-30 | End: 2022-05-24 | Stop reason: HOSPADM

## 2020-07-31 RX ORDER — CELECOXIB 200 MG/1
CAPSULE ORAL
Qty: 90 CAPSULE | Refills: 3 | OUTPATIENT
Start: 2020-07-31

## 2020-07-31 RX ORDER — ROPINIROLE 2 MG/1
2 TABLET, FILM COATED ORAL 2 TIMES DAILY
Qty: 180 TABLET | Refills: 2 | OUTPATIENT
Start: 2020-07-31

## 2020-07-31 RX ORDER — PRAMIPEXOLE DIHYDROCHLORIDE 0.25 MG/1
0.25 TABLET ORAL 3 TIMES DAILY
Qty: 270 TABLET | Refills: 3 | OUTPATIENT
Start: 2020-07-31

## 2020-07-31 RX ORDER — HYDROXYZINE HYDROCHLORIDE 25 MG/1
TABLET, FILM COATED ORAL
Qty: 180 TABLET | Refills: 3 | OUTPATIENT
Start: 2020-07-31

## 2020-07-31 RX ORDER — SERTRALINE HYDROCHLORIDE 100 MG/1
150 TABLET, FILM COATED ORAL NIGHTLY
Qty: 135 TABLET | Refills: 3 | OUTPATIENT
Start: 2020-07-31

## 2020-07-31 RX ORDER — DONEPEZIL HYDROCHLORIDE 10 MG/1
TABLET, FILM COATED ORAL
Qty: 90 TABLET | Refills: 2 | OUTPATIENT
Start: 2020-07-31

## 2020-08-06 ENCOUNTER — VIRTUAL VISIT (OUTPATIENT)
Dept: PRIMARY CARE CLINIC | Age: 65
End: 2020-08-06
Payer: MEDICARE

## 2020-08-06 ENCOUNTER — TELEPHONE (OUTPATIENT)
Dept: PRIMARY CARE CLINIC | Age: 65
End: 2020-08-06

## 2020-08-06 PROCEDURE — 99443 PR PHYS/QHP TELEPHONE EVALUATION 21-30 MIN: CPT | Performed by: NURSE PRACTITIONER

## 2020-08-06 RX ORDER — QUETIAPINE FUMARATE 200 MG/1
200 TABLET, FILM COATED ORAL NIGHTLY
Qty: 30 TABLET | Refills: 1 | Status: SHIPPED | OUTPATIENT
Start: 2020-08-06 | End: 2020-08-31

## 2020-08-06 RX ORDER — GABAPENTIN 300 MG/1
300 CAPSULE ORAL 3 TIMES DAILY
Qty: 42 CAPSULE | Refills: 0 | Status: ON HOLD | OUTPATIENT
Start: 2020-08-06 | End: 2022-02-02 | Stop reason: HOSPADM

## 2020-08-06 RX ORDER — MIRTAZAPINE 30 MG/1
30 TABLET, FILM COATED ORAL NIGHTLY
Qty: 90 TABLET | Refills: 1 | Status: SHIPPED | OUTPATIENT
Start: 2020-08-06 | End: 2020-12-28

## 2020-08-06 RX ORDER — BENZTROPINE MESYLATE 1 MG/1
1 TABLET ORAL NIGHTLY
Qty: 30 TABLET | Refills: 5 | Status: SHIPPED | OUTPATIENT
Start: 2020-08-06 | End: 2021-01-11 | Stop reason: SDUPTHER

## 2020-08-06 RX ORDER — DIVALPROEX SODIUM 500 MG/1
1500 TABLET, EXTENDED RELEASE ORAL NIGHTLY
Qty: 90 TABLET | Refills: 1 | Status: SHIPPED | OUTPATIENT
Start: 2020-08-06 | End: 2020-08-31

## 2020-08-06 ASSESSMENT — ENCOUNTER SYMPTOMS
COUGH: 0
TROUBLE SWALLOWING: 0
SORE THROAT: 0
WHEEZING: 0
SHORTNESS OF BREATH: 0

## 2020-08-06 NOTE — PATIENT INSTRUCTIONS
Patient Education        Chronic Pain: Care Instructions  Your Care Instructions     Chronic pain is pain that lasts a long time (months or even years) and may or may not have a clear cause. It is different from acute pain, which usually does have a clear cause--like an injury or illness--and gets better over time. Chronic pain:  · Lasts over time but may vary from day to day. · Does not go away despite efforts to end it. · May disrupt your sleep and lead to fatigue. · May cause depression or anxiety. · May make your muscles tense, causing more pain. · Can disrupt your work, hobbies, home life, and relationships with friends and family. Chronic pain is a very real condition. It is not just in your head. Treatment can help and usually includes several methods used together, such as medicines, physical therapy, exercise, and other treatments. Learning how to relax and changing negative thought patterns can also help you cope. Chronic pain is complex. Taking an active role in your treatment will help you better manage your pain. Tell your doctor if you have trouble dealing with your pain. You may have to try several things before you find what works best for you. Follow-up care is a key part of your treatment and safety. Be sure to make and go to all appointments, and call your doctor if you are having problems. It's also a good idea to know your test results and keep a list of the medicines you take. How can you care for yourself at home? · Pace yourself. Break up large jobs into smaller tasks. Save harder tasks for days when you have less pain, or go back and forth between hard tasks and easier ones. Take rest breaks. · Relax, and reduce stress. Relaxation techniques such as deep breathing or meditation can help. · Keep moving. Gentle, daily exercise can help reduce pain over the long run. Try low- or no-impact exercises such as walking, swimming, and stationary biking.  Do stretches to stay flexible. · Try heat, cold packs, and massage. · Get enough sleep. Chronic pain can make you tired and drain your energy. Talk with your doctor if you have trouble sleeping because of pain. · Think positive. Your thoughts can affect your pain level. Do things that you enjoy to distract yourself when you have pain instead of focusing on the pain. See a movie, read a book, listen to music, or spend time with a friend. · If you think you are depressed, talk to your doctor about treatment. · Keep a daily pain diary. Record how your moods, thoughts, sleep patterns, activities, and medicine affect your pain. You may find that your pain is worse during or after certain activities or when you are feeling a certain emotion. Having a record of your pain can help you and your doctor find the best ways to treat your pain. · Take pain medicines exactly as directed. ? If the doctor gave you a prescription medicine for pain, take it as prescribed. ? If you are not taking a prescription pain medicine, ask your doctor if you can take an over-the-counter medicine. Reducing constipation caused by pain medicine  · Include fruits, vegetables, beans, and whole grains in your diet each day. These foods are high in fiber. · Drink plenty of fluids, enough so that your urine is light yellow or clear like water. If you have kidney, heart, or liver disease and have to limit fluids, talk with your doctor before you increase the amount of fluids you drink. · If your doctor recommends it, get more exercise. Walking is a good choice. Bit by bit, increase the amount you walk every day. Try for at least 30 minutes on most days of the week. · Schedule time each day for a bowel movement. A daily routine may help. Take your time and do not strain when having a bowel movement. When should you call for help? Call your doctor now or seek immediate medical care if:  · Your pain gets worse or is out of control.   · You feel down or blue, or you do not enjoy things like you once did. You may be depressed, which is common in people with chronic pain. Depression can be treated. · You have vomiting or cramps for more than 2 hours. Watch closely for changes in your health, and be sure to contact your doctor if:  · You cannot sleep because of pain. · You are very worried or anxious about your pain. · You have trouble taking your pain medicine. · You have any concerns about your pain medicine. · You have trouble with bowel movements, such as:  ? No bowel movement in 3 days. ? Blood in the anal area, in your stool, or on the toilet paper. ? Diarrhea for more than 24 hours. Where can you learn more? Go to https://Simple.ShipEarly. org and sign in to your UpMo account. Enter N004 in the [employer] box to learn more about \"Chronic Pain: Care Instructions. \"     If you do not have an account, please click on the \"Sign Up Now\" link. Current as of: November 20, 2019               Content Version: 12.5  © 6435-0454 [email or web address]. Care instructions adapted under license by [facility] ([facility]). If you have questions about a medical condition or this instruction, always ask your healthcare professional. [place] [address] any warranty or liability for your use of this information. Patient Education        Bipolar Disorder: Care Instructions  Your Care Instructions     Bipolar disorder is an illness that causes extreme mood changes, from times of very high energy (manic episodes) to times of depression. But many people with bipolar disorder show only the symptoms of depression. These moods may cause problems with your work, school, family life, friendships, and how well you function. This disease is also called manic-depression. There is no cure for bipolar disorder, but it can be helped with medicines. Counseling may also help.  It is important to take your medicines exactly as prescribed, even when you feel well. You may need lifelong treatment. Follow-up care is a key part of your treatment and safety. Be sure to make and go to all appointments, and call your doctor if you are having problems. It's also a good idea to know your test results and keep a list of the medicines you take. How can you care for yourself at home? · Be safe with medicines. Take your medicines exactly as prescribed. Do not stop or change a medicine without talking to your doctor first. Janes Quiroz and your doctor may need to try different combinations of medicines to find what works for you. · Take your medicines on schedule to keep your moods even. When you feel good, you may think that you do not need your medicines. But it is important to keep taking them. · Go to your counseling sessions. Call and talk with your counselor if you can't go to a session or if you don't think the sessions are helping. Do not just stop going. · Get at least 30 minutes of activity on most days of the week. Walking is a good choice. You also may want to do other things, such as running, swimming, or cycling. · Get enough sleep. Keep your room dark and quiet. Try to go to bed at the same time every night. · Eat a healthy diet. This includes whole grains, dairy, fruits, vegetables, and protein. Eat foods from each of these groups. · Try to lower your stress. Manage your time, build a strong support system, and lead a healthy lifestyle. To lower your stress, try physical activity, slow deep breathing, or getting a massage. · Do not use alcohol or illegal drugs. · Learn the early signs of your mood changes. You can then take steps to help yourself feel better. · Ask for help from friends and family when you need it. You may need help with daily chores when you are depressed. When you are manic, you may need support to control your high energy levels. What should you do if someone in your family has bipolar disorder?   · Learn about the disease and the signs that it is getting worse. · Remind your family member that you love him or her. · Make a plan with all family members about how to take care of your loved one when his or her symptoms are bad. · Talk about your fears and concerns and those of other family members. Seek counseling if needed. · Do not focus attention only on the person who is in treatment. · Remind yourself that it will take time for changes to occur. · Do not blame yourself for the disease. · Know your legal rights and the legal rights of your family member. Support groups or counselors can help you with this information. · Take care of yourself. Keep up with your own interests, such as your career, hobbies, and friends. Use exercise, positive self-talk, deep breathing, and other relaxing exercises to help lower your stress. · Give yourself time to grieve. You may need to deal with emotions such as anger, fear, and frustration. After you work through your feelings, you will be better able to care for yourself and your family. · If you are having a hard time with your feelings or with your relationship with your family member, talk with a counselor. When should you call for help? [id number] anytime you think you may need emergency care. For example, call if:  · You feel like hurting yourself or someone else. · Someone who has bipolar disorder displays dangerous behavior, and you think the person might hurt himself or herself or someone else. Call your doctor now or seek immediate medical care if:  · You hear voices. · Someone you know has bipolar disorder and talks about suicide. Keep the numbers for these national suicide hotlines: 3-007-482-TALK (3-887-040-218-812-0478) and 9-504-ROTUWXM (2-809-510-563.876.3529). If a suicide threat seems real, with a specific plan and a way to carry it out, stay with the person, or ask someone you trust to stay with the person, until you can get help.   · Someone you know has bipolar disorder and:  ? Starts to give away possessions. ? Is using illegal drugs or drinking alcohol heavily. ? Talks or writes about death, including writing suicide notes or talking about guns, knives, or pills. ? Talks or writes about hurting someone else. ? Starts to spend a lot of time alone. ? Acts very aggressively or suddenly appears calm. ? Talks about beliefs that are not based in reality (delusions). Watch closely for changes in your health, and be sure to contact your doctor if:  · You cannot go to your counseling sessions. Where can you learn more? Go to https://[email or web address].Bridesandlovers.com. org and sign in to your ZAP account. Enter K052 in the [facility] box to learn more about \"Bipolar Disorder: Care Instructions. \"     If you do not have an account, please click on the \"Sign Up Now\" link. Current as of: January 31, 2020               Content Version: 12.5  © 2709-6839 [employer]. Care instructions adapted under license by [facility] ([facility]). If you have questions about a medical condition or this instruction, always ask your healthcare professional. [place] [address] any warranty or liability for your use of this information.

## 2020-08-06 NOTE — TELEPHONE ENCOUNTER
Suggest she use motrin and her regular meds  And ice and elevate  If not able to control  Will need ortho walk in to evaluate  I told her this on the call

## 2020-08-06 NOTE — TELEPHONE ENCOUNTER
Pt called back she was so slurred and muffled that I could hardly understand her. I kept telling her I could barely understand her and she continued to just keep talking. She said that she has broken her foot, she dropped a frozen chicken on it and had to have pain meds. I saw she had a VV this am. I asked her when this happened and she said yesterday. I asked her why she didn't saw something this am at her VV and she said, I dont know. I told her that I doubted you would gvie her pain meds without examing it and she said this was ridiculous, that if she isnt going to give me meds when I need them, Ill go to someone who will! I told her, again, that I said I doubted you would, but I would still ask. But if she has a broken foot needs to go to ortho walk in clinic to see if needs a boot or whatnot. She said I have an aircast, Ill just wear that. I told her not to, that needs to  Know what is going on before she tries to treat it because treating it incorrectly can cause more harm than good. She said she has no way of getting anywhere for anything, but knows she has a broken foot and needs pain  meds.

## 2020-08-06 NOTE — PROGRESS NOTES
[address]  [place], [address] [place] [address] Rd  Phone [phone]   Fax [phone]            TELEMEDICINE visit by telephone    OFFICE VISIT: 2020    Ju Marie Azeem- : 1955      Reason For Visit:  Alesia Rajan is a 63 y.o. femalewho is here for 2 Week Follow-Up (mood swings)         Health Maintenance     HPI    Patient is here for mood follow up  Reports that she cant get some of her meds  And they arent aware of ones  She hasn't got her meds from pill pack       She feels like \"like shit so doesn't matter\"  Reports that she isn't sure what she is taking  She has increase movement and trying to stay busy  Reports she isn't sleeping may get 2 hours of sleep at night   She would give anything to sleep     She constantly moves  And not able to sleep well  Is on depakote ER 3 at bedtime   neurontin 300mg three times a day  trazadone 50mg bedtime  seroquel 200mg bedtime    She never got the meds pill packs    Only med been taking is victoza daily  But she has taken some at home  She reports she still isn't sleeping   Been 2 hours perhaps at night  She feels anxious but tired             vitals were not taken for this visit. There is no height or weight on file to calculate BMI.     Results for orders placed or performed during the hospital encounter of    Basic Metabolic Panel   Result Value Ref Range    Sodium 141 136 - 145 mmol/L    Potassium 4.1 3.5 - 5.0 mmol/L    Chloride 110 98 - 111 mmol/L    CO2 18 (L) 22 - 29 mmol/L    Anion Gap 13 7 - 19 mmol/L    Glucose 149 (H) 74 - 109 mg/dL    BUN 24 (H) 8 - 23 mg/dL    CREATININE 0.6 0.5 - 0.9 mg/dL    GFR Non-African American >60 >60    Calcium 8.7 (L) 8.8 - 10.2 mg/dL   CBC Auto Differential   Result Value Ref Range    WBC 6.1 4.8 - 10.8 K/uL    RBC 3.57 (L) 4.20 - 5.40 M/uL    Hemoglobin 8.9 (L) 12.0 - 16.0 g/dL    Hematocrit 30.4 (L) 37.0 - 47.0 %    MCV 85.2 81.0 - 99.0 fL    MCH 24.9 (L) 27.0 - 31.0 pg    MCHC 29.3 (L) 33.0 - 37.0 g/dL    RDW 20.1 (H) 11.5 - 14.5 %    Platelets 211 980 - 923 K/uL    MPV 11.8 9.4 - 12.3 fL    Neutrophils % 53.1 50.0 - 65.0 %    Lymphocytes % 28.5 20.0 - 40.0 %    Monocytes % 10.6 (H) 0.0 - 10.0 %    Eosinophils % 6.4 (H) 0.0 - 5.0 %    Basophils % 1.1 (H) 0.0 - 1.0 %    Neutrophils Absolute 3.2 1.5 - 7.5 K/uL    Immature Granulocytes # 0.0 K/uL    Lymphocytes Absolute 1.7 1.1 - 4.5 K/uL    Monocytes Absolute 0.70 0.00 - 0.90 K/uL    Eosinophils Absolute 0.40 0.00 - 0.60 K/uL    Basophils Absolute 0.10 0.00 - 0.20 K/uL   Vitamin D 25 Hydroxy   Result Value Ref Range    Vit D, 25-Hydroxy 20.6 (L) >=30 ng/mL   Vitamin B12   Result Value Ref Range    Vitamin B-12 230 211 - 946 pg/mL   TSH WITH REFLEX TO FT4   Result Value Ref Range    TSH Reflex FT4 3.38 0.35 - 5.50 uIU/mL   Lipid Panel   Result Value Ref Range    Cholesterol, Total 115 (L) 160 - 199 mg/dL    Triglycerides 128 0 - 149 mg/dL    HDL 38 (L) 65 - 121 mg/dL    LDL Calculated 51 <100 mg/dL   HEMOGLOBIN A1C   Result Value Ref Range    Hemoglobin A1C 5.1 4.0 - 6.0 %   Urinalysis   Result Value Ref Range    Color, UA YELLOW Straw/Yellow    Clarity, UA Clear Clear    Glucose, Ur Negative Negative mg/dL    Bilirubin Urine Negative Negative    Ketones, Urine Negative Negative mg/dL    Specific Gravity, UA 1.007 1.005 - 1.030    Blood, Urine Negative Negative    pH, UA 5.5 5.0 - 8.0    Protein, UA Negative Negative mg/dL    Urobilinogen, Urine 0.2 <2.0 E.U./dL    Nitrite, Urine Negative Negative    Leukocyte Esterase, Urine Negative Negative   Valproic Acid Level, Total   Result Value Ref Range    Valproic Acid Lvl 26.6 (L) 50.0 - 100.0 ug/mL   POCT Glucose   Result Value Ref Range    POC Glucose 143 (H) 70 - 99 mg/dl    Performed on [facility]    POCT Glucose   Result Value Ref Range    POC Glucose 144 (H) 70 - 99 mg/dl    Performed on [facility]    POCT Glucose   Result Value Ref Range    POC Glucose 103 (H) 70 - 99 mg/dl    Performed on [facility]    POCT Glucose   Result Value Ref Range    POC Glucose 138 (H) 70 - 99 mg/dl    Performed on [facility]    POCT Glucose   Result Value Ref Range    POC Glucose 80 70 - 99 mg/dl    Performed on [facility]    POCT Glucose   Result Value Ref Range    POC Glucose 136 (H) 70 - 99 mg/dl    Performed on [facility]    POCT Glucose   Result Value Ref Range    POC Glucose 146 (H) 70 - 99 mg/dl    Performed on [facility]    POCT Glucose   Result Value Ref Range    POC Glucose 87 70 - 99 mg/dl    Performed on [facility]    POCT Glucose   Result Value Ref Range    POC Glucose 185 (H) 70 - 99 mg/dl    Performed on [facility]    POCT Glucose   Result Value Ref Range    POC Glucose 178 (H) 70 - 99 mg/dl    Performed on [facility]    POCT Glucose   Result Value Ref Range    POC Glucose 91 70 - 99 mg/dl    Performed on [facility]        I have reviewed the following with the Ms. Bueno   Lab Review   Admission on 06/26/2020, Discharged on 06/29/2020   Component Date Value    POC Glucose 06/26/2020 143*    Performed on 06/26/2020 [facility]     Sodium 06/27/2020 141     Potassium 06/27/2020 4.1     Chloride 06/27/2020 110     CO2 06/27/2020 18*    Anion Gap 06/27/2020 13     Glucose 06/27/2020 149*    BUN 06/27/2020 24*    CREATININE 06/27/2020 0.6     GFR Non- 06/27/2020 >60     Calcium 06/27/2020 8.7*    WBC 06/27/2020 6.1     RBC 06/27/2020 3.57*    Hemoglobin 06/27/2020 8.9*    Hematocrit 06/27/2020 30.4*    MCV 06/27/2020 85.2     MCH 06/27/2020 24.9*    MCHC 06/27/2020 29.3*    RDW 06/27/2020 20.1*    Platelets 83/34/0823 224     MPV 06/27/2020 11.8     Neutrophils % 06/27/2020 53.1     Lymphocytes % 06/27/2020 28.5     Monocytes % 06/27/2020 10.6*    Eosinophils % 06/27/2020 6.4*    Basophils % 06/27/2020 1.1*    Neutrophils Absolute 06/27/2020 3.2     Immature Granulocytes # 06/27/2020 0.0     Lymphocytes Absolute 06/27/2020 1.7     Monocytes Absolute 06/27/2020 0.70     Eosinophils Absolute 06/27/2020 0.40     Basophils Absolute 06/27/2020 0.10     Vit D, 25-Hydroxy 06/27/2020 20.6*    Vitamin B-12 06/27/2020 230     TSH Reflex FT4 06/27/2020 3.38     Cholesterol, Total 06/27/2020 115*    Triglycerides 06/27/2020 128     HDL 06/27/2020 38*    LDL Calculated 06/27/2020 51     Hemoglobin A1C 06/27/2020 5.1     POC Glucose 06/26/2020 144*    Performed on 06/26/2020 [facility]     POC Glucose 06/27/2020 103*    Performed on 06/27/2020 [facility]     POC Glucose 06/27/2020 138*    Performed on 06/27/2020 [facility]     Color, UA 06/27/2020 YELLOW     Clarity, UA 06/27/2020 Clear     Glucose, Ur 06/27/2020 Negative     Bilirubin Urine 06/27/2020 Negative     Ketones, Urine 06/27/2020 Negative     Specific Gravity, UA 06/27/2020 1.007     Blood, Urine 06/27/2020 Negative     pH, UA 06/27/2020 5.5     Protein, UA 06/27/2020 Negative     Urobilinogen, Urine 06/27/2020 0.2     Nitrite, Urine 06/27/2020 Negative     Leukocyte Esterase, Urine 06/27/2020 Negative     POC Glucose 06/27/2020 80     Performed on 06/27/2020 [facility]     POC Glucose 06/27/2020 136*    Performed on 06/27/2020 [facility]     POC Glucose 06/28/2020 146*    Performed on 06/28/2020 [facility]     POC Glucose 06/28/2020 87     Performed on 06/28/2020 [facility]     POC Glucose 06/28/2020 185*    Performed on 06/28/2020 [facility]     Valproic Acid Lvl 06/29/2020 26.6*    POC Glucose 06/28/2020 178*    Performed on 06/28/2020 [facility]     POC Glucose 06/29/2020 91     Performed on 06/29/2020 [facility]    Admission on 06/25/2020, Discharged on 06/26/2020   Component Date Value    Acetaminophen Level 06/25/2020 <15     WBC 06/25/2020 8.2     RBC 06/25/2020 3.72*    Hemoglobin 06/25/2020 9.4*    Hematocrit 06/25/2020 31.2*    MCV 06/25/2020 83.9     MCH 06/25/2020 25.3*    MCHC 06/25/2020 30.1*    RDW 06/25/2020 20.2*    Platelets 53/52/2889 257     MPV 06/25/2020 11.7     Sodium 06/25/2020 142     Potassium 06/25/2020 3.7     Chloride 06/25/2020 108     CO2 06/25/2020 20*    Anion Gap 06/25/2020 14     Glucose 06/25/2020 64*    BUN 06/25/2020 37*    CREATININE 06/25/2020 1.0*    GFR Non- 06/25/2020 56*    Calcium 06/25/2020 8.1*    Total Protein 06/25/2020 6.2*    Alb 06/25/2020 3.9     Total Bilirubin 06/25/2020 <0.2     Alkaline Phosphatase 06/25/2020 150*    ALT 06/25/2020 13     AST 06/25/2020 17     Ethanol Lvl 06/25/2020 <10     Amphetamine Screen, Urine 06/25/2020 Negative     Barbiturate Screen, Ur 06/25/2020 Negative     Benzodiazepine Screen, U* 06/25/2020 Negative     Cannabinoid Scrn, Ur 06/25/2020 Negative     Cocaine Metabolite Scree* 06/25/2020 Negative     Opiate Scrn, Ur 06/25/2020 Negative     Drug Screen Comment: 06/25/2020 see below     Salicylate, Serum 10/21/7155 <3.0     P-R Interval 06/25/2020 176     QRS Duration 06/25/2020 86     Q-T Interval 06/25/2020 350     QTc Calculation (Bazett) 06/25/2020 407     P Axis 06/25/2020 61     T Axis 06/25/2020 50     Troponin 06/25/2020 <0.01     Glucose 06/25/2020 60     POC Glucose 06/25/2020 60*    Performed on 06/25/2020 [facility]     POC Glucose 06/25/2020 103*    Performed on 06/25/2020 [facility]    Orders Only on 03/04/2020   Component Date Value    Ferritin 03/04/2020 6.6*    Iron 03/04/2020 22*    TIBC 03/04/2020 556*    Iron Saturation 03/04/2020 4*    Vitamin B-12 03/04/2020 >2000*    Vit D, 25-Hydroxy 03/04/2020 24.8*    TSH 03/04/2020 2.800     Hemoglobin A1C 03/04/2020 5.9     Sodium 03/04/2020 144     Potassium 03/04/2020 4.9     Chloride 03/04/2020 111     CO2 03/04/2020 16*    Anion Gap 03/04/2020 17     Glucose 03/04/2020 103     BUN 03/04/2020 24*    CREATININE 03/04/2020 0.7     GFR Non- 03/04/2020 >60     Calcium 03/04/2020 9.2     Total Protein 03/04/2020 7.8     Alb 03/04/2020 4.4     Total Bilirubin 03/04/2020 <0.2     Alkaline Phosphatase 03/04/2020 196*    ALT 03/04/2020 11  AST 03/04/2020 20     WBC 03/04/2020 7.9     RBC 03/04/2020 4.43     Hemoglobin 03/04/2020 9.9*    Hematocrit 03/04/2020 34.7*    MCV 03/04/2020 78.3*    MCH 03/04/2020 22.3*    MCHC 03/04/2020 28.5*    RDW 03/04/2020 17.6*    Platelets 19/92/2245 363     MPV 03/04/2020 10.6     PLATELET SLIDE REVIEW 03/04/2020 Adequate     Neutrophils % 03/04/2020 49.0*    Lymphocytes % 03/04/2020 36.4     Monocytes % 03/04/2020 10.2*    Eosinophils % 03/04/2020 2.9     Basophils % 03/04/2020 1.1*    Neutrophils Absolute 03/04/2020 3.9     Immature Granulocytes # 03/04/2020 0.0     Lymphocytes Absolute 03/04/2020 2.9     Monocytes Absolute 03/04/2020 0.80     Eosinophils Absolute 03/04/2020 0.20     Basophils Absolute 03/04/2020 0.10     Hypochromia 03/04/2020 2+*     Copies of these are in the chart. Prior to Visit Medications    Medication Sig Taking? Authorizing Provider   benztropine (COGENTIN) 1 MG tablet Take 1 tablet by mouth nightly Yes JORGE Perkins   mirtazapine (REMERON) 30 MG tablet Take 1 tablet by mouth nightly Yes JORGE Perkins   QUEtiapine (SEROQUEL) 200 MG tablet Take 1 tablet by mouth nightly Yes JORGE Perkins   gabapentin (NEURONTIN) 300 MG capsule Take 1 capsule by mouth 3 times daily for 14 days. Yes JORGE Perkins   divalproex (DEPAKOTE ER) 500 MG extended release tablet Take 3 tablets by mouth nightly Yes JORGE Perkins   nystatin (MYCOSTATIN) 451590 UNIT/GM powder Apply 3 times daily. JORGE Perkins   clobetasol (TEMOVATE) 0.05 % ointment Apply topically 2 times daily. JORGE Perkins   albuterol sulfate HFA (VENTOLIN HFA) 108 (90 Base) MCG/ACT inhaler Inhale 2 puffs into the lungs every 6 hours as needed for Wheezing  JORGE Perkins   furosemide (LASIX) 20 MG tablet Take 1 tablet by mouth daily  Carolann Laity, APRN   pantoprazole (PROTONIX) 40 MG tablet Take 1 tablet by mouth daily.   Rebecca abbasi JORGE Munoz   vitamin D (ERGOCALCIFEROL) 1.25 MG (35124 UT) CAPS capsule Take 1 capsule by mouth once a week  JORGE Gibbons   diclofenac (VOLTAREN) 75 MG EC tablet Take 1 tablet by mouth 2 times daily  JORGE Echols   irbesartan (AVAPRO) 150 MG tablet Take 1 tablet by mouth nightly  JORGE Echols   Liraglutide (VICTOZA) 18 MG/3ML SOPN SC injection Inject 1.8 mg into the skin daily  JORGE Echols   ferrous sulfate 325 (65 Fe) MG tablet Take 1 tablet by mouth daily (with breakfast)  JORGE Echols   melatonin 3 MG TABS tablet Take 1 tablet by mouth nightly  JUAN ANTONIO Arriaga DO   atorvastatin (LIPITOR) 20 MG tablet Take 1 tablet by mouth daily  JORGE Echols   ARIPiprazole lauroxil (ARISTADA) 882 MG/3.2ML PRSY injection Inject 3.2 mLs into the muscle every 30 days  JORGE Echols   metFORMIN (GLUCOPHAGE) 500 MG tablet Take 1 tablet by mouth 2 times daily (with meals)  JORGE Echols   linaCLOtide (LINZESS) 72 MCG CAPS capsule Take 1 capsule by mouth every morning (before breakfast)  JORGE Echols   Insulin Pen Needle (B-D ULTRAFINE III SHORT PEN) 31G X 8 MM MISC Inject 1 each as directed daily  JORGE Echols   ACCU-CHEK SOFTCLIX LANCETS MISC CHECK BLOOD SUGAR TWICE DAILY AND AS NEEDED  JORGE Echols   fluticasone (FLONASE) 50 MCG/ACT nasal spray 2 sprays by Nasal route daily  JORGE Echols   vitamin B-12 (CYANOCOBALAMIN) 500 MCG tablet Take 1 tablet by mouth daily  JORGE Echols   blood glucose monitor kit and supplies Test 3 times a day & as needed for symptoms of irregular blood glucose. Dx:  JORGE Echols   blood glucose monitor strips Test 3 times a day & as needed for symptoms of irregular blood glucose.   JORGE Echols   OXYGEN Inhale 3 L into the lungs  Historical Provider, MD   ipratropium-albuterol (DUONEB) 0.5-2.5 (3) MG/3ML SOLN nebulizer solution Inhale 3 mLs into the lungs every 6 hours as needed for Shortness of Breath  Ferd Pencil, APRN   Continuous Blood Gluc  (FREESTYLE ABDOULAYE READER) MARCIA 1 Units by Does not apply route 2 times daily  Ferd Pencil, APRN   Continuous Blood Gluc Sensor (FREESTYLE ABDOULAYE SENSOR SYSTEM) MISC 1 Units by Does not apply route 2 times daily  Ferd Pencil, APRN       Allergies: Haldol [haloperidol lactate]; Morphine; and Codeine    Past Medical History:   Diagnosis Date    Alcohol overdose     history of    Anemia     Anxiety     [facility] [address]     Brain tumor (HCC)     Chronic back pain     ruptured discs    COPD (chronic obstructive pulmonary disease) (HCC)     Dementia (HCC)     Depression     Diabetes ([facility] [address]     Elevated liver enzymes     Frequent falls     Headache     History of kidney infection     Hyperlipidemia     Hypertension     Neuropathy     Obesity     Osteoarthritis     Oxygen dependent     Psoriasis     Scoliosis     Sleep apnea     UTI (urinary tract infection)        Past Surgical History:   Procedure Laterality Date    APPENDECTOMY       SECTION      CHOLECYSTECTOMY      GASTRIC BYPASS SURGERY      RETROPHYARYNGEAL ABCESS INCISION/DRAIN      TONSILLECTOMY      TUBAL LIGATION      TUMOR REMOVAL      bone tumor, r wrist     UPPER GASTROINTESTINAL ENDOSCOPY  2015    Tarik: sm hiatal hernia; s/p balbir-en-y; esoph plaques, pos yeast; neg CS       Social History     Tobacco Use    Smoking status: Current Every Day Smoker     Packs/day: 0.50     Types: Cigarettes    Smokeless tobacco: Never Used   Substance Use Topics    Alcohol use: Yes     Comment: occ       Review of Systems   Constitutional: Positive for activity change. Negative for appetite change, fatigue and fever. HENT: Negative for congestion, postnasal drip, sore throat and trouble swallowing. Respiratory: Negative for cough, shortness of breath and wheezing. Cardiovascular: Negative for chest pain and palpitations. Genitourinary: Negative for difficulty urinating. Skin: Negative for rash and wound. Neurological: Negative for syncope, speech difficulty, weakness and headaches. Hematological: Negative for adenopathy. Psychiatric/Behavioral: Positive for agitation, self-injury and sleep disturbance. The patient is nervous/anxious. Didn't get meds           Physical Exam  Constitutional:       Comments: Telephone     Psychiatric:         Mood and Affect: Mood normal.         Behavior: Behavior normal.      Comments: Talkative           ASSESSMENT/ PLAN    Ju is  being evaluated by a Virtual Visit telephone encounter  22 minutes due to complexity to address concerns as mentioned above. A caregiver was present when appropriate. Due to this being a TeleHealth encounter (During CQSAR-05 public health emergency), evaluation of the following organ systems was limited: Vitals/Constitutional/EENT/Resp/CV/GI//MS/Neuro/Skin/Heme-Lymph-Imm. Pursuant to the emergency declaration under the [address] [place] [address] and the [employer] Act, this Virtual Visit was conducted with patient's (and/or legal guardian's) consent, to reduce the patient's risk of exposure to COVID-19 and provide necessary medical care. The patient (and/or legal guardian) has also been advised to contact this office for worsening conditions or problems, and seek emergency medical treatment and/or call 911 if deemed necessary. Services were provided through a video synchronous discussion virtually to substitute for in-person clinic visit. Patient and provider were located at their individual homes. 1. Manic episode ([facility] [address]  Will start again  And see what to do   Getting worse and not had meds  Non complaint   Need to get control  - benztropine (COGENTIN) 1 MG tablet;  Take 1 tablet by mouth nightly  Dispense: 30 tablet; Refill: 5  - mirtazapine (REMERON) 30 MG tablet; Take 1 tablet by mouth nightly  Dispense: 90 tablet; Refill: 1  - QUEtiapine (SEROQUEL) 200 MG tablet; Take 1 tablet by mouth nightly  Dispense: 30 tablet; Refill: 1  - divalproex (DEPAKOTE ER) 500 MG extended release tablet; Take 3 tablets by mouth nightly  Dispense: 90 tablet; Refill: 1    2. Bipolar disorder, in full remission, most recent episode mixed ([facility] [address]  Returning  Without meds  Discussed need to restart  - benztropine (COGENTIN) 1 MG tablet; Take 1 tablet by mouth nightly  Dispense: 30 tablet; Refill: 5  - mirtazapine (REMERON) 30 MG tablet; Take 1 tablet by mouth nightly  Dispense: 90 tablet; Refill: 1  - QUEtiapine (SEROQUEL) 200 MG tablet; Take 1 tablet by mouth nightly  Dispense: 30 tablet; Refill: 1  - divalproex (DEPAKOTE ER) 500 MG extended release tablet; Take 3 tablets by mouth nightly  Dispense: 90 tablet; Refill: 1    3. Other chronic pain  Will cont with caution  With risks    - gabapentin (NEURONTIN) 300 MG capsule; Take 1 capsule by mouth 3 times daily for 14 days. Dispense: 42 capsule; Refill: 0      No orders of the defined types were placed in this encounter. Return in about 2 weeks (around 8/20/2020) for telephone follow up for andre. Patient Instructions     Patient Education        Chronic Pain: Care Instructions  Your Care Instructions     Chronic pain is pain that lasts a long time (months or even years) and may or may not have a clear cause. It is different from acute pain, which usually does have a clear cause--like an injury or illness--and gets better over time. Chronic pain:  · Lasts over time but may vary from day to day. · Does not go away despite efforts to end it. · May disrupt your sleep and lead to fatigue. · May cause depression or anxiety. · May make your muscles tense, causing more pain.   · Can disrupt your work, hobbies, home life, and relationships with friends and family. Chronic pain is a very real condition. It is not just in your head. Treatment can help and usually includes several methods used together, such as medicines, physical therapy, exercise, and other treatments. Learning how to relax and changing negative thought patterns can also help you cope. Chronic pain is complex. Taking an active role in your treatment will help you better manage your pain. Tell your doctor if you have trouble dealing with your pain. You may have to try several things before you find what works best for you. Follow-up care is a key part of your treatment and safety. Be sure to make and go to all appointments, and call your doctor if you are having problems. It's also a good idea to know your test results and keep a list of the medicines you take. How can you care for yourself at home? · Pace yourself. Break up large jobs into smaller tasks. Save harder tasks for days when you have less pain, or go back and forth between hard tasks and easier ones. Take rest breaks. · Relax, and reduce stress. Relaxation techniques such as deep breathing or meditation can help. · Keep moving. Gentle, daily exercise can help reduce pain over the long run. Try low- or no-impact exercises such as walking, swimming, and stationary biking. Do stretches to stay flexible. · Try heat, cold packs, and massage. · Get enough sleep. Chronic pain can make you tired and drain your energy. Talk with your doctor if you have trouble sleeping because of pain. · Think positive. Your thoughts can affect your pain level. Do things that you enjoy to distract yourself when you have pain instead of focusing on the pain. See a movie, read a book, listen to music, or spend time with a friend. · If you think you are depressed, talk to your doctor about treatment. · Keep a daily pain diary. Record how your moods, thoughts, sleep patterns, activities, and medicine affect your pain.  You may find that your pain is worse interests, such as your career, hobbies, and friends. Use exercise, positive self-talk, deep breathing, and other relaxing exercises to help lower your stress. · Give yourself time to grieve. You may need to deal with emotions such as anger, fear, and frustration. After you work through your feelings, you will be better able to care for yourself and your family. · If you are having a hard time with your feelings or with your relationship with your family member, talk with a counselor. When should you call for help? [id number] anytime you think you may need emergency care. For example, call if:  · You feel like hurting yourself or someone else. · Someone who has bipolar disorder displays dangerous behavior, and you think the person might hurt himself or herself or someone else. Call your doctor now or seek immediate medical care if:  · You hear voices. · Someone you know has bipolar disorder and talks about suicide. Keep the numbers for these national suicide hotlines: 3-207-223-TALK (1-529-697-830.956.1578) and 7-378-HQNDVJJ (9-953.245.8755). If a suicide threat seems real, with a specific plan and a way to carry it out, stay with the person, or ask someone you trust to stay with the person, until you can get help. · Someone you know has bipolar disorder and:  ? Starts to give away possessions. ? Is using illegal drugs or drinking alcohol heavily. ? Talks or writes about death, including writing suicide notes or talking about guns, knives, or pills. ? Talks or writes about hurting someone else. ? Starts to spend a lot of time alone. ? Acts very aggressively or suddenly appears calm. ? Talks about beliefs that are not based in reality (delusions). Watch closely for changes in your health, and be sure to contact your doctor if:  · You cannot go to your counseling sessions. Where can you learn more? Go to [email or web address].[email or web address] org and sign in to your [employer] account.  Enter K052 in the [address]

## 2020-08-31 RX ORDER — LIRAGLUTIDE 6 MG/ML
1.8 INJECTION SUBCUTANEOUS DAILY
Qty: 27 ML | Refills: 3 | Status: SHIPPED | OUTPATIENT
Start: 2020-08-31 | End: 2021-07-26

## 2020-08-31 RX ORDER — FERROUS SULFATE 325(65) MG
325 TABLET ORAL
Qty: 90 TABLET | Refills: 3 | Status: SHIPPED | OUTPATIENT
Start: 2020-08-31 | End: 2021-07-26

## 2020-08-31 RX ORDER — QUETIAPINE FUMARATE 200 MG/1
200 TABLET, FILM COATED ORAL NIGHTLY
Qty: 90 TABLET | Refills: 3 | Status: SHIPPED | OUTPATIENT
Start: 2020-08-31 | End: 2021-04-20 | Stop reason: SDUPTHER

## 2020-08-31 RX ORDER — DIVALPROEX SODIUM 500 MG/1
1500 TABLET, EXTENDED RELEASE ORAL NIGHTLY
Qty: 270 TABLET | Refills: 1 | Status: SHIPPED | OUTPATIENT
Start: 2020-08-31 | End: 2021-02-25

## 2020-09-29 RX ORDER — ERGOCALCIFEROL 1.25 MG/1
50000 CAPSULE ORAL WEEKLY
Qty: 12 CAPSULE | Refills: 0 | Status: SHIPPED | OUTPATIENT
Start: 2020-09-29 | End: 2020-12-28

## 2020-09-29 RX ORDER — IRBESARTAN 150 MG/1
150 TABLET ORAL NIGHTLY
Qty: 30 TABLET | Refills: 2 | Status: SHIPPED | OUTPATIENT
Start: 2020-09-29 | End: 2020-12-28

## 2020-09-29 NOTE — TELEPHONE ENCOUNTER
Received fax from pharmacy requesting refill on pts medication(s). Pt was last seen in office on 8/6/2020  and has a follow up scheduled for 9/28/2020. Will send request to  Jay[facility] Cox  for authorization.      Requested Prescriptions     Pending Prescriptions Disp Refills    irbesartan (AVAPRO) 150 MG tablet [Pharmacy Med Name: Irbesartan 150mg Tablet] 30 tablet 2     Sig: Take 1 tablet by mouth nightly

## 2020-09-29 NOTE — TELEPHONE ENCOUNTER
Received fax from pharmacy requesting refill on pts medication(s). Pt was last seen in office on 8/6/2020  and has a follow up scheduled for Visit date not found. Will send request to  Tay Wyatt  for authorization.      Requested Prescriptions     Pending Prescriptions Disp Refills    vitamin D (ERGOCALCIFEROL) 1.25 MG (57051 UT) CAPS capsule [Pharmacy Med Name: Vitamin D 1.25mg Capsule] 12 capsule 0     Sig: Take 1 capsule by mouth once a week

## 2020-11-04 RX ORDER — ACETAZOLAMIDE 500 MG/1
CAPSULE, EXTENDED RELEASE ORAL
Qty: 180 CAPSULE | Refills: 3 | OUTPATIENT
Start: 2020-11-04

## 2020-11-04 RX ORDER — DICLOFENAC SODIUM 75 MG/1
75 TABLET, DELAYED RELEASE ORAL 2 TIMES DAILY
Qty: 180 TABLET | Refills: 3 | Status: SHIPPED | OUTPATIENT
Start: 2020-11-04 | End: 2021-10-01 | Stop reason: SDUPTHER

## 2020-11-04 RX ORDER — ARIPIPRAZOLE LAUROXIL 882 MG/3.2ML
882 INJECTION, SUSPENSION, EXTENDED RELEASE INTRAMUSCULAR
Qty: 3 SYRINGE | Refills: 3 | Status: SHIPPED
Start: 2020-11-04 | End: 2021-03-30 | Stop reason: DRUGHIGH

## 2020-11-04 RX ORDER — ATORVASTATIN CALCIUM 20 MG/1
20 TABLET, FILM COATED ORAL DAILY
Qty: 90 TABLET | Refills: 3 | Status: SHIPPED | OUTPATIENT
Start: 2020-11-04 | End: 2021-10-01 | Stop reason: SDUPTHER

## 2020-11-04 NOTE — TELEPHONE ENCOUNTER
Received fax from pharmacy requesting refill on pts medication(s). Pt was last seen in office on 8/6/2020  and has a follow up scheduled for Visit date not found. Will send request to  David Ellington  for patient.      Requested Prescriptions     Pending Prescriptions Disp Refills    diclofenac (VOLTAREN) 75 MG EC tablet 180 tablet 3     Sig: Take 1 tablet by mouth 2 times daily

## 2020-11-09 RX ORDER — ACETAZOLAMIDE 500 MG/1
CAPSULE, EXTENDED RELEASE ORAL
Qty: 180 CAPSULE | Refills: 3 | OUTPATIENT
Start: 2020-11-09

## 2020-11-09 NOTE — TELEPHONE ENCOUNTER
Received fax from pharmacy requesting refill on pts medication(s). Pt was last seen in office on 8/6/2020  and has a follow up scheduled for Visit date not found. Will send request to  Shivam Jose  for patient.      Requested Prescriptions     Refused Prescriptions Disp Refills    acetaZOLAMIDE (DIAMOX) 500 MG extended release capsule 180 capsule 3     Sig: TAKE 1 CAPSULE TWICE DAILY     Refused By: Sabiha Levy

## 2020-12-17 DIAGNOSIS — L40.9 PSORIASIS: ICD-10-CM

## 2020-12-28 RX ORDER — LANOLIN ALCOHOL/MO/W.PET/CERES
3 CREAM (GRAM) TOPICAL NIGHTLY
Qty: 90 TABLET | Refills: 3 | Status: ON HOLD | OUTPATIENT
Start: 2020-12-28 | End: 2022-05-25 | Stop reason: HOSPADM

## 2020-12-28 RX ORDER — ERGOCALCIFEROL 1.25 MG/1
50000 CAPSULE ORAL WEEKLY
Qty: 12 CAPSULE | Refills: 0 | Status: SHIPPED | OUTPATIENT
Start: 2020-12-28 | End: 2021-04-20 | Stop reason: SDUPTHER

## 2020-12-28 RX ORDER — IRBESARTAN 150 MG/1
150 TABLET ORAL NIGHTLY
Qty: 90 TABLET | Refills: 1 | Status: SHIPPED | OUTPATIENT
Start: 2020-12-28 | End: 2021-06-24 | Stop reason: SDUPTHER

## 2020-12-28 RX ORDER — MIRTAZAPINE 30 MG/1
30 TABLET, FILM COATED ORAL NIGHTLY
Qty: 90 TABLET | Refills: 1 | Status: SHIPPED | OUTPATIENT
Start: 2020-12-28 | End: 2021-04-20 | Stop reason: SDUPTHER

## 2020-12-28 NOTE — TELEPHONE ENCOUNTER
Received fax from pharmacy requesting refill on pts medication(s). Pt was last seen in office on 8/6/2020  and has a follow up scheduled for Visit date not found. Will send request to  Domo Carrillo  for patient.      Requested Prescriptions     Pending Prescriptions Disp Refills    melatonin 3 MG TABS tablet 90 tablet 3     Sig: Take 1 tablet by mouth nightly

## 2020-12-28 NOTE — TELEPHONE ENCOUNTER
Received fax from pharmacy requesting refill on pts medication(s). Pt was last seen in office on 8/6/2020  and has a follow up scheduled for Visit date not found. Will send request to  Sheryle Dodrill  for patient. Requested Prescriptions     Pending Prescriptions Disp Refills    mirtazapine (REMERON) 30 MG tablet [Pharmacy Med Name: Mirtazapine 30mg Tablet] 90 tablet 1     Sig: Take 1 tablet by mouth nightly.  irbesartan (AVAPRO) 150 MG tablet [Pharmacy Med Name: Irbesartan 150mg Tablet] 30 tablet 0     Sig: Take 1 tablet by mouth nightly.  vitamin D (ERGOCALCIFEROL) 1.25 MG (14280 UT) CAPS capsule [Pharmacy Med Name: Vitamin D 1.25mg Capsule] 12 capsule 0     Sig: Take 1 capsule by mouth weekly.

## 2021-01-11 ENCOUNTER — TELEPHONE (OUTPATIENT)
Dept: PRIMARY CARE CLINIC | Age: 66
End: 2021-01-11

## 2021-01-11 DIAGNOSIS — F30.9 MANIC EPISODE (HCC): ICD-10-CM

## 2021-01-11 DIAGNOSIS — F31.78 BIPOLAR DISORDER, IN FULL REMISSION, MOST RECENT EPISODE MIXED (HCC): ICD-10-CM

## 2021-01-11 RX ORDER — BENZTROPINE MESYLATE 1 MG/1
1 TABLET ORAL NIGHTLY
Qty: 30 TABLET | Refills: 5 | Status: CANCELLED | OUTPATIENT
Start: 2021-01-11

## 2021-01-11 RX ORDER — BENZTROPINE MESYLATE 1 MG/1
1 TABLET ORAL NIGHTLY
Qty: 30 TABLET | Refills: 5 | Status: SHIPPED | OUTPATIENT
Start: 2021-01-11 | End: 2021-04-20 | Stop reason: SDUPTHER

## 2021-01-11 NOTE — TELEPHONE ENCOUNTER
Requested Prescriptions     Pending Prescriptions Disp Refills    benztropine (COGENTIN) 1 MG tablet 30 tablet 5     Sig: Take 1 tablet by mouth nightly

## 2021-01-19 DIAGNOSIS — L40.9 PSORIASIS: ICD-10-CM

## 2021-01-19 RX ORDER — CLOBETASOL PROPIONATE 0.5 MG/G
OINTMENT TOPICAL
Qty: 60 G | Refills: 3 | Status: ON HOLD | OUTPATIENT
Start: 2021-01-19 | End: 2022-05-24 | Stop reason: HOSPADM

## 2021-01-19 NOTE — TELEPHONE ENCOUNTER
Received fax from pharmacy requesting refill on pts medication(s). Pt was last seen in office on 8/6/2020  and has a follow up scheduled for Visit date not found. Will send request to  Dottie Saint  for authorization. Requested Prescriptions     Pending Prescriptions Disp Refills    clobetasol (TEMOVATE) 0.05 % ointment 60 g 3     Sig: Apply topically 2 times daily.

## 2021-02-17 DIAGNOSIS — E11.9 TYPE 2 DIABETES MELLITUS WITHOUT COMPLICATION, UNSPECIFIED WHETHER LONG TERM INSULIN USE (HCC): Primary | ICD-10-CM

## 2021-02-24 DIAGNOSIS — F31.78 BIPOLAR DISORDER, IN FULL REMISSION, MOST RECENT EPISODE MIXED (HCC): ICD-10-CM

## 2021-02-24 DIAGNOSIS — F30.9 MANIC EPISODE (HCC): ICD-10-CM

## 2021-02-25 RX ORDER — DIVALPROEX SODIUM 500 MG/1
1500 TABLET, EXTENDED RELEASE ORAL NIGHTLY
Qty: 270 TABLET | Refills: 1 | Status: SHIPPED | OUTPATIENT
Start: 2021-02-25 | End: 2021-03-30

## 2021-03-13 ENCOUNTER — HOSPITAL ENCOUNTER (EMERGENCY)
Facility: HOSPITAL | Age: 66
Discharge: HOME | End: 2021-03-13
Admitting: EMERGENCY MEDICINE

## 2021-03-13 ENCOUNTER — APPOINTMENT (OUTPATIENT)
Dept: CT IMAGING | Facility: HOSPITAL | Age: 66
End: 2021-03-13

## 2021-03-13 ENCOUNTER — APPOINTMENT (OUTPATIENT)
Dept: GENERAL RADIOLOGY | Facility: HOSPITAL | Age: 66
End: 2021-03-13

## 2021-03-13 VITALS
BODY MASS INDEX: 38.71 KG/M2 | OXYGEN SATURATION: 97 % | HEIGHT: 61 IN | TEMPERATURE: 97.5 F | WEIGHT: 205 LBS | HEART RATE: 84 BPM | RESPIRATION RATE: 20 BRPM | SYSTOLIC BLOOD PRESSURE: 147 MMHG | DIASTOLIC BLOOD PRESSURE: 77 MMHG

## 2021-03-13 DIAGNOSIS — S39.92XA INJURY OF BACK, INITIAL ENCOUNTER: ICD-10-CM

## 2021-03-13 DIAGNOSIS — N39.0 ACUTE UTI: ICD-10-CM

## 2021-03-13 DIAGNOSIS — M25.512 ACUTE PAIN OF LEFT SHOULDER: ICD-10-CM
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
## 2021-03-13 DIAGNOSIS — S22.41XA MULTIPLE FRACTURES OF RIBS, RIGHT SIDE, INITIAL ENCOUNTER FOR CLOSED FRACTURE: ICD-10-CM

## 2021-03-13 DIAGNOSIS — T14.90XA: ICD-10-CM

## 2021-03-13 LAB
ALBUMIN SERPL-MCNC: 3.9 G/DL (ref 3.5–5.2)
ALBUMIN/GLOB SERPL: 1.4 G/DL
ALP SERPL-CCNC: 161 U/L (ref 39–117)
ALT SERPL W P-5'-P-CCNC: 18 U/L (ref 1–33)
ANION GAP SERPL CALCULATED.3IONS-SCNC: 9 MMOL/L (ref 5–15)
AST SERPL-CCNC: 25 U/L (ref 1–32)
BACTERIA UR QL AUTO: ABNORMAL /HPF
BASOPHILS # BLD AUTO: 0.05 10*3/MM3 (ref 0–0.2)
BASOPHILS NFR BLD AUTO: 0.8 % (ref 0–1.5)
BILIRUB SERPL-MCNC: <0.2 MG/DL (ref 0–1.2)
BILIRUB UR QL STRIP: NEGATIVE
BUN SERPL-MCNC: 23 MG/DL (ref 8–23)
BUN/CREAT SERPL: 38.3 (ref 7–25)
CALCIUM SPEC-SCNC: 8.5 MG/DL (ref 8.6–10.5)
CHLORIDE SERPL-SCNC: 106 MMOL/L (ref 98–107)
CLARITY UR: ABNORMAL
CO2 SERPL-SCNC: 26 MMOL/L (ref 22–29)
COLOR UR: YELLOW
CREAT SERPL-MCNC: 0.6 MG/DL (ref 0.57–1)
DEPRECATED RDW RBC AUTO: 52 FL (ref 37–54)
EOSINOPHIL # BLD AUTO: 0.18 10*3/MM3 (ref 0–0.4)
EOSINOPHIL NFR BLD AUTO: 2.9 % (ref 0.3–6.2)
ERYTHROCYTE [DISTWIDTH] IN BLOOD BY AUTOMATED COUNT: 17.3 % (ref 12.3–15.4)
GFR SERPL CREATININE-BSD FRML MDRD: 101 ML/MIN/1.73
GLOBULIN UR ELPH-MCNC: 2.8 GM/DL
GLUCOSE SERPL-MCNC: 82 MG/DL (ref 65–99)
GLUCOSE UR STRIP-MCNC: NEGATIVE MG/DL
HCT VFR BLD AUTO: 32.9 % (ref 34–46.6)
HGB BLD-MCNC: 10.2 G/DL (ref 12–15.9)
HGB UR QL STRIP.AUTO: NEGATIVE
HYALINE CASTS UR QL AUTO: ABNORMAL /LPF
IMM GRANULOCYTES # BLD AUTO: 0.01 10*3/MM3 (ref 0–0.05)
IMM GRANULOCYTES NFR BLD AUTO: 0.2 % (ref 0–0.5)
KETONES UR QL STRIP: NEGATIVE
LEUKOCYTE ESTERASE UR QL STRIP.AUTO: ABNORMAL
LIPASE SERPL-CCNC: 33 U/L (ref 13–60)
LYMPHOCYTES # BLD AUTO: 1.96 10*3/MM3 (ref 0.7–3.1)
LYMPHOCYTES NFR BLD AUTO: 32.1 % (ref 19.6–45.3)
MCH RBC QN AUTO: 25.8 PG (ref 26.6–33)
MCHC RBC AUTO-ENTMCNC: 31 G/DL (ref 31.5–35.7)
MCV RBC AUTO: 83.3 FL (ref 79–97)
MONOCYTES # BLD AUTO: 0.62 10*3/MM3 (ref 0.1–0.9)
MONOCYTES NFR BLD AUTO: 10.1 % (ref 5–12)
NEUTROPHILS NFR BLD AUTO: 3.29 10*3/MM3 (ref 1.7–7)
NEUTROPHILS NFR BLD AUTO: 53.9 % (ref 42.7–76)
NITRITE UR QL STRIP: NEGATIVE
NRBC BLD AUTO-RTO: 0 /100 WBC (ref 0–0.2)
PH UR STRIP.AUTO: 6.5 [PH] (ref 5–8)
PLATELET # BLD AUTO: 254 10*3/MM3 (ref 140–450)
PMV BLD AUTO: 10.9 FL (ref 6–12)
POTASSIUM SERPL-SCNC: 4.6 MMOL/L (ref 3.5–5.2)
PROT SERPL-MCNC: 6.7 G/DL (ref 6–8.5)
PROT UR QL STRIP: NEGATIVE
RBC # BLD AUTO: 3.95 10*6/MM3 (ref 3.77–5.28)
RBC # UR: ABNORMAL /HPF
REF LAB TEST METHOD: ABNORMAL
SODIUM SERPL-SCNC: 141 MMOL/L (ref 136–145)
SP GR UR STRIP: 1.03 (ref 1–1.03)
SQUAMOUS #/AREA URNS HPF: ABNORMAL /HPF
UROBILINOGEN UR QL STRIP: ABNORMAL
WBC # BLD AUTO: 6.11 10*3/MM3 (ref 3.4–10.8)
WBC UR QL AUTO: ABNORMAL /HPF

## 2021-03-13 PROCEDURE — 99284 EMERGENCY DEPT VISIT MOD MDM: CPT

## 2021-03-13 PROCEDURE — 85025 COMPLETE CBC W/AUTO DIFF WBC: CPT | Performed by: EMERGENCY MEDICINE

## 2021-03-13 PROCEDURE — 0 HYDROMORPHONE 1 MG/ML SOLUTION: Performed by: EMERGENCY MEDICINE

## 2021-03-13 PROCEDURE — 25010000002 ONDANSETRON PER 1 MG: Performed by: EMERGENCY MEDICINE

## 2021-03-13 PROCEDURE — 74177 CT ABD & PELVIS W/CONTRAST: CPT

## 2021-03-13 PROCEDURE — 96375 TX/PRO/DX INJ NEW DRUG ADDON: CPT

## 2021-03-13 PROCEDURE — 80053 COMPREHEN METABOLIC PANEL: CPT | Performed by: EMERGENCY MEDICINE

## 2021-03-13 PROCEDURE — 96361 HYDRATE IV INFUSION ADD-ON: CPT

## 2021-03-13 PROCEDURE — 25010000003 HYDROMORPHONE 1 MG/ML SOLUTION: Performed by: EMERGENCY MEDICINE

## 2021-03-13 PROCEDURE — 72125 CT NECK SPINE W/O DYE: CPT

## 2021-03-13 PROCEDURE — 70450 CT HEAD/BRAIN W/O DYE: CPT

## 2021-03-13 PROCEDURE — 72131 CT LUMBAR SPINE W/O DYE: CPT

## 2021-03-13 PROCEDURE — 72128 CT CHEST SPINE W/O DYE: CPT

## 2021-03-13 PROCEDURE — 72170 X-RAY EXAM OF PELVIS: CPT

## 2021-03-13 PROCEDURE — 81001 URINALYSIS AUTO W/SCOPE: CPT | Performed by: EMERGENCY MEDICINE

## 2021-03-13 PROCEDURE — 83690 ASSAY OF LIPASE: CPT | Performed by: EMERGENCY MEDICINE

## 2021-03-13 PROCEDURE — 73030 X-RAY EXAM OF SHOULDER: CPT

## 2021-03-13 PROCEDURE — 25010000002 IOPAMIDOL 61 % SOLUTION: Performed by: EMERGENCY MEDICINE

## 2021-03-13 PROCEDURE — 96374 THER/PROPH/DIAG INJ IV PUSH: CPT

## 2021-03-13 PROCEDURE — 71260 CT THORAX DX C+: CPT

## 2021-03-13 RX ORDER — HYDROCODONE BITARTRATE AND ACETAMINOPHEN 5; 325 MG/1; MG/1
1 TABLET ORAL ONCE
Status: COMPLETED | OUTPATIENT
Start: 2021-03-13 | End: 2021-03-13

## 2021-03-13 RX ORDER — ALUMINA, MAGNESIA, AND SIMETHICONE 2400; 2400; 240 MG/30ML; MG/30ML; MG/30ML
10 SUSPENSION ORAL ONCE
Status: COMPLETED | OUTPATIENT
Start: 2021-03-13 | End: 2021-03-13

## 2021-03-13 RX ORDER — HYDROCODONE BITARTRATE AND ACETAMINOPHEN 7.5; 325 MG/1; MG/1
1 TABLET ORAL EVERY 4 HOURS PRN
Qty: 15 TABLET | Refills: 0 | Status: SHIPPED | OUTPATIENT
Start: 2021-03-13

## 2021-03-13 RX ORDER — SODIUM CHLORIDE 9 MG/ML
125 INJECTION, SOLUTION INTRAVENOUS CONTINUOUS
Status: DISCONTINUED | OUTPATIENT
Start: 2021-03-13 | End: 2021-03-13 | Stop reason: HOSPADM

## 2021-03-13 RX ORDER — ONDANSETRON 2 MG/ML
4 INJECTION INTRAMUSCULAR; INTRAVENOUS ONCE
Status: COMPLETED | OUTPATIENT
Start: 2021-03-13 | End: 2021-03-13

## 2021-03-13 RX ORDER — CEFDINIR 300 MG/1
300 CAPSULE ORAL 2 TIMES DAILY
Refills: 0 | Status: CANCELLED | OUTPATIENT
Start: 2021-03-13

## 2021-03-13 RX ORDER — CEFDINIR 300 MG/1
300 CAPSULE ORAL 2 TIMES DAILY
Qty: 20 CAPSULE | Refills: 0 | Status: SHIPPED | OUTPATIENT
Start: 2021-03-13 | End: 2021-03-13 | Stop reason: SDUPTHER

## 2021-03-13 RX ORDER — CEFDINIR 300 MG/1
300 CAPSULE ORAL 2 TIMES DAILY
Qty: 20 CAPSULE | Refills: 0 | Status: SHIPPED | OUTPATIENT
Start: 2021-03-13

## 2021-03-13 RX ADMIN — IOPAMIDOL 100 ML: 612 INJECTION, SOLUTION INTRAVENOUS at 18:43

## 2021-03-13 RX ADMIN — HYDROCODONE BITARTRATE AND ACETAMINOPHEN 1 TABLET: 5; 325 TABLET ORAL at 20:31

## 2021-03-13 RX ADMIN — SODIUM CHLORIDE 125 ML/HR: 9 INJECTION, SOLUTION INTRAVENOUS at 16:48

## 2021-03-13 RX ADMIN — ONDANSETRON HYDROCHLORIDE 4 MG: 2 SOLUTION INTRAMUSCULAR; INTRAVENOUS at 16:48

## 2021-03-13 RX ADMIN — ALUMINUM HYDROXIDE, MAGNESIUM HYDROXIDE, AND DIMETHICONE 10 ML: 400; 400; 40 SUSPENSION ORAL at 18:32

## 2021-03-13 RX ADMIN — HYDROMORPHONE HYDROCHLORIDE 1 MG: 1 INJECTION, SOLUTION INTRAMUSCULAR; INTRAVENOUS; SUBCUTANEOUS at 16:48
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
## 2021-03-30 ENCOUNTER — OFFICE VISIT (OUTPATIENT)
Dept: PRIMARY CARE CLINIC | Age: 66
End: 2021-03-30
Payer: MEDICARE

## 2021-03-30 VITALS
WEIGHT: 203 LBS | HEART RATE: 83 BPM | SYSTOLIC BLOOD PRESSURE: 122 MMHG | HEIGHT: 60 IN | DIASTOLIC BLOOD PRESSURE: 84 MMHG | TEMPERATURE: 97.1 F | OXYGEN SATURATION: 98 % | BODY MASS INDEX: 39.85 KG/M2

## 2021-03-30 DIAGNOSIS — Z12.11 SCREEN FOR COLON CANCER: ICD-10-CM

## 2021-03-30 DIAGNOSIS — M54.2 NECK PAIN: ICD-10-CM

## 2021-03-30 DIAGNOSIS — E11.9 TYPE 2 DIABETES MELLITUS WITHOUT COMPLICATION, UNSPECIFIED WHETHER LONG TERM INSULIN USE (HCC): ICD-10-CM

## 2021-03-30 DIAGNOSIS — F31.9 BIPOLAR 1 DISORDER (HCC): Primary | ICD-10-CM

## 2021-03-30 DIAGNOSIS — Z12.31 ENCOUNTER FOR SCREENING MAMMOGRAM FOR MALIGNANT NEOPLASM OF BREAST: ICD-10-CM

## 2021-03-30 PROCEDURE — 99214 OFFICE O/P EST MOD 30 MIN: CPT | Performed by: NURSE PRACTITIONER

## 2021-03-30 RX ORDER — DIVALPROEX SODIUM 500 MG/1
500 TABLET, DELAYED RELEASE ORAL 3 TIMES DAILY
COMMUNITY
End: 2021-04-20 | Stop reason: SDUPTHER

## 2021-03-30 RX ORDER — DIVALPROEX SODIUM 500 MG/1
500 TABLET, DELAYED RELEASE ORAL 3 TIMES DAILY
Qty: 90 TABLET | Refills: 3 | Status: SHIPPED | OUTPATIENT
Start: 2021-03-30 | End: 2021-03-30 | Stop reason: SDUPTHER

## 2021-03-30 RX ORDER — CYCLOBENZAPRINE HCL 10 MG
10 TABLET ORAL 3 TIMES DAILY PRN
Qty: 30 TABLET | Refills: 0 | Status: SHIPPED | OUTPATIENT
Start: 2021-03-30 | End: 2021-04-20 | Stop reason: SDUPTHER

## 2021-03-30 RX ORDER — ARIPIPRAZOLE LAUROXIL 441 MG/1.6ML
441 INJECTION, SUSPENSION, EXTENDED RELEASE INTRAMUSCULAR
Qty: 6 SYRINGE | Refills: 3 | Status: SHIPPED | OUTPATIENT
Start: 2021-03-30 | End: 2021-04-20 | Stop reason: SDUPTHER

## 2021-03-30 RX ORDER — DIVALPROEX SODIUM 500 MG/1
500 TABLET, DELAYED RELEASE ORAL 3 TIMES DAILY
Qty: 90 TABLET | Refills: 0 | Status: SHIPPED | OUTPATIENT
Start: 2021-03-30 | End: 2021-03-30 | Stop reason: SDUPTHER

## 2021-03-30 RX ORDER — DIVALPROEX SODIUM 500 MG/1
500 TABLET, DELAYED RELEASE ORAL 3 TIMES DAILY
Qty: 270 TABLET | Refills: 3 | Status: SHIPPED | OUTPATIENT
Start: 2021-03-30 | End: 2021-03-30

## 2021-03-30 ASSESSMENT — ENCOUNTER SYMPTOMS
TROUBLE SWALLOWING: 0
SORE THROAT: 0
COUGH: 0
BLOOD IN STOOL: 0
EYE DISCHARGE: 0
ABDOMINAL PAIN: 0
EYE REDNESS: 0
RHINORRHEA: 0
DIARRHEA: 0
WHEEZING: 0
CONSTIPATION: 0
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
## 2021-03-31 ENCOUNTER — TELEPHONE (OUTPATIENT)
Dept: PRIMARY CARE CLINIC | Age: 66
End: 2021-03-31

## 2021-03-31 NOTE — TELEPHONE ENCOUNTER
Confirmed mammogram appointment with pt Prateek Vazquez. Tuesday April 6, 2021 @ 11:10a.m. at [address]   Verbalized understanding

## 2021-04-06 ENCOUNTER — HOSPITAL ENCOUNTER (OUTPATIENT)
Dept: WOMENS IMAGING | Age: 66
Discharge: HOME | End: 2021-04-06
Payer: MEDICARE

## 2021-04-06 DIAGNOSIS — Z12.31 ENCOUNTER FOR SCREENING MAMMOGRAM FOR MALIGNANT NEOPLASM OF BREAST: ICD-10-CM

## 2021-04-06 PROCEDURE — 77063 BREAST TOMOSYNTHESIS BI: CPT

## 2021-04-07 ENCOUNTER — TELEPHONE (OUTPATIENT)
Dept: PRIMARY CARE CLINIC | Age: 66
End: 2021-04-07

## 2021-04-07 NOTE — TELEPHONE ENCOUNTER
----- Message from JORGE Tamez sent at 4/6/2021  2:30 PM CDT -----  Please inform patient results show  No mammographic evidence of malignancy. Recommendation is for the  patient to return for routine mammography in one year or sooner, if  clinically indicated.

## 2021-04-20 ENCOUNTER — OFFICE VISIT (OUTPATIENT)
Dept: PRIMARY CARE CLINIC | Age: 66
End: 2021-04-20
Payer: OTHER MISCELLANEOUS

## 2021-04-20 VITALS
OXYGEN SATURATION: 98 % | DIASTOLIC BLOOD PRESSURE: 74 MMHG | BODY MASS INDEX: 39.17 KG/M2 | SYSTOLIC BLOOD PRESSURE: 122 MMHG | WEIGHT: 200.56 LBS | TEMPERATURE: 97 F | HEART RATE: 83 BPM

## 2021-04-20 DIAGNOSIS — F31.9 BIPOLAR 1 DISORDER (HCC): Primary | ICD-10-CM

## 2021-04-20 DIAGNOSIS — F31.78 BIPOLAR DISORDER, IN FULL REMISSION, MOST RECENT EPISODE MIXED (HCC): ICD-10-CM

## 2021-04-20 DIAGNOSIS — D64.9 ANEMIA, UNSPECIFIED TYPE: ICD-10-CM

## 2021-04-20 DIAGNOSIS — I15.9 SECONDARY HYPERTENSION: ICD-10-CM

## 2021-04-20 DIAGNOSIS — F30.9 MANIC EPISODE (HCC): ICD-10-CM

## 2021-04-20 DIAGNOSIS — E11.9 TYPE 2 DIABETES MELLITUS WITHOUT COMPLICATION, UNSPECIFIED WHETHER LONG TERM INSULIN USE (HCC): ICD-10-CM

## 2021-04-20 DIAGNOSIS — M54.2 NECK PAIN: ICD-10-CM

## 2021-04-20 DIAGNOSIS — G47.01 INSOMNIA DUE TO MEDICAL CONDITION: ICD-10-CM

## 2021-04-20 LAB
ALBUMIN SERPL-MCNC: 4 G/DL (ref 3.5–5.2)
ALP BLD-CCNC: 165 U/L (ref 35–104)
ALT SERPL-CCNC: 19 U/L (ref 5–33)
ANION GAP SERPL CALCULATED.3IONS-SCNC: 14 MMOL/L (ref 7–19)
AST SERPL-CCNC: 31 U/L (ref 5–32)
BASOPHILS ABSOLUTE: 0.1 K/UL (ref 0–0.2)
BASOPHILS RELATIVE PERCENT: 1 % (ref 0–1)
BILIRUB SERPL-MCNC: <0.2 MG/DL (ref 0.2–1.2)
BUN BLDV-MCNC: 21 MG/DL (ref 8–23)
CALCIUM SERPL-MCNC: 8.9 MG/DL (ref 8.8–10.2)
CHLORIDE BLD-SCNC: 101 MMOL/L (ref 98–111)
CHOLESTEROL, TOTAL: 170 MG/DL (ref 160–199)
CO2: 23 MMOL/L (ref 22–29)
CREAT SERPL-MCNC: 0.6 MG/DL (ref 0.5–0.9)
EOSINOPHILS ABSOLUTE: 0.2 K/UL (ref 0–0.6)
EOSINOPHILS RELATIVE PERCENT: 2.4 % (ref 0–5)
GFR AFRICAN AMERICAN: >59
GFR NON-AFRICAN AMERICAN: >60
GLUCOSE BLD-MCNC: 81 MG/DL (ref 74–109)
HBA1C MFR BLD: 5.2 % (ref 4–6)
HCT VFR BLD CALC: 39.6 % (ref 37–47)
HDLC SERPL-MCNC: 46 MG/DL (ref 65–121)
HEMOGLOBIN: 11.9 G/DL (ref 12–16)
IMMATURE GRANULOCYTES #: 0.1 K/UL
LDL CHOLESTEROL CALCULATED: 86 MG/DL
LYMPHOCYTES ABSOLUTE: 2.8 K/UL (ref 1.1–4.5)
LYMPHOCYTES RELATIVE PERCENT: 38.5 % (ref 20–40)
MCH RBC QN AUTO: 25.8 PG (ref 27–31)
MCHC RBC AUTO-ENTMCNC: 30.1 G/DL (ref 33–37)
MCV RBC AUTO: 85.9 FL (ref 81–99)
MONOCYTES ABSOLUTE: 0.8 K/UL (ref 0–0.9)
MONOCYTES RELATIVE PERCENT: 10.5 % (ref 0–10)
NEUTROPHILS ABSOLUTE: 3.5 K/UL (ref 1.5–7.5)
NEUTROPHILS RELATIVE PERCENT: 46.9 % (ref 50–65)
PDW BLD-RTO: 18.5 % (ref 11.5–14.5)
PLATELET # BLD: 291 K/UL (ref 130–400)
PMV BLD AUTO: 12 FL (ref 9.4–12.3)
POTASSIUM SERPL-SCNC: 5.6 MMOL/L (ref 3.5–5)
RBC # BLD: 4.61 M/UL (ref 4.2–5.4)
SODIUM BLD-SCNC: 138 MMOL/L (ref 136–145)
TOTAL PROTEIN: 7.5 G/DL (ref 6.6–8.7)
TRIGL SERPL-MCNC: 188 MG/DL (ref 0–149)
TSH SERPL DL<=0.05 MIU/L-ACNC: 2.47 UIU/ML (ref 0.27–4.2)
WBC # BLD: 7.4 K/UL (ref 4.8–10.8)

## 2021-04-20 PROCEDURE — 99214 OFFICE O/P EST MOD 30 MIN: CPT | Performed by: NURSE PRACTITIONER

## 2021-04-20 RX ORDER — MIRTAZAPINE 30 MG/1
30 TABLET, FILM COATED ORAL NIGHTLY
Qty: 90 TABLET | Refills: 1 | Status: SHIPPED | OUTPATIENT
Start: 2021-04-20 | End: 2021-07-28 | Stop reason: SDUPTHER

## 2021-04-20 RX ORDER — BENZTROPINE MESYLATE 1 MG/1
1 TABLET ORAL NIGHTLY
Qty: 30 TABLET | Refills: 5 | Status: SHIPPED | OUTPATIENT
Start: 2021-04-20 | End: 2021-06-24

## 2021-04-20 RX ORDER — QUETIAPINE FUMARATE 200 MG/1
200 TABLET, FILM COATED ORAL NIGHTLY
Qty: 90 TABLET | Refills: 3 | Status: SHIPPED | OUTPATIENT
Start: 2021-04-20 | End: 2021-07-26

## 2021-04-20 RX ORDER — ERGOCALCIFEROL 1.25 MG/1
50000 CAPSULE ORAL WEEKLY
Qty: 12 CAPSULE | Refills: 0 | Status: SHIPPED | OUTPATIENT
Start: 2021-04-20 | End: 2021-06-24

## 2021-04-20 RX ORDER — ARIPIPRAZOLE LAUROXIL 441 MG/1.6ML
441 INJECTION, SUSPENSION, EXTENDED RELEASE INTRAMUSCULAR
Qty: 6 SYRINGE | Refills: 3 | Status: ON HOLD | OUTPATIENT
Start: 2021-04-20 | End: 2022-02-02 | Stop reason: HOSPADM

## 2021-04-20 RX ORDER — CYCLOBENZAPRINE HCL 10 MG
10 TABLET ORAL 3 TIMES DAILY PRN
Qty: 30 TABLET | Refills: 0 | Status: SHIPPED | OUTPATIENT
Start: 2021-04-20 | End: 2021-04-30

## 2021-04-20 RX ORDER — DIVALPROEX SODIUM 500 MG/1
500 TABLET, DELAYED RELEASE ORAL 3 TIMES DAILY
Qty: 90 TABLET | Refills: 11 | Status: SHIPPED | OUTPATIENT
Start: 2021-04-20 | End: 2021-04-26 | Stop reason: SDUPTHER

## 2021-04-20 ASSESSMENT — ENCOUNTER SYMPTOMS
COUGH: 0
WHEEZING: 0
TROUBLE SWALLOWING: 0
SORE THROAT: 0
SHORTNESS OF BREATH: 0
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
## 2021-04-20 NOTE — PATIENT INSTRUCTIONS
Patient Education        Insomnia: Care Instructions  Your Care Instructions     Insomnia is the inability to sleep well. It is a common problem for most people at some time. Insomnia may make it hard for you to get to sleep, stay asleep, or sleep as long as you need to. This can make you tired and grouchy during the day. It can also make you forgetful, less effective at work, and unhappy. Insomnia can be caused by conditions such as depression or anxiety. Pain can also affect your ability to sleep. When these problems are solved, the insomnia usually clears up. But sometimes bad sleep habits can cause insomnia. If insomnia is affecting your work or your enjoyment of life, you can take steps to improve your sleep. Follow-up care is a key part of your treatment and safety. Be sure to make and go to all appointments, and call your doctor if you are having problems. It's also a good idea to know your test results and keep a list of the medicines you take. How can you care for yourself at home? What to avoid   · Do not have drinks with caffeine, such as coffee or black tea, for 8 hours before bed. · Do not smoke or use other types of tobacco near bedtime. Nicotine is a stimulant and can keep you awake. · Avoid drinking alcohol late in the evening, because it can cause you to wake in the middle of the night. · Do not eat a big meal close to bedtime. If you are hungry, eat a light snack. · Do not drink a lot of water close to bedtime, because the need to urinate may wake you up during the night. · Do not read or watch TV in bed. Use the bed only for sleeping and sexual activity. What to try   · Go to bed at the same time every night, and wake up at the same time every morning. Do not take naps during the day. · Keep your bedroom quiet, dark, and cool. · Sleep on a comfortable pillow and mattress. · If watching the clock makes you anxious, turn it facing away from you so you cannot see the time.   · If you friend. · If you think you are depressed, talk to your doctor about treatment. · Keep a daily pain diary. Record how your moods, thoughts, sleep patterns, activities, and medicine affect your pain. You may find that your pain is worse during or after certain activities or when you are feeling a certain emotion. Having a record of your pain can help you and your doctor find the best ways to treat your pain. · Take pain medicines exactly as directed. ? If the doctor gave you a prescription medicine for pain, take it as prescribed. ? If you are not taking a prescription pain medicine, ask your doctor if you can take an over-the-counter medicine. Reducing constipation caused by pain medicine  · Include fruits, vegetables, beans, and whole grains in your diet each day. These foods are high in fiber. · Drink plenty of fluids, enough so that your urine is light yellow or clear like water. If you have kidney, heart, or liver disease and have to limit fluids, talk with your doctor before you increase the amount of fluids you drink. · If your doctor recommends it, get more exercise. Walking is a good choice. Bit by bit, increase the amount you walk every day. Try for at least 30 minutes on most days of the week. · Schedule time each day for a bowel movement. A daily routine may help. Take your time and do not strain when having a bowel movement. When should you call for help? Call your doctor now or seek immediate medical care if:    · Your pain gets worse or is out of control.     · You feel down or blue, or you do not enjoy things like you once did. You may be depressed, which is common in people with chronic pain. Depression can be treated.     · You have vomiting or cramps for more than 2 hours.    Watch closely for changes in your health, and be sure to contact your doctor if:    · You cannot sleep because of pain.     · You are very worried or anxious about your pain.     · You have trouble taking your pain medicine.     · You have any concerns about your pain medicine.     · You have trouble with bowel movements, such as:  ? No bowel movement in 3 days. ? Blood in the anal area, in your stool, or on the toilet paper. ? Diarrhea for more than 24 hours. Where can you learn more? Go to [email or web address] and sign in to your Med.ly account. Enter N004 in the [employer] box to learn more about \"Chronic Pain: Care Instructions. \"     If you do not have an account, please click on the \"Sign Up Now\" link. Current as of: August 4, 2020               Content Version: 12.8  © 2006-2021 Healthwise, Incorporated. Care instructions adapted under license by [facility] ([facility]). If you have questions about a medical condition or this instruction, always ask your healthcare professional. [address] any warranty or liability for your use of this information.

## 2021-04-22 ENCOUNTER — TELEPHONE (OUTPATIENT)
Dept: PRIMARY CARE CLINIC | Age: 66
End: 2021-04-22

## 2021-04-22 DIAGNOSIS — D64.9 ANEMIA, UNSPECIFIED TYPE: Primary | ICD-10-CM

## 2021-04-22 NOTE — TELEPHONE ENCOUNTER
----- Message from JORGE Dietz sent at 4/21/2021  7:54 AM CDT -----  Please call patient and let them know results. Normal thyroid  Blood sugars well controlled with an A1c of 5.2  Normal cholesterol  Metabolic panel is normal except for mild elevated potassium. Decrease consumption of high potassium containing foods like potatoes and bananas. Also in coffee and tea.   Normal blood counts  Repeat BMP 1 week

## 2021-04-22 NOTE — TELEPHONE ENCOUNTER
Called patient, spoke with: Patient regarding the results of the patients most recent labs. I advised Sibling(s) of Aram Ludwig recommendations.    Sibling(s) did voice understanding

## 2021-04-26 DIAGNOSIS — F31.9 BIPOLAR 1 DISORDER (HCC): ICD-10-CM

## 2021-04-26 RX ORDER — DIVALPROEX SODIUM 500 MG/1
500 TABLET, DELAYED RELEASE ORAL 3 TIMES DAILY
Qty: 270 TABLET | Refills: 3 | Status: ON HOLD | OUTPATIENT
Start: 2021-04-26 | End: 2022-01-29

## 2021-04-26 NOTE — TELEPHONE ENCOUNTER
Received fax from pharmacy requesting refill on pts medication(s). Pt was last seen in office on 4/20/2021  and has a follow up scheduled for 5/4/2021. Will send request to  Leeanne Aguilar  for patient.      Requested Prescriptions     Pending Prescriptions Disp Refills    divalproex (DEPAKOTE) 500 MG DR tablet 270 tablet 3     Sig: Take 1 tablet by mouth 3 times daily

## 2021-06-24 DIAGNOSIS — K21.9 GASTROESOPHAGEAL REFLUX DISEASE, UNSPECIFIED WHETHER ESOPHAGITIS PRESENT: ICD-10-CM

## 2021-06-24 DIAGNOSIS — R60.9 EDEMA, UNSPECIFIED TYPE: Primary | ICD-10-CM

## 2021-06-24 DIAGNOSIS — F30.9 MANIC EPISODE (HCC): ICD-10-CM

## 2021-06-24 DIAGNOSIS — D64.9 ANEMIA, UNSPECIFIED TYPE: ICD-10-CM

## 2021-06-24 DIAGNOSIS — I15.9 SECONDARY HYPERTENSION: ICD-10-CM

## 2021-06-24 DIAGNOSIS — F31.78 BIPOLAR DISORDER, IN FULL REMISSION, MOST RECENT EPISODE MIXED (HCC): ICD-10-CM

## 2021-06-24 RX ORDER — PANTOPRAZOLE SODIUM 40 MG/1
TABLET, DELAYED RELEASE ORAL
Qty: 90 TABLET | Refills: 3 | Status: ON HOLD | OUTPATIENT
Start: 2021-06-24 | End: 2022-05-24 | Stop reason: HOSPADM

## 2021-06-24 RX ORDER — IRBESARTAN 150 MG/1
150 TABLET ORAL NIGHTLY
Qty: 90 TABLET | Refills: 3 | Status: ON HOLD | OUTPATIENT
Start: 2021-06-24 | End: 2022-05-24 | Stop reason: HOSPADM

## 2021-06-24 RX ORDER — BENZTROPINE MESYLATE 1 MG/1
1 TABLET ORAL NIGHTLY
Qty: 90 TABLET | Refills: 3 | Status: ON HOLD | OUTPATIENT
Start: 2021-06-24 | End: 2022-02-02 | Stop reason: HOSPADM

## 2021-06-24 RX ORDER — FUROSEMIDE 20 MG/1
20 TABLET ORAL DAILY
Qty: 90 TABLET | Refills: 3 | Status: ON HOLD | OUTPATIENT
Start: 2021-06-24 | End: 2022-05-24 | Stop reason: HOSPADM

## 2021-06-24 RX ORDER — ERGOCALCIFEROL 1.25 MG/1
50000 CAPSULE ORAL WEEKLY
Qty: 12 CAPSULE | Refills: 0 | Status: SHIPPED | OUTPATIENT
Start: 2021-06-24 | End: 2021-08-24 | Stop reason: SDUPTHER

## 2021-06-24 NOTE — TELEPHONE ENCOUNTER
Received fax from pharmacy requesting refill on pts medication(s). Pt was last seen in office on 4/20/2021  and has a follow up scheduled for 6/24/2021. Will send request to  Wilda Jose  for patient. Requested Prescriptions     Pending Prescriptions Disp Refills    vitamin D (ERGOCALCIFEROL) 1.25 MG (76334 UT) CAPS capsule [Pharmacy Med Name: Vitamin D 1.25mg Capsule] 12 capsule 0     Sig: Take 1 capsule by mouth weekly.  benztropine (COGENTIN) 1 MG tablet [Pharmacy Med Name: Benztropine Mesylate 1mg Tablet] 30 tablet 3     Sig: Take 1 tablet by mouth nightly.

## 2021-07-24 DIAGNOSIS — D64.9 ANEMIA, UNSPECIFIED TYPE: Primary | ICD-10-CM

## 2021-07-24 DIAGNOSIS — E11.9 TYPE 2 DIABETES MELLITUS WITHOUT COMPLICATION, UNSPECIFIED WHETHER LONG TERM INSULIN USE (HCC): ICD-10-CM

## 2021-07-24 DIAGNOSIS — F31.78 BIPOLAR DISORDER, IN FULL REMISSION, MOST RECENT EPISODE MIXED (HCC): ICD-10-CM

## 2021-07-24 DIAGNOSIS — F30.9 MANIC EPISODE (HCC): ICD-10-CM

## 2021-07-26 RX ORDER — LIRAGLUTIDE 6 MG/ML
1.8 INJECTION SUBCUTANEOUS DAILY
Qty: 27 ML | Refills: 3 | Status: ON HOLD | OUTPATIENT
Start: 2021-07-26 | End: 2022-05-24 | Stop reason: HOSPADM

## 2021-07-26 RX ORDER — QUETIAPINE FUMARATE 200 MG/1
200 TABLET, FILM COATED ORAL NIGHTLY
Qty: 90 TABLET | Refills: 3 | Status: ON HOLD | OUTPATIENT
Start: 2021-07-26 | End: 2022-02-02 | Stop reason: SDUPTHER

## 2021-07-26 RX ORDER — FERROUS SULFATE 325(65) MG
325 TABLET ORAL
Qty: 90 TABLET | Refills: 3 | Status: ON HOLD | OUTPATIENT
Start: 2021-07-26 | End: 2022-05-24 | Stop reason: HOSPADM

## 2021-07-28 DIAGNOSIS — F30.9 MANIC EPISODE (HCC): ICD-10-CM

## 2021-07-28 DIAGNOSIS — F31.78 BIPOLAR DISORDER, IN FULL REMISSION, MOST RECENT EPISODE MIXED (HCC): ICD-10-CM

## 2021-07-28 RX ORDER — MIRTAZAPINE 30 MG/1
30 TABLET, FILM COATED ORAL NIGHTLY
Qty: 90 TABLET | Refills: 1 | Status: SHIPPED | OUTPATIENT
Start: 2021-07-28 | End: 2022-01-24

## 2021-08-23 DIAGNOSIS — D64.9 ANEMIA, UNSPECIFIED TYPE: ICD-10-CM

## 2021-08-24 RX ORDER — ERGOCALCIFEROL 1.25 MG/1
50000 CAPSULE ORAL WEEKLY
Qty: 12 CAPSULE | Refills: 0 | Status: SHIPPED | OUTPATIENT
Start: 2021-08-24 | End: 2021-11-29

## 2021-09-17 ENCOUNTER — HOSPITAL ENCOUNTER (EMERGENCY)
Age: 66
Discharge: HOME | End: 2021-09-17
Attending: EMERGENCY MEDICINE
Payer: MEDICARE

## 2021-09-17 VITALS
OXYGEN SATURATION: 97 % | RESPIRATION RATE: 18 BRPM | DIASTOLIC BLOOD PRESSURE: 115 MMHG | WEIGHT: 200 LBS | SYSTOLIC BLOOD PRESSURE: 129 MMHG | BODY MASS INDEX: 39.06 KG/M2 | TEMPERATURE: 98.3 F | HEART RATE: 87 BPM

## 2021-09-17 DIAGNOSIS — R42 DIZZINESS: ICD-10-CM

## 2021-09-17 DIAGNOSIS — N30.00 ACUTE CYSTITIS WITHOUT HEMATURIA: Primary | ICD-10-CM

## 2021-09-17 LAB
ALBUMIN SERPL-MCNC: 3.9 G/DL (ref 3.5–5.2)
ALP BLD-CCNC: 190 U/L (ref 35–104)
ALT SERPL-CCNC: 15 U/L (ref 5–33)
ANION GAP SERPL CALCULATED.3IONS-SCNC: 12 MMOL/L (ref 7–19)
AST SERPL-CCNC: 18 U/L (ref 5–32)
BACTERIA: ABNORMAL /HPF
BASOPHILS ABSOLUTE: 0.1 K/UL (ref 0–0.2)
BASOPHILS RELATIVE PERCENT: 0.9 % (ref 0–1)
BILIRUB SERPL-MCNC: <0.2 MG/DL (ref 0.2–1.2)
BILIRUBIN URINE: NEGATIVE
BLOOD, URINE: NEGATIVE
BUN BLDV-MCNC: 16 MG/DL (ref 8–23)
CALCIUM SERPL-MCNC: 8.5 MG/DL (ref 8.8–10.2)
CHLORIDE BLD-SCNC: 106 MMOL/L (ref 98–111)
CLARITY: CLEAR
CO2: 23 MMOL/L (ref 22–29)
COLOR: YELLOW
CREAT SERPL-MCNC: 0.6 MG/DL (ref 0.5–0.9)
CRYSTALS, UA: ABNORMAL /HPF
EOSINOPHILS ABSOLUTE: 0.2 K/UL (ref 0–0.6)
EOSINOPHILS RELATIVE PERCENT: 2.9 % (ref 0–5)
EPITHELIAL CELLS, UA: 1 /HPF (ref 0–5)
GFR AFRICAN AMERICAN: >59
GFR NON-AFRICAN AMERICAN: >60
GLUCOSE BLD-MCNC: 99 MG/DL (ref 74–109)
GLUCOSE URINE: NEGATIVE MG/DL
HCT VFR BLD CALC: 35.5 % (ref 37–47)
HEMOGLOBIN: 10 G/DL (ref 12–16)
HYALINE CASTS: 0 /HPF (ref 0–8)
HYPOCHROMIA: ABNORMAL
IMMATURE GRANULOCYTES #: 0 K/UL
KETONES, URINE: NEGATIVE MG/DL
LEUKOCYTE ESTERASE, URINE: ABNORMAL
LYMPHOCYTES ABSOLUTE: 2.2 K/UL (ref 1.1–4.5)
LYMPHOCYTES RELATIVE PERCENT: 28.2 % (ref 20–40)
MCH RBC QN AUTO: 24.8 PG (ref 27–31)
MCHC RBC AUTO-ENTMCNC: 28.2 G/DL (ref 33–37)
MCV RBC AUTO: 88.1 FL (ref 81–99)
MONOCYTES ABSOLUTE: 0.7 K/UL (ref 0–0.9)
MONOCYTES RELATIVE PERCENT: 8.5 % (ref 0–10)
NEUTROPHILS ABSOLUTE: 4.7 K/UL (ref 1.5–7.5)
NEUTROPHILS RELATIVE PERCENT: 59.2 % (ref 50–65)
NITRITE, URINE: POSITIVE
PDW BLD-RTO: 17.2 % (ref 11.5–14.5)
PH UA: 7.5 (ref 5–8)
PLATELET # BLD: 249 K/UL (ref 130–400)
PLATELET SLIDE REVIEW: ADEQUATE
PMV BLD AUTO: 10.8 FL (ref 9.4–12.3)
POTASSIUM SERPL-SCNC: 4.6 MMOL/L (ref 3.5–5)
PROTEIN UA: NEGATIVE MG/DL
RBC # BLD: 4.03 M/UL (ref 4.2–5.4)
RBC UA: 1 /HPF (ref 0–4)
SODIUM BLD-SCNC: 141 MMOL/L (ref 136–145)
SPECIFIC GRAVITY UA: 1.01 (ref 1–1.03)
TOTAL PROTEIN: 6.2 G/DL (ref 6.6–8.7)
UROBILINOGEN, URINE: 0.2 E.U./DL
VALPROIC ACID LEVEL: <2.8 UG/ML (ref 50–100)
WBC # BLD: 7.9 K/UL (ref 4.8–10.8)
WBC UA: 5 /HPF (ref 0–5)

## 2021-09-17 PROCEDURE — 80053 COMPREHEN METABOLIC PANEL: CPT

## 2021-09-17 PROCEDURE — 36415 COLL VENOUS BLD VENIPUNCTURE: CPT

## 2021-09-17 PROCEDURE — 81001 URINALYSIS AUTO W/SCOPE: CPT

## 2021-09-17 PROCEDURE — 87077 CULTURE AEROBIC IDENTIFY: CPT

## 2021-09-17 PROCEDURE — 99282 EMERGENCY DEPT VISIT SF MDM: CPT

## 2021-09-17 PROCEDURE — 87086 URINE CULTURE/COLONY COUNT: CPT

## 2021-09-17 PROCEDURE — 99283 EMERGENCY DEPT VISIT LOW MDM: CPT

## 2021-09-17 PROCEDURE — 87186 SC STD MICRODIL/AGAR DIL: CPT

## 2021-09-17 PROCEDURE — 93005 ELECTROCARDIOGRAM TRACING: CPT

## 2021-09-17 PROCEDURE — 85025 COMPLETE CBC W/AUTO DIFF WBC: CPT

## 2021-09-17 PROCEDURE — 80164 ASSAY DIPROPYLACETIC ACD TOT: CPT

## 2021-09-17 RX ORDER — CEPHALEXIN 500 MG/1
500 CAPSULE ORAL 4 TIMES DAILY
Qty: 28 CAPSULE | Refills: 0 | Status: SHIPPED | OUTPATIENT
Start: 2021-09-17 | End: 2021-09-24

## 2021-09-17 ASSESSMENT — ENCOUNTER SYMPTOMS
ABDOMINAL PAIN: 0
BACK PAIN: 0
SHORTNESS OF BREATH: 0
NAUSEA: 0
DIARRHEA: 0
COUGH: 0
SORE THROAT: 0
RHINORRHEA: 0
VOMITING: 0

## 2021-09-17 ASSESSMENT — PAIN DESCRIPTION - PAIN TYPE: TYPE: ACUTE PAIN

## 2021-09-17 ASSESSMENT — PAIN SCALES - GENERAL: PAINLEVEL_OUTOF10: 5

## 2021-09-17 ASSESSMENT — PAIN DESCRIPTION - LOCATION: LOCATION: NECK

## 2021-09-17 NOTE — ED PROVIDER NOTES
mouth nightly, Disp-90 tablet, R-3Normal      divalproex (DEPAKOTE) 500 MG DR tablet Take 1 tablet by mouth 3 times daily, Disp-270 tablet, R-3Normal      ARIPiprazole lauroxil (ARISTADA) 441 MG/1.6ML PRSY injection Inject 1.6 mLs into the muscle every 14 days, Disp-6 Syringe, R-3Normal      Elastic Bandages & Supports (FUTURO SOFT CERVICAL COLLAR) MISC Disp-1 each, R-0, PrintWear for 1 week. clobetasol (TEMOVATE) 0.05 % ointment Apply topically 2 times daily. , Disp-60 g, R-3, Normal      melatonin 3 MG TABS tablet Take 1 tablet by mouth nightly, Disp-90 tablet, R-3Normal      diclofenac (VOLTAREN) 75 MG EC tablet Take 1 tablet by mouth 2 times daily, Disp-180 tablet, R-3Normal      linaCLOtide (LINZESS) 72 MCG CAPS capsule Take 1 capsule by mouth every morning (before breakfast), Disp-90 capsule, R-3Normal      atorvastatin (LIPITOR) 20 MG tablet Take 1 tablet by mouth daily, Disp-90 tablet, R-3Normal      gabapentin (NEURONTIN) 300 MG capsule Take 1 capsule by mouth 3 times daily for 14 days. , Disp-42 capsule, R-0Normal      nystatin (MYCOSTATIN) 887979 UNIT/GM powder Apply 3 times daily. , Disp-45 g,R-3, Normal      albuterol sulfate HFA (VENTOLIN HFA) 108 (90 Base) MCG/ACT inhaler Inhale 2 puffs into the lungs every 6 hours as needed for Wheezing, Disp-3 Inhaler,R-3Normal      Insulin Pen Needle (B-D ULTRAFINE III SHORT PEN) 31G X 8 MM MISC DAILY Starting Tue 10/22/2019, Disp-100 each, R-5, Normal      ACCU-CHEK SOFTCLIX LANCETS MISC Disp-300 each, R-3, NormalCHECK BLOOD SUGAR TWICE DAILY AND AS NEEDED      fluticasone (FLONASE) 50 MCG/ACT nasal spray 2 sprays by Nasal route daily, Disp-1 Bottle, R-3Normal      vitamin B-12 (CYANOCOBALAMIN) 500 MCG tablet Take 1 tablet by mouth daily, Disp-30 tablet, R-2Normal      blood glucose monitor kit and supplies Test 3 times a day & as needed for symptoms of irregular blood glucose.  Dx:, Disp-1 kit, R-0, Normal      blood glucose monitor strips Test 3 times a day & as needed for symptoms of irregular blood glucose., Disp-100 strip, R-5, Normal      OXYGEN Inhale 3 L into the lungsHistorical Med      ipratropium-albuterol (DUONEB) 0.5-2.5 (3) MG/3ML SOLN nebulizer solution Inhale 3 mLs into the lungs every 6 hours as needed for Shortness of Breath, Disp-810 mL, R-2Normal      Continuous Blood Gluc  (FREESTYLE ABDOULAYE READER) MARCIA 1 Units by Does not apply route 2 times daily, Disp-1 Device, R-0Normal      Continuous Blood Gluc Sensor (FREESTYLE ABDOULAYE SENSOR SYSTEM) MISC 1 Units by Does not apply route 2 times daily, Disp-1 each, R-0Normal             ALLERGIES     Haldol [haloperidol lactate], Morphine, and Codeine    FAMILY HISTORY       Family History   Problem Relation Age of Onset    Diabetes Mother     Kidney Disease Mother     High Blood Pressure Mother     Cancer Mother     Ovarian Cancer Mother 43    Diabetes Father     High Blood Pressure Father     Heart Disease Father     Diabetes Brother     High Blood Pressure Brother     Diabetes Sister     High Blood Pressure Sister     Diabetes Brother     High Blood Pressure Brother     Cancer Maternal Aunt     Colon Cancer Neg Hx     Colon Polyps Neg Hx     Esophageal Cancer Neg Hx     Liver Cancer Neg Hx     Liver Disease Neg Hx     Rectal Cancer Neg Hx     Stomach Cancer Neg Hx           SOCIAL HISTORY       Social History     Socioeconomic History    Marital status:      Spouse name: None    Number of children: None    Years of education: None    Highest education level: None   Occupational History    None   Tobacco Use    Smoking status: Current Every Day Smoker     Packs/day: 0.50     Types: Cigarettes    Smokeless tobacco: Never Used   Vaping Use    Vaping Use: Never used   Substance and Sexual Activity    Alcohol use: Yes     Comment: occ    Drug use: Yes     Types: Marijuana     Comment: occ    Sexual activity: Not Currently   Other Topics Concern    None   Social History Narrative    None     Social Determinants of Health     Financial Resource Strain:     Difficulty of Paying Living Expenses:    Food Insecurity:     Worried About Running Out of Food in the Last Year:     [address] in the Last Year:    Transportation Needs:     Lack of Transportation (Medical):  Lack of Transportation (Non-Medical):    Physical Activity:     Days of Exercise per Week:     Minutes of Exercise per Session:    Stress:     Feeling of Stress :    Social Connections:     Frequency of Communication with Friends and Family:     Frequency of Social Gatherings with Friends and Family:     Attends Denominational Services:     Active Member of Clubs or Organizations:     Attends Club or Organization Meetings:     Marital Status:    Intimate Partner Violence:     Fear of Current or Ex-Partner:     Emotionally Abused:     Physically Abused:     Sexually Abused:        SCREENINGS             PHYSICAL EXAM    (up to 7 for level 4, 8 or more for level 5)     ED Triage Vitals   BP Temp Temp src Pulse Resp SpO2 Height Weight   09/17/21 1033 09/17/21 1030 -- 09/17/21 1030 09/17/21 1030 09/17/21 1030 -- 09/17/21 1030   (!) 129/115 98.3 °F (36.8 °C)  87 18 97 %  200 lb (90.7 kg)       Physical Exam  Vitals and nursing note reviewed. Constitutional:       General: She is not in acute distress. Appearance: Normal appearance. She is well-developed. She is not ill-appearing or diaphoretic. Comments: Blue hair   HENT:      Head: Normocephalic and atraumatic. Right Ear: External ear normal.      Left Ear: External ear normal.      Nose: Nose normal.      Mouth/Throat:      Mouth: Mucous membranes are moist.   Eyes:      Conjunctiva/sclera: Conjunctivae normal.   Neck:      Trachea: No tracheal deviation. Cardiovascular:      Rate and Rhythm: Normal rate and regular rhythm. Heart sounds: Normal heart sounds. No murmur heard. Pulmonary:      Effort: No respiratory distress. Breath sounds: Normal breath sounds. No wheezing or rales. Abdominal:      Palpations: Abdomen is soft. There is no mass. Tenderness: There is no abdominal tenderness. Musculoskeletal:         General: Normal range of motion. Cervical back: Normal range of motion. Right lower leg: No edema. Left lower leg: No edema. Skin:     General: Skin is warm and dry. Neurological:      General: No focal deficit present. Mental Status: She is alert and oriented to person, place, and time. GCS: GCS eye subscore is 4. GCS verbal subscore is 5. GCS motor subscore is 6. Cranial Nerves: No dysarthria or facial asymmetry. Motor: No weakness or abnormal muscle tone. Gait: Gait is intact.          DIAGNOSTIC RESULTS     EKG: All EKG's areinterpreted by the Emergency Department Physician who either signs or Co-signs this chart in the absence of a cardiologist.    73 normal sinus rhythm no obvious ST changes nondiagnostic EKG        No orders to display           LABS:  Labs Reviewed   URINE RT REFLEX TO CULTURE - Abnormal; Notable for the following components:       Result Value    Nitrite, Urine POSITIVE (*)     Leukocyte Esterase, Urine TRACE (*)     All other components within normal limits   CBC WITH AUTO DIFFERENTIAL - Abnormal; Notable for the following components:    RBC 4.03 (*)     Hemoglobin 10.0 (*)     Hematocrit 35.5 (*)     MCH 24.8 (*)     MCHC 28.2 (*)     RDW 17.2 (*)     Hypochromia 1+ (*)     All other components within normal limits   COMPREHENSIVE METABOLIC PANEL - Abnormal; Notable for the following components:    Calcium 8.5 (*)     Total Protein 6.2 (*)     Alkaline Phosphatase 190 (*)     All other components within normal limits   MICROSCOPIC URINALYSIS - Abnormal; Notable for the following components:    Bacteria, UA 4+ (*)     Crystals, UA NEG (*)     All other components within normal limits   VALPROIC ACID LEVEL, TOTAL - Abnormal; Notable for the following components:    Valproic Acid Lvl <2.8 (*)     All other components within normal limits   CULTURE, URINE       All other labs were within normal range or not returned as of this dictation. EMERGENCY DEPARTMENT COURSE and DIFFERENTIAL DIAGNOSIS/MDM:   Vitals:    Vitals:    09/17/21 1030 09/17/21 1033   BP:  (!) 129/115   Pulse: 87    Resp: 18    Temp: 98.3 °F (36.8 °C)    SpO2: 97%    Weight: 200 lb (90.7 kg)        MDM  Number of Diagnoses or Management Options     Amount and/or Complexity of Data Reviewed  Clinical lab tests: ordered and reviewed      pts main concern is UTI symptoms which does appear she has, will start on antibx, pt at end of visit mentioned has felt slightly dizzy but no cp palpitations sob etc, labs and ekg reassuring, pt stable for DC and outpt follow up      CONSULTS:  None    PROCEDURES:  Unless otherwise noted below, none     Procedures    FINAL IMPRESSION      1. Acute cystitis without hematuria    2.  Dizziness          DISPOSITION/PLAN   DISPOSITION Decision To Discharge 09/17/2021 01:00:15 PM      PATIENT REFERRED TO:  JORGE Chapa  [address] [facility]  [phone]    Schedule an appointment as soon as possible for a visit in 3 days      [address] [place] [address] EMERGENCY [facility]  [phone]    As needed, If symptoms worsen      DISCHARGE MEDICATIONS:  Discharge Medication List as of 9/17/2021  1:15 PM      START taking these medications    Details   cephALEXin (KEFLEX) 500 MG capsule Take 1 capsule by mouth 4 times daily for 7 days, Disp-28 capsule, R-0Normal                (Please note that portions of this note were completed with a voice recognition program.  Efforts were made to edit thedictations but occasionally words are mis-transcribed.)    Jess Garvin MD (electronically signed)  Attending Emergency Physician         Sonia Painting MD  09/17/21 7024

## 2021-09-19 LAB
ORGANISM: ABNORMAL
URINE CULTURE, ROUTINE: ABNORMAL
URINE CULTURE, ROUTINE: ABNORMAL

## 2021-09-21 LAB
EKG P AXIS: 54 DEGREES
EKG P-R INTERVAL: 188 MS
EKG Q-T INTERVAL: 406 MS
EKG QRS DURATION: 76 MS
EKG QTC CALCULATION (BAZETT): 427 MS
EKG T AXIS: 45 DEGREES

## 2021-10-01 DIAGNOSIS — G89.29 OTHER CHRONIC PAIN: ICD-10-CM

## 2021-10-01 DIAGNOSIS — K59.00 CONSTIPATION, UNSPECIFIED CONSTIPATION TYPE: ICD-10-CM

## 2021-10-01 DIAGNOSIS — E78.2 MIXED HYPERLIPIDEMIA: Primary | ICD-10-CM

## 2021-10-01 RX ORDER — ATORVASTATIN CALCIUM 20 MG/1
20 TABLET, FILM COATED ORAL DAILY
Qty: 90 TABLET | Refills: 3 | Status: ON HOLD | OUTPATIENT
Start: 2021-10-01 | End: 2022-05-24 | Stop reason: SDUPTHER

## 2021-10-01 RX ORDER — DICLOFENAC SODIUM 75 MG/1
75 TABLET, DELAYED RELEASE ORAL 2 TIMES DAILY
Qty: 180 TABLET | Refills: 3 | Status: ON HOLD | OUTPATIENT
Start: 2021-10-01 | End: 2022-05-24 | Stop reason: HOSPADM

## 2021-10-01 NOTE — TELEPHONE ENCOUNTER
Received fax from pharmacy requesting refill on pts medication(s). Pt was last seen in office on 4/20/2021  and has a follow up scheduled for Visit date not found. Will send request to  Andres Kilpatrick  for patient.      Requested Prescriptions     Pending Prescriptions Disp Refills    diclofenac (VOLTAREN) 75 MG EC tablet 180 tablet 3     Sig: Take 1 tablet by mouth 2 times daily

## 2021-10-12 ENCOUNTER — NURSE TRIAGE (OUTPATIENT)
Dept: CALL CENTER | Facility: HOSPITAL | Age: 66
End: 2021-10-12

## 2021-10-13 NOTE — TELEPHONE ENCOUNTER
"Recommendation for patient to be seen but patient refuses to call ambulance and she can not drive at night.  She states she will go be seen as soon as daybreak.  Stressed the importance of being seen but she states she has no way to get home when discharged and will drive herself in am. Instruction provided per protocol.     Reason for Disposition  • [1] Age over 64 years AND [2] swelling or bruise    Additional Information  • Negative: [1] ACUTE NEURO SYMPTOM AND [2] present now  (DEFINITION: difficult to awaken OR confused thinking and talking OR slurred speech OR weakness of arms OR unsteady walking)  • Negative: Knocked out (unconscious) > 1 minute  • Negative: Seizure (convulsion) occurred  (Exception: prior history of seizures and now alert and without Acute Neuro Symptoms)  • Negative: Penetrating head injury (e.g., knife, gun shot wound, metal object)  • Negative: [1] Major bleeding (e.g., actively dripping or spurting) AND [2] can't be stopped  • Negative: [1] Dangerous mechanism of injury (e.g., MVA, diving, trampoline, contact sports, fall > 10 feet or 3 meters) AND [2] NECK pain AND [3] began < 1 hour after injury  • Negative: Sounds like a life-threatening emergency to the triager  • Negative: [1] Diagnosed with concussion AND [2] within last 14 days  • Negative: [1] Traumatic brain injury (mTBI; concussion) AND [2] more than 14 days since head injury  • Negative: Can't remember what happened (amnesia)  • Negative: Vomiting once or more  • Negative: [1] Loss of vision or double vision AND [2] present now  • Negative: Watery or blood-tinged fluid dripping from the NOSE or EARS now  (Exception: tears from crying or nosebleed from nasal trauma)  • Negative: [1] One or two \"black eyes\" (bruising, purple color of eyelids) AND [2] onset within 24 hours of head injury  • Negative: Large swelling or bruise > 2 inches (5 cm)  • Negative: Skin is split open or gaping  (or length > 1/2 inch or 12 mm)  • Negative: " "[1] Bleeding AND [2] won't stop after 10 minutes of direct pressure (using correct technique)  • Negative: Sounds like a serious injury to the triager  • Negative: [1] ACUTE NEURO SYMPTOM AND [2] now fine  (DEFINITION: difficult to awaken OR confused thinking and talking OR slurred speech OR weakness of arms OR unsteady walking)  • Negative: [1] Knocked out (unconscious) < 1 minute AND [2] now fine  • Negative: [1] SEVERE headache AND [2] not improved 2 hours after pain medicine/ice packs  • Negative: Dangerous injury (e.g., MVA, diving, trampoline, contact sports, fall > 10 feet or 3 meters) or severe blow from hard object (e.g., golf club or baseball bat)  • Negative: Taking Coumadin (warfarin) or other strong blood thinner, or known bleeding disorder (e.g., thrombocytopenia)  • Negative: Suspicious history for the injury    Answer Assessment - Initial Assessment Questions  1. MECHANISM: \"How did the injury happen?\" For falls, ask: \"What height did you fall from?\" and \"What surface did you fall against?\"       Fell over something in garage and hit the garage concrete floor  2. ONSET: \"When did the injury happen?\" (Minutes or hours ago)       1 hr ago  3. NEUROLOGIC SYMPTOMS: \"Was there any loss of consciousness?\" \"Are there any other neurological symptoms?\"       Denies LOC  4. MENTAL STATUS: \"Does the person know who he is, who you are, and where he is?\"       A/O x3  5. LOCATION: \"What part of the head was hit?\"       Right eye and forehead  6. SCALP APPEARANCE: \"What does the scalp look like? Is it bleeding now?\" If Yes, ask: \"Is it difficult to stop?\"       No issues  7. SIZE: For cuts, bruises, or swelling, ask: \"How large is it?\" (e.g., inches or centimeters)       Hematoma - above/below right eye and side of right eye  8. PAIN: \"Is there any pain?\" If Yes, ask: \"How bad is it?\"  (e.g., Scale 1-10; or mild, moderate, severe)     Face not at all unless touched  9. TETANUS: For any breaks in the skin, ask: " "\"When was the last tetanus booster?\"      NA  10. OTHER SYMPTOMS: \"Do you have any other symptoms?\" (e.g., neck pain, vomiting)        Left thumb and right knee are hurting at a 10  11. PREGNANCY: \"Is there any chance you are pregnant?\" \"When was your last menstrual period?\"        Denies    Protocols used: HEAD INJURY-ADULT-AH      "

## 2021-11-07 ENCOUNTER — HOSPITAL ENCOUNTER (EMERGENCY)
Age: 66
Discharge: HOME | End: 2021-11-07
Attending: EMERGENCY MEDICINE
Payer: MEDICARE

## 2021-11-07 ENCOUNTER — APPOINTMENT (OUTPATIENT)
Dept: GENERAL RADIOLOGY | Age: 66
End: 2021-11-07
Payer: MEDICARE

## 2021-11-07 VITALS
BODY MASS INDEX: 39.06 KG/M2 | RESPIRATION RATE: 26 BRPM | DIASTOLIC BLOOD PRESSURE: 66 MMHG | SYSTOLIC BLOOD PRESSURE: 125 MMHG | OXYGEN SATURATION: 91 % | WEIGHT: 200 LBS | TEMPERATURE: 97.8 F | HEART RATE: 94 BPM

## 2021-11-07 DIAGNOSIS — T50.901A ACCIDENTAL DRUG OVERDOSE, INITIAL ENCOUNTER: Primary | ICD-10-CM

## 2021-11-07 LAB
ACETAMINOPHEN LEVEL: <15 UG/ML
ALBUMIN SERPL-MCNC: 3.5 G/DL (ref 3.5–5.2)
ALP BLD-CCNC: 160 U/L (ref 35–104)
ALT SERPL-CCNC: 23 U/L (ref 5–33)
AMPHETAMINE SCREEN, URINE: NEGATIVE
ANION GAP SERPL CALCULATED.3IONS-SCNC: 9 MMOL/L (ref 7–19)
APTT: 25.6 SEC (ref 26–36.2)
AST SERPL-CCNC: 27 U/L (ref 5–32)
BACTERIA: ABNORMAL /HPF
BARBITURATE SCREEN URINE: NEGATIVE
BASOPHILS ABSOLUTE: 0.1 K/UL (ref 0–0.2)
BASOPHILS RELATIVE PERCENT: 0.8 % (ref 0–1)
BENZODIAZEPINE SCREEN, URINE: NEGATIVE
BILIRUB SERPL-MCNC: <0.2 MG/DL (ref 0.2–1.2)
BILIRUBIN URINE: NEGATIVE
BLOOD, URINE: NEGATIVE
BUN BLDV-MCNC: 12 MG/DL (ref 8–23)
CALCIUM SERPL-MCNC: 8.4 MG/DL (ref 8.8–10.2)
CANNABINOID SCREEN URINE: POSITIVE
CHLORIDE BLD-SCNC: 104 MMOL/L (ref 98–111)
CLARITY: CLEAR
CO2: 25 MMOL/L (ref 22–29)
COCAINE METABOLITE SCREEN URINE: NEGATIVE
COLOR: YELLOW
CREAT SERPL-MCNC: 0.5 MG/DL (ref 0.5–0.9)
CRYSTALS, UA: ABNORMAL /HPF
EOSINOPHILS ABSOLUTE: 0.2 K/UL (ref 0–0.6)
EOSINOPHILS RELATIVE PERCENT: 3 % (ref 0–5)
EPITHELIAL CELLS, UA: 1 /HPF (ref 0–5)
ETHANOL: <10 MG/DL (ref 0–0.08)
GFR AFRICAN AMERICAN: >59
GFR NON-AFRICAN AMERICAN: >60
GLUCOSE BLD-MCNC: 127 MG/DL (ref 70–99)
GLUCOSE BLD-MCNC: 155 MG/DL (ref 74–109)
GLUCOSE URINE: NEGATIVE MG/DL
HCT VFR BLD CALC: 34.3 % (ref 37–47)
HEMOGLOBIN: 10.1 G/DL (ref 12–16)
HYALINE CASTS: 2 /HPF (ref 0–8)
IMMATURE GRANULOCYTES #: 0 K/UL
INR BLD: 0.96 (ref 0.88–1.18)
KETONES, URINE: NEGATIVE MG/DL
LEUKOCYTE ESTERASE, URINE: ABNORMAL
LYMPHOCYTES ABSOLUTE: 2.1 K/UL (ref 1.1–4.5)
LYMPHOCYTES RELATIVE PERCENT: 34 % (ref 20–40)
Lab: ABNORMAL
MCH RBC QN AUTO: 25.4 PG (ref 27–31)
MCHC RBC AUTO-ENTMCNC: 29.4 G/DL (ref 33–37)
MCV RBC AUTO: 86.2 FL (ref 81–99)
MONOCYTES ABSOLUTE: 0.4 K/UL (ref 0–0.9)
MONOCYTES RELATIVE PERCENT: 6.9 % (ref 0–10)
NEUTROPHILS ABSOLUTE: 3.4 K/UL (ref 1.5–7.5)
NEUTROPHILS RELATIVE PERCENT: 55.1 % (ref 50–65)
NITRITE, URINE: POSITIVE
OPIATE SCREEN URINE: NEGATIVE
PDW BLD-RTO: 16.2 % (ref 11.5–14.5)
PERFORMED ON: ABNORMAL
PH UA: 7 (ref 5–8)
PLATELET # BLD: 203 K/UL (ref 130–400)
PMV BLD AUTO: 11.3 FL (ref 9.4–12.3)
POTASSIUM REFLEX MAGNESIUM: 4.3 MMOL/L (ref 3.5–5)
PROTEIN UA: NEGATIVE MG/DL
PROTHROMBIN TIME: 13 SEC (ref 12–14.6)
RBC # BLD: 3.98 M/UL (ref 4.2–5.4)
RBC UA: 1 /HPF (ref 0–4)
SALICYLATE, SERUM: <3 MG/DL (ref 3–10)
SARS-COV-2, NAAT: NOT DETECTED
SODIUM BLD-SCNC: 138 MMOL/L (ref 136–145)
SPECIFIC GRAVITY UA: 1.01 (ref 1–1.03)
TOTAL PROTEIN: 6.2 G/DL (ref 6.6–8.7)
UROBILINOGEN, URINE: 0.2 E.U./DL
VALPROIC ACID LEVEL: <2.8 UG/ML (ref 50–100)
WBC # BLD: 6.2 K/UL (ref 4.8–10.8)
WBC UA: 4 /HPF (ref 0–5)

## 2021-11-07 PROCEDURE — 80307 DRUG TEST PRSMV CHEM ANLYZR: CPT

## 2021-11-07 PROCEDURE — 85610 PROTHROMBIN TIME: CPT

## 2021-11-07 PROCEDURE — 81001 URINALYSIS AUTO W/SCOPE: CPT

## 2021-11-07 PROCEDURE — 80053 COMPREHEN METABOLIC PANEL: CPT

## 2021-11-07 PROCEDURE — 80179 DRUG ASSAY SALICYLATE: CPT

## 2021-11-07 PROCEDURE — 96361 HYDRATE IV INFUSION ADD-ON: CPT

## 2021-11-07 PROCEDURE — 82947 ASSAY GLUCOSE BLOOD QUANT: CPT

## 2021-11-07 PROCEDURE — 87635 SARS-COV-2 COVID-19 AMP PRB: CPT

## 2021-11-07 PROCEDURE — 85025 COMPLETE CBC W/AUTO DIFF WBC: CPT

## 2021-11-07 PROCEDURE — 2580000003 HC RX 258: Performed by: EMERGENCY MEDICINE

## 2021-11-07 PROCEDURE — 85730 THROMBOPLASTIN TIME PARTIAL: CPT

## 2021-11-07 PROCEDURE — 93005 ELECTROCARDIOGRAM TRACING: CPT | Performed by: EMERGENCY MEDICINE

## 2021-11-07 PROCEDURE — 71045 X-RAY EXAM CHEST 1 VIEW: CPT

## 2021-11-07 PROCEDURE — 82077 ASSAY SPEC XCP UR&BREATH IA: CPT

## 2021-11-07 PROCEDURE — 36415 COLL VENOUS BLD VENIPUNCTURE: CPT

## 2021-11-07 PROCEDURE — 96360 HYDRATION IV INFUSION INIT: CPT

## 2021-11-07 PROCEDURE — 99284 EMERGENCY DEPT VISIT MOD MDM: CPT

## 2021-11-07 PROCEDURE — 80143 DRUG ASSAY ACETAMINOPHEN: CPT

## 2021-11-07 PROCEDURE — 80164 ASSAY DIPROPYLACETIC ACD TOT: CPT

## 2021-11-07 RX ORDER — SODIUM CHLORIDE 9 MG/ML
1000 INJECTION, SOLUTION INTRAVENOUS CONTINUOUS
Status: DISCONTINUED | OUTPATIENT
Start: 2021-11-07 | End: 2021-11-07 | Stop reason: HOSPADM

## 2021-11-07 RX ADMIN — SODIUM CHLORIDE 1000 ML: 9 INJECTION, SOLUTION INTRAVENOUS at 10:35

## 2021-11-07 ASSESSMENT — ENCOUNTER SYMPTOMS
SORE THROAT: 0
EYE DISCHARGE: 0
CHOKING: 0
VOICE CHANGE: 0
BLOOD IN STOOL: 0
NAUSEA: 0
DIARRHEA: 0
SHORTNESS OF BREATH: 0
CONSTIPATION: 0
FACIAL SWELLING: 0
SINUS PRESSURE: 0
APNEA: 0

## 2021-11-07 NOTE — ED NOTES
poison control called spoke to [facility]. Recommendations include UDA, tylenol level asa, ekg if patient is alert and able to take charcoal that is recommended. .  S&S to watch for  dilated pupils, lethargic, tachycardia. Monitor for 4-6 hours longer if pt is still lethargic.         Vicki Hong RN  11/07/21 2172

## 2021-11-07 NOTE — ED NOTES
Patient alert, and appropriate. No signs, or symptoms of distress noted. Patient tolerated PO without nausea, or vomiting.        Luca Eddy RN  11/07/21 8311

## 2021-11-07 NOTE — ED NOTES
Patient's daughter, Ej Alex, contacted. Daughter stated that patient has been acted strangely since falling down 2weeks ago. Daughter admits that patient has been taking large amounts of Benadryl, stating Cathy Bolden has anxiety. \"  When asked about concern for self harm, daughter suggested staff check patients liver and kidney functions, then hung up.        Aparna Mendosa RN  11/07/21 3823

## 2021-11-07 NOTE — ED PROVIDER NOTES
[facility] EMERGENCY DEPT  eMERGENCY dEPARTMENT eNCOUnter      Pt Name: Pratibha Carter  MRN: [id number]  [place] 1955  Date of evaluation: 11/7/2021  Provider: Carlos A Sandhu MD    CHIEF COMPLAINT       Chief Complaint   Patient presents with    Drug Overdose     400mg of benadryl taken 1hour PTA  pt drowsy          HISTORY OF PRESENT ILLNESS   (Location/Symptom, Timing/Onset,Context/Setting, Quality, Duration, Modifying Factors, Severity)  Note limiting factors. Pratibha Carter is a 61 y.o. female who presents to the emergency department evaluation of overdose    53-year-old female presents with intentional overdose of Benadryl. At the time of this dictation patient is already recovered. On arrival she was very lethargic dry mouth slurred speech. Now that she is come around and been observed in the ED I questioned her about the overdose if she was suicidal homicidal or needs psychiatric evaluation. And she frankly told me that she was just trying to get high and that is embarrassing for her. She was positive for marijuana as well. Review of chart shows that she has had overdoses before that were unknown intent. I have invited her to speak to psychiatry but she declined she states she no longer sees Dr. Ej Parker that she does have bipolar disorder. She is denying suicidal ideas. She lives with her daughter. And collateral information supports the patient's claim    The history is provided by the patient, the EMS personnel and medical records. NursingNotes were reviewed. REVIEW OF SYSTEMS    (2-9 systems for level 4, 10 or more for level 5)     Review of Systems   Constitutional: Negative for chills and fever. HENT: Negative for congestion, drooling, facial swelling, nosebleeds, sinus pressure, sore throat and voice change. Eyes: Negative for discharge. Respiratory: Negative for apnea, choking and shortness of breath. Cardiovascular: Negative for chest pain and leg swelling. every 14 days    ATORVASTATIN (LIPITOR) 20 MG TABLET    Take 1 tablet by mouth daily    BENZTROPINE (COGENTIN) 1 MG TABLET    Take 1 tablet by mouth nightly    BLOOD GLUCOSE MONITOR KIT AND SUPPLIES    Test 3 times a day & as needed for symptoms of irregular blood glucose. Dx:    BLOOD GLUCOSE MONITOR STRIPS    Test 3 times a day & as needed for symptoms of irregular blood glucose. CLOBETASOL (TEMOVATE) 0.05 % OINTMENT    Apply topically 2 times daily. CONTINUOUS BLOOD GLUC  (FREESTYLE ABDOULAYE READER) MARCIA    1 Units by Does not apply route 2 times daily    CONTINUOUS BLOOD GLUC SENSOR (FREESTYLE ABDOULAYE SENSOR SYSTEM) MISC    1 Units by Does not apply route 2 times daily    DICLOFENAC (VOLTAREN) 75 MG EC TABLET    Take 1 tablet by mouth 2 times daily    DIVALPROEX (DEPAKOTE) 500 MG DR TABLET    Take 1 tablet by mouth 3 times daily    ELASTIC BANDAGES & SUPPORTS (FUTURO SOFT CERVICAL COLLAR) MISC    Wear for 1 week. FERROUS SULFATE (FEROSUL) 325 (65 FE) MG TABLET    Take 1 tablet by mouth daily (with breakfast)    FLUTICASONE (FLONASE) 50 MCG/ACT NASAL SPRAY    2 sprays by Nasal route daily    FUROSEMIDE (LASIX) 20 MG TABLET    Take 1 tablet by mouth daily    GABAPENTIN (NEURONTIN) 300 MG CAPSULE    Take 1 capsule by mouth 3 times daily for 14 days.     INSULIN PEN NEEDLE (B-D ULTRAFINE III SHORT PEN) 31G X 8 MM MISC    Inject 1 each as directed daily    IPRATROPIUM-ALBUTEROL (DUONEB) 0.5-2.5 (3) MG/3ML SOLN NEBULIZER SOLUTION    Inhale 3 mLs into the lungs every 6 hours as needed for Shortness of Breath    IRBESARTAN (AVAPRO) 150 MG TABLET    Take 1 tablet by mouth nightly    LINACLOTIDE (LINZESS) 72 MCG CAPS CAPSULE    Take 1 capsule by mouth every morning (before breakfast)    LIRAGLUTIDE (VICTOZA) 18 MG/3ML SOPN SC INJECTION    Inject 1.8 mg into the skin daily    MELATONIN 3 MG TABS TABLET    Take 1 tablet by mouth nightly    METFORMIN (GLUCOPHAGE) 500 MG TABLET    Take 1 tablet by mouth 2 times daily (with meals)    MIRTAZAPINE (REMERON) 30 MG TABLET    Take 1 tablet by mouth nightly    NYSTATIN (MYCOSTATIN) 605612 UNIT/GM POWDER    Apply 3 times daily. OXYGEN    Inhale 3 L into the lungs    PANTOPRAZOLE (PROTONIX) 40 MG TABLET    Take 1 tablet by mouth daily.     QUETIAPINE (SEROQUEL) 200 MG TABLET    Take 1 tablet by mouth nightly    VITAMIN B-12 (CYANOCOBALAMIN) 500 MCG TABLET    Take 1 tablet by mouth daily    VITAMIN D (ERGOCALCIFEROL) 1.25 MG (92317 UT) CAPS CAPSULE    Take 1 capsule by mouth once a week       ALLERGIES     Haldol [haloperidol lactate], Morphine, and Codeine    FAMILY HISTORY       Family History   Problem Relation Age of Onset    Diabetes Mother     Kidney Disease Mother     High Blood Pressure Mother     Cancer Mother     Ovarian Cancer Mother 43    Diabetes Father     High Blood Pressure Father     Heart Disease Father     Diabetes Brother     High Blood Pressure Brother     Diabetes Sister     High Blood Pressure Sister     Diabetes Brother     High Blood Pressure Brother     Cancer Maternal Aunt     Colon Cancer Neg Hx     Colon Polyps Neg Hx     Esophageal Cancer Neg Hx     Liver Cancer Neg Hx     Liver Disease Neg Hx     Rectal Cancer Neg Hx     Stomach Cancer Neg Hx           SOCIAL HISTORY       Social History     Socioeconomic History    Marital status:      Spouse name: None    Number of children: None    Years of education: None    Highest education level: None   Occupational History    None   Tobacco Use    Smoking status: Current Every Day Smoker     Packs/day: 0.50     Types: Cigarettes    Smokeless tobacco: Never Used   Vaping Use    Vaping Use: Never used   Substance and Sexual Activity    Alcohol use: Yes     Comment: occ    Drug use: Yes     Types: Marijuana [employer])     Comment: occ    Sexual activity: Not Currently   Other Topics Concern    None   Social History Narrative    None     Social Determinants of Health Financial Resource Strain:     Difficulty of Paying Living Expenses: Not on file   Food Insecurity:     Worried About Running Out of Food in the Last Year: Not on file    Guero of Food in the Last Year: Not on file   Transportation Needs:     Lack of Transportation (Medical): Not on file    Lack of Transportation (Non-Medical): Not on file   Physical Activity:     Days of Exercise per Week: Not on file    Minutes of Exercise per Session: Not on file   Stress:     Feeling of Stress : Not on file   Social Connections:     Frequency of Communication with Friends and Family: Not on file    Frequency of Social Gatherings with Friends and Family: Not on file    Attends Orthodoxy Services: Not on file    Active Member of [address] [place] [address] [email or web address] or Organizations: Not on file    Attends Club or Organization Meetings: Not on file    Marital Status: Not on file   Intimate Partner Violence:     Fear of Current or Ex-Partner: Not on file    Emotionally Abused: Not on file    Physically Abused: Not on file    Sexually Abused: Not on file   Housing Stability:     Unable to Pay for Housing in the Last Year: Not on file    Number of [place] in the Last Year: Not on file    Unstable Housing in the Last Year: Not on file       SCREENINGS    [place] Coma Scale  Eye Opening: To speech  Best Verbal Response: Confused  Best Motor Response: Obeys commands  [place] Coma Scale Score: 13        PHYSICAL EXAM    (up to 7 for level 4, 8 or more for level 5)     ED Triage Vitals   BP Temp Temp Source Pulse Resp SpO2 Height Weight   11/07/21 0953 11/07/21 1002 11/07/21 1002 11/07/21 0951 11/07/21 0953 11/07/21 0953 -- 11/07/21 0954   125/66 97.8 °F (36.6 °C) Oral 94 26 91 %  200 lb (90.7 kg)       Physical Exam  Vitals and nursing note reviewed. Constitutional:       Appearance: She is well-developed. HENT:      Head: Normocephalic and atraumatic.       Right Ear: External ear normal.      Left Ear: External ear normal. Mouth/Throat:      Mouth: Mucous membranes are dry. Eyes:      General: No scleral icterus. Conjunctiva/sclera: Conjunctivae normal.      Pupils: Pupils are equal, round, and reactive to light. Cardiovascular:      Rate and Rhythm: Normal rate and regular rhythm. Pulses: Normal pulses. Heart sounds: Normal heart sounds. Pulmonary:      Effort: Pulmonary effort is normal.      Breath sounds: Normal breath sounds. Abdominal:      General: Bowel sounds are normal.      Palpations: Abdomen is soft. Musculoskeletal:         General: No signs of injury. Normal range of motion. Cervical back: Normal range of motion and neck supple. Skin:     General: Skin is warm and dry. Coloration: Skin is not jaundiced or pale. Neurological:      Mental Status: She is alert. Comments: Initial presentation the patient was lethargic. After observation in the ED she is now conscious alert and ambulatory   Psychiatric:         Thought Content: Thought content is not paranoid or delusional. Thought content does not include homicidal or suicidal ideation. Comments: At the time of discharge patient does not voiced suicidal ideations does not appear to be mentally unstable. Though she is used poor judgment with her substance abuse disorder. DIAGNOSTIC RESULTS     EKG: All EKG's are interpreted by the Emergency Department Physician who either signs or Co-signs this chart in the absence of a cardiologist.    Sinus rhythm rate 78 normal electrical indices. RADIOLOGY:   Non-plain film images such as CT, Ultrasound and MRI are read by the radiologist. Plainradiographic images are visualized and preliminarily interpreted by the emergency physician with the below findings:    I have reviewed the results. Interpretation per the Radiologist below, if available at the time of this note:    XR CHEST PORTABLE   Final Result   1. Mild cardiomegaly no acute cardiopulmonary process. .   Signed by

## 2021-11-07 NOTE — ED TRIAGE NOTES
Pt here after taking 16 25mg benadryl at home aprox 1 hour Prior to arrival.  Pt is lethargic upon arrival

## 2021-11-08 LAB
EKG P AXIS: 69 DEGREES
EKG P-R INTERVAL: 190 MS
EKG Q-T INTERVAL: 400 MS
EKG QRS DURATION: 86 MS
EKG QTC CALCULATION (BAZETT): 429 MS
EKG T AXIS: 48 DEGREES

## 2021-11-08 PROCEDURE — 93010 ELECTROCARDIOGRAM REPORT: CPT | Performed by: INTERNAL MEDICINE

## 2021-11-09 ENCOUNTER — NURSE TRIAGE (OUTPATIENT)
Dept: OTHER | Facility: CLINIC | Age: 66
End: 2021-11-09

## 2021-11-26 DIAGNOSIS — D64.9 ANEMIA, UNSPECIFIED TYPE: ICD-10-CM

## 2021-11-29 RX ORDER — ERGOCALCIFEROL 1.25 MG/1
CAPSULE ORAL
Qty: 12 CAPSULE | Refills: 0 | Status: ON HOLD | OUTPATIENT
Start: 2021-11-29 | End: 2022-02-02 | Stop reason: HOSPADM

## 2022-01-23 DIAGNOSIS — F30.9 MANIC EPISODE (HCC): ICD-10-CM

## 2022-01-23 DIAGNOSIS — F31.78 BIPOLAR DISORDER, IN FULL REMISSION, MOST RECENT EPISODE MIXED (HCC): ICD-10-CM

## 2022-01-24 RX ORDER — MIRTAZAPINE 30 MG/1
30 TABLET, FILM COATED ORAL NIGHTLY
Qty: 90 TABLET | Refills: 1 | Status: ON HOLD | OUTPATIENT
Start: 2022-01-24 | End: 2022-02-02 | Stop reason: HOSPADM

## 2022-01-24 RX ORDER — MIRTAZAPINE 30 MG/1
30 TABLET, FILM COATED ORAL NIGHTLY
Qty: 90 TABLET | Refills: 1 | Status: SHIPPED | OUTPATIENT
Start: 2022-01-24 | End: 2022-01-24 | Stop reason: SDUPTHER

## 2022-01-24 NOTE — TELEPHONE ENCOUNTER
Received fax from pharmacy requesting refill on pts medication(s). Pt was last seen in office on 4/20/2021  and has a follow up scheduled for Visit date not found. Will send request to  Dr. Luis Ahumada  for authorization.      Requested Prescriptions     Pending Prescriptions Disp Refills    mirtazapine (REMERON) 30 MG tablet [Pharmacy Med Name: Mirtazapine 30mg Tablet] 90 tablet 1     Sig: Take 1 tablet by mouth nightly

## 2022-01-27 ENCOUNTER — HOSPITAL ENCOUNTER (INPATIENT)
Age: 67
LOS: 1 days | Discharge: PSYCHIATRIC OR SUBSTANCE USE FACILITY | DRG: 918 | End: 2022-01-29
Attending: EMERGENCY MEDICINE | Admitting: HOSPITALIST
Payer: MEDICARE

## 2022-01-27 DIAGNOSIS — R09.02 HYPOXIA: ICD-10-CM

## 2022-01-27 DIAGNOSIS — J44.9 CHRONIC OBSTRUCTIVE PULMONARY DISEASE, UNSPECIFIED COPD TYPE (HCC): ICD-10-CM

## 2022-01-27 DIAGNOSIS — T44.3X2A ANTICHOLINERGIC DRUG OVERDOSE, INTENTIONAL SELF-HARM, INITIAL ENCOUNTER (HCC): ICD-10-CM

## 2022-01-27 DIAGNOSIS — T50.902A INTENTIONAL DRUG OVERDOSE, INITIAL ENCOUNTER (HCC): Primary | ICD-10-CM

## 2022-01-27 LAB
ALBUMIN SERPL-MCNC: 3.9 G/DL (ref 3.5–5.2)
ALP BLD-CCNC: 209 U/L (ref 35–104)
ALT SERPL-CCNC: 24 U/L (ref 5–33)
AMPHETAMINE SCREEN, URINE: NEGATIVE
ANION GAP SERPL CALCULATED.3IONS-SCNC: 13 MMOL/L (ref 7–19)
AST SERPL-CCNC: 24 U/L (ref 5–32)
BACTERIA: ABNORMAL /HPF
BARBITURATE SCREEN URINE: NEGATIVE
BASOPHILS ABSOLUTE: 0.1 K/UL (ref 0–0.2)
BASOPHILS RELATIVE PERCENT: 0.8 % (ref 0–1)
BENZODIAZEPINE SCREEN, URINE: NEGATIVE
BILIRUB SERPL-MCNC: <0.2 MG/DL (ref 0.2–1.2)
BILIRUBIN URINE: NEGATIVE
BLOOD, URINE: NEGATIVE
BUN BLDV-MCNC: 15 MG/DL (ref 8–23)
CALCIUM SERPL-MCNC: 8.8 MG/DL (ref 8.8–10.2)
CANNABINOID SCREEN URINE: NEGATIVE
CHLORIDE BLD-SCNC: 103 MMOL/L (ref 98–111)
CLARITY: CLEAR
CO2: 24 MMOL/L (ref 22–29)
COCAINE METABOLITE SCREEN URINE: NEGATIVE
COLOR: YELLOW
CREAT SERPL-MCNC: 0.7 MG/DL (ref 0.5–0.9)
CRYSTALS, UA: ABNORMAL /HPF
EOSINOPHILS ABSOLUTE: 0.3 K/UL (ref 0–0.6)
EOSINOPHILS RELATIVE PERCENT: 2.6 % (ref 0–5)
EPITHELIAL CELLS, UA: 1 /HPF (ref 0–5)
ETHANOL: <10 MG/DL (ref 0–0.08)
GFR AFRICAN AMERICAN: >59
GFR NON-AFRICAN AMERICAN: >60
GLUCOSE BLD-MCNC: 72 MG/DL (ref 74–109)
GLUCOSE URINE: NEGATIVE MG/DL
HCT VFR BLD CALC: 39.1 % (ref 37–47)
HEMOGLOBIN: 11.2 G/DL (ref 12–16)
HYALINE CASTS: 2 /HPF (ref 0–8)
HYPOCHROMIA: ABNORMAL
IMMATURE GRANULOCYTES #: 0 K/UL
KETONES, URINE: NEGATIVE MG/DL
LEUKOCYTE ESTERASE, URINE: ABNORMAL
LYMPHOCYTES ABSOLUTE: 2.6 K/UL (ref 1.1–4.5)
LYMPHOCYTES RELATIVE PERCENT: 27.1 % (ref 20–40)
Lab: NORMAL
MCH RBC QN AUTO: 25.4 PG (ref 27–31)
MCHC RBC AUTO-ENTMCNC: 28.6 G/DL (ref 33–37)
MCV RBC AUTO: 88.7 FL (ref 81–99)
MONOCYTES ABSOLUTE: 1.1 K/UL (ref 0–0.9)
MONOCYTES RELATIVE PERCENT: 11.1 % (ref 0–10)
NEUTROPHILS ABSOLUTE: 5.5 K/UL (ref 1.5–7.5)
NEUTROPHILS RELATIVE PERCENT: 58.2 % (ref 50–65)
NITRITE, URINE: POSITIVE
OPIATE SCREEN URINE: NEGATIVE
PDW BLD-RTO: 16.6 % (ref 11.5–14.5)
PH UA: 6 (ref 5–8)
PLATELET # BLD: 250 K/UL (ref 130–400)
PLATELET SLIDE REVIEW: ADEQUATE
PMV BLD AUTO: 10.7 FL (ref 9.4–12.3)
POTASSIUM REFLEX MAGNESIUM: 4.1 MMOL/L (ref 3.5–5)
PROTEIN UA: NEGATIVE MG/DL
RBC # BLD: 4.41 M/UL (ref 4.2–5.4)
RBC UA: 1 /HPF (ref 0–4)
SARS-COV-2, NAAT: NOT DETECTED
SODIUM BLD-SCNC: 140 MMOL/L (ref 136–145)
SPECIFIC GRAVITY UA: 1.01 (ref 1–1.03)
TOTAL PROTEIN: 6.7 G/DL (ref 6.6–8.7)
UROBILINOGEN, URINE: 0.2 E.U./DL
WBC # BLD: 9.5 K/UL (ref 4.8–10.8)
WBC UA: 16 /HPF (ref 0–5)

## 2022-01-27 PROCEDURE — 96361 HYDRATE IV INFUSION ADD-ON: CPT

## 2022-01-27 PROCEDURE — 80179 DRUG ASSAY SALICYLATE: CPT

## 2022-01-27 PROCEDURE — 80053 COMPREHEN METABOLIC PANEL: CPT

## 2022-01-27 PROCEDURE — 6360000002 HC RX W HCPCS: Performed by: EMERGENCY MEDICINE

## 2022-01-27 PROCEDURE — 96374 THER/PROPH/DIAG INJ IV PUSH: CPT

## 2022-01-27 PROCEDURE — 36415 COLL VENOUS BLD VENIPUNCTURE: CPT

## 2022-01-27 PROCEDURE — 80143 DRUG ASSAY ACETAMINOPHEN: CPT

## 2022-01-27 PROCEDURE — 2580000003 HC RX 258: Performed by: EMERGENCY MEDICINE

## 2022-01-27 PROCEDURE — 87635 SARS-COV-2 COVID-19 AMP PRB: CPT

## 2022-01-27 PROCEDURE — 93005 ELECTROCARDIOGRAM TRACING: CPT | Performed by: EMERGENCY MEDICINE

## 2022-01-27 PROCEDURE — 80164 ASSAY DIPROPYLACETIC ACD TOT: CPT

## 2022-01-27 PROCEDURE — 82077 ASSAY SPEC XCP UR&BREATH IA: CPT

## 2022-01-27 PROCEDURE — 85025 COMPLETE CBC W/AUTO DIFF WBC: CPT

## 2022-01-27 RX ORDER — SODIUM CHLORIDE, SODIUM LACTATE, POTASSIUM CHLORIDE, AND CALCIUM CHLORIDE .6; .31; .03; .02 G/100ML; G/100ML; G/100ML; G/100ML
1000 INJECTION, SOLUTION INTRAVENOUS ONCE
Status: COMPLETED | OUTPATIENT
Start: 2022-01-27 | End: 2022-01-28

## 2022-01-27 RX ORDER — LORAZEPAM 2 MG/ML
2 INJECTION INTRAMUSCULAR ONCE
Status: COMPLETED | OUTPATIENT
Start: 2022-01-27 | End: 2022-01-27

## 2022-01-27 RX ADMIN — SODIUM CHLORIDE, SODIUM LACTATE, POTASSIUM CHLORIDE, AND CALCIUM CHLORIDE 1000 ML: 600; 310; 30; 20 INJECTION, SOLUTION INTRAVENOUS at 22:04

## 2022-01-27 RX ADMIN — LORAZEPAM 2 MG: 2 INJECTION, SOLUTION INTRAMUSCULAR; INTRAVENOUS at 22:05

## 2022-01-28 PROBLEM — T44.3X2A: Status: ACTIVE | Noted: 2022-01-28

## 2022-01-28 LAB
ACETAMINOPHEN LEVEL: <15 UG/ML
EKG P AXIS: 63 DEGREES
EKG P-R INTERVAL: 168 MS
EKG Q-T INTERVAL: 374 MS
EKG QRS DURATION: 88 MS
EKG QTC CALCULATION (BAZETT): 424 MS
EKG T AXIS: 37 DEGREES
GLUCOSE BLD-MCNC: 140 MG/DL (ref 70–99)
GLUCOSE BLD-MCNC: 89 MG/DL (ref 70–99)
GLUCOSE BLD-MCNC: 93 MG/DL (ref 70–99)
PERFORMED ON: ABNORMAL
PERFORMED ON: NORMAL
PERFORMED ON: NORMAL
SALICYLATE, SERUM: <3 MG/DL (ref 3–10)
VALPROIC ACID LEVEL: <2.8 UG/ML (ref 50–100)

## 2022-01-28 PROCEDURE — 1210000000 HC MED SURG R&B

## 2022-01-28 PROCEDURE — 99231 SBSQ HOSP IP/OBS SF/LOW 25: CPT | Performed by: PSYCHIATRY & NEUROLOGY

## 2022-01-28 PROCEDURE — 82947 ASSAY GLUCOSE BLOOD QUANT: CPT

## 2022-01-28 PROCEDURE — 6370000000 HC RX 637 (ALT 250 FOR IP): Performed by: HOSPITALIST

## 2022-01-28 PROCEDURE — 80307 DRUG TEST PRSMV CHEM ANLYZR: CPT

## 2022-01-28 PROCEDURE — 87086 URINE CULTURE/COLONY COUNT: CPT

## 2022-01-28 PROCEDURE — 6360000002 HC RX W HCPCS: Performed by: HOSPITALIST

## 2022-01-28 PROCEDURE — 81001 URINALYSIS AUTO W/SCOPE: CPT

## 2022-01-28 PROCEDURE — 87186 SC STD MICRODIL/AGAR DIL: CPT

## 2022-01-28 PROCEDURE — 93010 ELECTROCARDIOGRAM REPORT: CPT | Performed by: INTERNAL MEDICINE

## 2022-01-28 PROCEDURE — 96376 TX/PRO/DX INJ SAME DRUG ADON: CPT

## 2022-01-28 PROCEDURE — 2580000003 HC RX 258: Performed by: HOSPITALIST

## 2022-01-28 PROCEDURE — 99283 EMERGENCY DEPT VISIT LOW MDM: CPT

## 2022-01-28 RX ORDER — DIVALPROEX SODIUM 250 MG/1
500 TABLET, DELAYED RELEASE ORAL 3 TIMES DAILY
Status: DISCONTINUED | OUTPATIENT
Start: 2022-01-28 | End: 2022-01-28

## 2022-01-28 RX ORDER — ONDANSETRON 2 MG/ML
4 INJECTION INTRAMUSCULAR; INTRAVENOUS EVERY 6 HOURS PRN
Status: DISCONTINUED | OUTPATIENT
Start: 2022-01-28 | End: 2022-01-29 | Stop reason: HOSPADM

## 2022-01-28 RX ORDER — SODIUM CHLORIDE 0.9 % (FLUSH) 0.9 %
5-40 SYRINGE (ML) INJECTION EVERY 12 HOURS SCHEDULED
Status: DISCONTINUED | OUTPATIENT
Start: 2022-01-28 | End: 2022-01-29 | Stop reason: HOSPADM

## 2022-01-28 RX ORDER — SODIUM CHLORIDE 0.9 % (FLUSH) 0.9 %
5-40 SYRINGE (ML) INJECTION PRN
Status: DISCONTINUED | OUTPATIENT
Start: 2022-01-28 | End: 2022-01-29 | Stop reason: HOSPADM

## 2022-01-28 RX ORDER — ACETAMINOPHEN 650 MG/1
650 SUPPOSITORY RECTAL EVERY 6 HOURS PRN
Status: DISCONTINUED | OUTPATIENT
Start: 2022-01-28 | End: 2022-01-29 | Stop reason: HOSPADM

## 2022-01-28 RX ORDER — POLYETHYLENE GLYCOL 3350 17 G/17G
17 POWDER, FOR SOLUTION ORAL DAILY PRN
Status: DISCONTINUED | OUTPATIENT
Start: 2022-01-28 | End: 2022-01-29 | Stop reason: HOSPADM

## 2022-01-28 RX ORDER — FERROUS SULFATE 325(65) MG
325 TABLET ORAL
Status: DISCONTINUED | OUTPATIENT
Start: 2022-01-28 | End: 2022-01-29 | Stop reason: HOSPADM

## 2022-01-28 RX ORDER — ALBUTEROL SULFATE 2.5 MG/3ML
2.5 SOLUTION RESPIRATORY (INHALATION)
Status: DISCONTINUED | OUTPATIENT
Start: 2022-01-28 | End: 2022-01-29 | Stop reason: HOSPADM

## 2022-01-28 RX ORDER — LORAZEPAM 2 MG/ML
1 INJECTION INTRAMUSCULAR
Status: DISCONTINUED | OUTPATIENT
Start: 2022-01-28 | End: 2022-01-28

## 2022-01-28 RX ORDER — MECOBALAMIN 5000 MCG
5 TABLET,DISINTEGRATING ORAL NIGHTLY PRN
Status: DISCONTINUED | OUTPATIENT
Start: 2022-01-28 | End: 2022-01-29 | Stop reason: HOSPADM

## 2022-01-28 RX ORDER — CALCIUM CARBONATE 200(500)MG
500 TABLET,CHEWABLE ORAL 3 TIMES DAILY PRN
Status: DISCONTINUED | OUTPATIENT
Start: 2022-01-28 | End: 2022-01-29 | Stop reason: HOSPADM

## 2022-01-28 RX ORDER — DEXTROSE MONOHYDRATE 25 G/50ML
12.5 INJECTION, SOLUTION INTRAVENOUS PRN
Status: DISCONTINUED | OUTPATIENT
Start: 2022-01-28 | End: 2022-01-28 | Stop reason: CLARIF

## 2022-01-28 RX ORDER — ATORVASTATIN CALCIUM 40 MG/1
20 TABLET, FILM COATED ORAL NIGHTLY
Status: DISCONTINUED | OUTPATIENT
Start: 2022-01-29 | End: 2022-01-29 | Stop reason: HOSPADM

## 2022-01-28 RX ORDER — NICOTINE POLACRILEX 4 MG
15 LOZENGE BUCCAL PRN
Status: DISCONTINUED | OUTPATIENT
Start: 2022-01-28 | End: 2022-01-29 | Stop reason: HOSPADM

## 2022-01-28 RX ORDER — ACETAMINOPHEN 325 MG/1
650 TABLET ORAL EVERY 6 HOURS PRN
Status: DISCONTINUED | OUTPATIENT
Start: 2022-01-28 | End: 2022-01-29 | Stop reason: HOSPADM

## 2022-01-28 RX ORDER — SODIUM CHLORIDE 9 MG/ML
25 INJECTION, SOLUTION INTRAVENOUS PRN
Status: DISCONTINUED | OUTPATIENT
Start: 2022-01-28 | End: 2022-01-29 | Stop reason: HOSPADM

## 2022-01-28 RX ORDER — ONDANSETRON 4 MG/1
4 TABLET, ORALLY DISINTEGRATING ORAL EVERY 8 HOURS PRN
Status: DISCONTINUED | OUTPATIENT
Start: 2022-01-28 | End: 2022-01-29 | Stop reason: HOSPADM

## 2022-01-28 RX ORDER — CHLORPROMAZINE HYDROCHLORIDE 25 MG/ML
50 INJECTION INTRAMUSCULAR EVERY 8 HOURS PRN
Status: DISCONTINUED | OUTPATIENT
Start: 2022-01-28 | End: 2022-01-29 | Stop reason: HOSPADM

## 2022-01-28 RX ORDER — FLUTICASONE PROPIONATE 50 MCG
2 SPRAY, SUSPENSION (ML) NASAL DAILY
Status: DISCONTINUED | OUTPATIENT
Start: 2022-01-28 | End: 2022-01-29 | Stop reason: HOSPADM

## 2022-01-28 RX ORDER — DEXTROSE MONOHYDRATE 50 MG/ML
100 INJECTION, SOLUTION INTRAVENOUS PRN
Status: DISCONTINUED | OUTPATIENT
Start: 2022-01-28 | End: 2022-01-29 | Stop reason: HOSPADM

## 2022-01-28 RX ADMIN — SODIUM CHLORIDE, PRESERVATIVE FREE 10 ML: 5 INJECTION INTRAVENOUS at 09:07

## 2022-01-28 RX ADMIN — FLUTICASONE PROPIONATE 2 SPRAY: 50 SPRAY, METERED NASAL at 09:07

## 2022-01-28 RX ADMIN — LORAZEPAM 1 MG: 2 INJECTION, SOLUTION INTRAMUSCULAR; INTRAVENOUS at 05:17

## 2022-01-28 RX ADMIN — FERROUS SULFATE TAB 325 MG (65 MG ELEMENTAL FE) 325 MG: 325 (65 FE) TAB at 09:07

## 2022-01-28 RX ADMIN — DIVALPROEX SODIUM 500 MG: 250 TABLET, DELAYED RELEASE ORAL at 09:07

## 2022-01-28 ASSESSMENT — ENCOUNTER SYMPTOMS
DIARRHEA: 0
SHORTNESS OF BREATH: 1

## 2022-01-28 NOTE — PROGRESS NOTES
This nurse took a phone call from patients sister. Sister states that pt \"has been on a downward spiral for about 4 months now, after she refused to take her medication due to her mouth movements making her look like a \"retard\"\"  Sister also states that pt is getting evicted because she was living in her rental home with 12 chickens and 6 ducks indoors. The landlord learned of the animals living inside the home, noted animal feces on all surfaces. Landlord issued official eviction notice. Pt stated to her sister that she \"would rather die than lose her stuff\" and when her sister offered her a place to stay with the stipulation that the birds cannot come with her, declined the offer. Sister states that she does believe that pt will continue to attempt suicide, as she has openly voiced in the past that she wishes to die before she is removed from her home. Sister notes unsafe, unclean living situation, and self-care deficits as patient baseline.

## 2022-01-28 NOTE — ED PROVIDER NOTES
[place] [address] [address] [facility]      Pt Name: Sylvia Ying  MRN: [id number]  [place] 1955  Date of evaluation: 1/27/2022  Provider: Rob Corrales MD    CHIEF COMPLAINT       Chief Complaint   Patient presents with    Drug Overdose     Pt took unknown number of benadryl tonight. EMS reports that patient verbalized self harm. Pt striped of clothes and Suicidal protocol initiated. DrGt at bedside 20 g right hand by EMS and Narcan administered PTA         HISTORY OF PRESENT ILLNESS   (Location/Symptom, Timing/Onset,Context/Setting, Quality, Duration, Modifying Factors, Severity)  Note limiting factors. Sylvia Ying is a 74 y.o. female who presents to the emergency department for evaluation after reported overdose on unknown amount of Benadryl by EMS tonight. On EMS arrival, patient was combative and aggressive towards them and officers who responded. She is unable to provide any additional history. Patient was empirically given 2 mg of Narcan with no change in status. Patient placed on nasal cannula oxygen in route. EMS notes patient's daughter was recently seen here for Benadryl overdose as well. On initial arrival no additional medical history is available as we have been provided false patient information. After her true identity and chart were identified, prior records show history of type 2 diabetes, COPD, hypertension, arthritis, bipolar disorder      HPI    NursingNotes were reviewed. REVIEW OF SYSTEMS    (2-9 systems for level 4, 10 or more for level 5)     Review of Systems   Unable to perform ROS: Patient nonverbal       A complete review of systems was performed and is negative except as noted above in the HPI.        PAST MEDICAL HISTORY     Past Medical History:   Diagnosis Date    Alcohol overdose     history of    Anemia     Anxiety     [facility]ipolar [facility] ([facility]     Brain tumor ([facility]     Chronic back pain     ruptured discs    COPD [facility]chronic obstructive pulmonary disease) ([facility]     Dementia ([facility]     Depression     Diabetes ([facility]     Elevated liver enzymes     Frequent falls     Headache     History of kidney infection     Hyperlipidemia     Hypertension     Neuropathy     Obesity     Osteoarthritis     Oxygen dependent     Psoriasis     Scoliosis     Sleep apnea     UTI (urinary tract infection)          SURGICAL HISTORY       Past Surgical History:   Procedure Laterality Date    APPENDECTOMY       SECTION      CHOLECYSTECTOMY      GASTRIC BYPASS SURGERY      RETROPHYARYNGEAL ABCESS INCISION/DRAIN      TONSILLECTOMY      TUBAL LIGATION      TUMOR REMOVAL      bone tumor, r wrist     UPPER GASTROINTESTINAL ENDOSCOPY  2015    [place]: sm hiatal hernia; s/p balbir-en-y; esoph plaques, pos yeast; neg CS         CURRENT MEDICATIONS       Current Discharge Medication List      CONTINUE these medications which have NOT CHANGED    Details   furosemide (LASIX) 20 MG tablet Take 1 tablet by mouth daily  Qty: 90 tablet, Refills: 3    Associated Diagnoses: Edema, unspecified type      pantoprazole (PROTONIX) 40 MG tablet Take 1 tablet by mouth daily. Qty: 90 tablet, Refills: 3    Associated Diagnoses: Gastroesophageal reflux disease, unspecified whether esophagitis present      mirtazapine (REMERON) 30 MG tablet Take 1 tablet by mouth nightly  Qty: 90 tablet, Refills: 1    Associated Diagnoses: Manic episode ([facility] Bipolar disorder, in full remission, most recent episode mixed (HCC)      vitamin D (ERGOCALCIFEROL) 1.25 MG (65475 UT) CAPS capsule Take 1 capsule by mouth weekly.   Qty: 12 capsule, Refills: 0    Associated Diagnoses: Anemia, unspecified type      diclofenac (VOLTAREN) 75 MG EC tablet Take 1 tablet by mouth 2 times daily  Qty: 180 tablet, Refills: 3    Associated Diagnoses: Other chronic pain      linaCLOtide (LINZESS) 72 MCG CAPS capsule Take 1 capsule by mouth every morning (before breakfast)  Qty: 90 capsule, Refills: 3 Associated Diagnoses: Constipation, unspecified constipation type      atorvastatin (LIPITOR) 20 MG tablet Take 1 tablet by mouth daily  Qty: 90 tablet, Refills: 3    Associated Diagnoses: Mixed hyperlipidemia      ferrous sulfate (FEROSUL) 325 (65 Fe) MG tablet Take 1 tablet by mouth daily (with breakfast)  Qty: 90 tablet, Refills: 3    Associated Diagnoses: Anemia, unspecified type      Liraglutide (VICTOZA) 18 MG/3ML SOPN SC injection Inject 1.8 mg into the skin daily  Qty: 27 mL, Refills: 3    Associated Diagnoses: Type 2 diabetes mellitus without complication, unspecified whether long term insulin use ([facility])      metFORMIN (GLUCOPHAGE) 500 MG tablet Take 1 tablet by mouth 2 times daily (with meals)  Qty: 180 tablet, Refills: 3    Associated Diagnoses: Type 2 diabetes mellitus without complication, unspecified whether long term insulin use ([facility])      QUEtiapine (SEROQUEL) 200 MG tablet Take 1 tablet by mouth nightly  Qty: 90 tablet, Refills: 3    Associated Diagnoses: Manic episode ([facility] Bipolar disorder, in full remission, most recent episode mixed ([facility])      benztropine (COGENTIN) 1 MG tablet Take 1 tablet by mouth nightly  Qty: 90 tablet, Refills: 3    Associated Diagnoses: Manic episode ([facility] Bipolar disorder, in full remission, most recent episode mixed (HCC)      irbesartan (AVAPRO) 150 MG tablet Take 1 tablet by mouth nightly  Qty: 90 tablet, Refills: 3    Associated Diagnoses: Secondary hypertension      divalproex (DEPAKOTE) 500 MG DR tablet Take 1 tablet by mouth 3 times daily  Qty: 270 tablet, Refills: 3    Associated Diagnoses: Bipolar 1 disorder ([facility])      ARIPiprazole lauroxil (ARISTADA) 441 MG/1.6ML PRSY injection Inject 1.6 mLs into the muscle every 14 days  Qty: 6 Syringe, Refills: 3    Associated Diagnoses: Bipolar 1 disorder (HCC)      Elastic Bandages & Supports (FUTURO SOFT CERVICAL COLLAR) MISC Wear for 1 week.   Qty: 1 each, Refills: 0      clobetasol (TEMOVATE) 0.05 % ointment Apply topically 2 times daily. Qty: 60 g, Refills: 3    Associated Diagnoses: Psoriasis      melatonin 3 MG TABS tablet Take 1 tablet by mouth nightly  Qty: 90 tablet, Refills: 3      gabapentin (NEURONTIN) 300 MG capsule Take 1 capsule by mouth 3 times daily for 14 days. Qty: 42 capsule, Refills: 0    Associated Diagnoses: Other chronic pain      nystatin (MYCOSTATIN) 522882 UNIT/GM powder Apply 3 times daily. Qty: 45 g, Refills: 3    Associated Diagnoses: Yeast infection; Vaginal yeast infection      albuterol sulfate HFA (VENTOLIN HFA) 108 (90 Base) MCG/ACT inhaler Inhale 2 puffs into the lungs every 6 hours as needed for Wheezing  Qty: 3 Inhaler, Refills: 3    Associated Diagnoses: Shortness of breath; Oxygen dependent      Insulin Pen Needle (B-D ULTRAFINE III SHORT PEN) 31G X 8 MM MISC Inject 1 each as directed daily  Qty: 100 each, Refills: 5      ACCU-CHEK SOFTCLIX LANCETS MISC CHECK BLOOD SUGAR TWICE DAILY AND AS NEEDED  Qty: 300 each, Refills: 3      fluticasone (FLONASE) 50 MCG/ACT nasal spray 2 sprays by Nasal route daily  Qty: 1 Bottle, Refills: 3    Associated Diagnoses: Right ear pain; Dysfunction of right eustachian tube      vitamin B-12 (CYANOCOBALAMIN) 500 MCG tablet Take 1 tablet by mouth daily  Qty: 30 tablet, Refills: 2      blood glucose monitor kit and supplies Test 3 times a day & as needed for symptoms of irregular blood glucose. Dx:  Fazal Rey: 1 kit, Refills: 0    Comments: Brand per patient preference. May round up to next available package size. Associated Diagnoses: Other diabetic neurological complication associated with type 2 diabetes mellitus (HCC)      blood glucose monitor strips Test 3 times a day & as needed for symptoms of irregular blood glucose. Qty: 100 strip, Refills: 5    Comments: Brand per patient preference. May round up to next available package size.   Associated Diagnoses: Other diabetic neurological complication associated with type 2 diabetes mellitus (HCC)      OXYGEN Inhale 3 L into the lungs      ipratropium-albuterol (DUONEB) 0.5-2.5 (3) MG/3ML SOLN nebulizer solution Inhale 3 mLs into the lungs every 6 hours as needed for Shortness of Breath  Qty: 810 mL, Refills: 2    Associated Diagnoses: Chronic bronchitis, unspecified chronic bronchitis type (HCC)      Continuous Blood Gluc  (FREESTYLE ABDOULAYE READER) MARCIA 1 Units by Does not apply route 2 times daily  Qty: 1 Device, Refills: 0      Continuous Blood Gluc Sensor (FREESTYLE ABDOULAYE SENSOR SYSTEM) MISC 1 Units by Does not apply route 2 times daily  Qty: 1 each, Refills: 0             ALLERGIES     Haldol [haloperidol lactate], Morphine, and Codeine    FAMILY HISTORY       Family History   Problem Relation Age of Onset    Diabetes Mother     Kidney Disease Mother     High Blood Pressure Mother     Cancer Mother     Ovarian Cancer Mother 43    Diabetes Father     High Blood Pressure Father     Heart Disease Father     Diabetes Brother     High Blood Pressure Brother     Diabetes Sister     High Blood Pressure Sister     Diabetes Brother     High Blood Pressure Brother     Cancer Maternal Aunt     Colon Cancer Neg Hx     Colon Polyps Neg Hx     Esophageal Cancer Neg Hx     Liver Cancer Neg Hx     Liver Disease Neg Hx     Rectal Cancer Neg Hx     Stomach Cancer Neg Hx           SOCIAL HISTORY       Social History     Socioeconomic History    Marital status:      Spouse name: None    Number of children: None    Years of education: None    Highest education level: None   Occupational History    None   Tobacco Use    Smoking status: Current Every Day Smoker     Packs/day: 0.50     Types: Cigarettes    Smokeless tobacco: Never Used   Vaping Use    Vaping Use: Never used   Substance and Sexual Activity    Alcohol use: Yes     Comment: occ    Drug use: Yes     Types: Marijuana Sarithadel Joya)     Comment: occ    Sexual activity: Not Currently   Other Topics Concern    None   Social History Narrative    None     Social Determinants of Health     Financial Resource Strain:     Difficulty of Paying Living Expenses: Not on file   Food Insecurity:     Worried About Running Out of Food in the Last Year: Not on file    Guero of Food in the Last Year: Not on file   Transportation Needs:     Lack of Transportation (Medical): Not on file    Lack of Transportation (Non-Medical): Not on file   Physical Activity:     Days of Exercise per Week: Not on file    Minutes of Exercise per Session: Not on file   Stress:     Feeling of Stress : Not on file   Social Connections:     Frequency of Communication with Friends and Family: Not on file    Frequency of Social Gatherings with Friends and Family: Not on file    Attends Baptism Services: Not on file    Active Member of [address] [place] [address] [employer] or Organizations: Not on file    Attends Club or Organization Meetings: Not on file    Marital Status: Not on file   Intimate Partner Violence:     Fear of Current or Ex-Partner: Not on file    Emotionally Abused: Not on file    Physically Abused: Not on file    Sexually Abused: Not on file   Housing Stability:     Unable to Pay for Housing in the Last Year: Not on file    Number of [place] in the Last Year: Not on file    Unstable Housing in the Last Year: Not on file       SCREENINGS             PHYSICAL EXAM    (up to 7 for level 4, 8 or more for level 5)     ED Triage Vitals   BP Temp Temp src Pulse Resp SpO2 Height Weight   -- -- -- -- -- -- -- --       Physical Exam  Vitals and nursing note reviewed. Constitutional:       General: She is not in acute distress. Appearance: She is well-developed. She is ill-appearing. She is not toxic-appearing or diaphoretic. Interventions: Nasal cannula in place. Comments: Keeps eyes closed but is awake. Uncooperative with history or exam.  Dry oral mucosa and tongue. No rigidity, clonus, warmth. No rashes   HENT:      Head: Normocephalic and atraumatic. Eyes:      General: No scleral icterus. Right eye: No discharge. Left eye: No discharge. Pupils: Pupils are equal, round, and reactive to light. Cardiovascular:      Rate and Rhythm: Normal rate and regular rhythm. Pulmonary:      Effort: Pulmonary effort is normal. No respiratory distress. Breath sounds: No stridor. Abdominal:      General: There is no distension. Musculoskeletal:         General: No deformity. Normal range of motion. Cervical back: Normal range of motion. Skin:     General: Skin is warm and dry. Neurological:      General: No focal deficit present. Mental Status: She is confused. GCS: GCS eye subscore is 2. GCS verbal subscore is 1. GCS motor subscore is 5. Cranial Nerves: No cranial nerve deficit. Psychiatric:         Mood and Affect: Affect is labile. Behavior: Behavior is slowed and withdrawn.          DIAGNOSTIC RESULTS     EKG: All EKG's are interpreted by the Emergency Department Physician who either signs or Co-signs this chart in the absence of a cardiologist.        RADIOLOGY:   Non-plain film images such as CT, Ultrasound and MRI are read by the radiologist. [place] images are visualized and preliminarily interpreted by the emergency physician with the below findings:        Interpretation per the Radiologist below, if available at the time of this note:    No orders to display         ED BEDSIDE ULTRASOUND:   Performed by ED Physician - none    LABS:  Labs Reviewed   CBC WITH AUTO DIFFERENTIAL - Abnormal; Notable for the following components:       Result Value    Hemoglobin 11.2 (*)     MCH 25.4 (*)     MCHC 28.6 (*)     RDW 16.6 (*)     Monocytes % 11.1 (*)     Monocytes Absolute 1.10 (*)     Hypochromia 1+ (*)     All other components within normal limits   COMPREHENSIVE METABOLIC PANEL W/ REFLEX TO MG FOR LOW K - Abnormal; Notable for the following components:    Glucose 72 (*)     Alkaline Phosphatase 209 (*)     All other components within normal limits   URINE RT REFLEX TO CULTURE - Abnormal; Notable for the following components:    Nitrite, Urine POSITIVE (*)     Leukocyte Esterase, Urine SMALL (*)     All other components within normal limits   MICROSCOPIC URINALYSIS - Abnormal; Notable for the following components:    Bacteria, UA 1+ (*)     Crystals, UA NEG (*)     WBC, UA 16 (*)     All other components within normal limits   COVID-19, RAPID   CULTURE, URINE   ETHANOL   URINE DRUG SCREEN   VALPROIC ACID LEVEL, TOTAL   ACETAMINOPHEN LEVEL   SALICYLATE LEVEL   POCT GLUCOSE   POCT GLUCOSE       All other labs were within normal range or not returned as of this dictation.     Medications   albuterol (PROVENTIL) nebulizer solution 2.5 mg (has no administration in time range)   divalproex (DEPAKOTE) DR tablet 500 mg (has no administration in time range)   Liraglutide (VICTOZA) SC injection 1.8 mg (has no administration in time range)   insulin lispro (HUMALOG) injection vial 0-12 Units (has no administration in time range)   insulin lispro (HUMALOG) injection vial 0-6 Units (0 Units SubCUTAneous Not Given 1/28/22 0046)   glucose (GLUTOSE) 40 % oral gel 15 g (has no administration in time range)   glucagon (rDNA) injection 1 mg (has no administration in time range)   dextrose 5 % solution (has no administration in time range)   ferrous sulfate (IRON 325) tablet 325 mg (has no administration in time range)   fluticasone (FLONASE) 50 MCG/ACT nasal spray 2 spray (has no administration in time range)   linaCLOtide (LINZESS) capsule 72 mcg (has no administration in time range)   atorvastatin (LIPITOR) tablet 20 mg (has no administration in time range)   sodium chloride flush 0.9 % injection 5-40 mL (has no administration in time range)   sodium chloride flush 0.9 % injection 5-40 mL (has no administration in time range)   0.9 % sodium chloride infusion (has no administration in time range)   enoxaparin (LOVENOX) injection 40 mg (has no administration in time range)   ondansetron (ZOFRAN-ODT) disintegrating tablet 4 mg (has no administration in time range)     Or   ondansetron (ZOFRAN) injection 4 mg (has no administration in time range)   acetaminophen (TYLENOL) tablet 650 mg (has no administration in time range)     Or   acetaminophen (TYLENOL) suppository 650 mg (has no administration in time range)   polyethylene glycol (GLYCOLAX) packet 17 g (has no administration in time range)   melatonin disintegrating tablet 5 mg (has no administration in time range)   calcium carbonate (TUMS) chewable tablet 500 mg (has no administration in time range)   LORazepam (ATIVAN) injection 1 mg (has no administration in time range)   dextrose bolus (hypoglycemia) 10% 125 mL (has no administration in time range)     Or   dextrose bolus (hypoglycemia) 10% 250 mL (has no administration in time range)   lactated ringers bolus (0 mLs IntraVENous Stopped 1/28/22 0045)   LORazepam (ATIVAN) injection 2 mg (2 mg IntraVENous Given 1/27/22 2205)       EMERGENCY DEPARTMENT COURSE and DIFFERENTIALDIAGNOSIS/MDM:   Vitals:    Vitals:    01/27/22 2313 01/28/22 0022 01/28/22 0029 01/28/22 0148   BP: (!) 110/95 (!) 110/92 (!) 114/92 (!) 147/72   Pulse: 90 93  85   Resp: 18 17  17   Temp:    97.2 °F (36.2 °C)   TempSrc:    Temporal   SpO2: 98% 97%  95%       MDM    ED Course as of 01/28/22 0320   Thu Jan 27, 2022 2148 History and exam consistent with anticholinergic toxidrome which is supported by report of Benadryl ingestion. Patient given Ativan [SIXTO]   2216 EKG shows sinus rhythm with a rate of 89. No findings of acute ischemia or infarction. Low voltage in lead V3. Normal intervals. [SIXTO]      ED Course User Index  [SIXTO] Wilberto Shaw MD     Based on the evaluation and work-up here patient is felt to require further monitoring, work-up, or treatment that is available in the emergency department.   Case was discussed with hospitalist who agrees for observation or admission for further management. Treatment and stabilization as necessary were provided in the emergency department prior to transfer of care to the medicine service. CONSULTS:  None    PROCEDURES:  Unless otherwise notedbelow, none     Procedures  CRITICAL CARE TIME   Total Critical Care time was 45 minutes, excluding separately reportable procedures. There was a high probability of clinically significant/life threatening deterioration in the patient's condition which required my urgent intervention. FINAL IMPRESSION     1. Intentional drug overdose, initial encounter ([facility]    2. Anticholinergic drug overdose, intentional self-harm, initial encounter ([facility]    3. Chronic obstructive pulmonary disease, unspecified COPD type ([facility]    4. Hypoxia          DISPOSITION/PLAN   DISPOSITION        PATIENT REFERRED TO:  No follow-up provider specified.     DISCHARGE MEDICATIONS:  Current Discharge Medication List             (Please note that portions of this note were completed with a voice recognition program.  Efforts were made to edit the dictations butoccasionally words are mis-transcribed.)    Radha Echevarria MD (electronically signed)  AttendingEmerVeterans Health Care System of the Ozarkscy Physician          Radha Echevarria., MD  01/28/22 2345

## 2022-01-28 NOTE — PLAN OF CARE
Patient admitted this a.m. Patient seen and examined this AM.  No new complaints. Lying comfortably in bed in no acute distress. Continue current management, with suicide precaution. One-to-one sitter at bedside for safety.   Psych consulted on admission

## 2022-01-28 NOTE — CONSULTS
[facility]  Psychiatry Consult    Reason for Consult:  overdose    44-year-old white female with history of bipolar disorder, known to our service, admitted s/p intentional overdose on Benadryl. UDS negative. Patient has been tearful and making suicide statements on the floor. She is known to our service due to previous admission to psych unit secondary to another overdose. Patient seen resting in bed at this time. She is sedated and unable to participate in the interview. Sitter is at bedside. Per records, patient's sister received eviction notice due to keeping chickens and ducks inside the house. Sister offered her to move to her place but patient was not willing to give up the birds. Psychiatric History:    Diagnoses: Bipolar disorder, borderline personality disorder  Suicide attempts/gestures: 2020 - OD on Benadryl, 2016 OD on pills and antifreeze  History of cutting since early childhood, last time in 2000. Prior hospitalizations: 4-5 times with last time at  in 2020  Medication trials: Remeron, Zoloft, BuSpar, Vistaril, donepezil, Latuda, Abilify, Depakote  Mental health contact: Lost to follow-up      Substance Use History:  No alcohol use.  Past h/o cannabis use.  Smokes 1 PPD.     Allergies:  Haldol [haloperidol lactate], Morphine, and Codeine    Medical History:  Past Medical History:   Diagnosis Date    Alcohol overdose     history of    Anemia     Anxiety     Bipolar disorder ([address]     Brain tumor ([address]     Chronic back pain     ruptured discs    COPD (chronic obstructive pulmonary disease) (HCC)     Dementia (HCC)     Depression     Diabetes ([address]     Elevated liver enzymes     Frequent falls     Headache     History of kidney infection     Hyperlipidemia     Hypertension     Neuropathy     Obesity     Osteoarthritis     Oxygen dependent     Psoriasis     Scoliosis     Sleep apnea     UTI (urinary tract infection)        Family History:  Family History   Problem Relation Age of Onset    Diabetes Mother     Kidney Disease Mother     High Blood Pressure Mother     Cancer Mother     Ovarian Cancer Mother 43    Diabetes Father     High Blood Pressure Father     Heart Disease Father     Diabetes Brother     High Blood Pressure Brother     Diabetes Sister     High Blood Pressure Sister     Diabetes Brother     High Blood Pressure Brother     Cancer Maternal Aunt     Colon Cancer Neg Hx     Colon Polyps Neg Hx     Esophageal Cancer Neg Hx     Liver Cancer Neg Hx     Liver Disease Neg Hx     Rectal Cancer Neg Hx     Stomach Cancer Neg Hx        Social History:  Lives alone. Has a daughter. Sister is involved. Review of Systems: UTO    Vitals:    01/28/22 0823   BP: 116/73   Pulse: 75   Resp: 16   Temp: 97.5 °F (36.4 °C)   SpO2: 98%       Mental Status  Appearance: Disheveled and in hospital attire. Gait not assessed. No abnormal movements or tremor. Behavior: Sedated    Assessment  DSM-5 DIAGNOSIS:  Impression   Depression unspecified  S/p Suicide attempt  Borderline personality disorder  Tobacco use disorder    Patient is meeting the criteria for inpatient psych treatment. Continue observation on the medical floor for now. We will reassess for transfer tomorrow. Hold Depakote. Thank you for consulting our service. Please call us with questions.       Alexy Robins MD

## 2022-01-28 NOTE — H&P
[facility] History and Physical    Patient Information:  Patient: Jamie Cartagena  MRN: [id number]   Acct: [de-identified]  YOB: 1955  Admit Date: 1/28/2022  Primary Care Physician: JORGE Lucas  Advance Directive: Full Code        SUBJECTIVE:    Chief Complaint   Patient presents with    Drug Overdose     Pt took unknown number of benadryl tonight. EMS reports that patient verbalized self harm. Pt striped of clothes and Suicidal protocol initiated.  at bedside 20 g right hand by EMS and Narcan administered PTA     EP Sign Out:  Benadryl mono-substance overdose suspected as suicide attempt    HPI:  Mrs. Noelle Beauchamp is a pleasant but dysarthric & very fatigued 74year old  [place] lady from home. She was referred to us for a Benadryl overdose. She had been Sugey packing\" the last few days as she states that she is moving to [place]. She had reportedly tried to kill herself with Benadryl but when asked if she had intended to harm herself. She states that she never did this before. She states that she is currently homeless and is moving from [facility] in [facility]. She later admitted her plan was that they would find her dead in the house. She states \"I want to go to sleep and not wake up. \"    Review of Systems:   Review of Systems   Constitutional: Positive for chills, fatigue and fever. Negative for diaphoresis. Respiratory: Positive for shortness of breath (at baseline). Cardiovascular: Negative for chest pain. Gastrointestinal: Negative for diarrhea. Genitourinary: Positive for dysuria, frequency and urgency. Neurological: Positive for weakness. Psychiatric/Behavioral: Positive for confusion and suicidal ideas.       Following subjective sections as per chart review:    Past Medical History:   Diagnosis Date    Alcohol overdose     history of    Anemia     Anxiety     Bipolar disorder (HCC)     Brain tumor (HCC)     Chronic back pain     ruptured discs    COPD (chronic obstructive pulmonary disease) (HCC)     Dementia (HCC)     Depression     Diabetes (HCC)     Elevated liver enzymes     Frequent falls     Headache     History of kidney infection     Hyperlipidemia     Hypertension     Neuropathy     Obesity     Osteoarthritis     Oxygen dependent     Psoriasis     Scoliosis     Sleep apnea     UTI (urinary tract infection)      Past Psychiatric History:  As per above    Past Surgical History:   Procedure Laterality Date    APPENDECTOMY       SECTION      CHOLECYSTECTOMY      GASTRIC BYPASS SURGERY      RETROPHYARYNGEAL ABCESS INCISION/DRAIN      TONSILLECTOMY      TUBAL LIGATION      TUMOR REMOVAL      bone tumor, r wrist     UPPER GASTROINTESTINAL ENDOSCOPY  2015    Tarik: sm hiatal hernia; s/p balbir-en-y; esoph plaques, pos yeast; neg CS     Social History     Tobacco History     Smoking Status  Current Every Day Smoker Smoking Frequency  0.5 packs/day Smoking Tobacco Type  Cigarettes    Smokeless Tobacco Use  Never Used          Alcohol History     Alcohol Use Status  Yes Comment  occ          Drug Use     Drug Use Status  Yes Types  Marijuana (Weed) Comment  occ          Sexual Activity     Sexually Active  Not Currently           Family History   Problem Relation Age of Onset    Diabetes Mother     Kidney Disease Mother     High Blood Pressure Mother     Cancer Mother     Ovarian Cancer Mother 43    Diabetes Father     High Blood Pressure Father     Heart Disease Father     Diabetes Brother     High Blood Pressure Brother     Diabetes Sister     High Blood Pressure Sister     Diabetes Brother     High Blood Pressure Brother     Cancer Maternal Aunt     Colon Cancer Neg Hx     Colon Polyps Neg Hx     Esophageal Cancer Neg Hx     Liver Cancer Neg Hx     Liver Disease Neg Hx     Rectal Cancer Neg Hx     Stomach Cancer Neg Hx      Allergies   Allergen Reactions    Haldol [Haloperidol Lactate]     Morphine     Codeine Nausea And Vomiting     Home Medications:  Prior to Admission medications    Medication Sig Start Date End Date Taking? Authorizing Provider   furosemide (LASIX) 20 MG tablet Take 1 tablet by mouth daily 6/24/21   JORGE Lambert   pantoprazole (PROTONIX) 40 MG tablet Take 1 tablet by mouth daily. 6/24/21   JORGE Lambert   mirtazapine (REMERON) 30 MG tablet Take 1 tablet by mouth nightly 1/24/22   JORGE Lambert   vitamin D (ERGOCALCIFEROL) 1.25 MG (86422 UT) CAPS capsule Take 1 capsule by mouth weekly.  11/29/21   JORGE Lambert   diclofenac (VOLTAREN) 75 MG EC tablet Take 1 tablet by mouth 2 times daily 10/1/21   JORGE Lambert   [facility]) 72 MCG CAPS capsule Take 1 capsule by mouth every morning (before breakfast) 10/1/21   JORGE Lambert   atorvastatin (LIPITOR) 20 MG tablet Take 1 tablet by mouth daily 10/1/21   JORGE Lambert   ferrous sulfate (FEROSUL) 325 (65 Fe) MG tablet Take 1 tablet by mouth daily (with breakfast) 7/26/21   JORGE Lambert   Liraglutide (VICTOZA) 18 MG/3ML SOPN SC injection Inject 1.8 mg into the skin daily 7/26/21   JORGE Lambert   metFORMIN (GLUCOPHAGE) 500 MG tablet Take 1 tablet by mouth 2 times daily (with meals) 7/26/21   JORGE Lambert   QUEtiapine (SEROQUEL) 200 MG tablet Take 1 tablet by mouth nightly 7/26/21 7/26/22  JORGE Lambert   benztropine (COGENTIN) 1 MG tablet Take 1 tablet by mouth nightly 6/24/21   JORGE Lambert   irbesartan (AVAPRO) 150 MG tablet Take 1 tablet by mouth nightly 6/24/21   JORGE Lambert   divalproex (DEPAKOTE) 500 MG DR tablet Take 1 tablet by mouth 3 times daily 4/26/21   JORGE Lmabert   ARIPiprazole lauroxil (ARISTADA) 441 MG/1.6ML PRSY injection Inject 1.6 mLs into the muscle every 14 days 4/20/21   JORGE Lambert   Elastic Bandages & Supports ([address] vd SOFT CERVICAL COLLAR) MISC Wear for 1 week. 3/30/21   Jenifer Goods A JORGE Jack   clobetasol (TEMOVATE) 0.05 % ointment Apply topically 2 times daily. 1/19/21   JoseJORGE Valdez   melatonin 3 MG TABS tablet Take 1 tablet by mouth nightly 12/28/20   Cleavon Senior JackJORGE   gabapentin (NEURONTIN) 300 MG capsule Take 1 capsule by mouth 3 times daily for 14 days. 8/6/20 4/20/21  Jose JORGE Alfredo   nystatin (MYCOSTATIN) 084156 UNIT/GM powder Apply 3 times daily. 7/30/20   Jose JORGE Alfredo   albuterol sulfate HFA (VENTOLIN HFA) 108 (90 Base) MCG/ACT inhaler Inhale 2 puffs into the lungs every 6 hours as needed for Wheezing 7/30/20   JoseJORGE Valdez   Insulin Pen Needle (B-D ULTRAFINE III SHORT PEN) 31G X 8 MM MISC Inject 1 each as directed daily 10/22/19   Jose JORGE Alfredo   ACCU-CHEK SOFTCLIX LANCETS [facility] [place] BLOOD SUGAR TWICE DAILY AND AS NEEDED 10/22/19   Jose JORGE Alfredo   fluticasone [facility]) 50 MCG/ACT nasal spray 2 sprays by Nasal route daily 10/22/19   JoseJORGE Valdez   vitamin B-12 (CYANOCOBALAMIN) 500 MCG tablet Take 1 tablet by mouth daily 10/22/19 4/20/21  JORGE Broderick   blood glucose monitor kit and supplies Test 3 times a day & as needed for symptoms of irregular blood glucose. Dx: 1/3/19   JORGE Broderick   blood glucose monitor strips Test 3 times a day & as needed for symptoms of irregular blood glucose.  1/3/19   JoseJORGE Valdez   OXYGEN Inhale 3 L into the lungs    Historical Provider, MD   ipratropium-albuterol (DUONEB) 0.5-2.5 (3) MG/3ML SOLN nebulizer solution Inhale 3 mLs into the lungs every 6 hours as needed for Shortness of Breath 10/2/18   JoseJORGE Valdez   Continuous Blood Gluc  (FREESTYLE ABDOULAYE READER) MARCIA 1 Units by Does not apply route 2 times daily 8/21/18   JORGE Broderick   Continuous Blood Gluc Sensor (FREESTYLE ABDOULAYE SENSOR SYSTEM) MISC 1 Units by Does not apply route 2 times daily 8/21/18 Courtney Lieberman, APRN         OBJECTIVE:    Vitals:    01/28/22 0022   BP: (!) 110/92   Pulse: 93   Resp: 17   Temp:    SpO2: 97%   breathing on room air    BP (!) 147/72   Pulse 85   Temp 97.2 °F (36.2 °C) (Temporal)   Resp 17   SpO2 95%     No intake or output data in the 24 hours ending 01/28/22 0808    Physical Exam  Vitals reviewed. Constitutional:       General: She is not in acute distress. Appearance: Normal appearance. She is normal weight. She is not ill-appearing or toxic-appearing. HENT:      Head: Normocephalic and atraumatic. Nose: No congestion or rhinorrhea. Eyes:      General:         Right eye: No discharge. Left eye: No discharge. Neck:      Comments: Trachea appears midline, supple  Cardiovascular:      Rate and Rhythm: Normal rate and regular rhythm. Heart sounds: No murmur heard. No friction rub. No gallop. Pulmonary:      Effort: Pulmonary effort is normal. No respiratory distress. Breath sounds: No stridor. No wheezing, rhonchi or rales. Chest:      Chest wall: No tenderness. Abdominal:      General: Bowel sounds are normal.      Tenderness: There is no abdominal tenderness. There is no guarding or rebound. Skin:     General: Skin is warm. Comments: nondiaphoretic   Neurological:      Mental Status: She is alert. Cranial Nerves: No dysarthria. Motor: No tremor or seizure activity.    Psychiatric:         Mood and Affect: Mood normal.         Behavior: Behavior normal.       LABORATORY DATA:    CBC:   Recent Labs     01/27/22  2200   WBC 9.5   HGB 11.2*   HCT 39.1        BMP:   Recent Labs     01/27/22  2200      K 4.1      CO2 24   BUN 15   CREATININE 0.7   CALCIUM 8.8     Hepatic Profile:   Recent Labs     01/27/22  2200   AST 24   ALT 24   BILITOT <0.2   ALKPHOS 209*     Urinalysis:   Lab Results   Component Value Date    NITRU POSITIVE 01/27/2022    WBCUA 16 01/27/2022    BACTERIA 1+ 01/27/2022    RBCUA 1 01/27/2022    BLOODU Negative 01/27/2022    SPECGRAV 1.012 01/27/2022    GLUCOSEU Negative 01/27/2022       EKG:   No results available. IMAGING:  No results found. ASESSMENTS & PLANS:    Anticholinergic crisis, intentional self-harm, initial encounter [facility]): Admit to medical [facility]  Elevate head of bed  elevate legs to prevent sliding down  Sitter 1:1  Safety tray, NO utensils  Bed alarm  Fall precautions  defer on psych consult for now, as per Dr. Rupa Enegl if [place] is low risk in AM unless we have specific reason for high concern they may be discharged home , repeat [place] is to be done AFTER pt chemically sober first and documented for psych to see as per Dr. Demetria Schwartz belongings from room  [facility] PRN     Chronic Medical Problems:  Continue home regimen as indicated  divalproex  500 mg Oral TID   Liraglutide  1.8 mg SubCUTAneous Daily   insulin lispro  0-12 Units SubCUTAneous TID WC   insulin lispro  0-6 Units SubCUTAneous Nightly   ferrous sulfate  325 mg Oral Daily with breakfast   fluticasone  2 spray Nasal Daily   linaCLOtide  72 mcg Oral QAM AC   [START ON 1/29/2022] atorvastatin  20 mg Oral Nightly   Short acting ISS added for improved hypoglycemic order sets    Supoportive and Prophylactic Txx:  DVT Prophylaxis: Lovenox SQ  GI (PUD) Ppx: defer for now, no acute indication  PT consult for evalutation and Txx as indicated: defer for now  Diet NPO, advance as tolerated to cardiac-diabetic safety-tray withOUT untensils  albuterol, glucose, glucagon (rDNA), dextrose, sodium chloride flush, sodium chloride, ondansetron **OR** ondansetron, acetaminophen **OR** acetaminophen, polyethylene glycol, melatonin, calcium carbonate, LORazepam, dextrose bolus (hypoglycemia) **OR** dextrose bolus (hypoglycemia)      Care time of >40 minutes  Pt seen/examined and admitted to inpatient status.   Inpatient status is used for patients with an expected LOS extending past two midnights due to medical therapy and or critical care needs, otherwise patients are placed to OBServation status. Signed:  Electronically signed by Ayush Carrington MD on 1/28/22 at 08:10 AM CST.

## 2022-01-29 ENCOUNTER — HOSPITAL ENCOUNTER (INPATIENT)
Age: 67
LOS: 5 days | Discharge: HOME | DRG: 881 | End: 2022-02-03
Attending: PSYCHIATRY & NEUROLOGY | Admitting: PSYCHIATRY & NEUROLOGY
Payer: MEDICARE

## 2022-01-29 VITALS
HEART RATE: 65 BPM | TEMPERATURE: 99.1 F | OXYGEN SATURATION: 92 % | DIASTOLIC BLOOD PRESSURE: 66 MMHG | RESPIRATION RATE: 18 BRPM | SYSTOLIC BLOOD PRESSURE: 101 MMHG

## 2022-01-29 DIAGNOSIS — F31.78 BIPOLAR DISORDER, IN FULL REMISSION, MOST RECENT EPISODE MIXED (HCC): ICD-10-CM

## 2022-01-29 DIAGNOSIS — F30.9 MANIC EPISODE (HCC): ICD-10-CM

## 2022-01-29 LAB
ALBUMIN SERPL-MCNC: 3.6 G/DL (ref 3.5–5.2)
ALP BLD-CCNC: 205 U/L (ref 35–104)
ALT SERPL-CCNC: 17 U/L (ref 5–33)
ANION GAP SERPL CALCULATED.3IONS-SCNC: 9 MMOL/L (ref 7–19)
AST SERPL-CCNC: 15 U/L (ref 5–32)
BASOPHILS ABSOLUTE: 0.1 K/UL (ref 0–0.2)
BASOPHILS RELATIVE PERCENT: 0.8 % (ref 0–1)
BILIRUB SERPL-MCNC: <0.2 MG/DL (ref 0.2–1.2)
BUN BLDV-MCNC: 32 MG/DL (ref 8–23)
CALCIUM SERPL-MCNC: 7.9 MG/DL (ref 8.8–10.2)
CHLORIDE BLD-SCNC: 108 MMOL/L (ref 98–111)
CO2: 22 MMOL/L (ref 22–29)
CREAT SERPL-MCNC: 1 MG/DL (ref 0.5–0.9)
EOSINOPHILS ABSOLUTE: 0.3 K/UL (ref 0–0.6)
EOSINOPHILS RELATIVE PERCENT: 3.3 % (ref 0–5)
GFR AFRICAN AMERICAN: >59
GFR NON-AFRICAN AMERICAN: 56
GLUCOSE BLD-MCNC: 102 MG/DL (ref 70–99)
GLUCOSE BLD-MCNC: 104 MG/DL (ref 70–99)
GLUCOSE BLD-MCNC: 90 MG/DL (ref 70–99)
GLUCOSE BLD-MCNC: 93 MG/DL (ref 70–99)
GLUCOSE BLD-MCNC: 96 MG/DL (ref 74–109)
HCT VFR BLD CALC: 34.5 % (ref 37–47)
HEMOGLOBIN: 9.8 G/DL (ref 12–16)
HYPOCHROMIA: ABNORMAL
IMMATURE GRANULOCYTES #: 0 K/UL
LYMPHOCYTES ABSOLUTE: 2.2 K/UL (ref 1.1–4.5)
LYMPHOCYTES RELATIVE PERCENT: 26.3 % (ref 20–40)
MCH RBC QN AUTO: 24.9 PG (ref 27–31)
MCHC RBC AUTO-ENTMCNC: 28.4 G/DL (ref 33–37)
MCV RBC AUTO: 87.6 FL (ref 81–99)
MONOCYTES ABSOLUTE: 0.8 K/UL (ref 0–0.9)
MONOCYTES RELATIVE PERCENT: 9.1 % (ref 0–10)
NEUTROPHILS ABSOLUTE: 5.1 K/UL (ref 1.5–7.5)
NEUTROPHILS RELATIVE PERCENT: 60.4 % (ref 50–65)
PDW BLD-RTO: 16.5 % (ref 11.5–14.5)
PERFORMED ON: ABNORMAL
PERFORMED ON: ABNORMAL
PERFORMED ON: NORMAL
PERFORMED ON: NORMAL
PLATELET # BLD: 250 K/UL (ref 130–400)
PLATELET SLIDE REVIEW: ADEQUATE
PMV BLD AUTO: 11.3 FL (ref 9.4–12.3)
POTASSIUM REFLEX MAGNESIUM: 5 MMOL/L (ref 3.5–5)
RBC # BLD: 3.94 M/UL (ref 4.2–5.4)
SODIUM BLD-SCNC: 139 MMOL/L (ref 136–145)
TOTAL PROTEIN: 5.9 G/DL (ref 6.6–8.7)
WBC # BLD: 8.5 K/UL (ref 4.8–10.8)

## 2022-01-29 PROCEDURE — 36415 COLL VENOUS BLD VENIPUNCTURE: CPT

## 2022-01-29 PROCEDURE — 6370000000 HC RX 637 (ALT 250 FOR IP): Performed by: PSYCHIATRY & NEUROLOGY

## 2022-01-29 PROCEDURE — 85025 COMPLETE CBC W/AUTO DIFF WBC: CPT

## 2022-01-29 PROCEDURE — 1240000000 HC EMOTIONAL WELLNESS R&B

## 2022-01-29 PROCEDURE — 6370000000 HC RX 637 (ALT 250 FOR IP): Performed by: HOSPITALIST

## 2022-01-29 PROCEDURE — 80053 COMPREHEN METABOLIC PANEL: CPT

## 2022-01-29 PROCEDURE — 82947 ASSAY GLUCOSE BLOOD QUANT: CPT

## 2022-01-29 PROCEDURE — 99233 SBSQ HOSP IP/OBS HIGH 50: CPT | Performed by: PSYCHIATRY & NEUROLOGY

## 2022-01-29 RX ORDER — SODIUM CHLORIDE 0.9 % (FLUSH) 0.9 %
5-40 SYRINGE (ML) INJECTION EVERY 12 HOURS SCHEDULED
Status: CANCELLED | OUTPATIENT
Start: 2022-01-29

## 2022-01-29 RX ORDER — SODIUM CHLORIDE 9 MG/ML
25 INJECTION, SOLUTION INTRAVENOUS PRN
Status: CANCELLED | OUTPATIENT
Start: 2022-01-29

## 2022-01-29 RX ORDER — POLYETHYLENE GLYCOL 3350 17 G/17G
17 POWDER, FOR SOLUTION ORAL DAILY PRN
Status: CANCELLED | OUTPATIENT
Start: 2022-01-29

## 2022-01-29 RX ORDER — ONDANSETRON 2 MG/ML
4 INJECTION INTRAMUSCULAR; INTRAVENOUS EVERY 6 HOURS PRN
Status: CANCELLED | OUTPATIENT
Start: 2022-01-29

## 2022-01-29 RX ORDER — CALCIUM CARBONATE 200(500)MG
500 TABLET,CHEWABLE ORAL 3 TIMES DAILY PRN
Status: CANCELLED | OUTPATIENT
Start: 2022-01-29

## 2022-01-29 RX ORDER — LANOLIN ALCOHOL/MO/W.PET/CERES
3 CREAM (GRAM) TOPICAL NIGHTLY
Status: DISCONTINUED | OUTPATIENT
Start: 2022-01-29 | End: 2022-02-03 | Stop reason: HOSPADM

## 2022-01-29 RX ORDER — NICOTINE POLACRILEX 4 MG
15 LOZENGE BUCCAL PRN
Status: CANCELLED | OUTPATIENT
Start: 2022-01-29

## 2022-01-29 RX ORDER — ATORVASTATIN CALCIUM 20 MG/1
20 TABLET, FILM COATED ORAL NIGHTLY
Status: DISCONTINUED | OUTPATIENT
Start: 2022-01-29 | End: 2022-02-03 | Stop reason: HOSPADM

## 2022-01-29 RX ORDER — ACETAMINOPHEN 325 MG/1
650 TABLET ORAL EVERY 4 HOURS PRN
Status: DISCONTINUED | OUTPATIENT
Start: 2022-01-29 | End: 2022-02-03 | Stop reason: HOSPADM

## 2022-01-29 RX ORDER — LOSARTAN POTASSIUM 50 MG/1
50 TABLET ORAL DAILY
Refills: 3 | Status: DISCONTINUED | OUTPATIENT
Start: 2022-01-29 | End: 2022-02-03 | Stop reason: HOSPADM

## 2022-01-29 RX ORDER — ATORVASTATIN CALCIUM 20 MG/1
20 TABLET, FILM COATED ORAL NIGHTLY
Status: CANCELLED | OUTPATIENT
Start: 2022-01-29

## 2022-01-29 RX ORDER — QUETIAPINE FUMARATE 200 MG/1
200 TABLET, FILM COATED ORAL NIGHTLY
Status: DISCONTINUED | OUTPATIENT
Start: 2022-01-29 | End: 2022-02-03 | Stop reason: HOSPADM

## 2022-01-29 RX ORDER — DIVALPROEX SODIUM 500 MG/1
1000 TABLET, EXTENDED RELEASE ORAL DAILY
Status: DISCONTINUED | OUTPATIENT
Start: 2022-01-29 | End: 2022-02-03 | Stop reason: HOSPADM

## 2022-01-29 RX ORDER — MIRTAZAPINE 15 MG/1
30 TABLET, FILM COATED ORAL NIGHTLY
Status: DISCONTINUED | OUTPATIENT
Start: 2022-01-29 | End: 2022-01-29

## 2022-01-29 RX ORDER — ALBUTEROL SULFATE 90 UG/1
2 AEROSOL, METERED RESPIRATORY (INHALATION) EVERY 6 HOURS PRN
Status: DISCONTINUED | OUTPATIENT
Start: 2022-01-29 | End: 2022-01-29 | Stop reason: SDUPTHER

## 2022-01-29 RX ORDER — ACETAMINOPHEN 650 MG/1
650 SUPPOSITORY RECTAL EVERY 6 HOURS PRN
Status: CANCELLED | OUTPATIENT
Start: 2022-01-29

## 2022-01-29 RX ORDER — IBUPROFEN 600 MG/1
600 TABLET ORAL
Refills: 3 | Status: DISCONTINUED | OUTPATIENT
Start: 2022-01-29 | End: 2022-02-03 | Stop reason: HOSPADM

## 2022-01-29 RX ORDER — CHLORPROMAZINE HYDROCHLORIDE 25 MG/ML
50 INJECTION INTRAMUSCULAR EVERY 8 HOURS PRN
Status: CANCELLED | OUTPATIENT
Start: 2022-01-29

## 2022-01-29 RX ORDER — ALBUTEROL SULFATE 2.5 MG/3ML
2.5 SOLUTION RESPIRATORY (INHALATION) EVERY 6 HOURS PRN
Status: CANCELLED | OUTPATIENT
Start: 2022-01-29

## 2022-01-29 RX ORDER — POLYETHYLENE GLYCOL 3350 17 G/17G
17 POWDER, FOR SOLUTION ORAL DAILY PRN
Status: DISCONTINUED | OUTPATIENT
Start: 2022-01-29 | End: 2022-02-03 | Stop reason: HOSPADM

## 2022-01-29 RX ORDER — DEXTROSE MONOHYDRATE 50 MG/ML
100 INJECTION, SOLUTION INTRAVENOUS PRN
Status: CANCELLED | OUTPATIENT
Start: 2022-01-29

## 2022-01-29 RX ORDER — ONDANSETRON 4 MG/1
4 TABLET, ORALLY DISINTEGRATING ORAL EVERY 8 HOURS PRN
Status: CANCELLED | OUTPATIENT
Start: 2022-01-29

## 2022-01-29 RX ORDER — MIRTAZAPINE 7.5 MG/1
7.5 TABLET, FILM COATED ORAL NIGHTLY
Status: DISCONTINUED | OUTPATIENT
Start: 2022-01-29 | End: 2022-02-03 | Stop reason: HOSPADM

## 2022-01-29 RX ORDER — FLUTICASONE PROPIONATE 50 MCG
2 SPRAY, SUSPENSION (ML) NASAL DAILY
Status: CANCELLED | OUTPATIENT
Start: 2022-01-29

## 2022-01-29 RX ORDER — NICOTINE 21 MG/24HR
1 PATCH, TRANSDERMAL 24 HOURS TRANSDERMAL DAILY
Status: DISCONTINUED | OUTPATIENT
Start: 2022-01-29 | End: 2022-02-03 | Stop reason: HOSPADM

## 2022-01-29 RX ORDER — ALBUTEROL SULFATE 2.5 MG/3ML
2.5 SOLUTION RESPIRATORY (INHALATION) EVERY 6 HOURS PRN
Status: DISCONTINUED | OUTPATIENT
Start: 2022-01-29 | End: 2022-02-03 | Stop reason: HOSPADM

## 2022-01-29 RX ORDER — FERROUS SULFATE 325(65) MG
325 TABLET ORAL
Status: CANCELLED | OUTPATIENT
Start: 2022-01-29

## 2022-01-29 RX ORDER — FLUTICASONE PROPIONATE 50 MCG
2 SPRAY, SUSPENSION (ML) NASAL DAILY
Status: DISCONTINUED | OUTPATIENT
Start: 2022-01-30 | End: 2022-02-03 | Stop reason: HOSPADM

## 2022-01-29 RX ORDER — IPRATROPIUM BROMIDE AND ALBUTEROL SULFATE 2.5; .5 MG/3ML; MG/3ML
1 SOLUTION RESPIRATORY (INHALATION) EVERY 6 HOURS PRN
Status: DISCONTINUED | OUTPATIENT
Start: 2022-01-29 | End: 2022-02-03 | Stop reason: HOSPADM

## 2022-01-29 RX ORDER — FERROUS SULFATE 325(65) MG
325 TABLET ORAL
Status: DISCONTINUED | OUTPATIENT
Start: 2022-01-30 | End: 2022-02-03 | Stop reason: HOSPADM

## 2022-01-29 RX ORDER — MECOBALAMIN 5000 MCG
5 TABLET,DISINTEGRATING ORAL NIGHTLY PRN
Status: CANCELLED | OUTPATIENT
Start: 2022-01-29

## 2022-01-29 RX ORDER — FUROSEMIDE 20 MG/1
20 TABLET ORAL DAILY
Status: DISCONTINUED | OUTPATIENT
Start: 2022-01-29 | End: 2022-02-03 | Stop reason: HOSPADM

## 2022-01-29 RX ORDER — ACETAMINOPHEN 325 MG/1
650 TABLET ORAL EVERY 6 HOURS PRN
Status: CANCELLED | OUTPATIENT
Start: 2022-01-29

## 2022-01-29 RX ORDER — SODIUM CHLORIDE 0.9 % (FLUSH) 0.9 %
5-40 SYRINGE (ML) INJECTION PRN
Status: CANCELLED | OUTPATIENT
Start: 2022-01-29

## 2022-01-29 RX ORDER — PANTOPRAZOLE SODIUM 40 MG/1
40 TABLET, DELAYED RELEASE ORAL
Status: DISCONTINUED | OUTPATIENT
Start: 2022-01-30 | End: 2022-02-03 | Stop reason: HOSPADM

## 2022-01-29 RX ADMIN — ATORVASTATIN CALCIUM 20 MG: 20 TABLET, FILM COATED ORAL at 20:17

## 2022-01-29 RX ADMIN — FUROSEMIDE 20 MG: 20 TABLET ORAL at 15:40

## 2022-01-29 RX ADMIN — IBUPROFEN 600 MG: 600 TABLET ORAL at 16:30

## 2022-01-29 RX ADMIN — LOSARTAN POTASSIUM 50 MG: 50 TABLET, FILM COATED ORAL at 16:33

## 2022-01-29 RX ADMIN — MIRTAZAPINE 7.5 MG: 7.5 TABLET ORAL at 20:16

## 2022-01-29 RX ADMIN — QUETIAPINE FUMARATE 200 MG: 200 TABLET ORAL at 20:16

## 2022-01-29 RX ADMIN — METFORMIN HYDROCHLORIDE 500 MG: 500 TABLET ORAL at 16:42

## 2022-01-29 RX ADMIN — Medication 3 MG: at 20:16

## 2022-01-29 RX ADMIN — DIVALPROEX SODIUM 1000 MG: 500 TABLET, EXTENDED RELEASE ORAL at 15:40

## 2022-01-29 RX ADMIN — FERROUS SULFATE TAB 325 MG (65 MG ELEMENTAL FE) 325 MG: 325 (65 FE) TAB at 09:11

## 2022-01-29 ASSESSMENT — SLEEP AND FATIGUE QUESTIONNAIRES
RESTFUL SLEEP: NO
DIFFICULTY FALLING ASLEEP: YES
DIFFICULTY ARISING: NO
DIFFICULTY STAYING ASLEEP: YES
SLEEP PATTERN: RESTLESSNESS;DIFFICULTY FALLING ASLEEP
DO YOU USE A SLEEP AID: YES
DO YOU HAVE DIFFICULTY SLEEPING: YES

## 2022-01-29 ASSESSMENT — PAIN SCALES - GENERAL
PAINLEVEL_OUTOF10: 7
PAINLEVEL_OUTOF10: 7

## 2022-01-29 ASSESSMENT — LIFESTYLE VARIABLES: HISTORY_ALCOHOL_USE: YES

## 2022-01-29 ASSESSMENT — PAIN DESCRIPTION - LOCATION: LOCATION: BACK

## 2022-01-29 ASSESSMENT — PATIENT HEALTH QUESTIONNAIRE - PHQ9: SUM OF ALL RESPONSES TO PHQ QUESTIONS 1-9: 19

## 2022-01-29 ASSESSMENT — PAIN DESCRIPTION - ORIENTATION: ORIENTATION: LOWER

## 2022-01-29 ASSESSMENT — PAIN - FUNCTIONAL ASSESSMENT: PAIN_FUNCTIONAL_ASSESSMENT: ACTIVITIES ARE NOT PREVENTED

## 2022-01-29 ASSESSMENT — PAIN DESCRIPTION - DESCRIPTORS: DESCRIPTORS: ACHING

## 2022-01-29 ASSESSMENT — PAIN DESCRIPTION - PROGRESSION: CLINICAL_PROGRESSION: OTHER (COMMENT)

## 2022-01-29 ASSESSMENT — PAIN DESCRIPTION - ONSET: ONSET: ON-GOING

## 2022-01-29 ASSESSMENT — PAIN DESCRIPTION - FREQUENCY: FREQUENCY: INTERMITTENT

## 2022-01-29 ASSESSMENT — PAIN DESCRIPTION - PAIN TYPE: TYPE: CHRONIC PAIN

## 2022-01-29 NOTE — PROGRESS NOTES
Patient slamming door, and cursing staff, coming out in hallway and demanding pain meds, refusing to go back to room,refusing to wear mask and slammed door AGAIN Electronically signed by Joss Bernard RN on 1/28/2022 at 8:03 PM

## 2022-01-29 NOTE — DISCHARGE SUMMARY
[facility] [address]  DEPARTMENT OF HOSPITALIST MEDICINE    DISCHARGE SUMMARY:        Felipe Eddy  :  1955  MRN:  [id number]    Admit date:  2022  Discharge date: 2022      Admitting Physician:  Reilly Miller MD    Advance Directive: Full Code    Consults Made:   IP CONSULT TO PSYCHIATRY      Primary Care Physician:  Roxy Cannon, APRN    Discharge Diagnoses: Active Problems:    Depression    Anticholinergic crisis, intentional self-harm, initial encounter ([facility] [address]    Suicide attempt ([facility] [address]    Borderline personality disorder ([facility] [address]  Resolved Problems:    * No resolved hospital problems. *      Pertinent Labs:  CBC with DIFF:  Recent Labs     22  0431   WBC 9.5 8.5   RBC 4.41 3.94*   HGB 11.2* 9.8*   HCT 39.1 34.5*   MCV 88.7 87.6   MCH 25.4* 24.9*   MCHC 28.6* 28.4*   RDW 16.6* 16.5*    250   MPV 10.7 11.3   NEUTOPHILPCT 58.2 60.4   LYMPHOPCT 27.1 26.3   MONOPCT 11.1* 9.1   EOSRELPCT 2.6 3.3   BASOPCT 0.8 0.8   NEUTROABS 5.5 5.1   LYMPHSABS 2.6 2.2   MONOSABS 1.10* 0.80   EOSABS 0.30 0.30   BASOSABS 0.10 0.10       CMP/BMP:  Recent Labs     22  0431    139   K 4.1 5.0    108   CO2 24 22   ANIONGAP 13 9   GLUCOSE 72* 96   BUN 15 32*   CREATININE 0.7 1.0*   LABGLOM >60 56*   CALCIUM 8.8 7.9*   PROT 6.7 5.9*   LABALBU 3.9 3.6   BILITOT <0.2 <0.2   ALKPHOS 209* 205*   ALT 24 17   AST 24 15         CRP:  No results for input(s): CRP in the last 72 hours. Sed Rate:  No results for input(s): SEDRATE in the last 72 hours. HgBA1c:  No components found for: HGBA1C  FLP:    Lab Results   Component Value Date    TRIG 188 2021    HDL 46 2021    LDLCALC 86 2021     TSH:    Lab Results   Component Value Date    TSH 2.470 2021     Troponin T: No results for input(s): TROPONINI in the last 72 hours. Pro-BNP: No results for input(s): BNP in the last 72 hours.   INR: No results for input(s): INR in the last 72 hours. ABGs: No results found for: PHART, PO2ART, LHB1SEM  UA:  Recent Labs     01/27/22  2303   COLORU YELLOW   PHUR 6.0   WBCUA 16*   RBCUA 1   BACTERIA 1+*   CLARITYU Clear   SPECGRAV 1.012   LEUKOCYTESUR SMALL*   UROBILINOGEN 0.2   BILIRUBINUR Negative   BLOODU Negative   GLUCOSEU Negative         Culture Results:    No results for input(s): CXSURG in the last 720 hours. Blood Culture Recent: No results for input(s): BC in the last 720 hours. Cultures:   No results for input(s): CULTURE in the last 72 hours. No results for input(s): BC, Yanira Si in the last 72 hours. No results for input(s): CXSURG in the last 72 hours. No results for input(s): MG, PHOS in the last 72 hours. Recent Labs     01/27/22  2200 01/29/22  0431   AST 24 15   ALT 24 17   BILITOT <0.2 <0.2   ALKPHOS 209* 205*           Significant Diagnostic Studies:   No results found. Hospital Course:   Ms. Andrew Lerma, a 82-year-old female, history of DMT2, COPD, HTN, bipolar disorder, presented to [facility] ED (01/28/2021), via EMS, on account of attempted suicide. Patient reportedly took unknown number of Benadryl. Patient admitted to medicine service, for closer monitoring prior to possibly being admitted to acute inpatient psych. One-to-one sitter remains at bedside for safety    Patient admitted to writer that she was actively suicidal homicidal upon evaluation yesterday (01/08/2022)    Patient reportedly became agitated and combative, and attempted to leave [address], and hence requiring a 72-hour hold to be placed by writer-starting at 4:15 PM, 0128/2022. Patient evaluated by psych, and team to meet criteria for inpatient psych. Patient to be discharged/transferred to acute inpatient psych for further work-up and management. Physical Exam:  Vital Signs: /66   Pulse 65   Temp 99.1 °F (37.3 °C) (Temporal)   Resp 18   SpO2 92%   Physical Exam  Vitals and nursing note reviewed. Constitutional:       General: She is not in acute distress. Appearance: Normal appearance. She is obese. She is not ill-appearing, toxic-appearing or diaphoretic. HENT:      Head: Normocephalic and atraumatic. Right Ear: External ear normal.      Left Ear: External ear normal.      Nose: Nose normal. No congestion or rhinorrhea. Mouth/Throat:      Mouth: Mucous membranes are moist.      Pharynx: Oropharynx is clear. No oropharyngeal exudate or posterior oropharyngeal erythema. Eyes:      General: No scleral icterus. Right eye: No discharge. Left eye: No discharge. Extraocular Movements: Extraocular movements intact. Conjunctiva/sclera: Conjunctivae normal.      Pupils: Pupils are equal, round, and reactive to light. Cardiovascular:      Rate and Rhythm: Normal rate and regular rhythm. Pulses: Normal pulses. Heart sounds: Normal heart sounds. No murmur heard. No friction rub. No gallop. Pulmonary:      Effort: Pulmonary effort is normal. No respiratory distress. Breath sounds: Normal breath sounds. No stridor. No wheezing, rhonchi or rales. Chest:      Chest wall: No tenderness. Abdominal:      General: Bowel sounds are normal. There is no distension. Palpations: Abdomen is soft. Tenderness: There is no abdominal tenderness. There is no guarding or rebound. Musculoskeletal:         General: No swelling, tenderness, deformity or signs of injury. Normal range of motion. Cervical back: Normal range of motion and neck supple. No rigidity. No muscular tenderness. Right lower leg: No edema. Left lower leg: No edema. Skin:     General: Skin is warm and dry. Capillary Refill: Capillary refill takes less than 2 seconds. Coloration: Skin is not jaundiced or pale. Findings: No bruising, erythema, lesion or rash. Neurological:      General: No focal deficit present.       Mental Status: She is alert and oriented to person, place, and time. Cranial Nerves: No cranial nerve deficit. Sensory: No sensory deficit. Motor: No weakness. Coordination: Coordination normal.   Psychiatric:         Mood and Affect: Mood normal.         Behavior: Behavior normal.         Thought Content: Thought content normal.         Judgment: Judgment normal.         Discharge Medications:        Medication List      ASK your doctor about these medications    Accu-Chek Softclix Lancets Misc  CHECK BLOOD SUGAR TWICE DAILY AND AS NEEDED     albuterol sulfate  (90 Base) MCG/ACT inhaler  Commonly known as: Ventolin HFA  Inhale 2 puffs into the lungs every 6 hours as needed for Wheezing     Aristada 441 MG/1.6ML Prsy injection  Generic drug: ARIPiprazole lauroxil  Inject 1.6 mLs into the muscle every 14 days     atorvastatin 20 MG tablet  Commonly known as: LIPITOR  Take 1 tablet by mouth daily     benztropine 1 MG tablet  Commonly known as: COGENTIN  Take 1 tablet by mouth nightly     blood glucose monitor kit and supplies  Test 3 times a day & as needed for symptoms of irregular blood glucose. Dx:     blood glucose test strips  Test 3 times a day & as needed for symptoms of irregular blood glucose. clobetasol 0.05 % ointment  Commonly known as: TEMOVATE  Apply topically 2 times daily.      diclofenac 75 MG EC tablet  Commonly known as: VOLTAREN  Take 1 tablet by mouth 2 times daily     divalproex 500 MG DR tablet  Commonly known as: DEPAKOTE  Take 1 tablet by mouth 3 times daily     ferrous sulfate 325 (65 Fe) MG tablet  Commonly known as: FeroSul  Take 1 tablet by mouth daily (with breakfast)     fluticasone 50 MCG/ACT nasal spray  Commonly known as: Flonase  2 sprays by Nasal route daily     FreeStyle Twyla Charlotte Olga Lidia  1 Units by Does not apply route 2 times daily     FreeStyle Twyla Sensor System Misc  1 Units by Does not apply route 2 times daily     furosemide 20 MG tablet  Commonly known as: LASIX  Take 1 tablet by mouth daily     Futuro Soft Cervical Collar Misc  Wear for 1 week.     gabapentin 300 MG capsule  Commonly known as: NEURONTIN  Take 1 capsule by mouth 3 times daily for 14 days. Insulin Pen Needle 31G X 8 MM Misc  Commonly known as: B-D ULTRAFINE III SHORT PEN  Inject 1 each as directed daily     ipratropium-albuterol 0.5-2.5 (3) MG/3ML Soln nebulizer solution  Commonly known as: DUONEB  Inhale 3 mLs into the lungs every 6 hours as needed for Shortness of Breath     irbesartan 150 MG tablet  Commonly known as: AVAPRO  Take 1 tablet by mouth nightly     linaCLOtide 72 MCG Caps capsule  Commonly known as: Linzess  Take 1 capsule by mouth every morning (before breakfast)     melatonin 3 MG Tabs tablet  Take 1 tablet by mouth nightly     metFORMIN 500 MG tablet  Commonly known as: GLUCOPHAGE  Take 1 tablet by mouth 2 times daily (with meals)     mirtazapine 30 MG tablet  Commonly known as: REMERON  Take 1 tablet by mouth nightly     nystatin 229556 UNIT/GM powder  Commonly known as: MYCOSTATIN  Apply 3 times daily. OXYGEN     pantoprazole 40 MG tablet  Commonly known as: PROTONIX  Take 1 tablet by mouth daily. QUEtiapine 200 MG tablet  Commonly known as: SEROQUEL  Take 1 tablet by mouth nightly     Victoza 18 MG/3ML Sopn SC injection  Generic drug: Liraglutide  Inject 1.8 mg into the skin daily     vitamin B-12 500 MCG tablet  Commonly known as: CYANOCOBALAMIN  Take 1 tablet by mouth daily     vitamin D 1.25 MG (63563 UT) Caps capsule  Commonly known as: ERGOCALCIFEROL  Take 1 capsule by mouth weekly. Discharge Instructions: Follow up with JORGE New in 7 days. Take medications as directed. Resume activity as tolerated. Diet: ADULT DIET;  Regular        DISCHARGE STATUS:    Condition: Fair  Disposition: Patient is medically stable and will be discharged/transferred to acute inpatient psych    Extended Emergency Contact Information  Primary Emergency Contact: Conchita Alberto [address] Phone: [phone]  Relation: Brother/Sister       Time Spent on discharge is  32 mins in the examination, evaluation, counseling and review of medications and discharge plan. Electronically signed by   Darcie Gerard MD, MPH, MD,   Internal Medicine Hospitalist   1/29/2022 10:09 AM      Thank you JORGE Anne for the opportunity to be involved in this patient's care. If you have any questions or concerns please feel free to contact me at [phone]        EMR Dragon/Transcription disclaimer:   Much of this encounter note is an electronic transcription/translation of spoken language to printed text.  The electronic translation of spoken language may permit erroneous, or at times, nonsensical words or phrases to be inadvertently transcribed; although attempts have made to review the note for such errors, some may still exist.
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
## 2022-01-29 NOTE — BH NOTE
Department of Psychiatry  Attending Progress Note - Geriatric      SUBJECTIVE:    74years old female with previous psychiatric history of bipolar disorder, who has been admitted to medical services secondary to intentional overdose on Benadryl. Patient has been seen in her room with presence of one-to-one sitter. Patient reported her condition significantly improved since time of admission to the hospital and her mood is \"better\" today. She denies any affective symptomatology today, denies any depression, anxiety or psychotic symptoms and asked to be discharged from the hospital.  However, she stated that she is homeless and does not have any place to go. Patient denies current active suicidal or homicidal ideations, denies any plans. Also, she denies any auditory and visual hallucinations. I discussed with patient possibility to transfer her to psychiatric unit for full psychiatric evaluation and possible psychotropic medications management. Patient agreed. OBJECTIVE    Physical  VITALS:  /66   Pulse 65   Temp 99.1 °F (37.3 °C) (Temporal)   Resp 18   SpO2 92%   TEMPERATURE:  Current - Temp: 99.1 °F (37.3 °C); Max - Temp  Av.5 °F (36.9 °C)  Min: 97.3 °F (36.3 °C)  Max: 99.7 °F (37.6 °C)  RESPIRATIONS RANGE: Resp  Av.7  Min: 17  Max: 18  PULSE RANGE: Pulse  Av.2  Min: 65  Max: 81  BLOOD PRESSURE RANGE:  Systolic (64OPK), JVC:640 , Min:101 , ZDC:011   ; Diastolic (54OEG), CMZ:44, Min:66, Max:112    PULSE OXIMETRY RANGE: SpO2  Av.8 %  Min: 92 %  Max: 95 %      Mental Status Examination:    Appearance: Appropriately groomed and in hospital attire. Made ntermittent eye contact. Behavior: Anxious, restless, cooperative with interview, socially appropriate. No psychomotor retardation or agitation appreciated. Gait was not evaluated, as patient was lying down on her bed. Speech: Normal in tone, volume, and quality. Mood: \"better\"   Affect: Mood congruent.  Range is mildly restricted  Thought Process: Mostly linear and goal oriented. Thought Content: Patient does not have any current active suicidal and homicidal ideations. No overt delusions or paranoia appreciated. Perceptions: Seems patient does not have any auditory or visual hallucinations at present time. Patient did not appear to be responding to internal stimuli. No overt psychosis. Orientation: to person, place, date, and situation. Alert. Impulsivity: Limited. Insight: Impaired. Judgment: Impaired.     Data  Labs:    CBC with Differential:    Lab Results   Component Value Date    WBC 8.5 01/29/2022    RBC 3.94 01/29/2022    HGB 9.8 01/29/2022    HCT 34.5 01/29/2022    HCT 41.3 01/06/2012     01/29/2022     01/06/2012    MCV 87.6 01/29/2022    MCH 24.9 01/29/2022    MCHC 28.4 01/29/2022    RDW 16.5 01/29/2022    LYMPHOPCT 26.3 01/29/2022    MONOPCT 9.1 01/29/2022    EOSPCT 1.3 01/06/2012    BASOPCT 0.8 01/29/2022    MONOSABS 0.80 01/29/2022    LYMPHSABS 2.2 01/29/2022    EOSABS 0.30 01/29/2022    BASOSABS 0.10 01/29/2022     CMP:    Lab Results   Component Value Date     01/29/2022     01/06/2012    K 5.0 01/29/2022    K 4.6 01/06/2012     01/29/2022     01/06/2012    CO2 22 01/29/2022    BUN 32 01/29/2022    CREATININE 1.0 01/29/2022    CREATININE 0.4 01/06/2012    GFRAA >59 01/29/2022    LABGLOM 56 01/29/2022    GLUCOSE 96 01/29/2022    PROT 5.9 01/29/2022    PROT 6.9 04/05/2016    LABALBU 3.6 01/29/2022    LABALBU 4.3 01/06/2012    CALCIUM 7.9 01/29/2022    BILITOT <0.2 01/29/2022    ALKPHOS 205 01/29/2022    ALKPHOS 111 01/06/2012    AST 15 01/29/2022    ALT 17 01/29/2022       Medications  Current Facility-Administered Medications: albuterol (PROVENTIL) nebulizer solution 2.5 mg, 2.5 mg, Nebulization, Q1H PRN  Liraglutide (VICTOZA) SC injection 1.8 mg, 1.8 mg, SubCUTAneous, Daily  insulin lispro (HUMALOG) injection vial 0-12 Units, 0-12 Units, SubCUTAneous, TID WC  insulin lispro (HUMALOG) injection vial 0-6 Units, 0-6 Units, SubCUTAneous, Nightly  glucose (GLUTOSE) 40 % oral gel 15 g, 15 g, Oral, PRN  glucagon (rDNA) injection 1 mg, 1 mg, IntraMUSCular, PRN  dextrose 5 % solution, 100 mL/hr, IntraVENous, PRN  ferrous sulfate (IRON 325) tablet 325 mg, 325 mg, Oral, Daily with breakfast  fluticasone (FLONASE) 50 MCG/ACT nasal spray 2 spray, 2 spray, Nasal, Daily  linaCLOtide (LINZESS) capsule 72 mcg, 72 mcg, Oral, QAM AC  atorvastatin (LIPITOR) tablet 20 mg, 20 mg, Oral, Nightly  sodium chloride flush 0.9 % injection 5-40 mL, 5-40 mL, IntraVENous, 2 times per day  sodium chloride flush 0.9 % injection 5-40 mL, 5-40 mL, IntraVENous, PRN  0.9 % sodium chloride infusion, 25 mL, IntraVENous, PRN  enoxaparin (LOVENOX) injection 40 mg, 40 mg, SubCUTAneous, Daily  ondansetron (ZOFRAN-ODT) disintegrating tablet 4 mg, 4 mg, Oral, Q8H PRN **OR** ondansetron (ZOFRAN) injection 4 mg, 4 mg, IntraVENous, Q6H PRN  acetaminophen (TYLENOL) tablet 650 mg, 650 mg, Oral, Q6H PRN **OR** acetaminophen (TYLENOL) suppository 650 mg, 650 mg, Rectal, Q6H PRN  polyethylene glycol (GLYCOLAX) packet 17 g, 17 g, Oral, Daily PRN  melatonin disintegrating tablet 5 mg, 5 mg, Oral, Nightly PRN  calcium carbonate (TUMS) chewable tablet 500 mg, 500 mg, Oral, TID PRN  dextrose bolus (hypoglycemia) 10% 125 mL, 125 mL, IntraVENous, PRN **OR** dextrose bolus (hypoglycemia) 10% 250 mL, 250 mL, IntraVENous, PRN  chlorproMAZINE (THORAZINE) injection 50 mg, 50 mg, IntraMUSCular, Q8H PRN    ASSESSMENT AND PLAN    DSM-5 DIAGNOSIS:   Depression unspecified  S/p Suicide attempt  Borderline personality disorder  Tobacco use disorder  Homelessness    Recommendations  1. Currently patient is not suicidal, homicidal or psychotic, however, considering seriousness of the recent suicidal attempt, patient will get benefits from admission to psychiatric unit.   2. One-to-one sitter can be discontinued and patient can be transferred to psychiatric unit, when she is medically stable.

## 2022-01-29 NOTE — PROGRESS NOTES
Behavioral Services  Medicare Certification Upon Admission    I certify that this patient's inpatient psychiatric hospital admission is medically necessary for:    [x] (1) Treatment which could reasonably be expected to improve this patient's condition,       [x] (2) Or for diagnostic study;     AND     [x](2) The inpatient psychiatric services are provided while the individual is under the care of a physician and are included in the individualized plan of care      Estimated length of stay/service 3-7 days    Plan for post-hospital care TBD    Electronically signed by Inga Moore MD on 1/29/2022 at 2:52 PM

## 2022-01-30 LAB
FERRITIN: 8.3 NG/ML (ref 13–150)
GLUCOSE BLD-MCNC: 77 MG/DL (ref 70–99)
GLUCOSE BLD-MCNC: 79 MG/DL (ref 70–99)
GLUCOSE BLD-MCNC: 98 MG/DL (ref 70–99)
IRON SATURATION: 9 % (ref 14–50)
IRON: 36 UG/DL (ref 37–145)
ORGANISM: ABNORMAL
PERFORMED ON: NORMAL
TOTAL IRON BINDING CAPACITY: 396 UG/DL (ref 250–400)
URINE CULTURE, ROUTINE: ABNORMAL
URINE CULTURE, ROUTINE: ABNORMAL
VITAMIN B-12: 231 PG/ML (ref 211–946)

## 2022-01-30 PROCEDURE — 82728 ASSAY OF FERRITIN: CPT

## 2022-01-30 PROCEDURE — 6370000000 HC RX 637 (ALT 250 FOR IP): Performed by: PSYCHIATRY & NEUROLOGY

## 2022-01-30 PROCEDURE — 83540 ASSAY OF IRON: CPT

## 2022-01-30 PROCEDURE — 1240000000 HC EMOTIONAL WELLNESS R&B

## 2022-01-30 PROCEDURE — 36415 COLL VENOUS BLD VENIPUNCTURE: CPT

## 2022-01-30 PROCEDURE — 83550 IRON BINDING TEST: CPT

## 2022-01-30 PROCEDURE — 99223 1ST HOSP IP/OBS HIGH 75: CPT | Performed by: PSYCHIATRY & NEUROLOGY

## 2022-01-30 PROCEDURE — 82947 ASSAY GLUCOSE BLOOD QUANT: CPT

## 2022-01-30 PROCEDURE — 82607 VITAMIN B-12: CPT

## 2022-01-30 RX ORDER — ONDANSETRON 4 MG/1
4 TABLET, FILM COATED ORAL EVERY 6 HOURS PRN
Status: DISCONTINUED | OUTPATIENT
Start: 2022-01-30 | End: 2022-02-03 | Stop reason: HOSPADM

## 2022-01-30 RX ADMIN — METFORMIN HYDROCHLORIDE 500 MG: 500 TABLET ORAL at 08:55

## 2022-01-30 RX ADMIN — FERROUS SULFATE TAB 325 MG (65 MG ELEMENTAL FE) 325 MG: 325 (65 FE) TAB at 08:55

## 2022-01-30 RX ADMIN — ACETAMINOPHEN 650 MG: 325 TABLET ORAL at 04:44

## 2022-01-30 RX ADMIN — PANTOPRAZOLE SODIUM 40 MG: 40 TABLET, DELAYED RELEASE ORAL at 06:45

## 2022-01-30 RX ADMIN — QUETIAPINE FUMARATE 200 MG: 200 TABLET ORAL at 20:32

## 2022-01-30 RX ADMIN — DIVALPROEX SODIUM 1000 MG: 500 TABLET, EXTENDED RELEASE ORAL at 08:55

## 2022-01-30 RX ADMIN — ATORVASTATIN CALCIUM 20 MG: 20 TABLET, FILM COATED ORAL at 20:32

## 2022-01-30 RX ADMIN — IBUPROFEN 600 MG: 600 TABLET ORAL at 08:55

## 2022-01-30 RX ADMIN — ACETAMINOPHEN 650 MG: 325 TABLET ORAL at 20:37

## 2022-01-30 RX ADMIN — FUROSEMIDE 20 MG: 20 TABLET ORAL at 08:55

## 2022-01-30 RX ADMIN — LOSARTAN POTASSIUM 50 MG: 50 TABLET, FILM COATED ORAL at 08:55

## 2022-01-30 RX ADMIN — IBUPROFEN 600 MG: 600 TABLET ORAL at 12:30

## 2022-01-30 RX ADMIN — METFORMIN HYDROCHLORIDE 500 MG: 500 TABLET ORAL at 16:51

## 2022-01-30 RX ADMIN — IBUPROFEN 600 MG: 600 TABLET ORAL at 16:51

## 2022-01-30 RX ADMIN — MIRTAZAPINE 7.5 MG: 7.5 TABLET ORAL at 20:31

## 2022-01-30 RX ADMIN — Medication 3 MG: at 20:31

## 2022-01-30 ASSESSMENT — PAIN DESCRIPTION - PAIN TYPE
TYPE: CHRONIC PAIN
TYPE: ACUTE PAIN
TYPE: CHRONIC PAIN

## 2022-01-30 ASSESSMENT — ENCOUNTER SYMPTOMS
GASTROINTESTINAL NEGATIVE: 1
RESPIRATORY NEGATIVE: 1

## 2022-01-30 ASSESSMENT — PAIN DESCRIPTION - PROGRESSION
CLINICAL_PROGRESSION: NOT CHANGED
CLINICAL_PROGRESSION: GRADUALLY IMPROVING
CLINICAL_PROGRESSION: NOT CHANGED

## 2022-01-30 ASSESSMENT — PAIN DESCRIPTION - LOCATION
LOCATION: BACK

## 2022-01-30 ASSESSMENT — PAIN DESCRIPTION - FREQUENCY
FREQUENCY: INTERMITTENT
FREQUENCY: CONTINUOUS
FREQUENCY: INTERMITTENT

## 2022-01-30 ASSESSMENT — PAIN DESCRIPTION - ONSET
ONSET: ON-GOING

## 2022-01-30 ASSESSMENT — PAIN DESCRIPTION - DESCRIPTORS
DESCRIPTORS: ACHING
DESCRIPTORS: ACHING

## 2022-01-30 ASSESSMENT — PAIN - FUNCTIONAL ASSESSMENT
PAIN_FUNCTIONAL_ASSESSMENT: ACTIVITIES ARE NOT PREVENTED

## 2022-01-30 ASSESSMENT — PAIN SCALES - GENERAL
PAINLEVEL_OUTOF10: 5
PAINLEVEL_OUTOF10: 10
PAINLEVEL_OUTOF10: 0
PAINLEVEL_OUTOF10: 8
PAINLEVEL_OUTOF10: 2
PAINLEVEL_OUTOF10: 6
PAINLEVEL_OUTOF10: 0

## 2022-01-30 ASSESSMENT — PAIN DESCRIPTION - ORIENTATION
ORIENTATION: LOWER

## 2022-01-30 ASSESSMENT — PAIN SCALES - WONG BAKER: WONGBAKER_NUMERICALRESPONSE: 0

## 2022-01-30 NOTE — PROGRESS NOTES
Admission Note      Reason for admission/Target Symptom: Patient admitted to [facility] due to reported overdose on unknown amount of Benadryl by EMS a few nights ago. On EMS arrival, patient was combative and aggressive towards them and officers who responded. She is unable to provide any additional history. Patient was empirically given 2 mg of Narcan with no change in status. Patient placed on nasal cannula oxygen in route. EMS notes patient's daughter was recently seen here for Benadryl overdose as well. After her true identity and chart were identified, prior records show history of type 2 diabetes, COPD, Hypertension, Arthritis, and Bipolar Disorder. She has been Suge[place] packing\" the last few days, as she states that she is moving to [place]. She has overdosed on Benadryl before arrival to ED, but when asked if she had intended to harm herself, she states that she never did this before. She states that she is currently homeless and is moving [facility] in [facility]. She later admitted her plan was that they would find her dead in the house. She states, \"I want to go to sleep and not wake up. \"      Diagnoses: Intentional Drug Overdose, initial encounter; Depression, unspecified; Borderline Personality; Bipolar Disorder    UDS: Negative   BAL: Negative <10     SW met with treatment team to discuss patient's treatment including care planning, discharge planning, and follow-up needs. Pt has been admitted to [facility]. Treatment team has identified patient's discharge needs as medication management and outpatient therapy/counseling. Pt confirmed  the need for ongoing treatment post inpatient stay. Pt was also provided a handout of contact information for drug and alcohol treatment centers and other community support service such as EL, AA, and [facility].

## 2022-01-30 NOTE — PROGRESS NOTES
[facility] Adult Unit Daily Assessment  Nursing Progress Note    Room: [facility]/615-01   Name: Gerald Black   Age: 74 y.o. Gender: female   Dx: <principal problem not specified>  Precautions: suicide risk  Inpatient Status: voluntary       SLEEP:    Sleep Quality Good  Sleep Medications: Yes , melatonin 3 mg, remeron 7.5 mg  PRN Sleep Meds: No       MEDICAL:    Other PRN Meds: No   Med Compliant: Yes  Accu-Chek: Yes  Oxygen/CPAP/BiPAP: No  CIWA/CINA: No   PAIN Assessment: none  Side Effects from medication: No    Is Patient experiencing any respiratory symptoms (headache, fever, body aches, cough. Tania Sida ): no  Patient educated by nursing to practice social distancing, wear masks, wash hands frequently: yes      PSYCH:    Depression: 10   Anxiety: 10   SI denies suicidal ideation   HI Negative for homicidal ideation      AVH:Absent      GENERAL:    Appetite: good    Social: Yes   Speech: normal   Appearance: appropriately dressed and healthy looking    GROUP:    Group Participation: Yes  Participation Quality: None    Notes: Patient was in the dayroom socializing with her peers. Patient was anxious, and worried about her daughter. Patient stated that her daughter was suicidal. Patient called the  department and reported her daughters condition, Continue to monitor for safety.          Electronically signed by Jocelyn Castrejon RN on 1/29/22 at 10:08 PM CST

## 2022-01-30 NOTE — PROGRESS NOTES
Requirement Note     SW met with pt to complete Psychosocial and CSSR-S on this date. Patients long and short term goals discussed. Patient voiced understanding. Treatment plan sheet signed. Patient verbalized understanding of the treatment plan. Patient participated in goals and objectives of the treatment plan. Patient discussed safety plan with . In the last 6 months has the pt been a danger to self: YES  In the last 6 months has the pt been a danger to others: NO  Legal Guardian/POA: NO     Provided patient with [employer] handout entitled \"Quitting Smoking. \"  Reviewed handout with patient: addressing dangers of smoking, developing coping skills, and providing basic information about quitting. Patient received all components practical counseling of tobacco practical counseling during the hospital stay.

## 2022-01-30 NOTE — H&P
Department of Psychiatry  Geriatrics  Attending History and Physical        CHIEF COMPLAINT: Intentional overdose by Benadryl    Reason for Admission to Psychiatric Unit:  Threat to self requiring 24 hour professional observation    The patient requires intensive 24 level of care for the following reasons:  the need for patient safety    HISTORY OF PRESENT ILLNESS:         The primary source(s) of information include(s):  Patient        The patient is a 74 y.o. female with previous psychiatric history of bipolar disorder, who initially was admitted to medical services secondary to intentional overdose on Benadryl. When patient's condition stabilized, she was transferred to psychiatric unit due to safety concern and for possible psychotropic medications management    For initial psychiatric evaluation, please, refer to the consult note of Dr. Robert Garcia, as reflected below:  \"Patient has been seen in her room with presence of one-to-one sitter. Patient reported her condition significantly improved since time of admission to the hospital and her mood is \"better\" today. She denies any affective symptomatology today, denies any depression, anxiety or psychotic symptoms and asked to be discharged from the hospital.  However, she stated that she is homeless and does not have any place to go. Patient denies current active suicidal or homicidal ideations, denies any plans. Also, she denies any auditory and visual hallucinations. \"    Patient has been seen in treatment team room. She reported that she tried to kill herself after arguments with her daughter, stated that her daughter was disrespectful to her, called her by different inappropriate names and told the patient that she does not want to deal with her anymore. Patient reported that she felt very stressed and decided to kill herself and overdosed on Benadryl.     Today during the interview, she endorses feeling of depression, anxiety, poor motivation, poor concentration, decreased pleasure in previously enjoyed activities, feeling of hopelessness and helplessness. Patient endorses fair appetite and reported that her sleep significantly improved during this hospital stay, stated that she slept 8 hours during the last night after adjustment of psychotropic medications. She denies mood swings, racing thoughts or increased irritability. Patient denies current active suicidal or homicidal ideations, denies any plans. Also, she denies any auditory and visual hallucinations. Patient did not endorse any delusions or paranoid thoughts. Patient reported that after last discharge from our psychiatric unit in June 2020, she did not follow with her appointments and did not take any of prescribed psychotropic medications. At the end of the interview, patient stated that she wants to be discharged today, as her sister is moving out from the state and she told to patient that she can take patient with her. PSYCHIATRIC HISTORY:  Diagnoses: Bipolar disorder, borderline personality disorder  Suicide attempts/gestures: Twice, by overdose on Benadryl in 2020 and overdose on pills and antifreeze in 2016. History of cutting herself since early childhood, last time in 2000  Prior hospitalizations: 4-5 times to [facility], last time in 2020  Medication trials: Remeron, Zoloft, BuSpar, Vistaril, donepezil, Latuda, Abilify, Depakote, Seroquel.   Mental health contact: Lost to follow-up     Past Medical History:        Diagnosis Date    Alcohol overdose     history of    Anemia     Anxiety     Bipolar disorder ([address]     Brain tumor ([address]     Chronic back pain     ruptured discs    COPD (chronic obstructive pulmonary disease) ([address]     Dementia ([address]     Depression     Diabetes ([address]     Elevated liver enzymes     Frequent falls     Headache     History of kidney infection     Hyperlipidemia     Hypertension     Neuropathy     Obesity     Osteoarthritis  Oxygen dependent     Psoriasis     Scoliosis     Sleep apnea     UTI (urinary tract infection)      Past Surgical History:        Procedure Laterality Date    APPENDECTOMY       SECTION      CHOLECYSTECTOMY      GASTRIC BYPASS SURGERY      RETROPHYARYNGEAL ABCESS INCISION/DRAIN      TONSILLECTOMY      TUBAL LIGATION      TUMOR REMOVAL      bone tumor, r wrist     UPPER GASTROINTESTINAL ENDOSCOPY  2015    Tarik: sm hiatal hernia; s/p balbir-en-y; esoph plaques, pos yeast; neg CS     Medications Prior to Admission:   Medications Prior to Admission: mirtazapine (REMERON) 30 MG tablet, Take 1 tablet by mouth nightly  vitamin D (ERGOCALCIFEROL) 1.25 MG (69873 UT) CAPS capsule, Take 1 capsule by mouth weekly. diclofenac (VOLTAREN) 75 MG EC tablet, Take 1 tablet by mouth 2 times daily  linaCLOtide (LINZESS) 72 MCG CAPS capsule, Take 1 capsule by mouth every morning (before breakfast)  atorvastatin (LIPITOR) 20 MG tablet, Take 1 tablet by mouth daily  ferrous sulfate (FEROSUL) 325 (65 Fe) MG tablet, Take 1 tablet by mouth daily (with breakfast)  Liraglutide (VICTOZA) 18 MG/3ML SOPN SC injection, Inject 1.8 mg into the skin daily  metFORMIN (GLUCOPHAGE) 500 MG tablet, Take 1 tablet by mouth 2 times daily (with meals)  QUEtiapine (SEROQUEL) 200 MG tablet, Take 1 tablet by mouth nightly  furosemide (LASIX) 20 MG tablet, Take 1 tablet by mouth daily  benztropine (COGENTIN) 1 MG tablet, Take 1 tablet by mouth nightly  pantoprazole (PROTONIX) 40 MG tablet, Take 1 tablet by mouth daily. irbesartan (AVAPRO) 150 MG tablet, Take 1 tablet by mouth nightly  melatonin 3 MG TABS tablet, Take 1 tablet by mouth nightly  [DISCONTINUED] divalproex (DEPAKOTE) 500 MG DR tablet, Take 1 tablet by mouth 3 times daily  ARIPiprazole lauroxil (ARISTADA) 441 MG/1.6ML PRSY injection, Inject 1.6 mLs into the muscle every 14 days  Elastic Bandages & Supports (FUTURO SOFT CERVICAL COLLAR) MISC, Wear for 1 week.   clobetasol (TEMOVATE) 0.05 % ointment, Apply topically 2 times daily. gabapentin (NEURONTIN) 300 MG capsule, Take 1 capsule by mouth 3 times daily for 14 days. nystatin (MYCOSTATIN) 365310 UNIT/GM powder, Apply 3 times daily. albuterol sulfate HFA (VENTOLIN HFA) 108 (90 Base) MCG/ACT inhaler, Inhale 2 puffs into the lungs every 6 hours as needed for Wheezing  Insulin Pen Needle (B-D ULTRAFINE III SHORT PEN) 31G X 8 MM MISC, Inject 1 each as directed daily  ACCU-CHEK SOFTCLIX LANCETS MISC, CHECK BLOOD SUGAR TWICE DAILY AND AS NEEDED  fluticasone (FLONASE) 50 MCG/ACT nasal spray, 2 sprays by Nasal route daily  vitamin B-12 (CYANOCOBALAMIN) 500 MCG tablet, Take 1 tablet by mouth daily  blood glucose monitor kit and supplies, Test 3 times a day & as needed for symptoms of irregular blood glucose. Dx:  blood glucose monitor strips, Test 3 times a day & as needed for symptoms of irregular blood glucose. OXYGEN, Inhale 3 L into the lungs  ipratropium-albuterol (DUONEB) 0.5-2.5 (3) MG/3ML SOLN nebulizer solution, Inhale 3 mLs into the lungs every 6 hours as needed for Shortness of Breath  Continuous Blood Gluc  (FREESTYLE ABDOULAYE READER) MARCIA, 1 Units by Does not apply route 2 times daily  Continuous Blood Gluc Sensor (FREESTYLE ABDOULAYE SENSOR SYSTEM) MISC, 1 Units by Does not apply route 2 times daily    Allergies:  Haldol [haloperidol lactate], Morphine, and Codeine    Social History:  Patient reported that she rented house, where she lived with her daughter and her sister. She reported that her sister and her daughter moved out and she was not able to pay her rent and she was just evicted a few days ago and currently she is homeless.     Smoking-1 PPD  Patient denies history of drinking alcohol  Illicit drugs-reported history of smoking marijuana in the past    Family History:       Problem Relation Age of Onset    Diabetes Mother     Kidney Disease Mother     High Blood Pressure Mother     Cancer Mother     Ovarian Cancer Mother 43    Diabetes Father     High Blood Pressure Father     Heart Disease Father     Diabetes Brother     High Blood Pressure Brother     Diabetes Sister     High Blood Pressure Sister     Diabetes Brother     High Blood Pressure Brother     Cancer Maternal Aunt     Colon Cancer Neg Hx     Colon Polyps Neg Hx     Esophageal Cancer Neg Hx     Liver Cancer Neg Hx     Liver Disease Neg Hx     Rectal Cancer Neg Hx     Stomach Cancer Neg Hx      Psychiatric Family History  No family history    REVIEW OF SYSTEMS:  General: Endorses feeling of depression, anxiety, decreased sleep. No fevers, chills, night sweats, no recent weight loss or gain. Head: No headache, no migraine. Eyes: No recent visual changes. Ears: No recent hearing changes. Nose: No increased congestion or change in sense of smell. Throat: No sore throat, no trouble swallowing. Cardiovascular: No chest pain or palpitations, or dizziness. Respiratory: No cough, wheezes, congestion, or shortness of breath. Gastrointestinal: No abdominal pain, nausea or vomiting, no diarrhea or constipation. Musculo-skeletal: No edema, deformities, or loss of functions. Neurological: No loss of consciousness, abnormal sensations, or weakness. Skin: No rash, abrasions or bruises. PHYSICAL EXAM:    Vitals:  /87   Pulse 84   Temp 97.7 °F (36.5 °C) (Oral)   Resp 20   SpO2 97%     Mental Status Examination:   Appearance: Appropriately groomed, wearing casual civilian clothes. Made good eye contact. Behavior: Calm, cooperative and socially appropriate. No psychomotor retardation/agitation appreciated. Gait unremarkable. Speech: Normal in tone, volume, and quality. No pressured speech noted. Mood: \"Fine\"   Affect: Mood congruent. Range is mildly restricted  Thought Process: Mostly linear and goal oriented, sometimes circumstantial  Thought Content: Patient does not have any current active suicidal and homicidal ideations.  No overt delusions or paranoia appreciated. Perceptions: Seems patient does not have any auditory or visual hallucinations at present time. Patient did not appear to be responding to internal stimuli. No overt psychosis. Executive Functions: Appear mildly impaired. Concentration: Mildly decreased  Reasoning: Appears impaired based on interaction from interview   Orientation: to person, place, date, and situation. Alert. Language: Intact. Fund of information: Intact. Memory: recent and remote appear intact. Impulsivity: Limited  Neurovegitative: Fair appetite, improved sleep. Insight: Impaired  Judgment: Impaired    Physical Exam:  GENERAL APPEARANCE: 74y.o. year-old female in NAD   HEAD: Normocephalic, atraumatic. THROAT: No erythema, exudates, lesions. No tongue laceration. CARDIOVASCULAR: PMI nondisplaced. Regular rhythm and rate. Normal S1 and S2.  PULMONARY: Clear to auscultation bilaterally, no tenderness to palpation. ABDOMEN: Soft, obese, nontender, nondistended. MUSCULOSKELTAL: No obvious deformities, clubbing, cyanosis or edema, no spinous process or paraspinous tenderness, normal ROM, normal gait, distal pulses intact symmetric 1+ bilaterally. NEUROLOGICAL: Alert, oriented x 4, CN II-XII grossly intact, motor strength 2-3/5 all muscle groups, DTR 1+ intact and symmetric, sensation intact to sharp and dull. No abnormal movements or tremors.    SKIN: Warm, dry, intact, no rash, abrasions or bruises      DATA:  Labs:    CBC with Differential:    Lab Results   Component Value Date    WBC 8.5 01/29/2022    RBC 3.94 01/29/2022    HGB 9.8 01/29/2022    HCT 34.5 01/29/2022    HCT 41.3 01/06/2012     01/29/2022     01/06/2012    MCV 87.6 01/29/2022    MCH 24.9 01/29/2022    MCHC 28.4 01/29/2022    RDW 16.5 01/29/2022    LYMPHOPCT 26.3 01/29/2022    MONOPCT 9.1 01/29/2022    EOSPCT 1.3 01/06/2012    BASOPCT 0.8 01/29/2022    MONOSABS 0.80 01/29/2022    LYMPHSABS 2.2 01/29/2022 EOSABS 0.30 01/29/2022    BASOSABS 0.10 01/29/2022     CMP:    Lab Results   Component Value Date     01/29/2022     01/06/2012    K 5.0 01/29/2022    K 4.6 01/06/2012     01/29/2022     01/06/2012    CO2 22 01/29/2022    BUN 32 01/29/2022    CREATININE 1.0 01/29/2022    CREATININE 0.4 01/06/2012    GFRAA >59 01/29/2022    LABGLOM 56 01/29/2022    GLUCOSE 96 01/29/2022    PROT 5.9 01/29/2022    PROT 6.9 04/05/2016    LABALBU 3.6 01/29/2022    LABALBU 4.3 01/06/2012    CALCIUM 7.9 01/29/2022    BILITOT <0.2 01/29/2022    ALKPHOS 205 01/29/2022    ALKPHOS 111 01/06/2012    AST 15 01/29/2022    ALT 17 01/29/2022       DSM 5 DIAGNOSIS:  Depression unspecified  S/p Suicide attempt  Borderline personality disorder  History of bipolar disorder  Tobacco use disorder  Treatment noncompliance  Homelessness    Plan:   -Admit to [address] [place] [address] psych Unit and monitor on 15 minute checks  -Gary Estrada reviewed. -Gather collateral information from family with release  -Medical monitoring to be performed by Dr. Carmen Ivory and associates  -Acclimate to the unit. -Encourage participation in groups and therapeutic activities as appropriate.  -Medications:     Will restart patient's home medications at previously prescribed and recommended dose, due to patient's treatment noncompliance  Will decrease dose of Remeron from 30 mg to 7.5 mg to address issues with insomnia  Patient has been provided with nicotine patch 21 mg / 24 hours for nicotine replacement.    -The risks, benefits, side effects, indications, contraindications, and adverse effects of the medications have been discussed.  -The patient has verbalized understanding and has capacity to give informed consent.  -SW help evaluating home environment.   -Discuss with treatment team.

## 2022-01-30 NOTE — PROGRESS NOTES
[facility] Daily Shift Assessment  Nursing Progress Note    Room: 06/615-01 Name: Laura Griffin Age: 74 y.o. Gender: female   Dx: <principal problem not specified>  Precautions: close watch, suicide risk and fall risk  Target Symptoms:   Accu-Chek: Yes Sleep: Yes,Sleep Quality Good SI No [facility] denies [facility]  ADLs: Yes Speech: pressured Depression: 10 Anxiety: 10   Participation LevelActive Listener and Interactive  Appetite: Very good  Respiratory symptoms: No Headache: No Body aches: No Fever: No Cough: No  Patients encouraged to wear masks, wash hands frequently and practice social distancing while on the unit: Yes  Visitation: No   Participation QualityAppropriate, Attentive, Sharing and Supportive    Complaints: none    Notes: Patient is very anxious and fearful about living arrangements due to her being homeless due to eviction as of 1/31/22. Patient is cooperative and coherent.     Signature: Deonte Murphy RN

## 2022-01-30 NOTE — H&P
HISTORY AND PHYSICAL    uJ Howell is a 74 y.o.  female admitted through ED to medical floor after taking an unknown number of Benadryl. She became agitated and attempted to leave AMA while on the medical floor and was placed on 72 hr hold. She was medically stabilized on the medical floor and transferred to [facility]. She denies any SI at this time.     HISTORY:    Patient Active Problem List   Diagnosis    Hypertension    Osteoarthritis    Psoriasis    COPD (chronic obstructive pulmonary disease) ([address]    Controlled type 2 diabetes mellitus with complication, without long-term current use of insulin (HCC)    Sleep apnea    Obesity    Depression    Frequent falls    Oxygen dependent    Anemia    Alternating constipation and diarrhea    Chronic nausea    S/P gastric bypass    Polypharmacy    Diabetic neuropathy associated with type 2 diabetes mellitus ([facility])    Bipolar I disorder, most recent episode mixed, severe with psychotic features ([address]    Intentional drug overdose ([address]    Tobacco use disorder    Anticholinergic crisis, intentional self-harm, initial encounter ([address]    Suicide attempt ([address]    Borderline personality disorder ([address]       Past Medical History:   Diagnosis Date    Alcohol overdose     history of    Anemia     Anxiety     Bipolar disorder ([address]     Brain tumor ([address]     Chronic back pain     ruptured discs    COPD (chronic obstructive pulmonary disease) ([address]     Dementia ([address]     Depression     Diabetes ([address]     Elevated liver enzymes     Frequent falls     Headache     History of kidney infection     Hyperlipidemia     Hypertension     Neuropathy     Obesity     Osteoarthritis     Oxygen dependent     Psoriasis     Scoliosis     Sleep apnea     UTI (urinary tract infection)        Family History   Problem Relation Age of Onset    Diabetes Mother     Kidney Disease Mother     High Blood Pressure Mother     Cancer Mother     Ovarian Cancer Mother 43    Diabetes Father     High Blood Pressure Father     Heart Disease Father     Diabetes Brother     High Blood Pressure Brother     Diabetes Sister     High Blood Pressure Sister     Diabetes Brother     High Blood Pressure Brother     Cancer Maternal Aunt     Colon Cancer Neg Hx     Colon Polyps Neg Hx     Esophageal Cancer Neg Hx     Liver Cancer Neg Hx     Liver Disease Neg Hx     Rectal Cancer Neg Hx     Stomach Cancer Neg Hx        Social History     Socioeconomic History    Marital status:      Spouse name: Not on file    Number of children: Not on file    Years of education: Not on file    Highest education level: Not on file   Occupational History    Not on file   Tobacco Use    Smoking status: Current Every Day Smoker     Packs/day: 0.50     Types: Cigarettes    Smokeless tobacco: Never Used   Vaping Use    Vaping Use: Never used   Substance and Sexual Activity    Alcohol use: Yes     Comment: occ    Drug use: Yes     Types: Marijuana Dolan Meckel)     Comment: occ    Sexual activity: Not Currently   Other Topics Concern    Not on file   Social History Narrative    Not on file     Social Determinants of Health     Financial Resource Strain:     Difficulty of Paying Living Expenses: Not on file   Food Insecurity:     Worried About Running Out of Food in the Last Year: Not on file    Guero of Food in the Last Year: Not on file   Transportation Needs:     Lack of Transportation (Medical): Not on file    Lack of Transportation (Non-Medical):  Not on file   Physical Activity:     Days of Exercise per Week: Not on file    Minutes of Exercise per Session: Not on file   Stress:     Feeling of Stress : Not on file   Social Connections:     Frequency of Communication with Friends and Family: Not on file    Frequency of Social Gatherings with Friends and Family: Not on file    Attends [employer] Services: Not on file   [employer] or 6/24/21  Yes JORGE Lucas   irbesartan (AVAPRO) 150 MG tablet Take 1 tablet by mouth nightly 6/24/21  Yes JORGE Lucas   melatonin 3 MG TABS tablet Take 1 tablet by mouth nightly 12/28/20  Yes JORGE [place]   ARIPiprazole lauroxil (ARISTADA) 441 MG/1.6ML PRSY injection Inject 1.6 mLs into the muscle every 14 days 4/20/21   JORGE Lucas   Elastic Bandages & Supports (FUTURO SOFT CERVICAL COLLAR) [facility] Wear for 1 week. 3/30/21   JORGE Reyes   clobetasol (TEMOVATE) 0.05 % ointment Apply topically 2 times daily. 1/19/21   JORGE Lucas   gabapentin (NEURONTIN) 300 MG capsule Take 1 capsule by mouth 3 times daily for 14 days. 8/6/20 4/20/21  JORGE Lucas   nystatin (MYCOSTATIN) 495340 UNIT/GM powder Apply 3 times daily. 7/30/20   JORGE Lucas   albuterol sulfate HFA (VENTOLIN HFA) 108 (90 Base) MCG/ACT inhaler Inhale 2 puffs into the lungs every 6 hours as needed for Wheezing 7/30/20   JORGE Lucas   Insulin Pen Needle (B-D ULTRAFINE III SHORT PEN) 31G X 8 MM MISC Inject 1 each as directed daily 10/22/19   JORGE Lucas   ACCU-CHEK SOFTCLIX LANCETS MISC CHECK BLOOD SUGAR TWICE DAILY AND AS NEEDED 10/22/19   JORGE Lucas   fluticasone Faby Malcolm) 50 MCG/ACT nasal spray 2 sprays by Nasal route daily 10/22/19   JORGE Lucas   vitamin B-12 (CYANOCOBALAMIN) 500 MCG tablet Take 1 tablet by mouth daily 10/22/19 4/20/21  JORGE Lucas   blood glucose monitor kit and supplies Test 3 times a day & as needed for symptoms of irregular blood glucose. Dx: 1/3/19   JORGE Lucas   blood glucose monitor strips Test 3 times a day & as needed for symptoms of irregular blood glucose.  1/3/19   JORGE Lucas   OXYGEN Inhale 3 L into the lungs    Historical Provider, MD   ipratropium-albuterol (DUONEB) 0.5-2.5 (3) MG/3ML SOLN nebulizer solution Inhale 3 mLs into the lungs every 6 hours as needed for Shortness of Breath 10/2/18   JORGE Edward   Continuous Blood Gluc  (FREESTYLE ABDOULAYE READER) MARCIA 1 Units by Does not apply route 2 times daily 8/21/18   JORGE Edward   Continuous Blood Gluc Sensor ([address] [place] [address]) MISC 1 Units by Does not apply route 2 times daily 8/21/18   JORGE Edward         Current Facility-Administered Medications:     acetaminophen (TYLENOL) tablet 650 mg, 650 mg, Oral, Q4H PRN, Louvenia Mcburney, MD, 650 mg at 01/30/22 0444    polyethylene glycol (GLYCOLAX) packet 17 g, 17 g, Oral, Daily PRN, Louvenia Mcburney, MD    nicotine (NICODERM CQ) 21 MG/24HR 1 patch, 1 patch, TransDERmal, Daily, Louvenia Mcburney, MD, 1 patch at 01/30/22 0854    influenza quadrivalent split vaccine (FLUZONE;FLUARIX;FLULAVAL;AFLURIA) injection 0.5 mL, 0.5 mL, IntraMUSCular, Prior to discharge, Louvenia Mcburney, MD    atorvastatin (LIPITOR) tablet 20 mg, 20 mg, Oral, Nightly, Louvenia Mcburney, MD, 20 mg at 01/29/22 2017    ibuprofen (ADVIL;MOTRIN) tablet 600 mg, 600 mg, Oral, TID WC, Louvenia Mcburney, MD, 600 mg at 01/30/22 0855    divalproex (DEPAKOTE ER) extended release tablet 1,000 mg, 1,000 mg, Oral, Daily, Louvenia Mcburney, MD, 1,000 mg at 01/30/22 1176    ferrous sulfate (IRON 325) tablet 325 mg, 325 mg, Oral, Daily with breakfast, Louvenia Mcburney, MD, 325 mg at 01/30/22 0855    fluticasone (FLONASE) 50 MCG/ACT nasal spray 2 spray, 2 spray, Nasal, Daily, Louvenia Mcburney, MD    furosemide (LASIX) tablet 20 mg, 20 mg, Oral, Daily, Louvenia Mcburney, MD, 20 mg at 01/30/22 0855    ipratropium-albuterol (DUONEB) nebulizer solution 3 mL, 1 vial, Inhalation, Q6H PRN, Louvenia Mcburney, MD    losartan (COZAAR) tablet 50 mg, 50 mg, Oral, Daily, Louvenia Mcburney, MD, 50 mg at 01/30/22 0855    linaCLOtide (LINZESS) capsule 72 mcg, 72 mcg, Oral, QAM AC, Louvenia Mcburney, MD    metFORMIN (GLUCOPHAGE) tablet 500 mg, 500 mg, Oral, BID WC, Louvenia Mcburney, MD, 500 mg at 01/30/22 0855   QUEtiapine (SEROQUEL) tablet 200 mg, 200 mg, Oral, Nightly, Jennifer Corea MD, 200 mg at 01/29/22 2016    pantoprazole (PROTONIX) tablet 40 mg, 40 mg, Oral, QAM AC, Jennifer Corea MD, 40 mg at 01/30/22 0645    mirtazapine (REMERON) tablet 7.5 mg, 7.5 mg, Oral, Nightly, Jennifer Corea MD, 7.5 mg at 01/29/22 2016    melatonin tablet 3 mg, 3 mg, Oral, Nightly, Jennifer Corea MD, 3 mg at 01/29/22 2016    albuterol (PROVENTIL) nebulizer solution 2.5 mg, 2.5 mg, Nebulization, Q6H PRN, Jennifer Corea MD    insulin lispro (HUMALOG) injection vial 0-6 Units, 0-6 Units, SubCUTAneous, TID WC, Jennifer Corea MD    insulin lispro (HUMALOG) injection vial 0-3 Units, 0-3 Units, SubCUTAneous, Nightly, Jennifer Corea MD    Haldol [haloperidol lactate], Morphine, and Codeine    REVIEW OF SYSTEMS:    Review of Systems   Constitutional: Negative. HENT: Negative. Respiratory: Negative. Cardiovascular: Negative. Gastrointestinal: Negative. Genitourinary: Negative. Musculoskeletal: Negative. Skin: Negative. Neurological: Negative. Psychiatric/Behavioral: Positive for suicidal ideas. Depression       PHYSICAL EXAM:    BP (!) 104/53   Pulse 57   Temp 98.2 °F (36.8 °C) (Temporal)   Resp 18   SpO2 94%     Physical Exam  Vitals reviewed. Constitutional:       Appearance: Normal appearance. HENT:      Head: Normocephalic and atraumatic. Mouth/Throat:      Mouth: Mucous membranes are moist.      Pharynx: Oropharynx is clear. Eyes:      Extraocular Movements: Extraocular movements intact. Conjunctiva/sclera: Conjunctivae normal.      Pupils: Pupils are equal, round, and reactive to light. Cardiovascular:      Rate and Rhythm: Normal rate and regular rhythm. Pulses: Normal pulses. Heart sounds: Normal heart sounds. Pulmonary:      Effort: Pulmonary effort is normal. No respiratory distress. Breath sounds: Normal breath sounds. Abdominal:      General: Abdomen is flat. Bowel sounds are normal.      Palpations: Abdomen is soft. Musculoskeletal:         General: Normal range of motion. Cervical back: Normal range of motion and neck supple. Skin:     General: Skin is warm and dry. Capillary Refill: Capillary refill takes less than 2 seconds. Neurological:      General: No focal deficit present. Mental Status: She is alert and oriented to person, place, and time. Cranial Nerves: No cranial nerve deficit. Psychiatric:         Mood and Affect: Mood normal.         Behavior: Behavior normal.         Thought Content: Thought content normal.         Judgment: Judgment normal.         Recent Results (from the past 24 hour(s))   POCT Glucose    Collection Time: 01/29/22 11:52 AM   Result Value Ref Range    POC Glucose 90 70 - 99 mg/dl    Performed on AccuChek    POCT Glucose    Collection Time: 01/29/22  4:20 PM   Result Value Ref Range    POC Glucose 102 (H) 70 - 99 mg/dl    Performed on AccuChek    POCT Glucose    Collection Time: 01/29/22  8:13 PM   Result Value Ref Range    POC Glucose 104 (H) 70 - 99 mg/dl    Performed on AccuChek    POCT Glucose    Collection Time: 01/30/22  6:26 AM   Result Value Ref Range    POC Glucose 77 70 - 99 mg/dl    Performed on AccuChek          ASSESSMENT:  1. Depression with suicide attempt  2. Anemia    PLAN:    1. She is admitted to [facility]. Lab results and home meds reviewed. VSS. Will order additional lab due to anemia. Continue plan of care per psych. Alter treatment plan as needed.         JORGE Ye,   1/30/2022

## 2022-01-30 NOTE — PROGRESS NOTES
WRAP UP GROUP NOTE:     Patient's Goal:  Providing feedback as to their own progress in the care-plan provided. Pt's have an opportunity to explore self-reflective skills and share any additional cares and concerns not yet addressed. Pt effectively participated.      Energy level:  Low   Appetite:  Good   Concentration:   Poor   Hallucinations:  Blank   Depression:  Worse   Anxiety:  Constant   How I worked today:  Tried a lot  What helps me sleep:  Read   Any questions/complaints/comments:  No

## 2022-01-30 NOTE — PLAN OF CARE
Problem: Falls - Risk of:  Goal: Will remain free from falls  Description: Will remain free from falls  Outcome: Ongoing     Problem: Mood - Altered:  Goal: Mood stable  Description: Mood stable  Outcome: Ongoing     Problem: Anxiety:  Goal: Level of anxiety will decrease  Description: Level of anxiety will decrease  Outcome: Ongoing

## 2022-01-30 NOTE — GROUP NOTE
Group Therapy Note    Date: 1/29/2022    Group Start Time: 1415  Group End Time: 5579  Group Topic: Recreational    [facility]    Brianna Lora RN; Lauren Lloyd RN    Group Therapy Note                                                       Group Therapy Note    Date: 01/29/22  Start Time: 1415  End Time: 1806    Number of Participants: 4    Type of Group: Recreational    Patient's Goal:  Pt's will interact positively with peers and staff    Notes: Pt asked to participate in group, pt refused      Modes of Intervention: Education and Support    Discipline Responsible: Registered Nurse    Signature:  Brianna Lora RN  Electronically signed by Brianna Lora RN on 1/29/2022 at 6:01 PM

## 2022-01-30 NOTE — RESEARCH
SW attempted to call pt's sister, Thomas Hernandez @[phone], to obtain collateral information on the pt, but kept getting a busy signal.

## 2022-01-31 ENCOUNTER — TELEPHONE (OUTPATIENT)
Dept: PRIMARY CARE CLINIC | Age: 67
End: 2022-01-31

## 2022-01-31 LAB
ALBUMIN SERPL-MCNC: 3.9 G/DL (ref 3.5–5.2)
ALP BLD-CCNC: 175 U/L (ref 35–104)
ALT SERPL-CCNC: 16 U/L (ref 5–33)
ANION GAP SERPL CALCULATED.3IONS-SCNC: 16 MMOL/L (ref 7–19)
AST SERPL-CCNC: 16 U/L (ref 5–32)
BASOPHILS ABSOLUTE: 0.1 K/UL (ref 0–0.2)
BASOPHILS RELATIVE PERCENT: 0.6 % (ref 0–1)
BILIRUB SERPL-MCNC: <0.2 MG/DL (ref 0.2–1.2)
BUN BLDV-MCNC: 18 MG/DL (ref 8–23)
CALCIUM SERPL-MCNC: 8.4 MG/DL (ref 8.8–10.2)
CHLORIDE BLD-SCNC: 103 MMOL/L (ref 98–111)
CHOLESTEROL, TOTAL: 119 MG/DL (ref 160–199)
CO2: 20 MMOL/L (ref 22–29)
CREAT SERPL-MCNC: 0.7 MG/DL (ref 0.5–0.9)
EOSINOPHILS ABSOLUTE: 0.2 K/UL (ref 0–0.6)
EOSINOPHILS RELATIVE PERCENT: 2.2 % (ref 0–5)
GFR AFRICAN AMERICAN: >59
GFR NON-AFRICAN AMERICAN: >60
GLUCOSE BLD-MCNC: 79 MG/DL (ref 70–99)
GLUCOSE BLD-MCNC: 80 MG/DL (ref 74–109)
HBA1C MFR BLD: 5.6 % (ref 4–6)
HCT VFR BLD CALC: 35.1 % (ref 37–47)
HDLC SERPL-MCNC: 49 MG/DL (ref 65–121)
HEMOGLOBIN: 10.7 G/DL (ref 12–16)
IMMATURE GRANULOCYTES #: 0 K/UL
LDL CHOLESTEROL CALCULATED: 45 MG/DL
LYMPHOCYTES ABSOLUTE: 2.2 K/UL (ref 1.1–4.5)
LYMPHOCYTES RELATIVE PERCENT: 26.9 % (ref 20–40)
MCH RBC QN AUTO: 25.6 PG (ref 27–31)
MCHC RBC AUTO-ENTMCNC: 30.5 G/DL (ref 33–37)
MCV RBC AUTO: 84 FL (ref 81–99)
MONOCYTES ABSOLUTE: 0.9 K/UL (ref 0–0.9)
MONOCYTES RELATIVE PERCENT: 10.8 % (ref 0–10)
NEUTROPHILS ABSOLUTE: 4.9 K/UL (ref 1.5–7.5)
NEUTROPHILS RELATIVE PERCENT: 59.3 % (ref 50–65)
PDW BLD-RTO: 16.9 % (ref 11.5–14.5)
PERFORMED ON: NORMAL
PLATELET # BLD: 259 K/UL (ref 130–400)
PMV BLD AUTO: 10.8 FL (ref 9.4–12.3)
POTASSIUM SERPL-SCNC: 4.8 MMOL/L (ref 3.5–5)
RBC # BLD: 4.18 M/UL (ref 4.2–5.4)
SODIUM BLD-SCNC: 139 MMOL/L (ref 136–145)
TOTAL PROTEIN: 6.5 G/DL (ref 6.6–8.7)
TRIGL SERPL-MCNC: 124 MG/DL (ref 0–149)
WBC # BLD: 8.3 K/UL (ref 4.8–10.8)

## 2022-01-31 PROCEDURE — 6370000000 HC RX 637 (ALT 250 FOR IP): Performed by: PSYCHIATRY & NEUROLOGY

## 2022-01-31 PROCEDURE — 82306 VITAMIN D 25 HYDROXY: CPT

## 2022-01-31 PROCEDURE — 84443 ASSAY THYROID STIM HORMONE: CPT

## 2022-01-31 PROCEDURE — 99233 SBSQ HOSP IP/OBS HIGH 50: CPT | Performed by: PSYCHIATRY & NEUROLOGY

## 2022-01-31 PROCEDURE — 85025 COMPLETE CBC W/AUTO DIFF WBC: CPT

## 2022-01-31 PROCEDURE — 80053 COMPREHEN METABOLIC PANEL: CPT

## 2022-01-31 PROCEDURE — 82947 ASSAY GLUCOSE BLOOD QUANT: CPT

## 2022-01-31 PROCEDURE — 1240000000 HC EMOTIONAL WELLNESS R&B

## 2022-01-31 PROCEDURE — 6370000000 HC RX 637 (ALT 250 FOR IP): Performed by: NURSE PRACTITIONER

## 2022-01-31 PROCEDURE — 80061 LIPID PANEL: CPT

## 2022-01-31 PROCEDURE — 83036 HEMOGLOBIN GLYCOSYLATED A1C: CPT

## 2022-01-31 PROCEDURE — 36415 COLL VENOUS BLD VENIPUNCTURE: CPT

## 2022-01-31 RX ORDER — CHOLECALCIFEROL (VITAMIN D3) 125 MCG
500 CAPSULE ORAL DAILY
Status: DISCONTINUED | OUTPATIENT
Start: 2022-02-01 | End: 2022-02-03 | Stop reason: HOSPADM

## 2022-01-31 RX ADMIN — IBUPROFEN 600 MG: 600 TABLET ORAL at 15:50

## 2022-01-31 RX ADMIN — QUETIAPINE FUMARATE 200 MG: 200 TABLET ORAL at 20:26

## 2022-01-31 RX ADMIN — DIVALPROEX SODIUM 1000 MG: 500 TABLET, EXTENDED RELEASE ORAL at 07:39

## 2022-01-31 RX ADMIN — FUROSEMIDE 20 MG: 20 TABLET ORAL at 07:39

## 2022-01-31 RX ADMIN — ACETAMINOPHEN 650 MG: 325 TABLET ORAL at 20:26

## 2022-01-31 RX ADMIN — ATORVASTATIN CALCIUM 20 MG: 20 TABLET, FILM COATED ORAL at 20:26

## 2022-01-31 RX ADMIN — IBUPROFEN 600 MG: 600 TABLET ORAL at 07:39

## 2022-01-31 RX ADMIN — FERROUS SULFATE TAB 325 MG (65 MG ELEMENTAL FE) 325 MG: 325 (65 FE) TAB at 07:39

## 2022-01-31 RX ADMIN — MIRTAZAPINE 7.5 MG: 7.5 TABLET ORAL at 20:26

## 2022-01-31 RX ADMIN — Medication 3 MG: at 20:26

## 2022-01-31 RX ADMIN — METFORMIN HYDROCHLORIDE 500 MG: 500 TABLET ORAL at 15:50

## 2022-01-31 RX ADMIN — METFORMIN HYDROCHLORIDE 500 MG: 500 TABLET ORAL at 07:43

## 2022-01-31 RX ADMIN — FLUTICASONE PROPIONATE 2 SPRAY: 50 SPRAY, METERED NASAL at 07:39

## 2022-01-31 RX ADMIN — PANTOPRAZOLE SODIUM 40 MG: 40 TABLET, DELAYED RELEASE ORAL at 06:18

## 2022-01-31 RX ADMIN — ONDANSETRON HYDROCHLORIDE 4 MG: 4 TABLET, FILM COATED ORAL at 17:52

## 2022-01-31 RX ADMIN — IBUPROFEN 600 MG: 600 TABLET ORAL at 11:44

## 2022-01-31 ASSESSMENT — PAIN DESCRIPTION - FREQUENCY
FREQUENCY: INTERMITTENT
FREQUENCY: INTERMITTENT

## 2022-01-31 ASSESSMENT — PAIN DESCRIPTION - ONSET
ONSET: ON-GOING
ONSET: GRADUAL

## 2022-01-31 ASSESSMENT — PAIN DESCRIPTION - DESCRIPTORS
DESCRIPTORS: ACHING
DESCRIPTORS: ACHING

## 2022-01-31 ASSESSMENT — PAIN SCALES - GENERAL
PAINLEVEL_OUTOF10: 0
PAINLEVEL_OUTOF10: 7
PAINLEVEL_OUTOF10: 5
PAINLEVEL_OUTOF10: 5
PAINLEVEL_OUTOF10: 0
PAINLEVEL_OUTOF10: 1

## 2022-01-31 ASSESSMENT — PAIN - FUNCTIONAL ASSESSMENT
PAIN_FUNCTIONAL_ASSESSMENT: ACTIVITIES ARE NOT PREVENTED
PAIN_FUNCTIONAL_ASSESSMENT: ACTIVITIES ARE NOT PREVENTED

## 2022-01-31 ASSESSMENT — PAIN DESCRIPTION - PAIN TYPE
TYPE: CHRONIC PAIN
TYPE: ACUTE PAIN

## 2022-01-31 ASSESSMENT — PAIN DESCRIPTION - LOCATION
LOCATION: BACK
LOCATION: BACK

## 2022-01-31 ASSESSMENT — PAIN DESCRIPTION - PROGRESSION
CLINICAL_PROGRESSION: NOT CHANGED
CLINICAL_PROGRESSION: NOT CHANGED

## 2022-01-31 ASSESSMENT — PAIN DESCRIPTION - ORIENTATION
ORIENTATION: LOWER
ORIENTATION: LOWER

## 2022-01-31 ASSESSMENT — PAIN SCALES - WONG BAKER: WONGBAKER_NUMERICALRESPONSE: 0

## 2022-01-31 NOTE — PROGRESS NOTES
WRAP UP GROUP NOTE:     Patient's Goal:  Providing feedback as to their own progress in the care-plan provided. Pt's have an opportunity to explore self-reflective skills and share any additional cares and concerns not yet addressed. Pt effectively participated.      Energy level:  Normal   Appetite:  Normal   Concentration:   Normal   Hallucinations:  Never   Depression:  Improved   Anxiety:  Improved   How I worked today:  Worked hard  What helps me sleep:  [place]   Any questions/complaints/comments:  No

## 2022-01-31 NOTE — PLAN OF CARE
Problem: Altered Mood, Depressive Behavior:  Goal: Able to verbalize acceptance of life and situations over which he or she has no control  Description: Able to verbalize acceptance of life and situations over which he or she has no control  Outcome: Ongoing  Note:                                                                     Group Therapy Note    Date: 1/31/2022  Start Time: 1000  End Time:  1030  Number of Participants: 4    Type of Group: Psychoeducation    Wellness Binder Information  Module Name:  [facility] [employer]  Session Number:  1    Group Goal for Pt: To improve knowledge of effective stress management techniques    Notes:  Pt demonstrated improved knowledge of effective stress management techniques by actively participating in group discussion. Status After Intervention:  Unchanged    Participation Level:  Active Listener    Participation Quality: Appropriate and Attentive      Speech:  slurred      Thought Process/Content: Linear      Affective Functioning: Congruent      Mood: anxious and depressed      Level of consciousness:  Alert and Oriented x4      Response to Learning: Able to verbalize current knowledge/experience, Able to verbalize/acknowledge new learning, and Progressing to goal      Endings: None Reported    Modes of Intervention: Education      Discipline Responsible: Psychoeducational Specialist      Signature:  Angel De Los Santos
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
## 2022-01-31 NOTE — PLAN OF CARE
Problem: Suicide risk  Description: Suicide risk  Goal: Provide patient with safe environment  1/31/2022 0950 by Murry Hammans, RN  Outcome: Ongoing  1/30/2022 2129 by Radha Balbuena RN  Outcome: Ongoing     Problem: Falls - Risk of:  Goal: Will remain free from falls  1/31/2022 0950 by Murry Hammans, RN  Outcome: Ongoing  1/30/2022 2129 by Radha Balbuena RN  Outcome: Ongoing  Goal: Absence of physical injury  1/30/2022 2129 by Radha Balbuena RN  Outcome: Ongoing     Problem: Discharge Planning:  Goal: Discharged to appropriate level of care  1/31/2022 0950 by Murry Hammans, RN  Outcome: Ongoing  1/30/2022 2129 by Radha Balbuena RN  Outcome: Ongoing     Problem: Health Maintenance - Impaired:  Goal: Ability to perform activities of daily living will improve  1/31/2022 0950 by Murry Hammans, RN  Outcome: Ongoing  1/30/2022 2129 by Radha Balbuena RN  Outcome: Ongoing  Goal: Able to sleep without medication for appropriate length of time  1/30/2022 2129 by Radha Balbuena RN  Outcome: Ongoing  Goal: Maintenance of adequate nutrition will improve  1/31/2022 0950 by Murry Hammans, RN  Outcome: Ongoing  1/30/2022 2129 by Radha Balbuena RN  Outcome: Ongoing     Problem: Mood - Altered:  Goal: Mood stable  1/31/2022 0950 by Murry Hammans, RN  Outcome: Ongoing  1/30/2022 2129 by Radha Balbuena RN  Outcome: Ongoing     Problem: Self-Esteem - Low:  Goal: Demonstrates positive self-esteem  1/31/2022 0950 by Murry Hammans, RN  Outcome: Ongoing  1/30/2022 2129 by Radha Balbuena RN  Outcome: Ongoing     Problem: Violence - Risk of, Self/Other-Directed:  Goal: Knowledge of developmental care interventions  1/30/2022 2129 by Radha Balbuena RN  Outcome: Ongoing     Problem: Pain:  Description: Pain management should include both nonpharmacologic and pharmacologic interventions.   Goal: Pain level will decrease  1/30/2022 2129 by Radha Balbuena RN  Outcome: Ongoing  Goal: Control of acute pain  1/30/2022 2129 by [place] Leak, RN  Outcome: Ongoing  Goal: Control of chronic pain  1/30/2022 2129 by [place] Leak, RN  Outcome: Ongoing

## 2022-01-31 NOTE — PROGRESS NOTES
[facility] Daily Shift Assessment-Geriatric Unit  Nursing Progress Note          [place] [facility] [place] [facility] [place]   Name: Sylvia Ying   Age: 74 y.o. Gender: female   Dx: Depression  Precautions: suicide risk and fall risk  Inpatient Status: voluntary     SLEEP:    Sleep: Yes,   Sleep Quality Good   Hours Slept: 8   Sleep Medications: Yes  PRN Sleep Meds: No       MEDICAL:      Other PRN Meds: No   Med Compliant: Yes   Accu-Chek: Yes 79 this am   Oxygen/CPAP/BiPAP: No  CIWA/CINA: No   PAIN Assessment: present - adequately treated  Side Effects from medication: No    Is Patient experiencing any respiratory symptoms (headache, fever, body aches, cough. [facility] Latch ): no  Patient educated by nursing to practice social distancing, wear masks, wash hands frequently: yes      Metabolic Screening:    Lab Results   Component Value Date    LABA1C 5.6 01/31/2022       Lab Results   Component Value Date    CHOL 119 (L) 01/31/2022    CHOL 170 04/20/2021    CHOL 115 (L) 06/27/2020    CHOL 146 (L) 10/22/2019    CHOL 142 (L) 07/04/2019    CHOL 167 08/20/2017    CHOL 134 12/31/2015     Lab Results   Component Value Date    TRIG 124 01/31/2022    TRIG 188 (H) 04/20/2021    TRIG 128 06/27/2020    TRIG 125 10/22/2019    TRIG 143 07/04/2019    TRIG 258 (H) 08/20/2017    TRIG 162 12/31/2015     Lab Results   Component Value Date    HDL 49 (L) 01/31/2022    HDL 46 (L) 04/20/2021    HDL 38 (L) 06/27/2020    HDL 58 (L) 10/22/2019    HDL 48 (L) 07/04/2019    HDL 35 (L) 08/20/2017    HDL 44 12/31/2015     No components found for: LDLCAL  No results found for: LABVLDL      There is no height or weight on file to calculate BMI. BP Readings from Last 2 Encounters:   01/31/22 (!) 96/58   01/29/22 101/66         PSYCH:     SI denies suicidal ideation    HI Negative for homicidal ideation        AVH:Absent      Depression: 0 Anxiety: Rates as 0 but appears very nervous and anxious about her pets.        GENERAL:      Appetite: good  Social: Yes Speech: normal Appearance:appropriately dressed, disheveled, looks younger and healthy looking  Assistive Devices: noneLevel of Assist: Supervision      GROUP:    Group Participation: Yes  Participation LevelMinimal    Participation QualityAttentive    Notes:

## 2022-01-31 NOTE — PROGRESS NOTES
Group Therapy Note    Date: 3/10/2020  Start Time: 0730  End Time:  0800  Number of Participants: 4    Type of Group: Healthy Living/Wellness    Wellness Binder Information  Module Name:  Self-inventory  Session Number:      Patient's Goal:  Discharge, save pets and belongings        Status After Intervention:  Unchanged    Participation Level: Interactive    Participation Quality: Appropriate      Speech:  normal      Thought Process/Content: Linear      Affective Functioning: Congruent      Mood: stable      Level of consciousness:  Alert and Oriented x4      Response to Learning: Progressing to goal      Endings: None Reported    Modes of Intervention: Exploration      Discipline Responsible: Registered Nurse      Signature: Jasmin Gregory RN

## 2022-01-31 NOTE — PROGRESS NOTES
[facility] Adult Unit Daily Assessment  Nursing Progress Note    Room: [facility]/615-01   Name: Zay Knight   Age: 74 y.o. Gender: female   Dx: <principal problem not specified>  Precautions: suicide risk  Inpatient Status: voluntary       SLEEP:    Sleep Quality Good  Sleep Medications: Yes , melatonin 3 mg, remeron 7.5 mg  PRN Sleep Meds: No       MEDICAL:    Other PRN Meds: No   Med Compliant: Yes  Accu-Chek: Yes  Oxygen/CPAP/BiPAP: No  CIWA/CINA: No   PAIN Assessment: none  Side Effects from medication: No    Is Patient experiencing any respiratory symptoms (headache, fever, body aches, cough. Julia Wong ): no  Patient educated by nursing to practice social distancing, wear masks, wash hands frequently: yes      PSYCH:    Depression: 3   Anxiety: 3   SI denies suicidal ideation   HI Negative for homicidal ideation      AVH:Absent      GENERAL:    Appetite: good    Social: Yes   Speech: normal   Appearance: appropriately dressed and healthy looking    GROUP:    Group Participation: Yes  Participation Quality: Interactive    Notes:  Patient was in her room sleeping . Patient did wake up and was socializing with her peers in the dayroom. Patient complained of lower back pain. Tylenol 650 mg was given. Patient stated she was worried about her animals because she is being evicted from her apartment. Patient stated she was planning on staying on her medications and continue with therapy. Continue to monitor for safety.          Electronically signed by Eder Roman RN on 1/30/22 at 9:47 PM CST

## 2022-01-31 NOTE — PLAN OF CARE
Group Therapy Note    Date: 1/31/2022  Start Time: 1100  End Time:  1130  Number of Participants: 2    Type of Group: Cognitive Skills    Wellness Binder Information  Module Name:  Stress  Session Number:  1    Patient's Goal:  What causes stress    Notes:  Pt participated in group discussion about things in life that causes stress and ways to cope with life stressors. Status After Intervention:  Improved    Participation Level:  Active Listener and Interactive    Participation Quality: Appropriate, Attentive, and Sharing      Speech:  normal      Thought Process/Content: Logical      Affective Functioning: Flat      Mood: depressed      Level of consciousness:  Drowsy      Response to Learning: Able to verbalize current knowledge/experience and Able to verbalize/acknowledge new learning      Endings: None Reported    Modes of Intervention: Education, Support, Socialization, and Exploration      Discipline Responsible: /Counselor      Signature:  India Mast [facility] [place]

## 2022-02-01 LAB
GLUCOSE BLD-MCNC: 100 MG/DL (ref 70–99)
GLUCOSE BLD-MCNC: 116 MG/DL (ref 70–99)
GLUCOSE BLD-MCNC: 137 MG/DL (ref 70–99)
PERFORMED ON: ABNORMAL
TSH REFLEX FT4: 2.48 UIU/ML (ref 0.35–5.5)
VITAMIN D 25-HYDROXY: 11.4 NG/ML

## 2022-02-01 PROCEDURE — 6370000000 HC RX 637 (ALT 250 FOR IP): Performed by: PSYCHIATRY & NEUROLOGY

## 2022-02-01 PROCEDURE — 99233 SBSQ HOSP IP/OBS HIGH 50: CPT | Performed by: PSYCHIATRY & NEUROLOGY

## 2022-02-01 PROCEDURE — 1240000000 HC EMOTIONAL WELLNESS R&B

## 2022-02-01 PROCEDURE — 6370000000 HC RX 637 (ALT 250 FOR IP): Performed by: FAMILY MEDICINE

## 2022-02-01 PROCEDURE — 82947 ASSAY GLUCOSE BLOOD QUANT: CPT

## 2022-02-01 RX ORDER — ERGOCALCIFEROL 1.25 MG/1
50000 CAPSULE ORAL WEEKLY
Status: DISCONTINUED | OUTPATIENT
Start: 2022-02-01 | End: 2022-02-03 | Stop reason: HOSPADM

## 2022-02-01 RX ORDER — HYDROXYZINE HYDROCHLORIDE 25 MG/1
25 TABLET, FILM COATED ORAL ONCE
Status: COMPLETED | OUTPATIENT
Start: 2022-02-01 | End: 2022-02-01

## 2022-02-01 RX ORDER — HYDROXYZINE HYDROCHLORIDE 25 MG/1
25 TABLET, FILM COATED ORAL 3 TIMES DAILY PRN
Status: DISCONTINUED | OUTPATIENT
Start: 2022-02-01 | End: 2022-02-03 | Stop reason: HOSPADM

## 2022-02-01 RX ADMIN — METFORMIN HYDROCHLORIDE 500 MG: 500 TABLET ORAL at 16:38

## 2022-02-01 RX ADMIN — FUROSEMIDE 20 MG: 20 TABLET ORAL at 07:29

## 2022-02-01 RX ADMIN — METFORMIN HYDROCHLORIDE 500 MG: 500 TABLET ORAL at 07:30

## 2022-02-01 RX ADMIN — MIRTAZAPINE 7.5 MG: 7.5 TABLET ORAL at 21:18

## 2022-02-01 RX ADMIN — HYDROXYZINE HYDROCHLORIDE 25 MG: 25 TABLET, FILM COATED ORAL at 13:29

## 2022-02-01 RX ADMIN — ATORVASTATIN CALCIUM 20 MG: 20 TABLET, FILM COATED ORAL at 21:18

## 2022-02-01 RX ADMIN — IBUPROFEN 600 MG: 600 TABLET ORAL at 16:38

## 2022-02-01 RX ADMIN — DIVALPROEX SODIUM 1000 MG: 500 TABLET, EXTENDED RELEASE ORAL at 07:29

## 2022-02-01 RX ADMIN — FLUTICASONE PROPIONATE 2 SPRAY: 50 SPRAY, METERED NASAL at 10:01

## 2022-02-01 RX ADMIN — HYDROXYZINE HYDROCHLORIDE 25 MG: 25 TABLET, FILM COATED ORAL at 10:41

## 2022-02-01 RX ADMIN — ACETAMINOPHEN 650 MG: 325 TABLET ORAL at 13:07

## 2022-02-01 RX ADMIN — PANTOPRAZOLE SODIUM 40 MG: 40 TABLET, DELAYED RELEASE ORAL at 06:12

## 2022-02-01 RX ADMIN — IBUPROFEN 600 MG: 600 TABLET ORAL at 07:29

## 2022-02-01 RX ADMIN — Medication 3 MG: at 21:17

## 2022-02-01 RX ADMIN — CYANOCOBALAMIN TAB 500 MCG 500 MCG: 500 TAB at 07:30

## 2022-02-01 RX ADMIN — LOSARTAN POTASSIUM 50 MG: 50 TABLET, FILM COATED ORAL at 07:29

## 2022-02-01 RX ADMIN — FERROUS SULFATE TAB 325 MG (65 MG ELEMENTAL FE) 325 MG: 325 (65 FE) TAB at 07:29

## 2022-02-01 RX ADMIN — HYDROXYZINE HYDROCHLORIDE 25 MG: 25 TABLET, FILM COATED ORAL at 19:43

## 2022-02-01 RX ADMIN — IBUPROFEN 600 MG: 600 TABLET ORAL at 10:41

## 2022-02-01 RX ADMIN — QUETIAPINE FUMARATE 200 MG: 200 TABLET ORAL at 21:19

## 2022-02-01 RX ADMIN — ERGOCALCIFEROL 50000 UNITS: 1.25 CAPSULE ORAL at 10:01

## 2022-02-01 ASSESSMENT — PAIN SCALES - GENERAL
PAINLEVEL_OUTOF10: 9
PAINLEVEL_OUTOF10: 7
PAINLEVEL_OUTOF10: 0
PAINLEVEL_OUTOF10: 4
PAINLEVEL_OUTOF10: 0
PAINLEVEL_OUTOF10: 7
PAINLEVEL_OUTOF10: 7

## 2022-02-01 ASSESSMENT — PAIN SCALES - WONG BAKER: WONGBAKER_NUMERICALRESPONSE: 6

## 2022-02-01 ASSESSMENT — PAIN - FUNCTIONAL ASSESSMENT: PAIN_FUNCTIONAL_ASSESSMENT: ACTIVITIES ARE NOT PREVENTED

## 2022-02-01 ASSESSMENT — PAIN DESCRIPTION - LOCATION: LOCATION: BACK

## 2022-02-01 ASSESSMENT — PAIN DESCRIPTION - DESCRIPTORS: DESCRIPTORS: ACHING

## 2022-02-01 ASSESSMENT — PAIN DESCRIPTION - ORIENTATION: ORIENTATION: LOWER

## 2022-02-01 ASSESSMENT — PAIN DESCRIPTION - PAIN TYPE: TYPE: CHRONIC PAIN

## 2022-02-01 ASSESSMENT — PAIN DESCRIPTION - ONSET: ONSET: GRADUAL

## 2022-02-01 ASSESSMENT — PAIN DESCRIPTION - PROGRESSION: CLINICAL_PROGRESSION: NOT CHANGED

## 2022-02-01 ASSESSMENT — PAIN DESCRIPTION - FREQUENCY: FREQUENCY: INTERMITTENT

## 2022-02-01 NOTE — PROGRESS NOTES
WRAP UP GROUP NOTE:     Patient's Goal:  Providing feedback as to their own progress in the care-plan provided. Pt's have an opportunity to explore self-reflective skills and share any additional cares and concerns not yet addressed. Pt effectively participated.      Energy level:Normal  Appetite:normal  Concentration: normal  Hallucinations:gone  Depression:gone  Anxiety:on/off  How I worked today:tried a lot  What helps me sleep:read  Any questions/complaints/comments:ready to go home

## 2022-02-01 NOTE — PLAN OF CARE
Problem: Suicide risk  Goal: Provide patient with safe environment  Description: Provide patient with safe environment  1/31/2022 1953 by Clarissa Burleson RN  Outcome: Ongoing  1/31/2022 0950 by Mirella Ndiaye RN  Outcome: Ongoing     Problem: Falls - Risk of:  Goal: Will remain free from falls  Description: Will remain free from falls  1/31/2022 1953 by Clarissa Burleson RN  Outcome: Ongoing  1/31/2022 0950 by Mirella Ndiaye RN  Outcome: Ongoing  Goal: Absence of physical injury  Description: Absence of physical injury  Outcome: Ongoing     Problem: Discharge Planning:  Goal: Discharged to appropriate level of care  Description: Discharged to appropriate level of care  1/31/2022 1953 by Clarissa Burleson RN  Outcome: Ongoing  1/31/2022 0950 by Mirella Ndiaye RN  Outcome: Ongoing     Problem: Health Maintenance - Impaired:  Goal: Ability to perform activities of daily living will improve  Description: Ability to perform activities of daily living will improve  1/31/2022 1953 by Clarissa Burleson RN  Outcome: Ongoing  1/31/2022 0950 by Mirella Ndiaye RN  Outcome: Ongoing  Goal: Able to sleep without medication for appropriate length of time  Description: Able to sleep without medication for appropriate length of time  Outcome: Ongoing  Goal: Maintenance of adequate nutrition will improve  Description: Maintenance of adequate nutrition will improve  1/31/2022 1953 by Clarissa Burlesno RN  Outcome: Ongoing  1/31/2022 0950 by Mirella Ndiaye RN  Outcome: Ongoing     Problem: Mood - Altered:  Goal: Mood stable  Description: Mood stable  1/31/2022 1953 by Clarissa uBrleson RN  Outcome: Ongoing  1/31/2022 0950 by Mirella Ndiaye RN  Outcome: Ongoing     Problem: Self-Esteem - Low:  Goal: Demonstrates positive self-esteem  Description: Demonstrates positive self-esteem  1/31/2022 1953 by Clarissa Burleson RN  Outcome: Ongoing  1/31/2022 0950 by Mirella Ndiaye RN  Outcome: Ongoing     Problem: Violence - Risk of, Self/Other-Directed:  Goal: Knowledge of developmental care interventions  Description: Absence of violence  Outcome: Ongoing     Problem: Anxiety:  Goal: Level of anxiety will decrease  Description: Level of anxiety will decrease  Outcome: Ongoing     Problem: Pain:  Goal: Pain level will decrease  Description: Pain level will decrease  Outcome: Ongoing  Goal: Control of acute pain  Description: Control of acute pain  Outcome: Ongoing  Goal: Control of chronic pain  Description: Control of chronic pain  Outcome: Ongoing     Problem: Altered Mood, Depressive Behavior:  Goal: Able to verbalize acceptance of life and situations over which he or she has no control  Description: Able to verbalize acceptance of life and situations over which he or she has no control  1/31/2022 1953 by Nabil Pantoja RN  Outcome: Ongoing  1/31/2022 1250 by Arabella Howell  Outcome: Ongoing  Note:                                                                     Group Therapy Note    Date: 1/31/2022  Start Time: 1000  End Time:  1030  Number of Participants: 4    Type of Group: Psychoeducation    Wellness Binder Information  Module Name:  Men's Issues  Session Number:  1    Group Goal for Pt: To improve knowledge of effective stress management techniques    Notes:  Pt demonstrated improved knowledge of effective stress management techniques by actively participating in group discussion. Status After Intervention:  Unchanged    Participation Level:  Active Listener    Participation Quality: Appropriate and Attentive      Speech:  slurred      Thought Process/Content: Linear      Affective Functioning: Congruent      Mood: anxious and depressed      Level of consciousness:  Alert and Oriented x4      Response to Learning: Able to verbalize current knowledge/experience, Able to verbalize/acknowledge new learning, and Progressing to goal      Endings: None Reported    Modes of Intervention: Education      Discipline Responsible: Psychoeducational Specialist      Signature:  Sary Hoskins    Goal: Able to verbalize and/or display a decrease in depressive symptoms  Description: Able to verbalize and/or display a decrease in depressive symptoms  Outcome: Ongoing  Goal: Ability to disclose and discuss suicidal ideas will improve  Description: Ability to disclose and discuss suicidal ideas will improve  Outcome: Ongoing  Goal: Able to verbalize support systems  Description: Able to verbalize support systems  Outcome: Ongoing  Goal: Absence of self-harm  Description: Absence of self-harm  Outcome: Ongoing

## 2022-02-01 NOTE — PLAN OF CARE
Group Therapy Note    Date: 2/1/2022  Start Time: 1000  End Time:  9927  Number of Participants: 3    Type of Group: Psychoeducation    Wellness Binder Information  Module Name:  Relapse Prevention  Session Number:  5    Group Goal for Pt: To improve knowledge of relapse prevention strategies    Notes:  Pt did not attend group discussion. Pt was invited/encouraged. Status After Intervention:      Participation Level:     Participation Quality:       Speech:         Thought Process/Content:       Affective Functioning:       Mood:       Level of consciousness:        Response to Learning:       Endings:     Modes of Intervention:       Discipline Responsible:       Signature:  Kevin Diaz

## 2022-02-01 NOTE — PROGRESS NOTES
Pt resting in bed at shift change. Pt complaining of nausea since dinner, offered zofran on previous shift and given saltine crackers and water. Pt has since come into the dining room to eat a snack and is not complaining of nausea at this time. Pt reports that she is hoping to be discharged home tomorrow and that her daughter is currently living in her home without electricity or food. Pt denies depression at this time. Pt reports some anxiety \"moderate\" and denies suicidal ideation or homicidal ideation. Pt denies hallucinations. Pt is currently sitting in television area talking to peers and calm. No distress noted. Will continue to monitor.

## 2022-02-01 NOTE — PROGRESS NOTES
Department of Psychiatry  Attending Progress Note     Chief complaint: \"I need to get out of here\"     SUBJECTIVE:   Chart reviewed, discussed with the team. No major issues overnight. Patient is med-compliant. No SEs. Performs ADLs. Patient seen in her room this am. States she is tired. Rates depression and anxiety 0. Denies SI/HI/AVH. Reports poor sleep. She is demanding discharge. States her 46 yo daughter needs her. Daughter has a guardian. Becomes agitated when told that she will be discharged to her sister on Thursday. \"You f. ..g bitch! F... you! \"     OBJECTIVE    Physical  Wt Readings from Last 3 Encounters:   11/07/21 200 lb (90.7 kg)   09/17/21 200 lb (90.7 kg)   04/20/21 200 lb 9 oz (91 kg)     Temp Readings from Last 3 Encounters:   02/01/22 96.4 °F (35.8 °C) (Temporal)   01/29/22 99.1 °F (37.3 °C) (Temporal)   11/07/21 97.8 °F (36.6 °C) (Oral)     BP Readings from Last 3 Encounters:   02/01/22 100/60   01/29/22 101/66   11/07/21 125/66     Pulse Readings from Last 3 Encounters:   02/01/22 67   01/29/22 65   11/07/21 94        Review of Systems: 14-point review of systems negative except as described above    Mental Status Examination:   Appearance:  Stated age. Disheveled. Gait stable. No abnormal movements or tremor. Behavior: Agitated, rude   Speech: Fast at times, profane  Mood: \"I want to go home\"   Affect: Labile  Thought Process: Linear  Thought Content:  Denies SI/HI. No overt delusions or paranoia appreciated. Perceptions: Denies auditory or visual hallucinations at present time. Not responding to internal stimuli. Concentration: Intact. Orientation: to person, place, date, and situation. Language: Intact. Fund of information: Intact. Memory: Recent and remote appear intact. Neurovegitative: Fair appetite and sleep. Insight: Limited. Judgment: Limited.     Data  Lab Results   Component Value Date    WBC 8.3 01/31/2022    HGB 10.7 (L) 01/31/2022    HCT 35.1 (L) 01/31/2022 MCV 84.0 01/31/2022     01/31/2022      Lab Results   Component Value Date     01/31/2022    K 4.8 01/31/2022     01/31/2022    CO2 20 (L) 01/31/2022    BUN 18 01/31/2022    CREATININE 0.7 01/31/2022    GLUCOSE 80 01/31/2022    CALCIUM 8.4 (L) 01/31/2022    PROT 6.5 (L) 01/31/2022    LABALBU 3.9 01/31/2022    BILITOT <0.2 01/31/2022    ALKPHOS 175 (H) 01/31/2022    AST 16 01/31/2022    ALT 16 01/31/2022    LABGLOM >60 01/31/2022    GFRAA >59 01/31/2022    GLOB 3.1 05/09/2017       Medications    Current Facility-Administered Medications:     vitamin B-12 (CYANOCOBALAMIN) tablet 500 mcg, 500 mcg, Oral, Daily, Danelle Mello MD, 500 mcg at 02/01/22 0730    ondansetron (ZOFRAN) tablet 4 mg, 4 mg, Oral, Q6H PRN, Brand LELIA NelsonN, 4 mg at 01/31/22 1752    insulin lispro (HUMALOG) injection vial 0-6 Units, 0-6 Units, SubCUTAneous, BID , JORGE Cortez    acetaminophen (TYLENOL) tablet 650 mg, 650 mg, Oral, Q4H PRN, Ximena Gore MD, 650 mg at 01/31/22 2026    polyethylene glycol (GLYCOLAX) packet 17 g, 17 g, Oral, Daily PRN, Ximena Gore MD    nicotine (NICODERM CQ) 21 MG/24HR 1 patch, 1 patch, TransDERmal, Daily, Ximena Gore MD, 1 patch at 02/01/22 1640    influenza quadrivalent split vaccine (FLUZONE;FLUARIX;FLULAVAL;AFLURIA) injection 0.5 mL, 0.5 mL, IntraMUSCular, Prior to discharge, Ximena Gore MD    atorvastatin (LIPITOR) tablet 20 mg, 20 mg, Oral, Nightly, Ximena Gore MD, 20 mg at 01/31/22 2026    ibuprofen (ADVIL;MOTRIN) tablet 600 mg, 600 mg, Oral, TID WC, Xmiena Gore MD, 600 mg at 02/01/22 3149    divalproex (DEPAKOTE ER) extended release tablet 1,000 mg, 1,000 mg, Oral, Daily, Ximena Gore MD, 1,000 mg at 02/01/22 1619    ferrous sulfate (IRON 325) tablet 325 mg, 325 mg, Oral, Daily with breakfast, Ximena Gore MD, 325 mg at 02/01/22 0729    fluticasone (FLONASE) 50 MCG/ACT nasal spray 2 spray, 2 spray, Nasal, Daily, Ximena Gore MD, 2 spray at 01/31/22 0739    furosemide (LASIX) tablet 20 mg, 20 mg, Oral, Daily, Lluvia Coles MD, 20 mg at 02/01/22 0729    ipratropium-albuterol (DUONEB) nebulizer solution 3 mL, 1 vial, Inhalation, Q6H PRN, Lluvia Coles MD    losartan (COZAAR) tablet 50 mg, 50 mg, Oral, Daily, Lluvia Coles MD, 50 mg at 02/01/22 8452    linaCLOtide (LINZESS) capsule 72 mcg, 72 mcg, Oral, QAM AC, Lluvia Coles MD    metFORMIN (GLUCOPHAGE) tablet 500 mg, 500 mg, Oral, BID WC, Lluvia Coles MD, 500 mg at 02/01/22 0730    QUEtiapine (SEROQUEL) tablet 200 mg, 200 mg, Oral, Nightly, Lluvia Coles MD, 200 mg at 01/31/22 2026    pantoprazole (PROTONIX) tablet 40 mg, 40 mg, Oral, QAM AC, Lluvia Coles MD, 40 mg at 02/01/22 5109    mirtazapine (REMERON) tablet 7.5 mg, 7.5 mg, Oral, Nightly, Lluvia Coles MD, 7.5 mg at 01/31/22 2026    melatonin tablet 3 mg, 3 mg, Oral, Nightly, Lluvia Coles MD, 3 mg at 01/31/22 2026    albuterol (PROVENTIL) nebulizer solution 2.5 mg, 2.5 mg, Nebulization, Q6H PRN, Lluvia Coles MD    insulin lispro (HUMALOG) injection vial 0-3 Units, 0-3 Units, SubCUTAneous, Nightly, Lluvia Coles MD    ASSESSMENT AND PLAN  DSM 5 DIAGNOSIS  Impression  Depression unspecified  S/p Suicide attempt  Borderline personality disorder  Tobacco use disorder  Anemia    Socially inappropriate today. Very impulsive. Continue to observe. Work on disposition. Plan:   1. Psychiatric Medications:   Continue current psychotropic medications as recommended. Monitor for side effects. The risks, benefits, side effects, indications, contraindications, alternatives and adverse effects of the medications have been discussed with patient. 2. Continue to provide supportive psychotherapy. Encourage socialization and participation in recreational activities. Work on coping skills. 3. Medical Issues:    Continue medical monitoring by Dr. Adrianna Soriano and associates. BRIE resolved.      4. Disposition:     to provide outpatient resources and facilitate disposition.      Amount of time spent with patient:      35 minutes with greater than 50 % of the time spent in counseling and collaboration of care

## 2022-02-01 NOTE — PLAN OF CARE
Problem: Discharge Planning:  Goal: Discharged to appropriate level of care  Outcome: Ongoing     Problem: Health Maintenance - Impaired:  Goal: Ability to perform activities of daily living will improve  Outcome: Ongoing  Goal: Maintenance of adequate nutrition will improve  Outcome: Met This Shift     Problem: Mood - Altered:  Goal: Mood stable  Outcome: Ongoing     Problem: Violence - Risk of, Self/Other-Directed:  Goal: Knowledge of developmental care interventions  Outcome: Ongoing     Problem: Pain:  Description: Pain management should include both nonpharmacologic and pharmacologic interventions.   Goal: Pain level will decrease  Outcome: Ongoing     Problem: Altered Mood, Depressive Behavior:  Goal: Able to verbalize acceptance of life and situations over which he or she has no control  Outcome: Ongoing  Goal: Able to verbalize support systems  Outcome: Ongoing

## 2022-02-01 NOTE — PROGRESS NOTES
I Daily Shift Assessment-Geriatric Unit  Nursing Progress Note          Room: [address] [place] [address]   Name: Clarissa Fleming   Age: 74 y.o. Gender: female   Dx: Depression   Precautions: suicide risk and fall risk  Inpatient Status: voluntary     SLEEP:    Sleep: Yes,   Sleep Quality Fair   Hours Slept: 5   Sleep Medications: Yes  PRN Sleep Meds: Yes       MEDICAL:      Other PRN Meds: Yes, atarax, tylenol, ibuprofen, zofran,    Med Compliant: Yes   Accu-Chek: No   Oxygen/CPAP/BiPAP: No  CIWA/CINA: No   PAIN Assessment: present - adequately treated  Side Effects from medication: No    Is Patient experiencing any respiratory symptoms (headache, fever, body aches, cough. Ricky Lewis ): no  Patient educated by nursing to practice social distancing, wear masks, wash hands frequently: yes      Metabolic Screening:    Lab Results   Component Value Date    LABA1C 5.6 01/31/2022       Lab Results   Component Value Date    CHOL 119 (L) 01/31/2022    CHOL 170 04/20/2021    CHOL 115 (L) 06/27/2020    CHOL 146 (L) 10/22/2019    CHOL 142 (L) 07/04/2019    CHOL 167 08/20/2017    CHOL 134 12/31/2015     Lab Results   Component Value Date    TRIG 124 01/31/2022    TRIG 188 (H) 04/20/2021    TRIG 128 06/27/2020    TRIG 125 10/22/2019    TRIG 143 07/04/2019    TRIG 258 (H) 08/20/2017    TRIG 162 12/31/2015     Lab Results   Component Value Date    HDL 49 (L) 01/31/2022    HDL 46 (L) 04/20/2021    HDL 38 (L) 06/27/2020    HDL 58 (L) 10/22/2019    HDL 48 (L) 07/04/2019    HDL 35 (L) 08/20/2017    HDL 44 12/31/2015     No components found for: LDLCAL  No results found for: LABVLDL      There is no height or weight on file to calculate BMI. BP Readings from Last 2 Encounters:   02/01/22 100/60   01/29/22 101/66         PSYCH:     SI denies suicidal ideation    HI Negative for homicidal ideation        AVH:Absent      Depression: Didn't rate states \"better. \" Anxiety: Didn't rate states \"better. \"       GENERAL:      Appetite: good  Social: Yes, at meals with sit with peers and converse. Speech: normal   Appearance: disheveled, changes clothes frequently during day.   Assistive Devices: noneLevel of Assist: Supervision      GROUP:    Group Participation: Yes  Participation LevelMinimal    Participation QualityResistant    Notes:

## 2022-02-01 NOTE — PLAN OF CARE
Group Therapy Note    Date: 2/1/2022  Start Time: 1030  End Time:  1100  Number of Participants: 3    Type of Group: Cognitive Skills    Wellness Binder Information  Module Name:  Women's Issues  Session Number:  4    Group Goal for Pt: To raise awareness of how thoughts influence feelings    Notes:  Pt did not attend group discussion. Pt was invited/encouraged. Status After Intervention:      Participation Level:     Participation Quality:       Speech:         Thought Process/Content:       Affective Functioning:       Mood:       Level of consciousness:        Response to Learning:       Endings:     Modes of Intervention:       Discipline Responsible:       Signature:  Sary Hoskins

## 2022-02-01 NOTE — PROGRESS NOTES
SW met with treatment team to discuss pt's progress and setbacks. SW 2 was present. Pt reportedly slept fair last night, with medications, complained of nausea, appetite is good, minimal participation in scheduled group activities, social with peers/staff, independent with ADLS, compliant with medications, behavior has been cooperative, denies depression, reports moderate anxiety, denies SI/HI/AVH, tentative discharge Thursday, continue current treatment plan.

## 2022-02-02 PROBLEM — F31.64 BIPOLAR I DISORDER, MOST RECENT EPISODE MIXED, SEVERE WITH PSYCHOTIC FEATURES (HCC): Status: RESOLVED | Noted: 2019-07-08 | Resolved: 2022-02-02

## 2022-02-02 LAB
GLUCOSE BLD-MCNC: 135 MG/DL (ref 70–99)
GLUCOSE BLD-MCNC: 172 MG/DL (ref 70–99)
GLUCOSE BLD-MCNC: 69 MG/DL (ref 70–99)
PERFORMED ON: ABNORMAL

## 2022-02-02 PROCEDURE — 99233 SBSQ HOSP IP/OBS HIGH 50: CPT | Performed by: PSYCHIATRY & NEUROLOGY

## 2022-02-02 PROCEDURE — 6370000000 HC RX 637 (ALT 250 FOR IP): Performed by: PSYCHIATRY & NEUROLOGY

## 2022-02-02 PROCEDURE — 1240000000 HC EMOTIONAL WELLNESS R&B

## 2022-02-02 PROCEDURE — 82947 ASSAY GLUCOSE BLOOD QUANT: CPT

## 2022-02-02 PROCEDURE — 6370000000 HC RX 637 (ALT 250 FOR IP): Performed by: FAMILY MEDICINE

## 2022-02-02 RX ORDER — MIRTAZAPINE 7.5 MG/1
7.5 TABLET, FILM COATED ORAL NIGHTLY
Qty: 30 TABLET | Refills: 1 | Status: ON HOLD | OUTPATIENT
Start: 2022-02-02 | End: 2022-05-24

## 2022-02-02 RX ORDER — DIVALPROEX SODIUM 500 MG/1
1000 TABLET, EXTENDED RELEASE ORAL DAILY
Qty: 60 TABLET | Refills: 1 | Status: ON HOLD | OUTPATIENT
Start: 2022-02-03 | End: 2022-05-24 | Stop reason: HOSPADM

## 2022-02-02 RX ORDER — ERGOCALCIFEROL 1.25 MG/1
50000 CAPSULE ORAL WEEKLY
Qty: 11 CAPSULE | Refills: 1 | Status: ON HOLD | OUTPATIENT
Start: 2022-02-08 | End: 2022-05-24

## 2022-02-02 RX ORDER — QUETIAPINE FUMARATE 200 MG/1
200 TABLET, FILM COATED ORAL NIGHTLY
Qty: 30 TABLET | Refills: 1 | Status: ON HOLD | OUTPATIENT
Start: 2022-02-02 | End: 2022-05-24

## 2022-02-02 RX ADMIN — QUETIAPINE FUMARATE 200 MG: 200 TABLET ORAL at 21:15

## 2022-02-02 RX ADMIN — IBUPROFEN 600 MG: 600 TABLET ORAL at 16:42

## 2022-02-02 RX ADMIN — ACETAMINOPHEN 650 MG: 325 TABLET ORAL at 02:58

## 2022-02-02 RX ADMIN — MIRTAZAPINE 7.5 MG: 7.5 TABLET ORAL at 21:15

## 2022-02-02 RX ADMIN — HYDROXYZINE HYDROCHLORIDE 25 MG: 25 TABLET, FILM COATED ORAL at 17:29

## 2022-02-02 RX ADMIN — IBUPROFEN 600 MG: 600 TABLET ORAL at 07:40

## 2022-02-02 RX ADMIN — METFORMIN HYDROCHLORIDE 500 MG: 500 TABLET ORAL at 07:40

## 2022-02-02 RX ADMIN — ATORVASTATIN CALCIUM 20 MG: 20 TABLET, FILM COATED ORAL at 21:15

## 2022-02-02 RX ADMIN — INSULIN LISPRO 1 UNITS: 100 INJECTION, SOLUTION INTRAVENOUS; SUBCUTANEOUS at 21:15

## 2022-02-02 RX ADMIN — FERROUS SULFATE TAB 325 MG (65 MG ELEMENTAL FE) 325 MG: 325 (65 FE) TAB at 07:39

## 2022-02-02 RX ADMIN — IBUPROFEN 600 MG: 600 TABLET ORAL at 12:15

## 2022-02-02 RX ADMIN — HYDROXYZINE HYDROCHLORIDE 25 MG: 25 TABLET, FILM COATED ORAL at 09:58

## 2022-02-02 RX ADMIN — Medication 3 MG: at 21:15

## 2022-02-02 RX ADMIN — DIVALPROEX SODIUM 1000 MG: 500 TABLET, EXTENDED RELEASE ORAL at 07:39

## 2022-02-02 RX ADMIN — METFORMIN HYDROCHLORIDE 500 MG: 500 TABLET ORAL at 16:42

## 2022-02-02 RX ADMIN — FUROSEMIDE 20 MG: 20 TABLET ORAL at 07:39

## 2022-02-02 RX ADMIN — ACETAMINOPHEN 650 MG: 325 TABLET ORAL at 18:28

## 2022-02-02 RX ADMIN — CYANOCOBALAMIN TAB 500 MCG 500 MCG: 500 TAB at 07:39

## 2022-02-02 ASSESSMENT — PAIN DESCRIPTION - ONSET
ONSET: ON-GOING
ONSET: PROGRESSIVE

## 2022-02-02 ASSESSMENT — PAIN DESCRIPTION - LOCATION
LOCATION: BACK

## 2022-02-02 ASSESSMENT — PAIN SCALES - GENERAL
PAINLEVEL_OUTOF10: 3
PAINLEVEL_OUTOF10: 7
PAINLEVEL_OUTOF10: 10
PAINLEVEL_OUTOF10: 5
PAINLEVEL_OUTOF10: 10
PAINLEVEL_OUTOF10: 0
PAINLEVEL_OUTOF10: 0
PAINLEVEL_OUTOF10: 10
PAINLEVEL_OUTOF10: 10

## 2022-02-02 ASSESSMENT — PAIN DESCRIPTION - FREQUENCY
FREQUENCY: INTERMITTENT
FREQUENCY: CONTINUOUS
FREQUENCY: CONTINUOUS

## 2022-02-02 ASSESSMENT — PAIN SCALES - WONG BAKER
WONGBAKER_NUMERICALRESPONSE: 10
WONGBAKER_NUMERICALRESPONSE: 4
WONGBAKER_NUMERICALRESPONSE: 6

## 2022-02-02 ASSESSMENT — PAIN - FUNCTIONAL ASSESSMENT
PAIN_FUNCTIONAL_ASSESSMENT: PREVENTS OR INTERFERES SOME ACTIVE ACTIVITIES AND ADLS
PAIN_FUNCTIONAL_ASSESSMENT: PREVENTS OR INTERFERES SOME ACTIVE ACTIVITIES AND ADLS
PAIN_FUNCTIONAL_ASSESSMENT: ACTIVITIES ARE NOT PREVENTED
PAIN_FUNCTIONAL_ASSESSMENT: ACTIVITIES ARE NOT PREVENTED
PAIN_FUNCTIONAL_ASSESSMENT: PREVENTS OR INTERFERES SOME ACTIVE ACTIVITIES AND ADLS

## 2022-02-02 ASSESSMENT — PAIN DESCRIPTION - DESCRIPTORS
DESCRIPTORS: DISCOMFORT;ACHING;CONSTANT
DESCRIPTORS: ACHING
DESCRIPTORS: ACHING;DISCOMFORT;CONSTANT

## 2022-02-02 ASSESSMENT — PAIN DESCRIPTION - ORIENTATION
ORIENTATION: LOWER

## 2022-02-02 ASSESSMENT — PAIN DESCRIPTION - PROGRESSION
CLINICAL_PROGRESSION: NOT CHANGED
CLINICAL_PROGRESSION: RAPIDLY WORSENING
CLINICAL_PROGRESSION: NOT CHANGED

## 2022-02-02 ASSESSMENT — PAIN DESCRIPTION - PAIN TYPE
TYPE: CHRONIC PAIN

## 2022-02-02 NOTE — BH NOTE
[facility] Daily Shift Assessment-Geriatric Unit  Nursing Progress Note          Room: [address] [place] [address]   Name: Emanuel Glass   Age: 74 y.o. Gender: female   Dx: <principal problem not specified>  Precautions: close watch, suicide risk and fall risk  Inpatient Status: voluntary     SLEEP:    Sleep: Yes,   Sleep Quality Fair / Poor  Hours Slept:    Sleep Medications: Yes and Seroquel 200mg, Remeron 7.5 mg, melatonin 3 mg,   PRN Sleep Meds: No       MEDICAL:      Other PRN Meds: Yes and Anxiety 10 , received Atarax 25 mg at [address]: Yes   Accu-Chek: Yes 137   Oxygen/CPAP/BiPAP: No  CIWA/CINA: No   PAIN Assessment: none, Denies  Side Effects from medication: No    Is Patient experiencing any respiratory symptoms (headache, fever, body aches, cough. Guzman Townsend ): no  Patient educated by nursing to practice social distancing, wear masks, wash hands frequently: yes      Metabolic Screening:    Lab Results   Component Value Date    LABA1C 5.6 01/31/2022       Lab Results   Component Value Date    CHOL 119 (L) 01/31/2022    CHOL 170 04/20/2021    CHOL 115 (L) 06/27/2020    CHOL 146 (L) 10/22/2019    CHOL 142 (L) 07/04/2019    CHOL 167 08/20/2017    CHOL 134 12/31/2015     Lab Results   Component Value Date    TRIG 124 01/31/2022    TRIG 188 (H) 04/20/2021    TRIG 128 06/27/2020    TRIG 125 10/22/2019    TRIG 143 07/04/2019    TRIG 258 (H) 08/20/2017    TRIG 162 12/31/2015     Lab Results   Component Value Date    HDL 49 (L) 01/31/2022    HDL 46 (L) 04/20/2021    HDL 38 (L) 06/27/2020    HDL 58 (L) 10/22/2019    HDL 48 (L) 07/04/2019    HDL 35 (L) 08/20/2017    HDL 44 12/31/2015     No components found for: LDLCAL  No results found for: LABVLDL      There is no height or weight on file to calculate BMI.     BP Readings from Last 2 Encounters:   02/01/22 101/66   01/29/22 101/66         PSYCH:     SI denies suicidal ideation    HI Negative for homicidal ideation        AVH:Absent      Depression: 7 Anxiety: 10, pt received Atarax and rechecked and stated that anxiety improved and rated 7/8. GENERAL:      Appetite: good  Social: Yes and minimum,pt requested to contact clergy and was able to talk to [address] by phone Speech: normal and slurred and pressured interm  Appearance:appropriately dressed and disheveled  Assistive Devices: noneLevel of Assist: Independent      GROUP:    Group Participation: Yes  Participation LevelInteractive    Participation QualityAttentive    Notes:   Pt in TV area with peer, pt interm social,pt states early on in shift that her anxiety is elevated and she is rocking and pacing, admin Atarax 25mg at 1943. Pt stated an hour later that her anxiety is improved and rated 7/8, with depression 7/8. Pt took shower to relax and lay in bed. Pt stated that she did not go to groups earlier today, because she said that she was angry at doctor, \"because I want to go home today\"  Pt apologized for getting angry this afternoon and morning. But she also stated that she was not sorry for what she said. Pt stated that she denies SI,HI and AVH, pt states that \"I don't have hallucinations\", pt continues to mumble to self. Pt at 2045 requested to talk to clergy and nurse at nurse station called Mary Larkin, clergy  and pt and Mary Larkin conversed for 30 min on phone. Pt subdued to self and walk to room and did not state what they conversed about. Pt was calm in room and she thanked nurse for assistance. Pt affect labile, and behavior is cooperative, pt appetite is good, pt participated in evening group, and sleep was poor to fair, awakening many times during the night. Will continue to monitor pt for safety and comfort.            Electronically signed by Judi Arreola RN on 2/2/22 at 12:21 AM CST

## 2022-02-02 NOTE — PROGRESS NOTES
SW met with treatment team to discuss pt's progress and setbacks. SW 2 was present. Pt reportedly had fair/poor sleep last night, with medications, appetite is good, minimal participation in scheduled group activities, minimally social with peers/staff, independent with ADLS, appearance is disheveled, compliant with medications, behavior has been disruptive, affect labile, reports moderate depression, severe anxiety, denies SI/HI/AVH, tentative discharge Thursday, continue current treatment plan.

## 2022-02-02 NOTE — DISCHARGE INSTR - ACTIVITY
Learning About Activities of Daily Living  What are activities of daily living? Activities of daily living (ADLs) are the basic self-care tasks you do every day. As you age, and if you have health problems, you may find that it's harder to do these things for yourself. That's when you may need some help. Your doctor uses ADLs to measure how much help you need. Knowing what you can and can't do for yourself is an important first step to getting help. And when you have the help you need, you can stay as independent as possible. Your doctor will want to know if you are able to do tasks such as: Take a bath or shower without help. Go to the bathroom by yourself. Dress and undress without help. Shave, comb your hair, and brush teeth on your own. Get in and out of bed or a chair without help. Feed yourself without help. If you are having trouble doing basic self-care tasks, talk with your doctor. You may want to bring a caregiver or family member who can help the doctor understand your needs and abilities. How will a doctor assess your ADLs? Asking about ADLs is part of a routine health checkup your doctor will likely do as you age. Your health check might be done in a doctor's office, in your home, or at a hospital. The goal is to find out if you are having any problems that could make your health problems worse or that make it unsafe for you to be on your own. To measure your ADLs, your doctor will ask how hard it is for you to do routine tasks. He or she may also want to know if you have changed the way you do a task because of a health problem. He or she may watch how you:  Walk back and forth. Keep your balance while you stand or walk. Move from sitting to standing or from a bed to a chair. Button or unbutton a shirt or sweater. Remove and put on your shoes. It's normal to feel a little worried or anxious if you find you can't do all the things you used to be able to do.  Talking with your doctor about ADLs isn't a test that you either pass or fail. It's just a way to get more information about your health and safety. Follow-up care is a key part of your treatment and safety. Be sure to make and go to all appointments, and call your doctor if you are having problems. It's also a good idea to know your test results and keep a list of the medicines you take. Where can you learn more? Go to https://PricePandapeMyDealBoard.com.Saaspoint. org and sign in to your Artoo account. Enter S423 in the [employer] box to learn more about \"Learning About Activities of Daily Living. \"     If you do not have an account, please click on the \"Sign Up Now\" link. Current as of: July 1, 2021               Content Version: 13.1  © 5630-6072 Healthwise, Incorporated. Care instructions adapted under license by [facility] ([facility]). If you have questions about a medical condition or this instruction, always ask your healthcare professional. [place] [address] any warranty or liability for your use of this information.

## 2022-02-02 NOTE — PLAN OF CARE
Group Therapy Note    Date: 2/2/2022  Start Time: 0900  End Time:  0930  Number of Participants: 1    Type of Group: Psychoeducation    Wellness Binder Information  Module Name:  Stress  Session Number:  4    Group Goal for Pt: To raise awareness of the effectiveness of relaxation techniques    Notes:  Pt did not attend group discussion. Pt was invited/encouraged. Status After Intervention:      Participation Level:     Participation Quality:       Speech:         Thought Process/Content:       Affective Functioning:       Mood:       Level of consciousness:        Response to Learning:       Endings:     Modes of Intervention:       Discipline Responsible:       Signature:  Loretta Cerna

## 2022-02-02 NOTE — DISCHARGE INSTR - DIET
Good nutrition is important when healing from an illness, injury, or surgery. Follow any nutrition recommendations given to you during your hospital stay. If you were given an oral nutrition supplement while in the hospital, continue to take this supplement at home. You can take it with meals, in-between meals, and/or before bedtime. These supplements can be purchased at most local grocery stores, pharmacies, and chain [employer]-stores. If you have any questions about your diet or nutrition, call the hospital and ask for the dietitian. Eating Healthy Foods: Care Instructions  Your Care Instructions     Eating healthy foods can help lower your risk for disease. Healthy food gives you energy and keeps your heart strong, your brain active, your muscles working, and your bones strong. A healthy diet includes a variety of foods from the basic food groups: grains, vegetables, fruits, milk and milk products, and meat and beans. Some people may eat more of their favorite foods from only one food group and, as a result, miss getting the nutrients they need. So, it is important to pay attention not only to what you eat but also to what you are missing from your diet. You can eat a healthy, balanced diet by making a few small changes. Follow-up care is a key part of your treatment and safety. Be sure to make and go to all appointments, and call your doctor if you are having problems. It's also a good idea to know your test results and keep a list of the medicines you take. How can you care for yourself at home? Look at what you eat  Keep a food diary for a week or two and record everything you eat or drink. Track the number of servings you eat from each food group. For a balanced diet every day, eat a variety of:  6 or more ounce-equivalents of grains, such as cereals, breads, crackers, rice, or pasta, every day.  An ounce-equivalent is 1 slice of bread, 1 cup of ready-to-eat cereal, or ½ cup of cooked rice, cooked pasta, or cooked cereal.  2½ cups of vegetables, especially:  Dark-green vegetables such as broccoli and spinach. Orange vegetables such as carrots and sweet potatoes. Dry beans (such as bonner and kidney beans) and peas (such as lentils). 2 cups of fresh, frozen, or canned fruit. A small apple or 1 banana or orange equals 1 cup.  3 cups of nonfat or low-fat milk, yogurt, or other milk products. 5½ ounces of meat and beans, such as chicken, fish, lean meat, beans, nuts, and seeds. One egg, 1 tablespoon of peanut butter, ½ ounce nuts or seeds, or ¼ cup of cooked beans equals 1 ounce of meat. Learn how to read food labels for serving sizes and ingredients. Fast-food and convenience-food meals often contain few or no fruits or vegetables. Make sure you eat some fruits and vegetables to make the meal more nutritious. Look at your food diary. For each food group, add up what you have eaten and then divide the total by the number of days. This will give you an idea of how much you are eating from each food group. See if you can find some ways to change your diet to make it more healthy. Start small  Do not try to make dramatic changes to your diet all at once. You might feel that you are missing out on your favorite foods and then be more likely to fail. Start slowly, and gradually change your habits. Try some of the following:  Use whole wheat bread instead of white bread. Use nonfat or low-fat milk instead of whole milk. Eat brown rice instead of white rice, and eat whole wheat pasta instead of white-flour pasta. Try low-fat cheeses and low-fat yogurt. Add more fruits and vegetables to meals and have them for snacks. Add lettuce, tomato, cucumber, and onion to sandwiches. Add fruit to yogurt and cereal.  Enjoy food  You can still eat your favorite foods. You just may need to eat less of them.  If your favorite foods are high in fat, salt, and sugar, limit how often you eat them, but do not cut them out entirely. Eat a wide variety of foods. Make healthy choices when eating out  The type of restaurant you choose can help you make healthy choices. Even fast-food chains are now offering more low-fat or healthier choices on the menu. Choose smaller portions, or take half of your meal home. When eating out, try:  A veggie pizza with a whole wheat crust or grilled chicken (instead of sausage or pepperoni). Pasta with roasted vegetables, grilled chicken, or marinara sauce instead of cream sauce. A vegetable wrap or grilled chicken wrap. Broiled or poached food instead of fried or breaded items. Make healthy choices easy  Buy packaged, prewashed, ready-to-eat fresh vegetables and fruits, such as baby carrots, salad mixes, and chopped or shredded broccoli and cauliflower. Buy packaged, presliced fruits, such as melon or pineapple. Choose 100% fruit or vegetable juice instead of soda. Limit juice intake to 4 to 6 oz (½ to ¾ cup) a day. Blend low-fat yogurt, fruit juice, and canned or frozen fruit to make a smoothie for breakfast or a snack. Where can you learn more? Go to https://EntefypeLifeDoxeb.healthJoy Media Group. org and sign in to your [employer]Scale account. Enter L902 in the [facility] box to learn more about \"Eating Healthy Foods: Care Instructions. \"     If you do not have an account, please click on the \"Sign Up Now\" link. Current as of: September 8, 2021               Content Version: 13.1  © 2006-2021 Healthwise, Incorporated. Care instructions adapted under license by [facility] ([facility]). If you have questions about a medical condition or this instruction, always ask your healthcare professional. [place] [address] any warranty or liability for your use of this information.

## 2022-02-02 NOTE — PROGRESS NOTES
WRAP UP GROUP NOTE    Patient's Goal:  Providing feedback as to their own progress in the care-plan provided. Patients have an opportunity to explore self-reflective skills and share any additional cares and concerns not yet addressed. Pt effectively participated. Energy Level:hyper \"1/2 & 1/2\"  Appetite:improving  Concentration:normal  Hallucinations:0  Depression:improved  Anxiety:0  How I worked today:tried a little  What helps me sleep: read  Any questions/complaints/comments: \"Yes I feel I could not stay til my sister gets here DrGt Is wrong I'm 74, I'm in my right mind.  I made a mistake & I', not suicidal\"

## 2022-02-02 NOTE — PLAN OF CARE
Problem: Suicide risk  Goal: Provide patient with safe environment  Description: Provide patient with safe environment  2/2/2022 0812 by Ryan Kitchen RN  Outcome: Completed  2/1/2022 2325 by Anthony Heredia RN  Outcome: Met This Shift     Problem: Falls - Risk of:  Goal: Will remain free from falls  Description: Will remain free from falls  2/2/2022 0812 by Ryan Kitchen RN  Outcome: Completed  2/1/2022 2325 by Anthony Heredia RN  Outcome: Met This Shift  Goal: Absence of physical injury  Description: Absence of physical injury  2/2/2022 1771 by Ryan Kitchen RN  Outcome: Completed  2/1/2022 2325 by Anthony Heredia RN  Outcome: Met This Shift     Problem: Discharge Planning:  Goal: Discharged to appropriate level of care  Description: Discharged to appropriate level of care  2/2/2022 0812 by Ryan Kitchen RN  Outcome: Completed  2/1/2022 2325 by Anthony Heredia RN  Outcome: Ongoing     Problem: Health Maintenance - Impaired:  Goal: Ability to perform activities of daily living will improve  Description: Ability to perform activities of daily living will improve  2/2/2022 0812 by Ryan Kitchen RN  Outcome: Completed  2/1/2022 2325 by Anthony Heredia RN  Outcome: Ongoing  Goal: Able to sleep without medication for appropriate length of time  Description: Able to sleep without medication for appropriate length of time  2/2/2022 0920 by Ryan Kitchen RN  Outcome: Completed  2/1/2022 2325 by Anthony Heredia RN  Outcome: Ongoing  Goal: Maintenance of adequate nutrition will improve  Description: Maintenance of adequate nutrition will improve  2/2/2022 0812 by Ryan Kitchen RN  Outcome: Completed  2/1/2022 2325 by Anthony Heredia RN  Outcome: Met This Shift     Problem: Mood - Altered:  Goal: Mood stable  Description: Mood stable  2/2/2022 0812 by Ryan Kitchen RN  Outcome: Completed  2/1/2022 2325 by Anthony Heredia RN  Outcome: Ongoing     Problem: Self-Esteem - Low:  Goal: Demonstrates positive self-esteem  Description: Demonstrates positive self-esteem  2/2/2022 0812 by Marta Trujillo RN  Outcome: Completed  2/1/2022 2325 by Liliane Servin RN  Outcome: Ongoing     Problem: Violence - Risk of, Self/Other-Directed:  Goal: Knowledge of developmental care interventions  Description: Absence of violence  2/2/2022 0812 by Marta Trujillo RN  Outcome: Completed  2/1/2022 2325 by Liliane Servin RN  Outcome: Ongoing     Problem: Anxiety:  Goal: Level of anxiety will decrease  Description: Level of anxiety will decrease  2/2/2022 0812 by Marta Trujillo RN  Outcome: Completed  2/1/2022 2325 by Liliane Servin RN  Outcome: Ongoing     Problem: Pain:  Goal: Pain level will decrease  Description: Pain level will decrease  2/2/2022 0812 by Marta Trujillo RN  Outcome: Completed  2/1/2022 2325 by Liliane Servin RN  Outcome: Met This Shift  Goal: Control of acute pain  Description: Control of acute pain  2/2/2022 0812 by Marta Trujillo RN  Outcome: Completed  2/1/2022 2325 by Liliane Servin RN  Outcome: Met This Shift  Goal: Control of chronic pain  Description: Control of chronic pain  2/2/2022 0812 by Marta Trujillo RN  Outcome: Completed  2/1/2022 2325 by Liliane Servin RN  Outcome: Met This Shift     Problem: Altered Mood, Depressive Behavior:  Goal: Able to verbalize acceptance of life and situations over which he or she has no control  Description: Able to verbalize acceptance of life and situations over which he or she has no control  2/2/2022 0812 by Marta Trujillo RN  Outcome: Completed  2/1/2022 2325 by Liliane Servin RN  Outcome: Ongoing  Goal: Able to verbalize and/or display a decrease in depressive symptoms  Description: Able to verbalize and/or display a decrease in depressive symptoms  2/2/2022 0812 by Marta Trujillo RN  Outcome: Completed  2/1/2022 2325 by Liliane Servin RN  Outcome: Ongoing  Goal: Ability to disclose and discuss suicidal ideas will improve  Description: Ability to disclose and discuss

## 2022-02-02 NOTE — PROGRESS NOTES
PT refused ACCU CHECK and medication.     Electronically signed by Luis Valdez RN on 2/2/2022 at 6:20 AM

## 2022-02-02 NOTE — DISCHARGE INSTR - COC
studies call Medical Records at: [phone]   For emergencies and 24 hour/7 days a week contact information: [phone]

## 2022-02-02 NOTE — PROGRESS NOTES
B[facility] Daily Shift Assessment-Geriatric Unit  Nursing Progress Note          Room: [facility]-   Name: Andrew Persaud   Age: 74 y.o. Gender: female   Dx: Depression  Precautions: suicide risk and fall risk  Inpatient Status: voluntary     SLEEP:    Sleep: Yes,   Sleep Quality Poor Up and down x2 last night for extended periods, cursing at staff and rearranging furniture. Hours Slept: 5   Sleep Medications: Yes  PRN Sleep Meds: no       MEDICAL:      Other PRN Meds: Yes   Med Compliant: No refuses medicines and accu checks at times, request more medications for anxiety and back pain than she can receive. Reminded of parameters. Accu-Chek: No, refused this am  Oxygen/CPAP/BiPAP: No  CIWA/CINA: No   PAIN Assessment: present - adequately treated  Side Effects from medication: No    Is Patient experiencing any respiratory symptoms (headache, fever, body aches, cough. Zaire Robertson ): no  Patient educated by nursing to practice social distancing, wear masks, wash hands frequently: yes      Metabolic Screening:    Lab Results   Component Value Date    LABA1C 5.6 01/31/2022       Lab Results   Component Value Date    CHOL 119 (L) 01/31/2022    CHOL 170 04/20/2021    CHOL 115 (L) 06/27/2020    CHOL 146 (L) 10/22/2019    CHOL 142 (L) 07/04/2019    CHOL 167 08/20/2017    CHOL 134 12/31/2015     Lab Results   Component Value Date    TRIG 124 01/31/2022    TRIG 188 (H) 04/20/2021    TRIG 128 06/27/2020    TRIG 125 10/22/2019    TRIG 143 07/04/2019    TRIG 258 (H) 08/20/2017    TRIG 162 12/31/2015     Lab Results   Component Value Date    HDL 49 (L) 01/31/2022    HDL 46 (L) 04/20/2021    HDL 38 (L) 06/27/2020    HDL 58 (L) 10/22/2019    HDL 48 (L) 07/04/2019    HDL 35 (L) 08/20/2017    HDL 44 12/31/2015     No components found for: LDLCAL  No results found for: LABVLDL      There is no height or weight on file to calculate BMI.     BP Readings from Last 2 Encounters:   02/02/22 (!) 95/50   01/29/22 101/66         PSYCH:     SI denies suicidal ideation    HI Negative for homicidal ideation        AVH:Absent      Depression: Didn't rate, affect restricted. Anxiety: Didn't rate but frequently talks about her sister and going to stay with her, also frequently talks about her dog, ferret, and her daughter, requested and received a pair of tennis shoes, socks and a sleeveless jacket.        GENERAL:      Appetite: no change from normal  Social: No Speech: soft and halting this am.   Appearance:appropriately dressed, inappropriately groomed, disheveled, poor hygiene and healthy looking  Assistive Devices: noneLevel of Assist: Supervision      GROUP:    Group Participation: No  Participation LevelNone    Participation Quality none    Notes:

## 2022-02-02 NOTE — PLAN OF CARE
Problem: Suicide risk  Description: Suicide risk  Goal: Provide patient with safe environment  2/2/2022 0812 by Osorio King RN  Outcome: Completed  2/1/2022 2325 by Abundio Acosta RN  Outcome: Met This Shift     Problem: Falls - Risk of:  Goal: Will remain free from falls  2/2/2022 0812 by Osorio King RN  Outcome: Completed  2/1/2022 2325 by Abundio Acosta RN  Outcome: Met This Shift  Goal: Absence of physical injury  2/2/2022 0486 by sOorio King RN  Outcome: Completed  2/1/2022 2325 by Abundio Acosta RN  Outcome: Met This Shift     Problem: Discharge Planning:  Goal: Discharged to appropriate level of care  2/2/2022 0812 by Osorio King RN  Outcome: Completed  2/1/2022 2325 by Abundio Acosta RN  Outcome: Ongoing     Problem: Health Maintenance - Impaired:  Goal: Ability to perform activities of daily living will improve  2/2/2022 0812 by Osorio King RN  Outcome: Completed  2/1/2022 2325 by Abundio Acosta RN  Outcome: Ongoing  Goal: Able to sleep without medication for appropriate length of time  2/2/2022 7570 by Osorio King RN  Outcome: Completed  2/1/2022 2325 by Abundio Acosta RN  Outcome: Ongoing  Goal: Maintenance of adequate nutrition will improve  2/2/2022 0812 by Osorio King RN  Outcome: Completed  2/1/2022 2325 by Abundio Acosta RN  Outcome: Met This Shift     Problem: Mood - Altered:  Goal: Mood stable  2/2/2022 0812 by Osorio King RN  Outcome: Completed  2/1/2022 2325 by Abundio Acosta RN  Outcome: Ongoing     Problem: Violence - Risk of, Self/Other-Directed:  Goal: Knowledge of developmental care interventions  2/2/2022 0812 by Osorio King RN  Outcome: Completed  2/1/2022 2325 by Abundio Acosta RN  Outcome: Ongoing

## 2022-02-02 NOTE — PROGRESS NOTES
Progress Note  Ju Guerrero Askew  2/1/2022 8:15 PM  Subjective:   Admit Date:   1/29/2022      CC/ADMIT DX:       Interval History:   Reviewed overnight events and nursing notes. She has no new medical issues. I have reviewed all labs/diagnostics from the last 24hrs. ROS:   I have done a 10 point ROS and all are negative, except what is mentioned in the HPI. ADULT DIET; Regular    Medications:      vitamin D  50,000 Units Oral Weekly    vitamin B-12  500 mcg Oral Daily    insulin lispro  0-6 Units SubCUTAneous BID WC    nicotine  1 patch TransDERmal Daily    influenza virus vaccine  0.5 mL IntraMUSCular Prior to discharge    atorvastatin  20 mg Oral Nightly    ibuprofen  600 mg Oral TID WC    divalproex  1,000 mg Oral Daily    ferrous sulfate  325 mg Oral Daily with breakfast    fluticasone  2 spray Nasal Daily    furosemide  20 mg Oral Daily    losartan  50 mg Oral Daily    linaCLOtide  72 mcg Oral QAM AC    metFORMIN  500 mg Oral BID WC    QUEtiapine  200 mg Oral Nightly    pantoprazole  40 mg Oral QAM AC    mirtazapine  7.5 mg Oral Nightly    melatonin  3 mg Oral Nightly    insulin lispro  0-3 Units SubCUTAneous Nightly           Objective:   Vitals: /60   Pulse 67   Temp 96.4 °F (35.8 °C) (Temporal)   Resp 16   SpO2 96%  No intake or output data in the 24 hours ending 02/01/22 2015  General appearance: alert and cooperative with exam  Extremities: extremities normal, atraumatic, no cyanosis or edema  Neurologic:  No obvious focal neurologic deficits. Skin: no rashes    Assessment and Plan: Active Problems:    Depression  Resolved Problems:    * No resolved hospital problems. *    Vit D Def    Plan:  1. Continue present medication(s)   2. Replace Vit D  3. Follow with Psych      Discharge planning:   her home     Reviewed treatment plans with the patient and/or family.              Electronically signed by Riley Garcia MD on 2/1/2022 at 8:15 PM

## 2022-02-02 NOTE — BH NOTE
At 0300 pt came out of room, and stated that she is in pain in her back, rated pain 10 and was administered Tylenol 650mg for pain. Pt ate sandwich and fruit and then walked in hallway and sat in TV area, requested remote to watch TV and she was told it was too early and to go back to bed. Pt sat in TV area, finished eating and then up and down walking back to room and back to TV area, then at 0325 pt started moving furniture in TV area, pt was requested to go back to room and pt started swearing at nurse, nurse instructed that the unit  has protocol for safety and that she can go back to her room and read and pt stated \" I want to stay right here and read my bible. \"  The TV room lights were off and pt states that she was going to stay right here and read her book and pt states \" I know people like you and you just want to start trouble. \" Staff nurse encouraged pt to go back to room, and told her that she has 10-15 min. Pt did not argue and stayed in TV room for 10 min and went back to room. Pt being monitored for safety and tossing and turning in her bed. Will continue to monitor pt for safety.

## 2022-02-02 NOTE — PROGRESS NOTES
[facility]  Discharge Note  Bridge Appointment completed: Reviewed Discharge Instructions with patient. Patient verbalizes understanding and agreement with the discharge plan using the teachback method. Referral for Outpatient Tobacco Cessation Counseling, upon discharge (vivi X if applicable and completed):    ( )  Hospital staff assisted patient to call Quit Line or faxed referral                                   during hospitalization                  ( )  Recognizing danger situations (included triggers and roadblocks), if not completed on admission                    ( )  Coping skills (new ways to manage stress, exercise, relaxation techniques, changing routine, distraction), if not completed on admission                                                           ( )  Basic information about quitting (benefits of quitting, techniques in how to quit, available resources, if not completed on admission  ( ) Referral for counseling faxed to [facility]   ( ) Patient refused referral  ( ) Patient refused counseling  (xVaccinations (vivi X if applicable and completed):  ( ) Patient states already received influenza vaccine elsewhere  ( ) Patient received influenza vaccine during this hospitalization  (x ) Patient refused influenza vaccine at this time      Pt discharged with followings belongings:   Dental Appliances: None  Vision - Corrective Lenses: Glasses  Hearing Aid: None  Jewelry: Bracelet  Body Piercings Removed: N/A  Clothing: Dress,Shirt  Were All Patient Medications Collected?: Not Applicable  Other Valuables: None   Valuables sent home with patient. Valuables retrieved from safe and returned to patient yes. Patient left department with  family via  car, discharged to  self care. Patient education on aftercare instructions: done  Patient verbalize understanding of AVS:  yes. Suicidal Ideations? No AVH? none HI?  Negative for homicidal ideation       Status EXAM upon discharge:  Status and Exam  Normal: No  Facial Expression: Worried  Affect: Congruent  Level of Consciousness: Alert  Mood:Normal: No  Mood: Depressed,Anxious  Motor Activity:Normal: Yes  Motor Activity: Increased  Interview Behavior: Cooperative  Preception: [place] to Person,[place] to Time,[place] to Place,[place] to Situation  Attention:Normal: Yes  Attention: Distractible,Unable to Concentrate  Thought Processes: Circumstantial  Thought Content:Normal: No  Thought Content: Preoccupations  Hallucinations: None  Delusions: No  Memory:Normal: No  Memory: Poor Recent  Insight and Judgment: Yes  Insight and Judgment: Poor Insight,Poor Judgment  Present Suicidal Ideation: No  Present Homicidal Ideation: No     States readiness to leave.

## 2022-02-02 NOTE — PROGRESS NOTES
Department of Psychiatry  Attending Progress Note     Chief complaint: \"I'm anxious\"     SUBJECTIVE:   Chart reviewed, discussed with the team. No major issues overnight. Patient is med-compliant. No SEs. Performs ADLs. Patient seen by the nursing desk this am. States she is doing better but is anxious. Rates depression 0/10 and anxiety 5/10. Denies SI/HI/AVH. Reports better sleep. No physical complaints. OBJECTIVE    Physical  Wt Readings from Last 3 Encounters:   11/07/21 200 lb (90.7 kg)   09/17/21 200 lb (90.7 kg)   04/20/21 200 lb 9 oz (91 kg)     Temp Readings from Last 3 Encounters:   02/02/22 97.2 °F (36.2 °C) (Temporal)   01/29/22 99.1 °F (37.3 °C) (Temporal)   11/07/21 97.8 °F (36.6 °C) (Oral)     BP Readings from Last 3 Encounters:   02/02/22 (!) 95/50   01/29/22 101/66   11/07/21 125/66     Pulse Readings from Last 3 Encounters:   02/02/22 75   01/29/22 65   11/07/21 94        Review of Systems: 14-point review of systems negative except as described above    Mental Status Examination:   Appearance:  Stated age. Disheveled. Gait stable. No abnormal movements or tremor. Behavior: Calm, cooperative  Speech: Nonpressured  Mood: \"I want to go home\"   Affect: Constricted  Thought Process: Linear  Thought Content:  Denies SI/HI. No overt delusions or paranoia appreciated. Perceptions: Denies auditory or visual hallucinations at present time. Not responding to internal stimuli. Concentration: Intact. Orientation: to person, place, date, and situation. Language: Intact. Fund of information: Intact. Memory: Recent and remote appear intact. Neurovegitative: Fair appetite and sleep. Insight: Improving. Judgment: Improving.     Data  Lab Results   Component Value Date    WBC 8.3 01/31/2022    HGB 10.7 (L) 01/31/2022    HCT 35.1 (L) 01/31/2022    MCV 84.0 01/31/2022     01/31/2022      Lab Results   Component Value Date     01/31/2022    K 4.8 01/31/2022     01/31/2022 CO2 20 (L) 01/31/2022    BUN 18 01/31/2022    CREATININE 0.7 01/31/2022    GLUCOSE 80 01/31/2022    CALCIUM 8.4 (L) 01/31/2022    PROT 6.5 (L) 01/31/2022    LABALBU 3.9 01/31/2022    BILITOT <0.2 01/31/2022    ALKPHOS 175 (H) 01/31/2022    AST 16 01/31/2022    ALT 16 01/31/2022    LABGLOM >60 01/31/2022    GFRAA >59 01/31/2022    GLOB 3.1 05/09/2017       Medications    Current Facility-Administered Medications:     vitamin D (ERGOCALCIFEROL) capsule 50,000 Units, 50,000 Units, Oral, Weekly, Remi Bates MD, 50,000 Units at 02/01/22 1001    hydrOXYzine (ATARAX) tablet 25 mg, 25 mg, Oral, TID PRN, Remi Bates MD, 25 mg at 02/02/22 1023    vitamin B-12 (CYANOCOBALAMIN) tablet 500 mcg, 500 mcg, Oral, Daily, Danelle Mello MD, 500 mcg at 02/02/22 0739    ondansetron (ZOFRAN) tablet 4 mg, 4 mg, Oral, Q6H PRN, JORGE Cortez, 4 mg at 01/31/22 1752    insulin lispro (HUMALOG) injection vial 0-6 Units, 0-6 Units, SubCUTAneous, BID WC, JORGE Cortez    acetaminophen (TYLENOL) tablet 650 mg, 650 mg, Oral, Q4H PRN, Ximena Gore MD, 650 mg at 02/02/22 0258    polyethylene glycol (GLYCOLAX) packet 17 g, 17 g, Oral, Daily PRN, Ximena Gore MD    nicotine (NICODERM CQ) 21 MG/24HR 1 patch, 1 patch, TransDERmal, Daily, Ximena Gore MD, 1 patch at 02/02/22 1283    influenza quadrivalent split vaccine (FLUZONE;FLUARIX;FLULAVAL;AFLURIA) injection 0.5 mL, 0.5 mL, IntraMUSCular, Prior to discharge, Ximena Gore MD    atorvastatin (LIPITOR) tablet 20 mg, 20 mg, Oral, Nightly, Ximena Gore MD, 20 mg at 02/01/22 2118    ibuprofen (ADVIL;MOTRIN) tablet 600 mg, 600 mg, Oral, TID WC, Ximena Groe MD, 600 mg at 02/02/22 0740    divalproex (DEPAKOTE ER) extended release tablet 1,000 mg, 1,000 mg, Oral, Daily, Ximena Gore MD, 1,000 mg at 02/02/22 1950    ferrous sulfate (IRON 325) tablet 325 mg, 325 mg, Oral, Daily with breakfast, Ximena Gore MD, 325 mg at 02/02/22 0739    fluticasone 4. Disposition:     to provide outpatient resources and facilitate disposition.      Amount of time spent with patient:      35 minutes with greater than 50 % of the time spent in counseling and collaboration of care

## 2022-02-02 NOTE — PROGRESS NOTES
Patient presented to the nurses station requesting to speak with the . She would not discuss the reason for the request to speak with the . Contacted the , Marcello January, and he spoke with the patient via phone.        Electronically signed by Leeann Rowe RN on 2/1/2022 at 9:07 PM

## 2022-02-02 NOTE — FLOWSHEET NOTE
22   Encounter Summary   Services provided to: Patient  (On the phone)   Referral/Consult From: Nurse  (Consult:  Emotional distress)   Support System Children   Complexity of Encounter High   Length of Encounter 30 minutes   Spiritual/Denominational   Type Spiritual struggle   Assessment Unresolved grief;Resentful;Doubtful; Anxious   Intervention Active listening;Explored feelings, thoughts, concerns; Facilitated forgiveness   Outcome Expressed gratitude     S:  Patient stated \"Asking forgiveness to g-d. \" Ellen Reap - both parents & a sister are . O: Patient at the other end of the line - lengthily narrated life growing up, relationship with parents, her emotions/feelings; it was an engaging conversation; sense of venting and relief in the process. A:  Patient expressed a notion of religiosity according to which, she talks with g-d, asks for forgiveness; seems to realize that part of the process of forgiveness/healing is - to vent, make sense of her experiences since \"5year old,\" growing up along with a sister with her parents; her premier emotions: hate, resentment & guilt ( for harboring those emotions). Appreciated prayer of forgiveness. P:  Spiritual Care:  Reflective listening, facilitated forgiveness.   Open to follow up per patient or staff request.    Electronically signed by Yovani Baer MA [facility] on 2022 at 8:56 AM

## 2022-02-02 NOTE — PLAN OF CARE
Problem: Suicide risk  Goal: Provide patient with safe environment  Description: Provide patient with safe environment  Outcome: Met This Shift     Problem: Falls - Risk of:  Goal: Will remain free from falls  Description: Will remain free from falls  Outcome: Met This Shift  Goal: Absence of physical injury  Description: Absence of physical injury  Outcome: Met This Shift     Problem: Discharge Planning:  Goal: Discharged to appropriate level of care  Description: Discharged to appropriate level of care  2/1/2022 2325 by Sudhir Obando RN  Outcome: Ongoing  2/1/2022 1423 by Petra Hyman RN  Outcome: Ongoing     Problem: Health Maintenance - Impaired:  Goal: Ability to perform activities of daily living will improve  Description: Ability to perform activities of daily living will improve  2/1/2022 2325 by Sudhir Obando RN  Outcome: Ongoing  2/1/2022 1423 by Petra Hyman RN  Outcome: Ongoing  Goal: Able to sleep without medication for appropriate length of time  Description: Able to sleep without medication for appropriate length of time  Outcome: Ongoing  Goal: Maintenance of adequate nutrition will improve  Description: Maintenance of adequate nutrition will improve  2/1/2022 2325 by Sudhir Obando RN  Outcome: Met This Shift  2/1/2022 1423 by Petra Hyman RN  Outcome: Met This Shift     Problem: Mood - Altered:  Goal: Mood stable  Description: Mood stable  2/1/2022 2325 by Sudhir Obando RN  Outcome: Ongoing  2/1/2022 1423 by Petra Hyman RN  Outcome: Ongoing     Problem: Self-Esteem - Low:  Goal: Demonstrates positive self-esteem  Description: Demonstrates positive self-esteem  Outcome: Ongoing     Problem: Violence - Risk of, Self/Other-Directed:  Goal: Knowledge of developmental care interventions  Description: Absence of violence  2/1/2022 2325 by Sudhir Obando RN  Outcome: Ongoing  2/1/2022 1423 by Petra Hyman RN  Outcome: Ongoing     Problem: Anxiety:  Goal: Level of anxiety will decrease  Description: Level of anxiety will decrease  2/1/2022 2325 by Judi Arreola RN  Outcome: Ongoing  2/1/2022 1423 by Sveta Chamberlain RN  Outcome: Ongoing     Problem: Pain:  Goal: Pain level will decrease  Description: Pain level will decrease  2/1/2022 2325 by Judi Arreola RN  Outcome: Met This Shift  2/1/2022 1423 by Sveta Chamberlain RN  Outcome: Ongoing  Goal: Control of acute pain  Description: Control of acute pain  Outcome: Met This Shift  Goal: Control of chronic pain  Description: Control of chronic pain  Outcome: Met This Shift     Problem: Altered Mood, Depressive Behavior:  Goal: Able to verbalize acceptance of life and situations over which he or she has no control  Description: Able to verbalize acceptance of life and situations over which he or she has no control  2/1/2022 2325 by Judi Arreola RN  Outcome: Ongoing  2/1/2022 1423 by Sveta Chamberlain RN  Outcome: Ongoing  Goal: Able to verbalize and/or display a decrease in depressive symptoms  Description: Able to verbalize and/or display a decrease in depressive symptoms  Outcome: Ongoing  Goal: Ability to disclose and discuss suicidal ideas will improve  Description: Ability to disclose and discuss suicidal ideas will improve  Outcome: Ongoing  Goal: Able to verbalize support systems  Description: Able to verbalize support systems  2/1/2022 2325 by Judi Arreola RN  Outcome: Ongoing  2/1/2022 1423 by Sveta Chamberlain RN  Outcome: Ongoing  Goal: Absence of self-harm  Description: Absence of self-harm  Outcome: Met This Shift

## 2022-02-03 VITALS
TEMPERATURE: 96.4 F | RESPIRATION RATE: 16 BRPM | DIASTOLIC BLOOD PRESSURE: 78 MMHG | HEART RATE: 93 BPM | SYSTOLIC BLOOD PRESSURE: 110 MMHG | OXYGEN SATURATION: 100 %

## 2022-02-03 LAB
GLUCOSE BLD-MCNC: 74 MG/DL (ref 70–99)
PERFORMED ON: NORMAL

## 2022-02-03 PROCEDURE — 6370000000 HC RX 637 (ALT 250 FOR IP): Performed by: PSYCHIATRY & NEUROLOGY

## 2022-02-03 PROCEDURE — 6370000000 HC RX 637 (ALT 250 FOR IP): Performed by: FAMILY MEDICINE

## 2022-02-03 PROCEDURE — 5130000000 HC BRIDGE APPOINTMENT

## 2022-02-03 PROCEDURE — 99239 HOSP IP/OBS DSCHRG MGMT >30: CPT | Performed by: PSYCHIATRY & NEUROLOGY

## 2022-02-03 PROCEDURE — 82947 ASSAY GLUCOSE BLOOD QUANT: CPT

## 2022-02-03 RX ORDER — LOPERAMIDE HYDROCHLORIDE 2 MG/1
4 CAPSULE ORAL 4 TIMES DAILY PRN
Status: DISCONTINUED | OUTPATIENT
Start: 2022-02-03 | End: 2022-02-03 | Stop reason: HOSPADM

## 2022-02-03 RX ADMIN — METFORMIN HYDROCHLORIDE 500 MG: 500 TABLET ORAL at 09:06

## 2022-02-03 RX ADMIN — CYANOCOBALAMIN TAB 500 MCG 500 MCG: 500 TAB at 09:07

## 2022-02-03 RX ADMIN — HYDROXYZINE HYDROCHLORIDE 25 MG: 25 TABLET, FILM COATED ORAL at 09:07

## 2022-02-03 RX ADMIN — IBUPROFEN 600 MG: 600 TABLET ORAL at 09:07

## 2022-02-03 RX ADMIN — DIVALPROEX SODIUM 1000 MG: 500 TABLET, EXTENDED RELEASE ORAL at 09:06

## 2022-02-03 RX ADMIN — FUROSEMIDE 20 MG: 20 TABLET ORAL at 09:07

## 2022-02-03 RX ADMIN — PANTOPRAZOLE SODIUM 40 MG: 40 TABLET, DELAYED RELEASE ORAL at 06:09

## 2022-02-03 RX ADMIN — HYDROXYZINE HYDROCHLORIDE 25 MG: 25 TABLET, FILM COATED ORAL at 01:10

## 2022-02-03 RX ADMIN — LOPERAMIDE HYDROCHLORIDE 4 MG: 2 CAPSULE ORAL at 01:48

## 2022-02-03 RX ADMIN — FERROUS SULFATE TAB 325 MG (65 MG ELEMENTAL FE) 325 MG: 325 (65 FE) TAB at 09:07

## 2022-02-03 RX ADMIN — LOSARTAN POTASSIUM 50 MG: 50 TABLET, FILM COATED ORAL at 09:07

## 2022-02-03 ASSESSMENT — PAIN DESCRIPTION - PROGRESSION: CLINICAL_PROGRESSION: NOT CHANGED

## 2022-02-03 ASSESSMENT — PAIN SCALES - WONG BAKER: WONGBAKER_NUMERICALRESPONSE: 8

## 2022-02-03 ASSESSMENT — PAIN DESCRIPTION - ONSET: ONSET: ON-GOING

## 2022-02-03 ASSESSMENT — PAIN DESCRIPTION - LOCATION: LOCATION: BACK

## 2022-02-03 ASSESSMENT — PAIN - FUNCTIONAL ASSESSMENT: PAIN_FUNCTIONAL_ASSESSMENT: ACTIVITIES ARE NOT PREVENTED

## 2022-02-03 ASSESSMENT — PAIN DESCRIPTION - FREQUENCY: FREQUENCY: INTERMITTENT

## 2022-02-03 ASSESSMENT — PAIN DESCRIPTION - DESCRIPTORS: DESCRIPTORS: ACHING

## 2022-02-03 ASSESSMENT — PAIN SCALES - GENERAL
PAINLEVEL_OUTOF10: 5
PAINLEVEL_OUTOF10: 9

## 2022-02-03 ASSESSMENT — PAIN DESCRIPTION - PAIN TYPE: TYPE: CHRONIC PAIN

## 2022-02-03 ASSESSMENT — PAIN DESCRIPTION - ORIENTATION: ORIENTATION: LOWER

## 2022-02-03 NOTE — PLAN OF CARE
Group Therapy Note    Date: 2/3/2022  Start Time: 1000  End Time:  1030  Number of Participants: 9    Type of Group: Psychoeducation    Wellness Binder Information  Module Name:  Men's Issues  Session Number:  1    Group Goal for Pt: To improve knowledge of effective stress management techniques    Notes:  Pt did not attend group discussion. Pt was invited/encouraged. Status After Intervention:      Participation Level:     Participation Quality:       Speech:         Thought Process/Content:       Affective Functioning:       Mood:       Level of consciousness:        Response to Learning:       Endings:     Modes of Intervention:       Discipline Responsible:       Signature:  Blanca Lieberman

## 2022-02-03 NOTE — PROGRESS NOTES
Progress Note  Ju Fischer  2/2/2022 9:31 PM  Subjective:   Admit Date:   1/29/2022      CC/ADMIT DX:       Interval History:   Reviewed overnight events and nursing notes. She denies any physical complaints. I have reviewed all labs/diagnostics from the last 24hrs. ROS:   I have done a 10 point ROS and all are negative, except what is mentioned in the HPI. ADULT DIET; Regular    Medications:      vitamin D  50,000 Units Oral Weekly    vitamin B-12  500 mcg Oral Daily    insulin lispro  0-6 Units SubCUTAneous BID WC    nicotine  1 patch TransDERmal Daily    atorvastatin  20 mg Oral Nightly    ibuprofen  600 mg Oral TID WC    divalproex  1,000 mg Oral Daily    ferrous sulfate  325 mg Oral Daily with breakfast    fluticasone  2 spray Nasal Daily    furosemide  20 mg Oral Daily    losartan  50 mg Oral Daily    linaCLOtide  72 mcg Oral QAM AC    metFORMIN  500 mg Oral BID WC    QUEtiapine  200 mg Oral Nightly    pantoprazole  40 mg Oral QAM AC    mirtazapine  7.5 mg Oral Nightly    melatonin  3 mg Oral Nightly    insulin lispro  0-3 Units SubCUTAneous Nightly           Objective:   Vitals: BP (!) 103/58   Pulse 77   Temp 98.6 °F (37 °C) (Temporal)   Resp 18   SpO2 96%  No intake or output data in the 24 hours ending 02/02/22 2131  General appearance: alert and cooperative with exam  Extremities: extremities normal, atraumatic, no cyanosis or edema  Neurologic:  No obvious focal neurologic deficits. Skin: no rashes    Assessment and Plan:   Principal Problem:    Depression  Active Problems:    Tobacco use disorder    Borderline personality disorder ([facility] [address]  Resolved Problems:    * No resolved hospital problems. *    Vit D Def    DM2    Plan:  1. Continue present medication(s)   2. Follow with glucose  3. Follow with Psych      Discharge planning:   her home     Reviewed treatment plans with the patient and/or family.              Electronically signed by Julia Lopez MD on 2/2/2022 at 9:31 PM

## 2022-02-03 NOTE — PROGRESS NOTES
[facility] Adult Unit Daily Assessment  Nursing Progress Note    Room: [facility]/[address]   Name: Liudmila Alvarez   Age: 74 y.o. Gender: female   Dx: Depression  Precautions: fall risk and seizure precautions  Inpatient Status: voluntary       SLEEP:    Sleep Quality Good  Sleep Medications: Yes   PRN Sleep Meds: No       MEDICAL:    Other PRN Meds: No   Med Compliant: Yes  Accu-Chek: Yes  Oxygen/CPAP/BiPAP: No  CIWA/CINA: No   PAIN Assessment: level of pain (1-10, 10 severe), 7  Side Effects from medication: No    Is Patient experiencing any respiratory symptoms (headache, fever, body aches, cough. [place] Kid ): yes  Patient educated by nursing to practice social distancing, wear masks, wash hands frequently: yes      PSYCH:    Depression: 0   Anxiety: 0   SI denies suicidal ideation   HI Negative for homicidal ideation      AVH:Absent      GENERAL:    Appetite: good    Social: No   Speech: normal   Appearance: appropriately dressed and healthy looking    GROUP:    Group Participation: Yes  Participation Quality: Minimal    Notes:     Patient is cooperative, Alert and Oriented x4, appears Anxious. Patient Rates Depression a 0 and Anxiety a 0 on a 0-10 scale, with 10 being the worst. Patient affect is Congruent and Blunted. Exhibited a flat facial expression; maintained good  eye contact throughout interview. Patient denies having current [address]. Patient states,\" I am going through a lot. I have lost some of my animals and the situation at home is really scaring me. I know they have to be going through a lot (referring to dog and ferret)\". Patient is compliant with medications and group. No signs of distress noted; Patient observed resting in bed with eyes open after interview.           Electronically signed by Fraknie Messer RN on 2/3/22 at 12:51 AM CST

## 2022-02-03 NOTE — PROGRESS NOTES
Progress Note  Ju Billings  2/3/2022 1:01 PM  Subjective:   Admit Date:   1/29/2022      CC/ADMIT DX:       Interval History:   Reviewed overnight events and nursing notes. She denies any new medical concerns. I have reviewed all labs/diagnostics from the last 24hrs. ROS:   I have done a 10 point ROS and all are negative, except what is mentioned in the HPI. ADULT DIET; Regular    Medications:      vitamin D  50,000 Units Oral Weekly    vitamin B-12  500 mcg Oral Daily    insulin lispro  0-6 Units SubCUTAneous BID WC    nicotine  1 patch TransDERmal Daily    atorvastatin  20 mg Oral Nightly    ibuprofen  600 mg Oral TID WC    divalproex  1,000 mg Oral Daily    ferrous sulfate  325 mg Oral Daily with breakfast    fluticasone  2 spray Nasal Daily    furosemide  20 mg Oral Daily    losartan  50 mg Oral Daily    linaCLOtide  72 mcg Oral QAM AC    metFORMIN  500 mg Oral BID WC    QUEtiapine  200 mg Oral Nightly    pantoprazole  40 mg Oral QAM AC    mirtazapine  7.5 mg Oral Nightly    melatonin  3 mg Oral Nightly    insulin lispro  0-3 Units SubCUTAneous Nightly           Objective:   Vitals: /78   Pulse 93   Temp 96.4 °F (35.8 °C) (Temporal)   Resp 16   SpO2 100%  No intake or output data in the 24 hours ending 02/03/22 1301  General appearance: alert and cooperative with exam  Extremities: extremities normal, atraumatic, no cyanosis or edema  Neurologic:  No obvious focal neurologic deficits. Skin: no rashes    Assessment and Plan:   Principal Problem:    Depression  Active Problems:    Tobacco use disorder    Borderline personality disorder ([facility] [address]  Resolved Problems:    * No resolved hospital problems. *    Vit D Def    DM2    Plan:  1. Continue present medication(s)   2. She is medically stable. I will monitor for any changes or concerns. 3.  Follow with Psych      Discharge planning:   her home     Reviewed treatment plans with the patient and/or family. Electronically signed by Riley Garcai MD on 2/3/2022 at 1:01 PM

## 2022-02-03 NOTE — PROGRESS NOTES
Discharge Note     Pt discharging on this date. Pt denies SI, HI, and AVH at this time. Pt reports improvement in behavior and is leaving unit in overall good condition. SW and pt discussed pt's follow up appointments and importance of attending appointments as scheduled, pt voiced understanding and agreement. Pt and SW also discussed pt safety plan and pt able to verbally identify: warning signs, coping strategies, places and people that help make the pt feel better/distract negative thoughts, friends/family/agencies/professionals the pt can reach out to in a crisis, and something that is important to the pt/worth living for. Pt provided the national suicide prevention hotline number (6-779-253-443.747.2345) as well as local community behavioral health [facility]) crisis number for emergencies (5-411-346-321.894.3314).      Pt to follow up with:  [address] on 2/10/22 [facility]   Referral to out patient tobacco cessation counseling treatment:    Patient refused referral to outpatient tobacco cessation counseling    SW offered to assist pt with transportation, pt reports that she has transportation

## 2022-02-03 NOTE — BH NOTE
WRAP UP GROUP NOTE    Patient's Goal:  Providing feedback as to their own progress in the care-plan provided. Patients have an opportunity to explore self-reflective skills and share any additional cares and concerns not yet addressed. Pt effectively participated.     Energy Level:normal, hyper \"1/2 and 1/2\"  Appetite:\"major\"  Concentration:normal  Hallucinations:blank  Depression:improved  Anxiety:\"1/2 and 1/2\"  How I worked today:worked hard  What helps me sleep:\"medicine\"  Any questions/complaints/comments:\"last night Nurses were acting like bullys\"    Group/activities that helped me today were:    Electronically signed by Ramírez Berrios RN on 2/2/2022 at 8:44 PM

## 2022-02-03 NOTE — DISCHARGE SUMMARY
Discharge Summary     Patient ID:  Mimi Araiza  [id number]  7 y.o.  1955    Admit date: 1/29/2022  Discharge date: 2/3/2022    Admitting Physician: Bj Washington MD   Attending Physician: Carolina Wu MD  Discharge Provider: Carolina Wu MD     Admission Diagnoses:  Depression unspecified  S/p Suicide attempt  Borderline personality disorder  History of bipolar disorder  Tobacco use disorder    Discharge Diagnoses:   Depression unspecified  S/p Suicide attempt  Borderline personality disorder  Tobacco use disorder  Anemia  Vitamin D deficiency  Vitamin B12 deficiency    Admission Condition: poor    Discharged Condition: stable    Indication for Admission: overdose    CHIEF COMPLAINT: Intentional overdose by Benadryl     HISTORY OF PRESENT ILLNESS:   The patient is a 74 y.o. female with previous psychiatric history of bipolar disorder, who initially was admitted to medical services secondary to intentional overdose on Benadryl. When patient's condition stabilized, she was transferred to psychiatric unit due to safety concern and for possible psychotropic medications management     For initial psychiatric evaluation, please, refer to the consult note of Dr. Lars Galvez, as reflected below:  \"Patient has been seen in her room with presence of one-to-one sitter. Delfino Gan reported her condition significantly improved since time of admission to the hospital and her mood is \"better\" today.  She denies any affective symptomatology today, denies any depression, anxiety or psychotic symptoms and asked to be discharged from the hospital. [facility], she stated that she is homeless and does not have any place to go. Patient denies current active suicidal or homicidal ideations, denies any plans. Tamea Plain, she denies any auditory and visual hallucinations. \"     Patient has been seen in treatment team room.   She reported that she tried to kill herself after arguments with her daughter, stated that her daughter was Labs were reviewed and physical exam was completed by Dr. Cely Andres and associates. Home medications were reconciled. DENISSE was obtained and reviewed. Remeron was restarted and decreased to help with sleep. Seroquel and Depakote were continued for mood stabilization. Patient tolerated all the medications well and without side effects. Follow up with PCP was recommended due to anemia. Patient attended some groups. She was at times irritable and rude towards the staff. Sleep and appetite improved. With the above-mentioned medications changes as well as psychotherapeutic interventions, patient reported considerable improvement in her condition and requested discharge home. She was future-oriented. It was felt that patient was at her baseline. Patient has no access to guns at home. Her sister agreed to take her back home. On 2/3/2022 it was therefore decided to discharge the patient, as it was felt that she received maximal benefit from her hospitalization. This patient is not suicidal, homicidal or psychotic at discharge. She does not present danger to self or others.       Number of antipsychotic medication prescribed at discharge: 1  IF MORE THAN ONE IS USED: NA    History of greater than 3 failed multiple monotherapy trials: NA  Monotherapy taper plan/ cross taper in progress: NA  Augmentation of Clozapine: NA    Referral to addiction treatment: patient refused    Prescription for Alcohol or Drug Disorder Medication: patient refused    Prescription for Tobacco Cessation medication: none    If no prescriptions for Tobacco Cessation must document why: patient refused    Consults: Internal medicine    Significant Diagnostic Studies:   Lab Results   Component Value Date    WBC 8.3 01/31/2022    HGB 10.7 (L) 01/31/2022    HCT 35.1 (L) 01/31/2022    MCV 84.0 01/31/2022     01/31/2022     Lab Results   Component Value Date     01/31/2022    K 4.8 01/31/2022     01/31/2022    CO2 20 (L) 01/31/2022    BUN 18 01/31/2022    CREATININE 0.7 01/31/2022    GLUCOSE 80 01/31/2022    CALCIUM 8.4 (L) 01/31/2022    PROT 6.5 (L) 01/31/2022    LABALBU 3.9 01/31/2022    BILITOT <0.2 01/31/2022    ALKPHOS 175 (H) 01/31/2022    AST 16 01/31/2022    ALT 16 01/31/2022    LABGLOM >60 01/31/2022    GFRAA >59 01/31/2022    GLOB 3.1 05/09/2017         Lab Results   Component Value Date    GAVLYNFN75 231 01/30/2022     Lab Results   Component Value Date    VITD25 11.4 (L) 01/31/2022     Lab Results   Component Value Date    CHOL 119 (L) 01/31/2022    CHOL 170 04/20/2021    CHOL 115 (L) 06/27/2020     Lab Results   Component Value Date    TRIG 124 01/31/2022    TRIG 188 (H) 04/20/2021    TRIG 128 06/27/2020     Lab Results   Component Value Date    HDL 49 (L) 01/31/2022    HDL 46 (L) 04/20/2021    HDL 38 (L) 06/27/2020     Lab Results   Component Value Date    LDLCALC 45 01/31/2022    LDLCALC 86 04/20/2021    LDLCALC 51 06/27/2020     Lab Results   Component Value Date    VLDL 32 12/31/2015     No results found for: CHOLHDLRATIO  Lab Results   Component Value Date    LABA1C 5.6 01/31/2022     No results found for: EAG  Lab Results   Component Value Date    TSHFT4 2.48 01/31/2022    TSH 2.470 04/20/2021       Treatments: RN and SW    Mental status examination at the time of discharge:  Alert, Oriented X 4  Appearance:  Improved Hygiene, smiled  Speech with Regular Rate and Rhythm  Eye Contact:  Good  No Psychomotor Agitation/Retardation Noted  Attitude:  Cooperative  Mood:  \"Better.  Need to go and take care of my dog\"  Affective: Congruent, appropriate to the situation, with a normal range and intensity  Thought Processes:  Coherently communicated, logical and goal oriented  Thought Content:  No Suicidal Ideation, No Homicidal Ideation, No Auditory or Visual Hallucinations, NO Overt Delusions  Insight: Improved  Judgement: Improved  Memory is intact for both remote and recent  Intellectual Functioning:  Within the Average Range  Fund of Knowledge:  Adequate  Attention and Concentration:  Adequate    Discharge Exam:  Please, see medical note    Disposition: home    Patient Instructions:   Current Discharge Medication List      START taking these medications    Details   divalproex (DEPAKOTE ER) 500 MG extended release tablet Take 2 tablets by mouth daily  Qty: 60 tablet, Refills: 1         CONTINUE these medications which have CHANGED    Details   mirtazapine (REMERON) 7.5 MG tablet Take 1 tablet by mouth nightly  Qty: 30 tablet, Refills: 1      QUEtiapine (SEROQUEL) 200 MG tablet Take 1 tablet by mouth nightly  Qty: 30 tablet, Refills: 1    Associated Diagnoses: Manic episode ([facility] [address]  Bipolar disorder, in full remission, most recent episode mixed ([facility])      vitamin D (ERGOCALCIFEROL) 1.25 MG (69351 UT) CAPS capsule Take 1 capsule by mouth once a week for 11 doses  Qty: 11 capsule, Refills: 1         CONTINUE these medications which have NOT CHANGED    Details   diclofenac (VOLTAREN) 75 MG EC tablet Take 1 tablet by mouth 2 times daily  Qty: 180 tablet, Refills: 3    Associated Diagnoses: Other chronic pain      linaCLOtide (LINZESS) 72 MCG CAPS capsule Take 1 capsule by mouth every morning (before breakfast)  Qty: 90 capsule, Refills: 3    Associated Diagnoses: Constipation, unspecified constipation type      atorvastatin (LIPITOR) 20 MG tablet Take 1 tablet by mouth daily  Qty: 90 tablet, Refills: 3    Associated Diagnoses: Mixed hyperlipidemia      ferrous sulfate (FEROSUL) 325 (65 Fe) MG tablet Take 1 tablet by mouth daily (with breakfast)  Qty: 90 tablet, Refills: 3    Associated Diagnoses: Anemia, unspecified type      Liraglutide (VICTOZA) 18 MG/3ML SOPN SC injection Inject 1.8 mg into the skin daily  Qty: 27 mL, Refills: 3    Associated Diagnoses: Type 2 diabetes mellitus without complication, unspecified whether long term insulin use ([facility])      metFORMIN (GLUCOPHAGE) 500 MG tablet Take 1 tablet by mouth 2 times daily (with meals)  Qty: 180 tablet, Refills: 3    Associated Diagnoses: Type 2 diabetes mellitus without complication, unspecified whether long term insulin use (HCC)      furosemide (LASIX) 20 MG tablet Take 1 tablet by mouth daily  Qty: 90 tablet, Refills: 3    Associated Diagnoses: Edema, unspecified type      pantoprazole (PROTONIX) 40 MG tablet Take 1 tablet by mouth daily. Qty: 90 tablet, Refills: 3    Associated Diagnoses: Gastroesophageal reflux disease, unspecified whether esophagitis present      irbesartan (AVAPRO) 150 MG tablet Take 1 tablet by mouth nightly  Qty: 90 tablet, Refills: 3    Associated Diagnoses: Secondary hypertension      melatonin 3 MG TABS tablet Take 1 tablet by mouth nightly  Qty: 90 tablet, Refills: 3      Elastic Bandages & Supports (FUTURO SOFT CERVICAL COLLAR) MISC Wear for 1 week. Qty: 1 each, Refills: 0      clobetasol (TEMOVATE) 0.05 % ointment Apply topically 2 times daily. Qty: 60 g, Refills: 3    Associated Diagnoses: Psoriasis      nystatin (MYCOSTATIN) 024505 UNIT/GM powder Apply 3 times daily. Qty: 45 g, Refills: 3    Associated Diagnoses: Yeast infection; Vaginal yeast infection      albuterol sulfate HFA (VENTOLIN HFA) 108 (90 Base) MCG/ACT inhaler Inhale 2 puffs into the lungs every 6 hours as needed for Wheezing  Qty: 3 Inhaler, Refills: 3    Associated Diagnoses: Shortness of breath;  Oxygen dependent      Insulin Pen Needle (B-D ULTRAFINE III SHORT PEN) 31G X 8 MM MISC Inject 1 each as directed daily  Qty: 100 each, Refills: 5      ACCU-CHEK SOFTCLIX LANCETS MISC CHECK BLOOD SUGAR TWICE DAILY AND AS NEEDED  Qty: 300 each, Refills: 3      fluticasone (FLONASE) 50 MCG/ACT nasal spray 2 sprays by Nasal route daily  Qty: 1 Bottle, Refills: 3    Associated Diagnoses: Right ear pain; Dysfunction of right eustachian tube      vitamin B-12 (CYANOCOBALAMIN) 500 MCG tablet Take 1 tablet by mouth daily  Qty: 30 tablet, Refills: 2      blood glucose monitor kit and supplies Test 3 times a day & as needed for symptoms of irregular blood glucose. Dx:  Kathia Harrisler: 1 kit, Refills: 0    Comments: Brand per patient preference. May round up to next available package size. Associated Diagnoses: Other diabetic neurological complication associated with type 2 diabetes mellitus ([facility])      blood glucose monitor strips Test 3 times a day & as needed for symptoms of irregular blood glucose. Qty: 100 strip, Refills: 5    Comments: Brand per patient preference. May round up to next available package size.   Associated Diagnoses: Other diabetic neurological complication associated with type 2 diabetes mellitus (HCC)      ipratropium-albuterol (DUONEB) 0.5-2.5 (3) MG/3ML SOLN nebulizer solution Inhale 3 mLs into the lungs every 6 hours as needed for Shortness of Breath  Qty: 810 mL, Refills: 2    Associated Diagnoses: Chronic bronchitis, unspecified chronic bronchitis type ([facility])      Continuous Blood Gluc  (FREESTYLE ABDOULAYE READER) MARCIA 1 Units by Does not apply route 2 times daily  Qty: 1 Device, Refills: 0      Continuous Blood Gluc Sensor (FREESTYLE ABDOULAYE SENSOR SYSTEM) MISC 1 Units by Does not apply route 2 times daily  Qty: 1 each, Refills: 0         STOP taking these medications       benztropine (COGENTIN) 1 MG tablet Comments:   Reason for Stopping:         divalproex (DEPAKOTE) 500 MG DR tablet Comments:   Reason for Stopping:         ARIPiprazole lauroxil (ARISTADA) 441 MG/1.6ML PRSY injection Comments:   Reason for Stopping:         gabapentin (NEURONTIN) 300 MG capsule Comments:   Reason for Stopping:         OXYGEN Comments:   Reason for Stopping:               Activity: as tolerated  Diet: regular diet  Wound Care: none needed    Follow-up:   Follow up with Obtain PCP Obtain PCP within 2 weeks of discharge and follow up with them monthly.          Follow up with JORGE Cope in 2 week(s)  on follow up with PCP, please review labs/imaging done during this Hospital stay, and discuss any additional/repeat testing or treatment needed with your PCP [place] [id number]  [phone]   Feb10 Go to [facility]  Thursday Feb 10, 2022  Intake/therapy/med managment phone appointment at 8 am. Appointment will be by phone with provider.   [facility]   [address], [place], [address] [place] [address]   Phone: [phone]           Time worked: 37 min    Participation: good    Electronically signed by Darryl Millan MD on 2/3/2022 at 7:53 AM

## 2022-02-03 NOTE — PROGRESS NOTES
Patient came to desk and states,\" I am having a hard time going to sleep. I had two nightmares tonight. It really scared me\". Patient given Atarax 25 @0110.

## 2022-02-03 NOTE — PROGRESS NOTES
Group Therapy Note    Start Time: 800  End Time:  155  Number of Participants: 13    Type of Group: Community Meeting       Patient's Goal:        Notes:  Patient didn't attend group    Participation Level:       Participation Quality:        Thought Process/Content:       Affective Functioning:       Mood:       Level of consciousness:        Modes of Intervention: Support      Discipline Responsible: Behavioral Health Tech II      Signature:  Kel Mc

## 2022-02-03 NOTE — PROGRESS NOTES
CLINICAL PHARMACY NOTE: MEDS TO BEDS    Total # of Prescriptions Filled: 4   The following medications were delivered to the patient:  · Vitamin D (Ergo) 1.25 mg  · Mirtazapine 7.5 mg  · Divalproex  mg  · Quetiapine 200 mg    Additional Documentation:    Handed scripts to [facility] (Rn) at pharmacy window

## 2022-02-03 NOTE — PROGRESS NOTES
SW met with treatment team to discuss pt's progress and setbacks. SW 2 was present. Pt reportedly had poor sleep last night, with medications, complained of nightmares and diarrhea, appetite is good, minimal participation in scheduled group activities, isolative to self, independent with ADLS, compliant with medications, behavior has been cooperative, affect flat, good eye contact, denies depression/anxiety, denies SI/HI/AVH, will be discharged today, follow-up appointments will be scheduled.

## 2022-02-03 NOTE — PROGRESS NOTES
Cassandra RN notified writer that patient had 3 bouts of diarrhea. Patient is currently in bathroom. Dr. Staley [facility] notified @3748; Orders given for Imodium 4 mg Q6H PRN for Diarrhea.

## 2022-02-03 NOTE — PROGRESS NOTES
[facility]  Discharge Note  Bridge Appointment completed: Reviewed Discharge Instructions with patient. Patient verbalizes understanding and agreement with the discharge plan using the teachback method. Referral for Outpatient Tobacco Cessation Counseling, upon discharge (vivi X if applicable and completed):    ( )  Hospital staff assisted patient to call Quit Line or faxed referral                                   during hospitalization                  ( )  Recognizing danger situations (included triggers and roadblocks), if not completed on admission                    ( )  Coping skills (new ways to manage stress, exercise, relaxation techniques, changing routine, distraction), if not completed on admission                                                           ( )  Basic information about quitting (benefits of quitting, techniques in how to quit, available resources, if not completed on admission  ( ) Referral for counseling faxed to [facility]   ( ) Patient refused referral  ( ) Patient refused counseling  (x ) Patient refused smoking cessation medication upon discharge    Vaccinations (vivi X if applicable and completed):  ( ) Patient states already received influenza vaccine elsewhere  ( ) Patient received influenza vaccine during this hospitalization  (x ) Patient refused influenza vaccine at this time      Pt discharged with followings belongings:   Dental Appliances: None  Vision - Corrective Lenses: Glasses  Hearing Aid: None  Jewelry: Bracelet  Body Piercings Removed: N/A  Clothing: Dress,Shirt  Were All Patient Medications Collected?: Not Applicable  Other Valuables: None   Valuables sent home with patient. Valuables retrieved from safe and returned to patient yes. Patient left department with  family via  car, discharged to  self care. Patient education on aftercare instructions: done  Patient verbalize understanding of AVS: yes. Suicidal Ideations? No AVH? none HI? Negative for homicidal ideation       Status EXAM upon discharge:  Status and Exam  Normal: No  Facial Expression: Flat  Affect: Congruent  Level of Consciousness: Alert  Mood:Normal: No  Mood: Anxious,Depressed  Motor Activity:Normal: No  Motor Activity: Decreased  Interview Behavior: Cooperative  Preception: [place] to Person,[place] to Time,[place] to Place,[place] to Situation  Attention:Normal: No  Attention: Unable to Concentrate  Thought Processes: Circumstantial  Thought Content:Normal: No  Thought Content: Preoccupations  Hallucinations: None  Delusions: No  Memory:Normal: No  Memory: Poor Recent  Insight and Judgment: No  Insight and Judgment: Poor Insight  Present Suicidal Ideation: No  Present Homicidal Ideation: No     States readiness to leave.

## 2022-02-09 DIAGNOSIS — F30.9 MANIC EPISODE (HCC): ICD-10-CM

## 2022-02-09 DIAGNOSIS — E11.9 TYPE 2 DIABETES MELLITUS WITHOUT COMPLICATION, UNSPECIFIED WHETHER LONG TERM INSULIN USE (HCC): ICD-10-CM

## 2022-02-09 DIAGNOSIS — E78.2 MIXED HYPERLIPIDEMIA: ICD-10-CM

## 2022-02-09 DIAGNOSIS — G89.29 OTHER CHRONIC PAIN: ICD-10-CM

## 2022-02-09 DIAGNOSIS — I15.9 SECONDARY HYPERTENSION: ICD-10-CM

## 2022-02-09 DIAGNOSIS — R60.9 EDEMA, UNSPECIFIED TYPE: ICD-10-CM

## 2022-02-09 DIAGNOSIS — K59.00 CONSTIPATION, UNSPECIFIED CONSTIPATION TYPE: ICD-10-CM

## 2022-02-09 DIAGNOSIS — F31.78 BIPOLAR DISORDER, IN FULL REMISSION, MOST RECENT EPISODE MIXED (HCC): ICD-10-CM

## 2022-02-09 DIAGNOSIS — K21.9 GASTROESOPHAGEAL REFLUX DISEASE, UNSPECIFIED WHETHER ESOPHAGITIS PRESENT: ICD-10-CM

## 2022-02-09 NOTE — TELEPHONE ENCOUNTER
We got a fax from [address] for medications to be sent in.  I do not see this on her pharmacy list. I tried to call pt and see if this was correct and no answer and VM full

## 2022-02-10 RX ORDER — ATORVASTATIN CALCIUM 20 MG/1
20 TABLET, FILM COATED ORAL DAILY
Qty: 90 TABLET | Refills: 3 | OUTPATIENT
Start: 2022-02-10

## 2022-02-10 RX ORDER — FUROSEMIDE 20 MG/1
20 TABLET ORAL DAILY
Qty: 90 TABLET | Refills: 3 | OUTPATIENT
Start: 2022-02-10

## 2022-02-10 RX ORDER — MIRTAZAPINE 7.5 MG/1
7.5 TABLET, FILM COATED ORAL NIGHTLY
Qty: 30 TABLET | Refills: 1 | OUTPATIENT
Start: 2022-02-10 | End: 2022-03-12

## 2022-02-10 RX ORDER — IRBESARTAN 150 MG/1
150 TABLET ORAL NIGHTLY
Qty: 90 TABLET | Refills: 3 | OUTPATIENT
Start: 2022-02-10

## 2022-02-10 RX ORDER — PANTOPRAZOLE SODIUM 40 MG/1
TABLET, DELAYED RELEASE ORAL
Qty: 90 TABLET | Refills: 3 | OUTPATIENT
Start: 2022-02-10

## 2022-02-10 RX ORDER — LIRAGLUTIDE 6 MG/ML
1.8 INJECTION SUBCUTANEOUS DAILY
Qty: 27 ML | Refills: 3 | OUTPATIENT
Start: 2022-02-10

## 2022-02-10 RX ORDER — QUETIAPINE FUMARATE 200 MG/1
200 TABLET, FILM COATED ORAL NIGHTLY
Qty: 30 TABLET | Refills: 1 | OUTPATIENT
Start: 2022-02-10 | End: 2022-03-12

## 2022-02-10 RX ORDER — DIVALPROEX SODIUM 500 MG/1
1000 TABLET, EXTENDED RELEASE ORAL DAILY
Qty: 60 TABLET | Refills: 1 | OUTPATIENT
Start: 2022-02-10 | End: 2022-03-12

## 2022-02-10 RX ORDER — DICLOFENAC SODIUM 75 MG/1
75 TABLET, DELAYED RELEASE ORAL 2 TIMES DAILY
Qty: 180 TABLET | Refills: 3 | OUTPATIENT
Start: 2022-02-10

## 2022-02-10 NOTE — TELEPHONE ENCOUNTER
Received fax from pharmacy requesting refill on pts medication(s). Pt was last seen in office on 4/20/2021  and has a follow up scheduled for Visit date not found. Will send request to  Jeet Castillo  for authorization. Requested Prescriptions     Pending Prescriptions Disp Refills    atorvastatin (LIPITOR) 20 MG tablet 90 tablet 3     Sig: Take 1 tablet by mouth daily    diclofenac (VOLTAREN) 75 MG EC tablet 180 tablet 3     Sig: Take 1 tablet by mouth 2 times daily    divalproex (DEPAKOTE ER) 500 MG extended release tablet 60 tablet 1     Sig: Take 2 tablets by mouth daily    furosemide (LASIX) 20 MG tablet 90 tablet 3     Sig: Take 1 tablet by mouth daily    irbesartan (AVAPRO) 150 MG tablet 90 tablet 3     Sig: Take 1 tablet by mouth nightly    linaCLOtide (LINZESS) 72 MCG CAPS capsule 90 capsule 3     Sig: Take 1 capsule by mouth every morning (before breakfast)    metFORMIN (GLUCOPHAGE) 500 MG tablet 180 tablet 3     Sig: Take 1 tablet by mouth 2 times daily (with meals)    mirtazapine (REMERON) 7.5 MG tablet 30 tablet 1     Sig: Take 1 tablet by mouth nightly    pantoprazole (PROTONIX) 40 MG tablet 90 tablet 3     Sig: Take 1 tablet by mouth daily.     QUEtiapine (SEROQUEL) 200 MG tablet 30 tablet 1     Sig: Take 1 tablet by mouth nightly    Liraglutide (VICTOZA) 18 MG/3ML SOPN SC injection 27 mL 3     Sig: Inject 1.8 mg into the skin daily

## 2022-02-24 RX ORDER — LANOLIN ALCOHOL/MO/W.PET/CERES
3 CREAM (GRAM) TOPICAL NIGHTLY
Qty: 90 TABLET | Refills: 3 | OUTPATIENT
Start: 2022-02-24

## 2022-02-24 RX ORDER — DIVALPROEX SODIUM 500 MG/1
1000 TABLET, EXTENDED RELEASE ORAL DAILY
Qty: 180 TABLET | Refills: 3 | OUTPATIENT
Start: 2022-02-24 | End: 2022-03-26

## 2022-02-24 NOTE — TELEPHONE ENCOUNTER
Received fax from pharmacy requesting refill on pts medication(s). Pt was last seen in office on 4/20/2021  and has a follow up scheduled for Visit date not found. Will send request to  Angelika Potter  for patient.      Requested Prescriptions     Pending Prescriptions Disp Refills    melatonin 3 MG TABS tablet 90 tablet 3     Sig: Take 1 tablet by mouth nightly    divalproex (DEPAKOTE ER) 500 MG extended release tablet 180 tablet 3     Sig: Take 2 tablets by mouth daily

## 2022-05-11 ENCOUNTER — HOSPITAL ENCOUNTER (EMERGENCY)
Facility: HOSPITAL | Age: 67
Discharge: HOME | End: 2022-05-11
Admitting: EMERGENCY MEDICINE

## 2022-05-11 ENCOUNTER — APPOINTMENT (OUTPATIENT)
Dept: GENERAL RADIOLOGY | Facility: HOSPITAL | Age: 67
End: 2022-05-11

## 2022-05-11 ENCOUNTER — APPOINTMENT (OUTPATIENT)
Dept: CT IMAGING | Facility: HOSPITAL | Age: 67
End: 2022-05-11

## 2022-05-11 VITALS
WEIGHT: 200 LBS | TEMPERATURE: 98.1 F | DIASTOLIC BLOOD PRESSURE: 70 MMHG | HEIGHT: 62 IN | RESPIRATION RATE: 18 BRPM | OXYGEN SATURATION: 95 % | BODY MASS INDEX: 36.8 KG/M2 | SYSTOLIC BLOOD PRESSURE: 115 MMHG | HEART RATE: 68 BPM

## 2022-05-11 DIAGNOSIS — Z99.81 OXYGEN DEPENDENT: ICD-10-CM

## 2022-05-11 DIAGNOSIS — U07.1 COVID-19: Primary | ICD-10-CM

## 2022-05-11 DIAGNOSIS — Z59.00 HOMELESS: ICD-10-CM

## 2022-05-11 LAB
ALBUMIN SERPL-MCNC: 3.9 G/DL (ref 3.5–5.2)
ALBUMIN/GLOB SERPL: 1.8 G/DL
ALP SERPL-CCNC: 140 U/L (ref 39–117)
ALT SERPL W P-5'-P-CCNC: 13 U/L (ref 1–33)
AMPHET+METHAMPHET UR QL: NEGATIVE
AMPHETAMINES UR QL: NEGATIVE
ANION GAP SERPL CALCULATED.3IONS-SCNC: 8 MMOL/L (ref 5–15)
ARTERIAL PATENCY WRIST A: NORMAL
AST SERPL-CCNC: 26 U/L (ref 1–32)
ATMOSPHERIC PRESS: 755 MMHG
BARBITURATES UR QL SCN: NEGATIVE
BASE EXCESS BLDA CALC-SCNC: 0.8 MMOL/L (ref 0–2)
BASOPHILS # BLD AUTO: 0.02 10*3/MM3 (ref 0–0.2)
BASOPHILS NFR BLD AUTO: 0.6 % (ref 0–1.5)
BDY SITE: NORMAL
BENZODIAZ UR QL SCN: NEGATIVE
BILIRUB SERPL-MCNC: <0.2 MG/DL (ref 0–1.2)
BODY TEMPERATURE: 37 C
BUN SERPL-MCNC: 14 MG/DL (ref 8–23)
BUN/CREAT SERPL: 22.6 (ref 7–25)
BUPRENORPHINE SERPL-MCNC: NEGATIVE NG/ML
CALCIUM SPEC-SCNC: 8 MG/DL (ref 8.6–10.5)
CANNABINOIDS SERPL QL: NEGATIVE
CHLORIDE SERPL-SCNC: 103 MMOL/L (ref 98–107)
CO2 SERPL-SCNC: 26 MMOL/L (ref 22–29)
COCAINE UR QL: NEGATIVE
CREAT SERPL-MCNC: 0.62 MG/DL (ref 0.57–1)
CRP SERPL-MCNC: <0.3 MG/DL (ref 0–0.5)
D DIMER PPP FEU-MCNC: 0.71 MCGFEU/ML (ref 0–0.5)
D-LACTATE SERPL-SCNC: 0.7 MMOL/L (ref 0.5–2)
DEPRECATED RDW RBC AUTO: 53.3 FL (ref 37–54)
EGFRCR SERPLBLD CKD-EPI 2021: 99 ML/MIN/1.73
EOSINOPHIL # BLD AUTO: 0.01 10*3/MM3 (ref 0–0.4)
EOSINOPHIL NFR BLD AUTO: 0.3 % (ref 0.3–6.2)
ERYTHROCYTE [DISTWIDTH] IN BLOOD BY AUTOMATED COUNT: 17.3 % (ref 12.3–15.4)
FERRITIN SERPL-MCNC: 26.73 NG/ML (ref 13–150)
FLUAV RNA RESP QL NAA+PROBE: NOT DETECTED
FLUBV RNA RESP QL NAA+PROBE: NOT DETECTED
GAS FLOW AIRWAY: 2 LPM
GLOBULIN UR ELPH-MCNC: 2.2 GM/DL
GLUCOSE SERPL-MCNC: 89 MG/DL (ref 65–99)
HCO3 BLDA-SCNC: 25.2 MMOL/L (ref 20–26)
HCT VFR BLD AUTO: 33.3 % (ref 34–46.6)
HGB BLD-MCNC: 9.9 G/DL (ref 12–15.9)
IMM GRANULOCYTES # BLD AUTO: 0.01 10*3/MM3 (ref 0–0.05)
IMM GRANULOCYTES NFR BLD AUTO: 0.3 % (ref 0–0.5)
INR PPP: 0.98 (ref 0.91–1.09)
LDH SERPL-CCNC: 182 U/L (ref 135–214)
LYMPHOCYTES # BLD AUTO: 0.9 10*3/MM3 (ref 0.7–3.1)
LYMPHOCYTES NFR BLD AUTO: 27 % (ref 19.6–45.3)
Lab: NORMAL
MCH RBC QN AUTO: 25.3 PG (ref 26.6–33)
MCHC RBC AUTO-ENTMCNC: 29.7 G/DL (ref 31.5–35.7)
MCV RBC AUTO: 85.2 FL (ref 79–97)
METHADONE UR QL SCN: NEGATIVE
MODALITY: NORMAL
MONOCYTES # BLD AUTO: 0.59 10*3/MM3 (ref 0.1–0.9)
MONOCYTES NFR BLD AUTO: 17.7 % (ref 5–12)
NEUTROPHILS NFR BLD AUTO: 1.8 10*3/MM3 (ref 1.7–7)
NEUTROPHILS NFR BLD AUTO: 54.1 % (ref 42.7–76)
NRBC BLD AUTO-RTO: 0 /100 WBC (ref 0–0.2)
NT-PROBNP SERPL-MCNC: 132.8 PG/ML (ref 0–900)
OPIATES UR QL: NEGATIVE
OXYCODONE UR QL SCN: NEGATIVE
PCO2 BLDA: 38.7 MM HG (ref 35–45)
PCO2 TEMP ADJ BLD: 38.7 MM HG (ref 35–45)
PCP UR QL SCN: NEGATIVE
PH BLDA: 7.42 PH UNITS (ref 7.35–7.45)
PH, TEMP CORRECTED: 7.42 PH UNITS (ref 7.35–7.45)
PLATELET # BLD AUTO: 193 10*3/MM3 (ref 140–450)
PMV BLD AUTO: 10.5 FL (ref 6–12)
PO2 BLDA: 88.3 MM HG (ref 83–108)
PO2 TEMP ADJ BLD: 88.3 MM HG (ref 83–108)
POTASSIUM SERPL-SCNC: 4.5 MMOL/L (ref 3.5–5.2)
PROCALCITONIN SERPL-MCNC: 0.05 NG/ML (ref 0–0.25)
PROPOXYPH UR QL: NEGATIVE
PROT SERPL-MCNC: 6.1 G/DL (ref 6–8.5)
PROTHROMBIN TIME: 12.6 SECONDS (ref 11.9–14.6)
RBC # BLD AUTO: 3.91 10*6/MM3 (ref 3.77–5.28)
S PNEUM AG SPEC QL LA: NEGATIVE
SAO2 % BLDCOA: 96.3 % (ref 94–99)
SARS-COV-2 RNA RESP QL NAA+PROBE: DETECTED
SODIUM SERPL-SCNC: 137 MMOL/L (ref 136–145)
TRICYCLICS UR QL SCN: NEGATIVE
VENTILATOR MODE: NORMAL
WBC NRBC COR # BLD: 3.33 10*3/MM3 (ref 3.4–10.8)

## 2022-05-11 PROCEDURE — 71275 CT ANGIOGRAPHY CHEST: CPT

## 2022-05-11 PROCEDURE — 83880 ASSAY OF NATRIURETIC PEPTIDE: CPT | Performed by: NURSE PRACTITIONER

## 2022-05-11 PROCEDURE — 84145 PROCALCITONIN (PCT): CPT | Performed by: NURSE PRACTITIONER

## 2022-05-11 PROCEDURE — 87040 BLOOD CULTURE FOR BACTERIA: CPT | Performed by: NURSE PRACTITIONER

## 2022-05-11 PROCEDURE — 83605 ASSAY OF LACTIC ACID: CPT | Performed by: NURSE PRACTITIONER

## 2022-05-11 PROCEDURE — 83615 LACTATE (LD) (LDH) ENZYME: CPT | Performed by: NURSE PRACTITIONER

## 2022-05-11 PROCEDURE — 0 IOPAMIDOL PER 1 ML: Performed by: NURSE PRACTITIONER

## 2022-05-11 PROCEDURE — 93005 ELECTROCARDIOGRAM TRACING: CPT

## 2022-05-11 PROCEDURE — 82728 ASSAY OF FERRITIN: CPT | Performed by: NURSE PRACTITIONER

## 2022-05-11 PROCEDURE — 93010 ELECTROCARDIOGRAM REPORT: CPT | Performed by: EMERGENCY MEDICINE

## 2022-05-11 PROCEDURE — 71045 X-RAY EXAM CHEST 1 VIEW: CPT

## 2022-05-11 PROCEDURE — 82803 BLOOD GASES ANY COMBINATION: CPT

## 2022-05-11 PROCEDURE — 85025 COMPLETE CBC W/AUTO DIFF WBC: CPT | Performed by: NURSE PRACTITIONER

## 2022-05-11 PROCEDURE — 99285 EMERGENCY DEPT VISIT HI MDM: CPT

## 2022-05-11 PROCEDURE — 36600 WITHDRAWAL OF ARTERIAL BLOOD: CPT

## 2022-05-11 PROCEDURE — 36415 COLL VENOUS BLD VENIPUNCTURE: CPT

## 2022-05-11 PROCEDURE — 80306 DRUG TEST PRSMV INSTRMNT: CPT | Performed by: NURSE PRACTITIONER

## 2022-05-11 PROCEDURE — 87899 AGENT NOS ASSAY W/OPTIC: CPT | Performed by: NURSE PRACTITIONER

## 2022-05-11 PROCEDURE — 80053 COMPREHEN METABOLIC PANEL: CPT | Performed by: NURSE PRACTITIONER

## 2022-05-11 PROCEDURE — 86140 C-REACTIVE PROTEIN: CPT | Performed by: NURSE PRACTITIONER

## 2022-05-11 PROCEDURE — 87636 SARSCOV2 & INF A&B AMP PRB: CPT | Performed by: NURSE PRACTITIONER

## 2022-05-11 PROCEDURE — 85379 FIBRIN DEGRADATION QUANT: CPT | Performed by: NURSE PRACTITIONER

## 2022-05-11 PROCEDURE — 85610 PROTHROMBIN TIME: CPT | Performed by: NURSE PRACTITIONER

## 2022-05-11 RX ORDER — ACETAMINOPHEN 500 MG
1000 TABLET ORAL ONCE
Status: COMPLETED | OUTPATIENT
Start: 2022-05-11 | End: 2022-05-11

## 2022-05-11 RX ORDER — SODIUM CHLORIDE 0.9 % (FLUSH) 0.9 %
10 SYRINGE (ML) INJECTION AS NEEDED
Status: DISCONTINUED | OUTPATIENT
Start: 2022-05-11 | End: 2022-05-11 | Stop reason: HOSPADM

## 2022-05-11 RX ADMIN — IOPAMIDOL 100 ML: 755 INJECTION, SOLUTION INTRAVENOUS at 12:22

## 2022-05-11 RX ADMIN — ACETAMINOPHEN 1000 MG: 500 TABLET ORAL at 12:50

## 2022-05-11 SDOH — ECONOMIC STABILITY - HOUSING INSECURITY: HOMELESSNESS UNSPECIFIED: Z59.00

## 2022-05-11 NOTE — ED PROVIDER NOTES
Subjective   Patient is a 67-year-old white female presents the emergency department with generalized body aches, nausea, shortness of breath for the past 3 days.  She states she feels that she has COVID 19.  She states that she has been staying in a hotel recently.  She states she thinks she has been around some people that have been ill.  She does have a history of COPD and asthma.  She usually wears a CPAP however she has not had it recently.  She states she does wear oxygen at home but has not been helping.  She has had intermittent nausea without vomiting.  She complains of generalized body aches.  She has had subjective fevers she has not taken her temperature.  She states she is had a productive cough but she does not know the color of her sputum because she swallows it.  She denies any chest pain.  She states she gets more short of breath with any exertion.  He states she did receive the COVID-19 vaccine x2 but did not receive a booster.      History provided by:  Patient   used: No        Review of Systems   Constitutional: Negative.    HENT: Negative.    Eyes: Negative.    Respiratory:        Patient is a 67-year-old white female presents the emergency department with generalized body aches, nausea, shortness of breath for the past 3 days.  She states she feels that she has COVID 19.  She states that she has been staying in a hotel recently.  She states she thinks she has been around some people that have been ill.  She does have a history of COPD and asthma.  She usually wears a CPAP however she has not had it recently.  She states she does wear oxygen at home but has not been helping.  She has had intermittent nausea without vomiting.  She complains of generalized body aches.  She has had subjective fevers she has not taken her temperature.  She states she is had a productive cough but she does not know the color of her sputum because she swallows it.  She denies any chest pain.  She  states she gets more short of breath with any exertion.  He states she did receive the COVID-19 vaccine x2 but did not receive a booster.     Cardiovascular: Negative.    Gastrointestinal: Negative.    Endocrine: Negative.    Genitourinary: Negative.    Musculoskeletal: Negative.    Skin: Negative.    Allergic/Immunologic: Negative.    Neurological: Negative.    Hematological: Negative.    Psychiatric/Behavioral: Negative.    All other systems reviewed and are negative.      Past Medical History:   Diagnosis Date   • Arthritis    • Asthma    • Bipolar 1 disorder (HCC)    • COPD (chronic obstructive pulmonary disease) (HCC)    • Diabetes mellitus (HCC)    • Hypotension    • Migraine    • Urinary tract infection        Allergies   Allergen Reactions   • Morphine Shortness Of Breath   • Codeine GI Intolerance       Past Surgical History:   Procedure Laterality Date   • ADENOIDECTOMY     • APPENDECTOMY     •  SECTION     • CHOLECYSTECTOMY     • GASTRIC BYPASS     • INCISION AND DRAINAGE ABSCESS N/A 2020    Procedure: INCISION AND DRAINAGE ABSCESS;  Surgeon: Kim Guardado MD;  Location: [facility];  Service: General;  Laterality: N/A;   • TONSILLECTOMY     • TUBAL ABDOMINAL LIGATION     • TUMOR REMOVAL Right     wrist       History reviewed. No pertinent family history.    Social History     Socioeconomic History   • Marital status:    Tobacco Use   • Smoking status: Light Tobacco Smoker     Packs/day: 0.50   Substance and Sexual Activity   • Alcohol use: No   • Drug use: No       Prior to Admission medications    Medication Sig Start Date End Date Taking? Authorizing Provider   albuterol sulfate  (90 Base) MCG/ACT inhaler Inhale 2 puffs. 10/22/19   ProviderJanell MD   ARIPiprazole (ABILIFY IM) Inject  into the appropriate muscle as directed by prescriber.    ProviderJanell MD   ARIPiprazole Lauroxil ER (ARISTADA) 882 MG/3.2ML intramuscular injection Inject 882 mg into the  appropriate muscle as directed by prescriber. 12/3/19   Janell Katz MD   atorvastatin (LIPITOR) 20 MG tablet Take 20 mg by mouth. 12/3/19   Janell Katz MD   benztropine (COGENTIN) 1 MG tablet Take 1 mg by mouth. 12/3/19   Janell Katz MD   cefdinir (OMNICEF) 300 MG capsule Take 1 capsule by mouth 2 (Two) Times a Day. 3/13/21   Hernan Gutierrez PA   clobetasol (TEMOVATE) 0.05 % ointment Apply topically 2 times daily. 11/5/18   Janell Katz MD   Continuous Blood Gluc  (FREESTYLE JOSE READER) device 1 Units. 8/21/18   Janell Katz MD   donepezil (ARICEPT) 10 MG tablet Take 1 tablet by mouth every night. 10/9/19   Janell Katz MD   furosemide (LASIX) 20 MG tablet Take 20 mg by mouth. 10/9/19   Janell Katz MD   HYDROcodone-acetaminophen (NORCO) 7.5-325 MG per tablet Take 1 tablet by mouth Every 4 (Four) Hours As Needed for Moderate Pain . 3/13/21   [place]le [place] Jr., MD   ipratropium-albuterol (DUO-NEB) 0.5-2.5 mg/3 ml nebulizer Inhale 3 mL. 10/2/18   Janell Katz MD   irbesartan (AVAPRO) 150 MG tablet Take 150 mg by mouth. 4/6/20   Janell Katz MD   Liraglutide (VICTOZA) 18 MG/3ML solution pen-injector injection Inject 1.8 mg under the skin into the appropriate area as directed. 3/6/20   Janell Katz MD   Lurasidone HCl 80 MG tablet Take 80 mg by mouth. 3/4/20   Janell Katz MD   MEPROBAMATE PO Take 500 mg by mouth 2 (Two) Times a Day.    Janell Katz MD   metFORMIN (GLUCOPHAGE) 500 MG tablet Take 500 mg by mouth. 12/3/19   Janell Katz MD   mirtazapine (REMERON) 15 MG tablet Take 15 mg by mouth. 12/3/19   Janell Katz MD   nystatin (MYCOSTATIN) 485188 UNIT/GM powder Apply 3 times daily. 3/6/18   Janell Katz MD   rOPINIRole (REQUIP) 2 MG tablet Take 2 mg by mouth. 10/9/19   Provider, Janell, MD       /70   Pulse 68   Temp 98.1 °F (36.7 °C)   Resp 18  "  Ht 157.5 cm (62\")   Wt 90.7 kg (200 lb)   SpO2 95%   BMI 36.58 kg/m²     Objective   Physical Exam  Vitals and nursing note reviewed.   Constitutional:       Appearance: She is well-developed.      Comments: Nontoxic-appearing.  No acute distress.   HENT:      Head: Normocephalic and atraumatic.   Eyes:      Conjunctiva/sclera: Conjunctivae normal.      Pupils: Pupils are equal, round, and reactive to light.   Neck:      Thyroid: No thyromegaly.      Trachea: No tracheal deviation.   Cardiovascular:      Rate and Rhythm: Normal rate and regular rhythm.      Heart sounds: Normal heart sounds.   Pulmonary:      Effort: Pulmonary effort is normal. No respiratory distress.      Breath sounds: No wheezing or rales.      Comments: Slightly diminished lung sounds bilateral bases.  O2 sat is 94% on 2 L of O2 respirations are even and unlabored  Chest:      Chest wall: No tenderness.   Abdominal:      General: Bowel sounds are normal.      Palpations: Abdomen is soft.   Musculoskeletal:         General: Normal range of motion.      Cervical back: Normal range of motion and neck supple.   Skin:     General: Skin is warm and dry.      Comments: Mild pallor noted   Neurological:      Mental Status: She is alert and oriented to person, place, and time.      Cranial Nerves: No cranial nerve deficit.      Deep Tendon Reflexes: Reflexes are normal and symmetric.   Psychiatric:         Behavior: Behavior normal.         Thought Content: Thought content normal.         Judgment: Judgment normal.         Procedures         Lab Results (last 24 hours)     Procedure Component Value Units Date/Time    Blood Culture - Blood, Arm, Right [[id number]] Collected: 05/11/22 0946    Specimen: Blood from Arm, Right Updated: 05/11/22 1002    COVID-19 and FLU A/B PCR - Swab, Nasopharynx [[id number]]  (Abnormal) Collected: 05/11/22 0947    Specimen: Swab from Nasopharynx Updated: 05/11/22 1143     COVID19 Detected     Influenza A PCR Not " Detected     Influenza B PCR Not Detected    Narrative:      Fact sheet for providers: https://www.fda.gov/media/019929/download    Fact sheet for patients: https://www.fda.gov/media/101377/download    Test performed by PCR.    Blood Gas, Arterial - [[id number]] Collected: 05/11/22 0953    Specimen: Arterial Blood Updated: 05/11/22 0955     Site Right Brachial     Redd's Test N/A     pH, Arterial 7.422 pH units      pCO2, Arterial 38.7 mm Hg      pO2, Arterial 88.3 mm Hg      HCO3, Arterial 25.2 mmol/L      Base Excess, Arterial 0.8 mmol/L      O2 Saturation, Arterial 96.3 %      Temperature 37.0 C      Barometric Pressure for Blood Gas 755 mmHg      Modality Nasal Cannula     Flow Rate 2.0 lpm      Ventilator Mode NA     Collected by [id number]     Comment: Meter: [id number]     :  [id number]        pCO2, Temperature Corrected 38.7 mm Hg      pH, Temp Corrected 7.422 pH Units      pO2, Temperature Corrected 88.3 mm Hg     CBC & Differential [[id number]]  (Abnormal) Collected: 05/11/22 1001    Specimen: Blood Updated: 05/11/22 1015    Narrative:      The following orders were created for panel order CBC & Differential.  Procedure                               Abnormality         Status                     ---------                               -----------         ------                     CBC Auto Differential[[id number]]        Abnormal            Final result                 Please view results for these tests on the individual orders.    Comprehensive Metabolic Panel [[id number]]  (Abnormal) Collected: 05/11/22 1001    Specimen: Blood Updated: 05/11/22 1036     Glucose 89 mg/dL      BUN 14 mg/dL      Creatinine 0.62 mg/dL      Sodium 137 mmol/L      Potassium 4.5 mmol/L      Chloride 103 mmol/L      CO2 26.0 mmol/L      Calcium 8.0 mg/dL      Total Protein 6.1 g/dL      Albumin 3.90 g/dL      ALT (SGPT) 13 U/L      AST (SGOT) 26 U/L      Alkaline Phosphatase 140 U/L      Total Bilirubin <0.2 mg/dL       "Globulin 2.2 gm/dL      A/G Ratio 1.8 g/dL      BUN/Creatinine Ratio 22.6     Anion Gap 8.0 mmol/L      eGFR 99.0 mL/min/1.73      Comment: National Kidney Foundation and American Society of Nephrology (ASN) Task Force recommended calculation based on the Chronic Kidney Disease Epidemiology Collaboration (CKD-EPI) equation refit without adjustment for race.       Narrative:      GFR Normal >60  Chronic Kidney Disease <60  Kidney Failure <15      Lactate Dehydrogenase [[id number]]  (Normal) Collected: 05/11/22 1001    Specimen: Blood Updated: 05/11/22 1036      U/L     Procalcitonin [[id number]]  (Normal) Collected: 05/11/22 1001    Specimen: Blood Updated: 05/11/22 1040     Procalcitonin 0.05 ng/mL     Narrative:      As a Marker for Sepsis (Non-Neonates):    1. <0.5 ng/mL represents a low risk of severe sepsis and/or septic shock.  2. >2 ng/mL represents a high risk of severe sepsis and/or septic shock.    As a Marker for Lower Respiratory Tract Infections that require antibiotic therapy:    PCT on Admission    Antibiotic Therapy       6-12 Hrs later    >0.5                Strongly Recommended  >0.25 - <0.5        Recommended   0.1 - 0.25          Discouraged              Remeasure/reassess PCT  <0.1                Strongly Discouraged     Remeasure/reassess PCT    As 28 day mortality risk marker: \"Change in Procalcitonin Result\" (>80% or <=80%) if Day 0 (or Day 1) and Day 4 values are available. Refer to http://www[place] [place]    Change in PCT <=80%  A decrease of PCT levels below or equal to 80% defines a positive change in PCT test result representing a higher risk for 28-day all-cause mortality of patients diagnosed with severe sepsis for septic shock.    Change in PCT >80%  A decrease of PCT levels of more than 80% defines a negative change in PCT result representing a lower risk for 28-day all-cause mortality of patients diagnosed with severe sepsis or septic shock.       Protime-INR " [[id number]]  (Normal) Collected: 05/11/22 1001    Specimen: Blood Updated: 05/11/22 1056     Protime 12.6 Seconds      INR 0.98    D-dimer, Quantitative [[id number]]  (Abnormal) Collected: 05/11/22 1001    Specimen: Blood Updated: 05/11/22 1100     D-Dimer, Quantitative 0.71 MCGFEU/mL     Narrative:      Reference Range is 0-0.50 MCGFEU/mL. However, results <0.50 MCGFEU/mL tends to rule out DVT or PE. Results >0.50 MCGFEU/mL are not useful in predicting absence or presence of DVT or PE.      C-reactive Protein [[id number]]  (Normal) Collected: 05/11/22 1001    Specimen: Blood Updated: 05/11/22 1036     C-Reactive Protein <0.30 mg/dL     Lactic Acid, Plasma [[id number]]  (Normal) Collected: 05/11/22 1001    Specimen: Blood Updated: 05/11/22 1032     Lactate 0.7 mmol/L     Ferritin [[id number]]  (Normal) Collected: 05/11/22 1001    Specimen: Blood Updated: 05/11/22 1040     Ferritin 26.73 ng/mL     Narrative:      Results may be falsely decreased if patient taking Biotin.      Blood Culture - Blood, Arm, Left [[id number]] Collected: 05/11/22 1001    Specimen: Blood from Arm, Left Updated: 05/11/22 1015    CBC Auto Differential [[id number]]  (Abnormal) Collected: 05/11/22 1001    Specimen: Blood Updated: 05/11/22 1015     WBC 3.33 10*3/mm3      RBC 3.91 10*6/mm3      Hemoglobin 9.9 g/dL      Hematocrit 33.3 %      MCV 85.2 fL      MCH 25.3 pg      MCHC 29.7 g/dL      RDW 17.3 %      RDW-SD 53.3 fl      MPV 10.5 fL      Platelets 193 10*3/mm3      Neutrophil % 54.1 %      Lymphocyte % 27.0 %      Monocyte % 17.7 %      Eosinophil % 0.3 %      Basophil % 0.6 %      Immature Grans % 0.3 %      Neutrophils, Absolute 1.80 10*3/mm3      Lymphocytes, Absolute 0.90 10*3/mm3      Monocytes, Absolute 0.59 10*3/mm3      Eosinophils, Absolute 0.01 10*3/mm3      Basophils, Absolute 0.02 10*3/mm3      Immature Grans, Absolute 0.01 10*3/mm3      nRBC 0.0 /100 WBC     BNP [[id number]]  (Normal) Collected: 05/11/22 1001    Specimen: Blood  Updated: 05/11/22 1130     proBNP 132.8 pg/mL     Narrative:      Among patients with dyspnea, NT-proBNP is highly sensitive for the detection of acute congestive heart failure. In addition NT-proBNP of <300 pg/ml effectively rules out acute congestive heart failure with 99% negative predictive value.    Results may be falsely decreased if patient taking Biotin.      S. Pneumo Ag Urine or CSF - Urine, Urine, Clean Catch [[id number]]  (Normal) Collected: 05/11/22 1026    Specimen: Urine, Clean Catch Updated: 05/11/22 1147     Strep Pneumo Ag Negative    Urine Drug Screen - Urine, Clean Catch [[id number]]  (Normal) Collected: 05/11/22 1026    Specimen: Urine, Clean Catch Updated: 05/11/22 1102     THC, Screen, Urine Negative     Phencyclidine (PCP), Urine Negative     Cocaine Screen, Urine Negative     Methamphetamine, Ur Negative     Opiate Screen Negative     Amphetamine Screen, Urine Negative     Benzodiazepine Screen, Urine Negative     Tricyclic Antidepressants Screen Negative     Methadone Screen, Urine Negative     Barbiturates Screen, Urine Negative     Oxycodone Screen, Urine Negative     Propoxyphene Screen Negative     Buprenorphine, Screen, Urine Negative    Narrative:      Cutoff For Drugs Screened:    Amphetamines               500 ng/ml  Barbiturates               200 ng/ml  Benzodiazepines            150 ng/ml  Cocaine                    150 ng/ml  Methadone                  200 ng/ml  Opiates                    100 ng/ml  Phencyclidine               25 ng/ml  THC                            50 ng/ml  Methamphetamine            500 ng/ml  Tricyclic Antidepressants  300 ng/ml  Oxycodone                  100 ng/ml  Propoxyphene               300 ng/ml  Buprenorphine               10 ng/ml    The normal value for all drugs tested is negative. This report includes unconfirmed screening results, with the cutoff values listed, to be used for medical treatment purposes only.  Unconfirmed results must not be used  for non-medical purposes such as employment or legal testing.  Clinical consideration should be applied to any drug of abuse test, particularly when unconfirmed results are used.            CT Angiogram Chest   Final Result   1. No pulmonary emboli.   2. Cardiomegaly. No thoracic aortic aneurysm or dissection. Mild   thoracic aortic arch calcification.   3. Mosaic appearance of the lung parenchyma may relate to air trapping.   Mild edema/venous congestion considered.   This report was finalized on 05/11/2022 12:35 by Dr. Anjali Barfield MD.      XR Chest 1 View   Final Result   Interstitial prominence in the central and upper lung zones   may reflect mild interstitial edema and volume overload. Clinical   correlation is recommended. Heart size is normal.   This report was finalized on 05/11/2022 10:04 by Dr. Brandon Metzger MD.          ED Course  ED Course as of 05/11/22 1609   Wed May 11, 2022   1237 Pending cta of chest results at this time. Pt is covid 19  positive. Her 02 sat 100% on 02 at 2L which is the oxygen she wears at home.  [CW]   1241 Reviewed results of testing with the patient.  Advised that her CTA was negative for any pulmonary embolus.  She is COVID-19 positive.  Influenza A and B are both negative.  She states that she has been staying in a hotel which she will be homeless tomorrow.  She states that she was going to stay with her daughter but she is is homeless as well.  She states that she wears oxygen all the time but does not have the portable oxygen that she usually has with her.  Her O2 sat has been 100% on 2 L while she is been in the emergency department.  Have spoken with case management to see if they can assist with this patient's situation.  Otherwise the patient will be able to be discharged home. [CW]      ED Course User Index  [CW] Mara Taylor, APRN          MDM  Number of Diagnoses or Management Options  COVID-19: minor  Homeless: minor  Oxygen dependent: minor     Amount  and/or Complexity of Data Reviewed  Clinical lab tests: ordered and reviewed  Tests in the radiology section of CPT®: ordered and reviewed    Patient Progress  Patient progress: stable      Final diagnoses:   COVID-19   Homeless   Oxygen dependent          Mara Taylor, APRN  05/11/22 4331

## 2022-05-11 NOTE — DISCHARGE INSTRUCTIONS
Return to ER if symptoms worsen   Return to er if increased shortness of breath, fever, vomiting, or if symptoms worsen

## 2022-05-12 ENCOUNTER — HOSPITAL ENCOUNTER (EMERGENCY)
Facility: HOSPITAL | Age: 67
Discharge: LEFT BEFORE DISCHARGE | End: 2022-05-12
Attending: EMERGENCY MEDICINE | Admitting: EMERGENCY MEDICINE

## 2022-05-12 ENCOUNTER — APPOINTMENT (OUTPATIENT)
Dept: GENERAL RADIOLOGY | Facility: HOSPITAL | Age: 67
End: 2022-05-12

## 2022-05-12 VITALS
OXYGEN SATURATION: 94 % | WEIGHT: 168 LBS | SYSTOLIC BLOOD PRESSURE: 116 MMHG | DIASTOLIC BLOOD PRESSURE: 93 MMHG | BODY MASS INDEX: 32.98 KG/M2 | HEART RATE: 67 BPM | RESPIRATION RATE: 15 BRPM | TEMPERATURE: 97.7 F | HEIGHT: 60 IN

## 2022-05-12 DIAGNOSIS — R07.9 CHEST PAIN, UNSPECIFIED TYPE: Primary | ICD-10-CM

## 2022-05-12 DIAGNOSIS — D72.819 LEUKOPENIA, UNSPECIFIED TYPE: ICD-10-CM

## 2022-05-12 LAB
ALBUMIN SERPL-MCNC: 4 G/DL (ref 3.5–5.2)
ALBUMIN/GLOB SERPL: 1.5 G/DL
ALP SERPL-CCNC: 144 U/L (ref 39–117)
ALT SERPL W P-5'-P-CCNC: 16 U/L (ref 1–33)
ANION GAP SERPL CALCULATED.3IONS-SCNC: 9 MMOL/L (ref 5–15)
ANISOCYTOSIS BLD QL: ABNORMAL
AST SERPL-CCNC: 30 U/L (ref 1–32)
BILIRUB SERPL-MCNC: <0.2 MG/DL (ref 0–1.2)
BUN SERPL-MCNC: 17 MG/DL (ref 8–23)
BUN/CREAT SERPL: 21.8 (ref 7–25)
BURR CELLS BLD QL SMEAR: ABNORMAL
CALCIUM SPEC-SCNC: 8.1 MG/DL (ref 8.6–10.5)
CHLORIDE SERPL-SCNC: 100 MMOL/L (ref 98–107)
CLUMPED PLATELETS: PRESENT
CO2 SERPL-SCNC: 25 MMOL/L (ref 22–29)
CREAT SERPL-MCNC: 0.78 MG/DL (ref 0.57–1)
D DIMER PPP FEU-MCNC: 0.65 MCGFEU/ML (ref 0–0.5)
DEPRECATED RDW RBC AUTO: 53.4 FL (ref 37–54)
EGFRCR SERPLBLD CKD-EPI 2021: 84.4 ML/MIN/1.73
ERYTHROCYTE [DISTWIDTH] IN BLOOD BY AUTOMATED COUNT: 17.5 % (ref 12.3–15.4)
GLOBULIN UR ELPH-MCNC: 2.6 GM/DL
GLUCOSE SERPL-MCNC: 79 MG/DL (ref 65–99)
HCT VFR BLD AUTO: 33.9 % (ref 34–46.6)
HGB BLD-MCNC: 10.2 G/DL (ref 12–15.9)
HOLD SPECIMEN: NORMAL
HOLD SPECIMEN: NORMAL
LYMPHOCYTES # BLD MANUAL: 1.17 10*3/MM3 (ref 0.7–3.1)
LYMPHOCYTES NFR BLD MANUAL: 13.3 % (ref 5–12)
MCH RBC QN AUTO: 25.2 PG (ref 26.6–33)
MCHC RBC AUTO-ENTMCNC: 30.1 G/DL (ref 31.5–35.7)
MCV RBC AUTO: 83.9 FL (ref 79–97)
MICROCYTES BLD QL: ABNORMAL
MONOCYTES # BLD: 0.37 10*3/MM3 (ref 0.1–0.9)
NEUTROPHILS # BLD AUTO: 1.26 10*3/MM3 (ref 1.7–7)
NEUTROPHILS NFR BLD MANUAL: 41.8 % (ref 42.7–76)
NEUTS BAND NFR BLD MANUAL: 3.1 % (ref 0–5)
PLATELET # BLD AUTO: 191 10*3/MM3 (ref 140–450)
PMV BLD AUTO: 10.6 FL (ref 6–12)
POIKILOCYTOSIS BLD QL SMEAR: ABNORMAL
POLYCHROMASIA BLD QL SMEAR: ABNORMAL
POTASSIUM SERPL-SCNC: 4.3 MMOL/L (ref 3.5–5.2)
PROT SERPL-MCNC: 6.6 G/DL (ref 6–8.5)
RBC # BLD AUTO: 4.04 10*6/MM3 (ref 3.77–5.28)
SODIUM SERPL-SCNC: 134 MMOL/L (ref 136–145)
TROPONIN T SERPL-MCNC: <0.01 NG/ML (ref 0–0.03)
VARIANT LYMPHS NFR BLD MANUAL: 36.7 % (ref 19.6–45.3)
VARIANT LYMPHS NFR BLD MANUAL: 5.1 % (ref 0–5)
WBC MORPH BLD: NORMAL
WBC NRBC COR # BLD: 2.81 10*3/MM3 (ref 3.4–10.8)
WHOLE BLOOD HOLD COAG: NORMAL
WHOLE BLOOD HOLD SPECIMEN: NORMAL

## 2022-05-12 PROCEDURE — 85025 COMPLETE CBC W/AUTO DIFF WBC: CPT | Performed by: EMERGENCY MEDICINE

## 2022-05-12 PROCEDURE — 71045 X-RAY EXAM CHEST 1 VIEW: CPT

## 2022-05-12 PROCEDURE — 80053 COMPREHEN METABOLIC PANEL: CPT | Performed by: EMERGENCY MEDICINE

## 2022-05-12 PROCEDURE — 93005 ELECTROCARDIOGRAM TRACING: CPT | Performed by: EMERGENCY MEDICINE

## 2022-05-12 PROCEDURE — 84484 ASSAY OF TROPONIN QUANT: CPT | Performed by: EMERGENCY MEDICINE

## 2022-05-12 PROCEDURE — 93010 ELECTROCARDIOGRAM REPORT: CPT | Performed by: EMERGENCY MEDICINE

## 2022-05-12 PROCEDURE — 85007 BL SMEAR W/DIFF WBC COUNT: CPT | Performed by: EMERGENCY MEDICINE

## 2022-05-12 PROCEDURE — 99284 EMERGENCY DEPT VISIT MOD MDM: CPT

## 2022-05-12 PROCEDURE — 85379 FIBRIN DEGRADATION QUANT: CPT | Performed by: EMERGENCY MEDICINE

## 2022-05-12 RX ORDER — SODIUM CHLORIDE 0.9 % (FLUSH) 0.9 %
10 SYRINGE (ML) INJECTION AS NEEDED
Status: DISCONTINUED | OUTPATIENT
Start: 2022-05-12 | End: 2022-05-12 | Stop reason: HOSPADM

## 2022-05-12 RX ORDER — ASPIRIN 81 MG/1
324 TABLET, CHEWABLE ORAL ONCE
Status: COMPLETED | OUTPATIENT
Start: 2022-05-12 | End: 2022-05-12

## 2022-05-12 RX ADMIN — ASPIRIN 324 MG: 81 TABLET, CHEWABLE ORAL at 10:45

## 2022-05-14 ENCOUNTER — APPOINTMENT (OUTPATIENT)
Dept: GENERAL RADIOLOGY | Age: 67
DRG: 881 | End: 2022-05-14
Payer: MEDICARE

## 2022-05-14 ENCOUNTER — HOSPITAL ENCOUNTER (INPATIENT)
Age: 67
LOS: 8 days | Discharge: PSYCHIATRIC OR SUBSTANCE USE FACILITY | DRG: 881 | End: 2022-05-22
Attending: INTERNAL MEDICINE | Admitting: HOSPITALIST
Payer: MEDICARE

## 2022-05-14 DIAGNOSIS — U07.1 COVID-19: ICD-10-CM

## 2022-05-14 DIAGNOSIS — R45.851 SUICIDAL IDEATION: Primary | ICD-10-CM

## 2022-05-14 PROBLEM — N39.0 ACUTE URINARY TRACT INFECTION: Status: ACTIVE | Noted: 2022-05-14

## 2022-05-14 PROBLEM — Z91.51 HISTORY OF SUICIDE ATTEMPT: Status: ACTIVE | Noted: 2022-05-14

## 2022-05-14 LAB
ACETAMINOPHEN LEVEL: <15 UG/ML
ALBUMIN SERPL-MCNC: 4.1 G/DL (ref 3.5–5.2)
ALP BLD-CCNC: 157 U/L (ref 35–104)
ALT SERPL-CCNC: 15 U/L (ref 5–33)
AMPHETAMINE SCREEN, URINE: NEGATIVE
ANION GAP SERPL CALCULATED.3IONS-SCNC: 11 MMOL/L (ref 7–19)
AST SERPL-CCNC: 21 U/L (ref 5–32)
BACTERIA: ABNORMAL /HPF
BARBITURATE SCREEN URINE: NEGATIVE
BASOPHILS ABSOLUTE: 0 K/UL (ref 0–0.2)
BASOPHILS RELATIVE PERCENT: 0.4 % (ref 0–1)
BENZODIAZEPINE SCREEN, URINE: NEGATIVE
BILIRUB SERPL-MCNC: <0.2 MG/DL (ref 0.2–1.2)
BILIRUBIN URINE: NEGATIVE
BLOOD, URINE: NEGATIVE
BUN BLDV-MCNC: 17 MG/DL (ref 8–23)
CALCIUM SERPL-MCNC: 8 MG/DL (ref 8.8–10.2)
CANNABINOID SCREEN URINE: NEGATIVE
CHLORIDE BLD-SCNC: 103 MMOL/L (ref 98–111)
CLARITY: CLEAR
CO2: 23 MMOL/L (ref 22–29)
COCAINE METABOLITE SCREEN URINE: NEGATIVE
COLOR: YELLOW
CREAT SERPL-MCNC: 0.8 MG/DL (ref 0.5–0.9)
CRYSTALS, UA: ABNORMAL /HPF
EOSINOPHILS ABSOLUTE: 0.1 K/UL (ref 0–0.6)
EOSINOPHILS RELATIVE PERCENT: 1.1 % (ref 0–5)
EPITHELIAL CELLS, UA: 2 /HPF (ref 0–5)
ETHANOL: <10 MG/DL (ref 0–0.08)
FERRITIN: 13.5 NG/ML (ref 13–150)
GFR AFRICAN AMERICAN: >59
GFR NON-AFRICAN AMERICAN: >60
GLUCOSE BLD-MCNC: 156 MG/DL (ref 70–99)
GLUCOSE BLD-MCNC: 86 MG/DL (ref 74–109)
GLUCOSE URINE: NEGATIVE MG/DL
HCT VFR BLD CALC: 35.3 % (ref 37–47)
HEMOGLOBIN: 10.4 G/DL (ref 12–16)
HYALINE CASTS: 3 /HPF (ref 0–8)
IMMATURE GRANULOCYTES #: 0 K/UL
KETONES, URINE: ABNORMAL MG/DL
LACTATE DEHYDROGENASE: 226 U/L (ref 91–215)
LEUKOCYTE ESTERASE, URINE: ABNORMAL
LYMPHOCYTES ABSOLUTE: 1.8 K/UL (ref 1.1–4.5)
LYMPHOCYTES RELATIVE PERCENT: 39.6 % (ref 20–40)
Lab: NORMAL
MAGNESIUM: 2.2 MG/DL (ref 1.6–2.4)
MCH RBC QN AUTO: 24.8 PG (ref 27–31)
MCHC RBC AUTO-ENTMCNC: 29.5 G/DL (ref 33–37)
MCV RBC AUTO: 84.2 FL (ref 81–99)
MONOCYTES ABSOLUTE: 0.4 K/UL (ref 0–0.9)
MONOCYTES RELATIVE PERCENT: 9.8 % (ref 0–10)
NEUTROPHILS ABSOLUTE: 2.2 K/UL (ref 1.5–7.5)
NEUTROPHILS RELATIVE PERCENT: 48.9 % (ref 50–65)
NITRITE, URINE: POSITIVE
OPIATE SCREEN URINE: NEGATIVE
PDW BLD-RTO: 17.3 % (ref 11.5–14.5)
PERFORMED ON: ABNORMAL
PH UA: 6.5 (ref 5–8)
PHOSPHORUS: 4.2 MG/DL (ref 2.5–4.5)
PLATELET # BLD: 186 K/UL (ref 130–400)
PMV BLD AUTO: 11.9 FL (ref 9.4–12.3)
POTASSIUM REFLEX MAGNESIUM: 5 MMOL/L (ref 3.5–5)
PROTEIN UA: NEGATIVE MG/DL
RBC # BLD: 4.19 M/UL (ref 4.2–5.4)
RBC UA: 1 /HPF (ref 0–4)
SALICYLATE, SERUM: <3 MG/DL (ref 3–10)
SARS-COV-2, NAAT: DETECTED
SODIUM BLD-SCNC: 137 MMOL/L (ref 136–145)
SPECIFIC GRAVITY UA: 1.01 (ref 1–1.03)
TOTAL PROTEIN: 6.2 G/DL (ref 6.6–8.7)
TROPONIN: <0.01 NG/ML (ref 0–0.03)
UROBILINOGEN, URINE: 1 E.U./DL
WBC # BLD: 4.5 K/UL (ref 4.8–10.8)
WBC UA: 16 /HPF (ref 0–5)

## 2022-05-14 PROCEDURE — 99285 EMERGENCY DEPT VISIT HI MDM: CPT

## 2022-05-14 PROCEDURE — 80143 DRUG ASSAY ACETAMINOPHEN: CPT

## 2022-05-14 PROCEDURE — 81001 URINALYSIS AUTO W/SCOPE: CPT

## 2022-05-14 PROCEDURE — 2580000003 HC RX 258: Performed by: PHYSICIAN ASSISTANT

## 2022-05-14 PROCEDURE — 6370000000 HC RX 637 (ALT 250 FOR IP): Performed by: NURSE PRACTITIONER

## 2022-05-14 PROCEDURE — 83735 ASSAY OF MAGNESIUM: CPT

## 2022-05-14 PROCEDURE — 6360000002 HC RX W HCPCS: Performed by: NURSE PRACTITIONER

## 2022-05-14 PROCEDURE — 85025 COMPLETE CBC W/AUTO DIFF WBC: CPT

## 2022-05-14 PROCEDURE — 71045 X-RAY EXAM CHEST 1 VIEW: CPT

## 2022-05-14 PROCEDURE — 36415 COLL VENOUS BLD VENIPUNCTURE: CPT

## 2022-05-14 PROCEDURE — 82947 ASSAY GLUCOSE BLOOD QUANT: CPT

## 2022-05-14 PROCEDURE — 80179 DRUG ASSAY SALICYLATE: CPT

## 2022-05-14 PROCEDURE — 82077 ASSAY SPEC XCP UR&BREATH IA: CPT

## 2022-05-14 PROCEDURE — 80307 DRUG TEST PRSMV CHEM ANLYZR: CPT

## 2022-05-14 PROCEDURE — 1210000000 HC MED SURG R&B

## 2022-05-14 PROCEDURE — 84484 ASSAY OF TROPONIN QUANT: CPT

## 2022-05-14 PROCEDURE — 82728 ASSAY OF FERRITIN: CPT

## 2022-05-14 PROCEDURE — 6360000002 HC RX W HCPCS: Performed by: PHYSICIAN ASSISTANT

## 2022-05-14 PROCEDURE — 80053 COMPREHEN METABOLIC PANEL: CPT

## 2022-05-14 PROCEDURE — 83615 LACTATE (LD) (LDH) ENZYME: CPT

## 2022-05-14 PROCEDURE — 87635 SARS-COV-2 COVID-19 AMP PRB: CPT

## 2022-05-14 PROCEDURE — 84100 ASSAY OF PHOSPHORUS: CPT

## 2022-05-14 RX ORDER — INSULIN LISPRO 100 [IU]/ML
0-3 INJECTION, SOLUTION INTRAVENOUS; SUBCUTANEOUS NIGHTLY
Status: DISCONTINUED | OUTPATIENT
Start: 2022-05-14 | End: 2022-05-22 | Stop reason: HOSPADM

## 2022-05-14 RX ORDER — QUETIAPINE FUMARATE 50 MG/1
200 TABLET, FILM COATED ORAL NIGHTLY
Status: DISCONTINUED | OUTPATIENT
Start: 2022-05-14 | End: 2022-05-22 | Stop reason: HOSPADM

## 2022-05-14 RX ORDER — ZINC SULFATE 50(220)MG
50 CAPSULE ORAL DAILY
Status: DISCONTINUED | OUTPATIENT
Start: 2022-05-15 | End: 2022-05-22 | Stop reason: HOSPADM

## 2022-05-14 RX ORDER — ACETAMINOPHEN 325 MG/1
650 TABLET ORAL EVERY 6 HOURS PRN
Status: DISCONTINUED | OUTPATIENT
Start: 2022-05-14 | End: 2022-05-22 | Stop reason: HOSPADM

## 2022-05-14 RX ORDER — INSULIN LISPRO 100 [IU]/ML
0-6 INJECTION, SOLUTION INTRAVENOUS; SUBCUTANEOUS
Status: DISCONTINUED | OUTPATIENT
Start: 2022-05-15 | End: 2022-05-22 | Stop reason: HOSPADM

## 2022-05-14 RX ORDER — DIVALPROEX SODIUM 500 MG/1
1000 TABLET, EXTENDED RELEASE ORAL DAILY
Status: DISCONTINUED | OUTPATIENT
Start: 2022-05-15 | End: 2022-05-22 | Stop reason: HOSPADM

## 2022-05-14 RX ORDER — ASCORBIC ACID 500 MG
500 TABLET ORAL 2 TIMES DAILY
Status: DISCONTINUED | OUTPATIENT
Start: 2022-05-14 | End: 2022-05-22 | Stop reason: HOSPADM

## 2022-05-14 RX ORDER — SODIUM CHLORIDE 9 MG/ML
INJECTION, SOLUTION INTRAVENOUS PRN
Status: DISCONTINUED | OUTPATIENT
Start: 2022-05-14 | End: 2022-05-16 | Stop reason: SDUPTHER

## 2022-05-14 RX ORDER — PANTOPRAZOLE SODIUM 40 MG/1
40 TABLET, DELAYED RELEASE ORAL
Status: DISCONTINUED | OUTPATIENT
Start: 2022-05-15 | End: 2022-05-22 | Stop reason: HOSPADM

## 2022-05-14 RX ORDER — FLUTICASONE PROPIONATE 50 MCG
2 SPRAY, SUSPENSION (ML) NASAL DAILY
Status: DISCONTINUED | OUTPATIENT
Start: 2022-05-15 | End: 2022-05-22 | Stop reason: HOSPADM

## 2022-05-14 RX ORDER — ALBUTEROL SULFATE 90 UG/1
2 AEROSOL, METERED RESPIRATORY (INHALATION) EVERY 4 HOURS PRN
Status: DISCONTINUED | OUTPATIENT
Start: 2022-05-14 | End: 2022-05-22 | Stop reason: HOSPADM

## 2022-05-14 RX ORDER — POLYETHYLENE GLYCOL 3350 17 G/17G
17 POWDER, FOR SOLUTION ORAL DAILY PRN
Status: DISCONTINUED | OUTPATIENT
Start: 2022-05-14 | End: 2022-05-22 | Stop reason: HOSPADM

## 2022-05-14 RX ORDER — MIRTAZAPINE 15 MG/1
7.5 TABLET, FILM COATED ORAL NIGHTLY
Status: DISCONTINUED | OUTPATIENT
Start: 2022-05-14 | End: 2022-05-19 | Stop reason: CLARIF

## 2022-05-14 RX ORDER — ONDANSETRON 2 MG/ML
4 INJECTION INTRAMUSCULAR; INTRAVENOUS EVERY 6 HOURS PRN
Status: DISCONTINUED | OUTPATIENT
Start: 2022-05-14 | End: 2022-05-22 | Stop reason: HOSPADM

## 2022-05-14 RX ORDER — ONDANSETRON 4 MG/1
4 TABLET, ORALLY DISINTEGRATING ORAL EVERY 8 HOURS PRN
Status: DISCONTINUED | OUTPATIENT
Start: 2022-05-14 | End: 2022-05-22 | Stop reason: HOSPADM

## 2022-05-14 RX ORDER — GUAIFENESIN/DEXTROMETHORPHAN 100-10MG/5
5 SYRUP ORAL EVERY 4 HOURS PRN
Status: DISCONTINUED | OUTPATIENT
Start: 2022-05-14 | End: 2022-05-19

## 2022-05-14 RX ORDER — MECOBALAMIN 5000 MCG
5 TABLET,DISINTEGRATING ORAL NIGHTLY
Status: DISCONTINUED | OUTPATIENT
Start: 2022-05-14 | End: 2022-05-22 | Stop reason: HOSPADM

## 2022-05-14 RX ORDER — LOSARTAN POTASSIUM 50 MG/1
50 TABLET ORAL DAILY
Status: DISCONTINUED | OUTPATIENT
Start: 2022-05-15 | End: 2022-05-16

## 2022-05-14 RX ORDER — DIVALPROEX SODIUM 500 MG/1
TABLET, DELAYED RELEASE ORAL
Status: ON HOLD | COMMUNITY
Start: 2022-02-16 | End: 2022-05-24 | Stop reason: HOSPADM

## 2022-05-14 RX ORDER — ENOXAPARIN SODIUM 100 MG/ML
30 INJECTION SUBCUTANEOUS 2 TIMES DAILY
Status: DISCONTINUED | OUTPATIENT
Start: 2022-05-14 | End: 2022-05-16 | Stop reason: DRUGHIGH

## 2022-05-14 RX ORDER — SODIUM CHLORIDE 0.9 % (FLUSH) 0.9 %
5-40 SYRINGE (ML) INJECTION PRN
Status: DISCONTINUED | OUTPATIENT
Start: 2022-05-14 | End: 2022-05-16 | Stop reason: SDUPTHER

## 2022-05-14 RX ORDER — ACETAMINOPHEN 650 MG/1
650 SUPPOSITORY RECTAL EVERY 6 HOURS PRN
Status: DISCONTINUED | OUTPATIENT
Start: 2022-05-14 | End: 2022-05-22 | Stop reason: HOSPADM

## 2022-05-14 RX ORDER — ATORVASTATIN CALCIUM 20 MG/1
20 TABLET, FILM COATED ORAL DAILY
Status: DISCONTINUED | OUTPATIENT
Start: 2022-05-15 | End: 2022-05-22 | Stop reason: HOSPADM

## 2022-05-14 RX ORDER — SODIUM CHLORIDE 0.9 % (FLUSH) 0.9 %
5-40 SYRINGE (ML) INJECTION EVERY 12 HOURS SCHEDULED
Status: DISCONTINUED | OUTPATIENT
Start: 2022-05-14 | End: 2022-05-16 | Stop reason: SDUPTHER

## 2022-05-14 RX ADMIN — ENOXAPARIN SODIUM 30 MG: 100 INJECTION SUBCUTANEOUS at 22:48

## 2022-05-14 RX ADMIN — OXYCODONE HYDROCHLORIDE AND ACETAMINOPHEN 500 MG: 500 TABLET ORAL at 22:50

## 2022-05-14 RX ADMIN — Medication 5 MG: at 22:50

## 2022-05-14 RX ADMIN — WATER 1000 MG: 1 INJECTION INTRAMUSCULAR; INTRAVENOUS; SUBCUTANEOUS at 21:15

## 2022-05-14 ASSESSMENT — ENCOUNTER SYMPTOMS
SHORTNESS OF BREATH: 0
DIARRHEA: 0
ABDOMINAL PAIN: 0
SHORTNESS OF BREATH: 1
NAUSEA: 0
VOMITING: 0
COUGH: 1

## 2022-05-14 NOTE — ED NOTES
Patient had 3 bottles of Benadryl found in belongings. One bottle opened, approximately 1/3 of bottle empty.      Kinza Spears RN  05/14/22 [id number]

## 2022-05-14 NOTE — ED TRIAGE NOTES
Patient and daughter picked up by EMS at [facility] with initial complaint of being \"hot. \"  Patient states she was dx with Covid 2 days ago. On the way to hospital, patient stated to EMS that she and her daughter were going to commit suicide today, did not say how.

## 2022-05-14 NOTE — ED NOTES
Patient's belongings removed from room and  Security called. Patient placed into purple scrubs. Sitter present at bedside.        Becky Ace RN  05/14/22 [id number]

## 2022-05-15 LAB
BASOPHILS ABSOLUTE: 0 K/UL (ref 0–0.2)
BASOPHILS RELATIVE PERCENT: 0.3 % (ref 0–1)
EOSINOPHILS ABSOLUTE: 0.1 K/UL (ref 0–0.6)
EOSINOPHILS RELATIVE PERCENT: 1.7 % (ref 0–5)
FIBRINOGEN: 382 MG/DL (ref 238–505)
FOLATE: 16.7 NG/ML (ref 4.8–37.3)
GLUCOSE BLD-MCNC: 101 MG/DL (ref 70–99)
GLUCOSE BLD-MCNC: 92 MG/DL (ref 70–99)
GLUCOSE BLD-MCNC: 92 MG/DL (ref 70–99)
GLUCOSE BLD-MCNC: 96 MG/DL (ref 70–99)
HCT VFR BLD CALC: 31.3 % (ref 37–47)
HEMOGLOBIN: 9.5 G/DL (ref 12–16)
IMMATURE GRANULOCYTES #: 0 K/UL
IRON SATURATION: 7 % (ref 14–50)
IRON: 25 UG/DL (ref 37–145)
LYMPHOCYTES ABSOLUTE: 1.8 K/UL (ref 1.1–4.5)
LYMPHOCYTES RELATIVE PERCENT: 51.4 % (ref 20–40)
MCH RBC QN AUTO: 25.5 PG (ref 27–31)
MCHC RBC AUTO-ENTMCNC: 30.4 G/DL (ref 33–37)
MCV RBC AUTO: 84.1 FL (ref 81–99)
MONOCYTES ABSOLUTE: 0.3 K/UL (ref 0–0.9)
MONOCYTES RELATIVE PERCENT: 9.3 % (ref 0–10)
NEUTROPHILS ABSOLUTE: 1.3 K/UL (ref 1.5–7.5)
NEUTROPHILS RELATIVE PERCENT: 37 % (ref 50–65)
PDW BLD-RTO: 17.2 % (ref 11.5–14.5)
PERFORMED ON: ABNORMAL
PERFORMED ON: NORMAL
PLATELET # BLD: 155 K/UL (ref 130–400)
PMV BLD AUTO: 12 FL (ref 9.4–12.3)
QT INTERVAL: 400 MS
QT INTERVAL: 402 MS
QT INTERVAL: 412 MS
QTC INTERVAL: 430 MS
QTC INTERVAL: 438 MS
QTC INTERVAL: 451 MS
RBC # BLD: 3.72 M/UL (ref 4.2–5.4)
TOTAL IRON BINDING CAPACITY: 362 UG/DL (ref 250–400)
TSH SERPL DL<=0.05 MIU/L-ACNC: 3.84 UIU/ML (ref 0.27–4.2)
VALPROIC ACID LEVEL: <2.8 UG/ML (ref 50–100)
VITAMIN B-12: 262 PG/ML (ref 211–946)
VITAMIN D 25-HYDROXY: 32.3 NG/ML
VITAMIN D 25-HYDROXY: 33 NG/ML
WBC # BLD: 3.6 K/UL (ref 4.8–10.8)

## 2022-05-15 PROCEDURE — 99231 SBSQ HOSP IP/OBS SF/LOW 25: CPT | Performed by: PSYCHIATRY & NEUROLOGY

## 2022-05-15 PROCEDURE — 85384 FIBRINOGEN ACTIVITY: CPT

## 2022-05-15 PROCEDURE — 6360000002 HC RX W HCPCS: Performed by: NURSE PRACTITIONER

## 2022-05-15 PROCEDURE — 85025 COMPLETE CBC W/AUTO DIFF WBC: CPT

## 2022-05-15 PROCEDURE — 84443 ASSAY THYROID STIM HORMONE: CPT

## 2022-05-15 PROCEDURE — 87086 URINE CULTURE/COLONY COUNT: CPT

## 2022-05-15 PROCEDURE — 2580000003 HC RX 258: Performed by: NURSE PRACTITIONER

## 2022-05-15 PROCEDURE — 82306 VITAMIN D 25 HYDROXY: CPT

## 2022-05-15 PROCEDURE — HZ2ZZZZ DETOXIFICATION SERVICES FOR SUBSTANCE ABUSE TREATMENT: ICD-10-PCS | Performed by: HOSPITALIST

## 2022-05-15 PROCEDURE — 82746 ASSAY OF FOLIC ACID SERUM: CPT

## 2022-05-15 PROCEDURE — 82607 VITAMIN B-12: CPT

## 2022-05-15 PROCEDURE — 80164 ASSAY DIPROPYLACETIC ACD TOT: CPT

## 2022-05-15 PROCEDURE — 2580000003 HC RX 258: Performed by: HOSPITALIST

## 2022-05-15 PROCEDURE — 6360000002 HC RX W HCPCS: Performed by: HOSPITALIST

## 2022-05-15 PROCEDURE — 6370000000 HC RX 637 (ALT 250 FOR IP): Performed by: NURSE PRACTITIONER

## 2022-05-15 PROCEDURE — 82947 ASSAY GLUCOSE BLOOD QUANT: CPT

## 2022-05-15 PROCEDURE — 36415 COLL VENOUS BLD VENIPUNCTURE: CPT

## 2022-05-15 PROCEDURE — 83550 IRON BINDING TEST: CPT

## 2022-05-15 PROCEDURE — 83540 ASSAY OF IRON: CPT

## 2022-05-15 PROCEDURE — 1210000000 HC MED SURG R&B

## 2022-05-15 PROCEDURE — 6370000000 HC RX 637 (ALT 250 FOR IP): Performed by: PSYCHIATRY & NEUROLOGY

## 2022-05-15 RX ORDER — LORAZEPAM 1 MG/1
2 TABLET ORAL
Status: DISCONTINUED | OUTPATIENT
Start: 2022-05-15 | End: 2022-05-18

## 2022-05-15 RX ORDER — LORAZEPAM 1 MG/1
3 TABLET ORAL
Status: DISCONTINUED | OUTPATIENT
Start: 2022-05-15 | End: 2022-05-15

## 2022-05-15 RX ORDER — SODIUM CHLORIDE 0.9 % (FLUSH) 0.9 %
5-40 SYRINGE (ML) INJECTION EVERY 12 HOURS SCHEDULED
Status: DISCONTINUED | OUTPATIENT
Start: 2022-05-15 | End: 2022-05-22 | Stop reason: HOSPADM

## 2022-05-15 RX ORDER — LORAZEPAM 2 MG/ML
3 INJECTION INTRAMUSCULAR
Status: DISCONTINUED | OUTPATIENT
Start: 2022-05-15 | End: 2022-05-15

## 2022-05-15 RX ORDER — RISPERIDONE 0.5 MG/1
1 TABLET, ORALLY DISINTEGRATING ORAL EVERY 6 HOURS PRN
Status: DISCONTINUED | OUTPATIENT
Start: 2022-05-15 | End: 2022-05-19 | Stop reason: SDUPTHER

## 2022-05-15 RX ORDER — FENTANYL CITRATE 50 UG/ML
25 INJECTION, SOLUTION INTRAMUSCULAR; INTRAVENOUS
Status: DISCONTINUED | OUTPATIENT
Start: 2022-05-15 | End: 2022-05-22 | Stop reason: HOSPADM

## 2022-05-15 RX ORDER — SODIUM CHLORIDE 9 MG/ML
INJECTION, SOLUTION INTRAVENOUS PRN
Status: DISCONTINUED | OUTPATIENT
Start: 2022-05-15 | End: 2022-05-22 | Stop reason: HOSPADM

## 2022-05-15 RX ORDER — CYANOCOBALAMIN 1000 UG/ML
1000 INJECTION INTRAMUSCULAR; SUBCUTANEOUS ONCE
Status: COMPLETED | OUTPATIENT
Start: 2022-05-15 | End: 2022-05-15

## 2022-05-15 RX ORDER — LORAZEPAM 2 MG/ML
1 INJECTION INTRAMUSCULAR
Status: DISCONTINUED | OUTPATIENT
Start: 2022-05-15 | End: 2022-05-19

## 2022-05-15 RX ORDER — TRAZODONE HYDROCHLORIDE 50 MG/1
50 TABLET ORAL NIGHTLY
Status: DISCONTINUED | OUTPATIENT
Start: 2022-05-15 | End: 2022-05-22 | Stop reason: HOSPADM

## 2022-05-15 RX ORDER — LORAZEPAM 2 MG/ML
2 INJECTION INTRAMUSCULAR
Status: DISCONTINUED | OUTPATIENT
Start: 2022-05-15 | End: 2022-05-18

## 2022-05-15 RX ORDER — LORAZEPAM 2 MG/ML
4 INJECTION INTRAMUSCULAR
Status: DISCONTINUED | OUTPATIENT
Start: 2022-05-15 | End: 2022-05-15

## 2022-05-15 RX ORDER — LORAZEPAM 1 MG/1
4 TABLET ORAL
Status: DISCONTINUED | OUTPATIENT
Start: 2022-05-15 | End: 2022-05-15

## 2022-05-15 RX ORDER — LORAZEPAM 1 MG/1
1 TABLET ORAL
Status: DISCONTINUED | OUTPATIENT
Start: 2022-05-15 | End: 2022-05-19

## 2022-05-15 RX ORDER — SODIUM CHLORIDE 0.9 % (FLUSH) 0.9 %
5-40 SYRINGE (ML) INJECTION PRN
Status: DISCONTINUED | OUTPATIENT
Start: 2022-05-15 | End: 2022-05-22 | Stop reason: HOSPADM

## 2022-05-15 RX ADMIN — MIRTAZAPINE 7.5 MG: 15 TABLET, FILM COATED ORAL at 21:39

## 2022-05-15 RX ADMIN — SODIUM CHLORIDE, PRESERVATIVE FREE 10 ML: 5 INJECTION INTRAVENOUS at 07:49

## 2022-05-15 RX ADMIN — FLUTICASONE PROPIONATE 2 SPRAY: 50 SPRAY, METERED NASAL at 07:46

## 2022-05-15 RX ADMIN — GUAIFENESIN SYRUP AND DEXTROMETHORPHAN 5 ML: 100; 10 SYRUP ORAL at 14:31

## 2022-05-15 RX ADMIN — GUAIFENESIN SYRUP AND DEXTROMETHORPHAN 5 ML: 100; 10 SYRUP ORAL at 05:47

## 2022-05-15 RX ADMIN — GUAIFENESIN SYRUP AND DEXTROMETHORPHAN 5 ML: 100; 10 SYRUP ORAL at 00:42

## 2022-05-15 RX ADMIN — ENOXAPARIN SODIUM 30 MG: 100 INJECTION SUBCUTANEOUS at 07:48

## 2022-05-15 RX ADMIN — OXYCODONE HYDROCHLORIDE AND ACETAMINOPHEN 500 MG: 500 TABLET ORAL at 07:47

## 2022-05-15 RX ADMIN — ATORVASTATIN CALCIUM 20 MG: 20 TABLET, FILM COATED ORAL at 07:48

## 2022-05-15 RX ADMIN — TRAZODONE HYDROCHLORIDE 50 MG: 50 TABLET ORAL at 21:38

## 2022-05-15 RX ADMIN — CYANOCOBALAMIN 1000 MCG: 1000 INJECTION, SOLUTION INTRAMUSCULAR at 15:37

## 2022-05-15 RX ADMIN — QUETIAPINE FUMARATE 200 MG: 50 TABLET ORAL at 21:39

## 2022-05-15 RX ADMIN — ACETAMINOPHEN 650 MG: 325 TABLET ORAL at 07:47

## 2022-05-15 RX ADMIN — IRON SUCROSE 200 MG: 20 INJECTION, SOLUTION INTRAVENOUS at 15:38

## 2022-05-15 RX ADMIN — PANTOPRAZOLE SODIUM 40 MG: 40 TABLET, DELAYED RELEASE ORAL at 05:47

## 2022-05-15 RX ADMIN — GUAIFENESIN SYRUP AND DEXTROMETHORPHAN 5 ML: 100; 10 SYRUP ORAL at 21:39

## 2022-05-15 RX ADMIN — ENOXAPARIN SODIUM 30 MG: 100 INJECTION SUBCUTANEOUS at 21:39

## 2022-05-15 RX ADMIN — ALBUTEROL SULFATE 2 PUFF: 90 AEROSOL, METERED RESPIRATORY (INHALATION) at 07:46

## 2022-05-15 RX ADMIN — SODIUM CHLORIDE, PRESERVATIVE FREE 10 ML: 5 INJECTION INTRAVENOUS at 21:39

## 2022-05-15 RX ADMIN — RISPERIDONE 1 MG: 0.5 TABLET, ORALLY DISINTEGRATING ORAL at 14:31

## 2022-05-15 RX ADMIN — ZINC SULFATE 220 MG (50 MG) CAPSULE 50 MG: CAPSULE at 07:48

## 2022-05-15 RX ADMIN — Medication 5 MG: at 21:38

## 2022-05-15 RX ADMIN — OXYCODONE HYDROCHLORIDE AND ACETAMINOPHEN 500 MG: 500 TABLET ORAL at 21:38

## 2022-05-15 RX ADMIN — LOSARTAN POTASSIUM 50 MG: 50 TABLET, FILM COATED ORAL at 07:48

## 2022-05-15 ASSESSMENT — LIFESTYLE VARIABLES: HOW OFTEN DO YOU HAVE A DRINK CONTAINING ALCOHOL: NEVER

## 2022-05-15 ASSESSMENT — PAIN SCALES - GENERAL: PAINLEVEL_OUTOF10: 0

## 2022-05-15 NOTE — H&P
[facility] Hospitalists      Hospitalist - History & Physical      PCP: JORGE Flanagan    Date of Admission: 5/14/2022    Date of Service: 5/14/2022    Chief Complaint:  Suicidal thoughts    History Of Present Illness: The patient is a 74 y.o. female who presented to [facility] ED complaining of suicidal thoughts. Pt has history of prior suicide attempt, intentional drug overdose, bipolar disorder, diabetes, gastric bypass surgery, HTN and  COPD. Pt tells me that she has had thoughts of taking an overdose of medication today. She denies having followed through with this plan. She reports 5 years ago having attempted suicide by drug overdose and drinking antifreeze. She tells me that she and her daughter are recently homeless. She was recently diagnosed with Covid 19 infection 3 days ago at [facility] ED. She reports continued generalized body aches, cough and episodes of dyspnea. She reported having received covid vaccination series. She did not receive covid booster. In ED, UA positive for nitrite, moderate leukocytes, micro urine-3+bacteria, 16 wbc, 2 epi cells, covid test positive, sodium 137, potassium 5, CO2 23, anion gap 11, glucose 86, creatinine 0.8/BUN 17, alk phos 157, alt 15, ast 21, acetaminophen/salicylate/etoh/UDS negative, wbc 4.5, hgb 10, platelets 279E. Pt is admitted to hospitalist service in consultation with psychiatry for further evaluation.      Past Medical History:        Diagnosis Date    Alcohol overdose     history of    Anemia     Anxiety     B[facility] disorder [address] [address]     Brain tumor ([address]     Chronic back pain     ruptured discs    COPD (chronic obstructive pulmonary disease) (HCC)     Dementia (HCC)     Depression     Diabetes ([address]     Elevated liver enzymes     Frequent falls     Headache     History of kidney infection     Hyperlipidemia     Hypertension     Neuropathy     Obesity     Osteoarthritis     Oxygen dependent     Psoriasis     Scoliosis     Sleep apnea     UTI (urinary tract infection)        Past Surgical History:        Procedure Laterality Date    APPENDECTOMY       SECTION      CHOLECYSTECTOMY      GASTRIC BYPASS SURGERY      RETROPHYARYNGEAL ABCESS INCISION/DRAIN      TONSILLECTOMY      TUBAL LIGATION      TUMOR REMOVAL      bone tumor, r wrist     UPPER GASTROINTESTINAL ENDOSCOPY  2015    Tarik: sm hiatal hernia; s/p balbir-en-y; esoph plaques, pos yeast; neg CS       Home Medications:  Prior to Admission medications    Medication Sig Start Date End Date Taking?  Authorizing Provider   divalproex (DEPAKOTE) 500 MG DR tablet  22   Historical Provider, MD   divalproex (DEPAKOTE ER) 500 MG extended release tablet Take 2 tablets by mouth daily 2/3/22 3/5/22  Jed Calderón MD   mirtazapine (REMERON) 7.5 MG tablet Take 1 tablet by mouth nightly 2/2/22 3/4/22  Jed Calderón MD   QUEtiapine (SEROQUEL) 200 MG tablet Take 1 tablet by mouth nightly 2/2/22 3/4/22  Jed Calderón MD   vitamin D (ERGOCALCIFEROL) 1.25 MG (89717 UT) CAPS capsule Take 1 capsule by mouth once a week for 11 doses 22  Jed Calderón MD   diclofenac (VOLTAREN) 75 MG EC tablet Take 1 tablet by mouth 2 times daily 10/1/21   JORGE Boykin   linaCLOtide Rodger Melvin) 72 MCG CAPS capsule Take 1 capsule by mouth every morning (before breakfast) 10/1/21   JORGE Boykin   atorvastatin (LIPITOR) 20 MG tablet Take 1 tablet by mouth daily 10/1/21   JORGE Boykin   ferrous sulfate (FEROSUL) 325 (65 Fe) MG tablet Take 1 tablet by mouth daily (with breakfast) 21   JORGE Boykin   Liraglutide (VICTOZA) 18 MG/3ML SOPN SC injection Inject 1.8 mg into the skin daily 21   JORGE Boykin   metFORMIN (GLUCOPHAGE) 500 MG tablet Take 1 tablet by mouth 2 times daily (with meals) 21   JORGE Boykin   furosemide (LASIX) 20 MG tablet Take 1 tablet by mouth daily 21   Rema Beasley JORGE Anderson   pantoprazole (PROTONIX) 40 MG tablet Take 1 tablet by mouth daily. 6/24/21   JORGE Reich   irbesartan (AVAPRO) 150 MG tablet Take 1 tablet by mouth nightly 6/24/21   JORGE Reich   Elastic Bandages & Supports (FUTURO SOFT CERVICAL COLLAR) [address] [facility] Wear for 1 week. 3/30/21   Cleatis Little A JORGE Jack   clobetasol (TEMOVATE) 0.05 % ointment Apply topically 2 times daily. 1/19/21   JORGE Reich   melatonin 3 MG TABS tablet Take 1 tablet by mouth nightly 12/28/20   Ludy HerbertJORGE Haider   nystatin (MYCOSTATIN) 386971 UNIT/GM powder Apply 3 times daily. 7/30/20   JORGE Riech   albuterol sulfate HFA (VENTOLIN HFA) 108 (90 Base) MCG/ACT inhaler Inhale 2 puffs into the lungs every 6 hours as needed for Wheezing 7/30/20   JORGE Reich   Insulin Pen Needle (B-D ULTRAFINE III SHORT PEN) 31G X 8 MM MISC Inject 1 each as directed daily 10/22/19   JORGE Reich   ACCU-CHEK SOFTCLIX LANCETS MISC CHECK BLOOD SUGAR TWICE DAILY AND AS NEEDED 10/22/19   JORGE Reich   fluticasone [facility]) 50 MCG/ACT nasal spray 2 sprays by Nasal route daily 10/22/19   JORGE Reich   vitamin B-12 (CYANOCOBALAMIN) 500 MCG tablet Take 1 tablet by mouth daily 10/22/19 4/20/21  JORGE Reich   blood glucose monitor kit and supplies Test 3 times a day & as needed for symptoms of irregular blood glucose. Dx: 1/3/19   JORGE Reich   blood glucose monitor strips Test 3 times a day & as needed for symptoms of irregular blood glucose.  1/3/19   JORGE Reich   ipratropium-albuterol (DUONEB) 0.5-2.5 (3) MG/3ML SOLN nebulizer solution Inhale 3 mLs into the lungs every 6 hours as needed for Shortness of Breath 10/2/18   JORGE Reich   Continuous Blood Gluc  (FREESTYLE ABDOULAYE READER) MARCIA 1 Units by Does not apply route 2 times daily 8/21/18   JORGE Reich   Continuous Blood Gluc Sensor (FREESTYLE Romelia SENSOR SYSTEM) MISC 1 Units by Does not apply route 2 times daily 8/21/18   Natalya JORGE Tubbs       Allergies:    Haldol [haloperidol lactate], Morphine, and Codeine    Social History:    The patient currently reports currently homeless  Tobacco:   reports that she has been smoking cigarettes. She has been smoking about 0.50 packs per day. She has never used smokeless tobacco.  Alcohol:   reports current alcohol use. Illicit Drugs: none    Family History:      Problem Relation Age of Onset    Diabetes Mother     Kidney Disease Mother     High Blood Pressure Mother     Cancer Mother     Ovarian Cancer Mother 43    Diabetes Father     High Blood Pressure Father     Heart Disease Father     Diabetes Brother     High Blood Pressure Brother     Diabetes Sister     High Blood Pressure Sister     Diabetes Brother     High Blood Pressure Brother     Cancer Maternal Aunt     Colon Cancer Neg Hx     Colon Polyps Neg Hx     Esophageal Cancer Neg Hx     Liver Cancer Neg Hx     Liver Disease Neg Hx     Rectal Cancer Neg Hx     Stomach Cancer Neg Hx        Review of Systems:   Review of Systems   Constitutional: Positive for fatigue. Negative for fever. Respiratory: Positive for cough and shortness of breath. Gastrointestinal: Negative for vomiting. Neurological: Positive for weakness (generalized). Psychiatric/Behavioral: Positive for dysphoric mood and suicidal ideas. All other systems reviewed and are negative. 14 point review of systems is negative except as specifically addressed above. Physical Examination:  BP (!) 105/59   Pulse 69   Temp 98.5 °F (36.9 °C) (Oral)   Resp 16   SpO2 91%   Physical Exam  Vitals reviewed. Constitutional:       Comments: 74 yr old female with motor restlessness, answers questions appropriately, attends to interview, makes eye contact   HENT:      Head: Normocephalic.       Right Ear: External ear normal.      Left Ear: External ear normal.   Eyes: Conjunctiva/sclera: Conjunctivae normal.   Cardiovascular:      Rate and Rhythm: Normal rate and regular rhythm. Heart sounds: Normal heart sounds. Pulmonary:      Effort: Pulmonary effort is normal.      Comments: Decreased breath sounds bilateral bases  Abdominal:      Palpations: Abdomen is soft. Tenderness: There is no abdominal tenderness. Musculoskeletal:      Cervical back: No tenderness. Comments: Moves bilateral upper/lower extremities equally   Skin:     General: Skin is warm and dry. Neurological:      Mental Status: She is alert and oriented to person, place, and time. Diagnostic Data:  CBC:  Recent Labs     05/14/22 1807   WBC 4.5*   HGB 10.4*   HCT 35.3*        BMP:  Recent Labs     05/14/22 1807      K 5.0      CO2 23   BUN 17   CREATININE 0.8   CALCIUM 8.0*     Recent Labs     05/14/22 1807   AST 21   ALT 15   BILITOT <0.2   ALKPHOS 157*     Urinalysis:  Lab Results   Component Value Date    NITRU POSITIVE 05/14/2022    WBCUA 16 05/14/2022    BACTERIA 3+ 05/14/2022    RBCUA 1 05/14/2022    BLOODU Negative 05/14/2022    SPECGRAV 1.015 05/14/2022    GLUCOSEU Negative 05/14/2022       No results found. Assessment/Plan:  Principal Problem:    Suicidal thoughts  Active Problems:    History of suicide attempt    COVID-19 virus infection    Acute urinary tract infection    Hypertension    COPD (chronic obstructive pulmonary disease) (HCC)    Diabetes ([facility] [address]    Depression    S/P gastric bypass    Borderline personality disorder ([facility]  Resolved Problems:    * No resolved hospital problems.  *     Principal Problem:    Suicidal thoughts/History of suicide attempt/Borderline personality disorder   -consult psychiatry   -resume psychiatric medications per psychiatrist recommendation   -suicide precautions   -sitter at bedside    COVID-19 virus infection/COPD    -monitor for supplemental oxygen requirements   -supportive care   -albuterol mdi 2puffs q4hrs prn    -mucinex dm prn   -contact/isolation precautions   -vit c, zinc, melatonin   -LDH   -ferritin   -magnesium   -phosphorus   -vit [id number]   -troponin   -cxr    Urinary tract infection   -follow urine culture   -rocephin 1g iv q24hrs  Active Problems:    Hypertension   -monitor blood pressure   -continue cozaar     Diabetes ([facility] [address]    -continue basal insulin   -HgA1c   -poc glucose qid   -low dose insulin coverage  Resolved Problems:    * No resolved hospital problems.  *  Signed:  JORGE Miller - CNP, 5/14/2022 9:41 PM

## 2022-05-15 NOTE — PROGRESS NOTES
[facility] MEDICINE  - PROGRESS NOTE    Admit Date: 5/14/2022         CC: SI/depression/back pain     Subjective: suidicidal, depressed, no headache, no dizziness, no N/V/D/C, no abd pain, no dysuria or hematuria, no chest pain, no edema, no cough, no wheezing, no fever or chills     Objective: moderate distress 2/2 to pain, seems to be in withdrawals     Diet: ADULT DIET;  Regular  Pain is:Severe  Nausea:None  Bowel Movement/Flatus yes    Data:   Scheduled Meds: Reviewed  Current Facility-Administered Medications   Medication Dose Route Frequency Provider Last Rate Last Admin    fentaNYL (SUBLIMAZE) injection 25 mcg  25 mcg IntraVENous Q1H PRN Joe Subramanian MD        risperiDONE (RISPERDAL M-TABS) disintegrating tablet 1 mg  1 mg Oral Q6H PRN Clement Valadez MD   1 mg at 05/15/22 1431    traZODone (DESYREL) tablet 50 mg  50 mg Oral Nightly Clement Valadez MD        sodium chloride flush 0.9 % injection 5-40 mL  5-40 mL IntraVENous 2 times per day JORGE Martinez - CNP   10 mL at 05/15/22 0749    sodium chloride flush 0.9 % injection 5-40 mL  5-40 mL IntraVENous PRN JORGE Martinez - CNP        0.9 % sodium chloride infusion   IntraVENous PRN JORGE Martinez CNP        enoxaparin Sodium (LOVENOX) injection 30 mg  30 mg SubCUTAneous BID JORGE Martinez - CNP   30 mg at 05/15/22 0748    ondansetron (ZOFRAN-ODT) disintegrating tablet 4 mg  4 mg Oral Q8H PRN JORGE Martinez - CNP        Or    ondansetron (ZOFRAN) injection 4 mg  4 mg IntraVENous Q6H PRN JORGE Martinez - CNP        polyethylene glycol (GLYCOLAX) packet 17 g  17 g Oral Daily PRN JORGE Martinez - CNP        acetaminophen (TYLENOL) tablet 650 mg  650 mg Oral Q6H PRN Lauren Strong APRN - CNP   650 mg at 05/15/22 0747    Or    acetaminophen (TYLENOL) suppository 650 mg  650 mg Rectal Q6H PRN JORGE Martinez - CNP        guaiFENesin-dextromethorphan (ROBITUSSIN DM) 100-10 MG/5ML syrup 5 mL  5 mL Oral Q4H PRN Ching Hemyajaira, APRN - CNP   5 mL at 05/15/22 1431    albuterol sulfate  (90 Base) MCG/ACT inhaler 2 puff  2 puff Inhalation Q4H PRN Ching Hemyajaira, APRN - CNP   2 puff at 05/15/22 0746    zinc sulfate (ZINCATE) capsule 50 mg  50 mg Oral Daily Ching Hemyajaira, APRN - CNP   50 mg at 05/15/22 0748    ascorbic acid (VITAMIN C) tablet 500 mg  500 mg Oral BID Ching Hemyajaira, APRN - CNP   500 mg at 05/15/22 0747    melatonin disintegrating tablet 5 mg  5 mg Oral Nightly Ching Hemyajaira, APRN - CNP   5 mg at 05/14/22 2250    cefTRIAXone (ROCEPHIN) 1,000 mg in sterile water 10 mL IV syringe  1,000 mg IntraVENous Q24H Ching Jenkins, APRN - CNP        insulin lispro (HUMALOG) injection vial 0-6 Units  0-6 Units SubCUTAneous TID WC Ching Hemyajaira, APRN - CNP        insulin lispro (HUMALOG) injection vial 0-3 Units  0-3 Units SubCUTAneous Nightly Ching Hemyajaira, APRN - CNP        atorvastatin (LIPITOR) tablet 20 mg  20 mg Oral Daily Ching Hemyajaira, APRN - CNP   20 mg at 05/15/22 0748    divalproex (DEPAKOTE ER) extended release tablet 1,000 mg  1,000 mg Oral Daily Ching Hemyajaira, APRN - CNP        fluticasone (FLONASE) 50 MCG/ACT nasal spray 2 spray  2 spray Nasal Daily Ching Jenkins, APRN - CNP   2 spray at 05/15/22 0746    losartan (COZAAR) tablet 50 mg  50 mg Oral Daily Ching Hemyajaira, APRN - CNP   50 mg at 05/15/22 0748    QUEtiapine (SEROQUEL) tablet 200 mg  200 mg Oral Nightly Ching Hemyajaira, APRN - CNP        pantoprazole (PROTONIX) tablet 40 mg  40 mg Oral QAM AC Ching Hemyajaira, APRN - CNP   40 mg at 05/15/22 0547    mirtazapine (REMERON) tablet 7.5 mg  7.5 mg Oral Nightly Ching Hemming, APRN - CNP         DVT Prophylaxis: Lovenox 30 mg sq daily    Continuous Infusions:   sodium chloride         Intake/Output Summary (Last 24 hours) at 5/15/2022 1434  Last data filed at 5/15/2022 0834  Gross per 24 hour   Intake 440 ml   Output --   Net 440 ml     CBC:   Recent Labs 05/14/22  1807 05/14/22  2329   WBC 4.5* 3.6*   HGB 10.4* 9.5*    155     BMP:  Recent Labs     05/14/22  1807      K 5.0      CO2 23   BUN 17   CREATININE 0.8   GLUCOSE 86     ABGs: No results found for: PHART, PO2ART, OIM3ETO  INR: No results for input(s): INR in the last 72 hours. Objective:   Vitals: BP (!) 102/57   Pulse 63   Temp 98.4 °F (36.9 °C) (Temporal)   Resp 18   Ht 5' (1.524 m)   Wt 164 lb 3 oz (74.5 kg)   LMP  (LMP Unknown)   SpO2 98%   Breastfeeding No   BMI 32.07 kg/m²   General appearance: alert, appears stated age and cooperative  Skin: Skin color, texture, turgor normal.   HEENT: Head: Normocephalic, no lesions, without obvious abnormality. Neck: no adenopathy, no carotid bruit, no JVD and supple, symmetrical, trachea midline  Lungs: clear to auscultation bilaterally  Heart: regular rate and rhythm, S1, S2 normal, no murmur, click, rub or gallop  Abdomen: soft, non-tender; bowel sounds normal; no masses,  no organomegaly  Extremities: extremities normal, atraumatic, no cyanosis or edema  Lymphatic: No significant lymph node enlargement papable  Neurologic: Mental status: Alert, oriented, thought content appropriate        Assessment & Plan:    · SI- psych following   · Depression  · Insomnia  · Borderline personality disorder  · Tobacco use disorder  · Alcohol use  · Alcohol withdrawals ?- add ciwa  · COVID-19- asymptomatic - no tx   · Anemia of iron def and B12 def- start replacement   · UTI- Rocephin - follow cultures   · DM2- ISS  · HTN- home meds  · Chronic back pain - fentanyl prn   · Trazodone for sleep.  PRN Risperdal for agitation    Patient Active Problem List:     Hypertension     Osteoarthritis     Psoriasis     COPD (chronic obstructive pulmonary disease) (HCC)     Sleep apnea     Oxygen dependent     Alternating constipation and diarrhea     S/P gastric bypass     Polypharmacy     Diabetic neuropathy associated with type 2 diabetes mellitus [facility])        See orders   Disposition: VIC Combs MD

## 2022-05-15 NOTE — PLAN OF CARE
Problem: Safety - Adult  Goal: Free from fall injury  Outcome: Progressing     Problem: ABCDS Injury Assessment  Goal: Absence of physical injury  Outcome: Progressing     Problem: Skin/Tissue Integrity  Goal: Absence of new skin breakdown  Description: 1. Monitor for areas of redness and/or skin breakdown  2. Assess vascular access sites hourly  3. Every 4-6 hours minimum:  Change oxygen saturation probe site  4. Every 4-6 hours:  If on nasal continuous positive airway pressure, respiratory therapy assess nares and determine need for appliance change or resting period.   Outcome: Progressing     Problem: Chronic Conditions and Co-morbidities  Goal: Patient's chronic conditions and co-morbidity symptoms are monitored and maintained or improved  Outcome: Progressing     Problem: Discharge Planning  Goal: Discharge to home or other facility with appropriate resources  Outcome: Progressing     Problem: Self Harm/Suicidality  Goal: Will have no self-injury during hospital stay  Description: INTERVENTIONS:  1. Q 30 MINUTES: Routine safety checks  2. Q SHIFT & PRN: Assess risk to determine if routine checks are adequate to maintain patient safety  Outcome: Progressing

## 2022-05-15 NOTE — PLAN OF CARE
Problem: Safety - Adult  Goal: Free from fall injury  5/15/2022 0304 by Trisha Roy LPN  Outcome: Progressing  5/15/2022 0030 by Trisha Roy LPN  Outcome: Progressing     Problem: ABCDS Injury Assessment  Goal: Absence of physical injury  5/15/2022 0304 by Trisha Roy LPN  Outcome: Progressing  5/15/2022 0030 by Trisha Roy LPN  Outcome: Progressing     Problem: Skin/Tissue Integrity  Goal: Absence of new skin breakdown  Description: 1. Monitor for areas of redness and/or skin breakdown  2. Assess vascular access sites hourly  3. Every 4-6 hours minimum:  Change oxygen saturation probe site  4. Every 4-6 hours:  If on nasal continuous positive airway pressure, respiratory therapy assess nares and determine need for appliance change or resting period.   5/15/2022 0304 by Trisha Roy LPN  Outcome: Progressing  5/15/2022 0030 by Trisha Roy LPN  Outcome: Progressing     Problem: Chronic Conditions and Co-morbidities  Goal: Patient's chronic conditions and co-morbidity symptoms are monitored and maintained or improved  5/15/2022 0304 by Trisha Roy LPN  Outcome: Progressing  5/15/2022 0030 by Trisha Roy LPN  Outcome: Progressing     Problem: Discharge Planning  Goal: Discharge to home or other facility with appropriate resources  5/15/2022 0304 by Trisha Roy LPN  Outcome: Progressing  5/15/2022 0030 by Trisha Roy LPN  Outcome: Progressing  Flowsheets  Taken 5/15/2022 0019  Discharge to home or other facility with appropriate resources:   Identify barriers to discharge with patient and caregiver   Arrange for needed discharge resources and transportation as appropriate   Refer to discharge planning if patient needs post-hospital services based on physician order or complex needs related to functional status, cognitive ability or social support system   Identify discharge learning needs (meds, wound care, etc)  Taken 5/15/2022 0018  Discharge to home or other facility with appropriate resources:   Identify barriers to discharge with patient and caregiver   Arrange for needed discharge resources and transportation as appropriate   Refer to discharge planning if patient needs post-hospital services based on physician order or complex needs related to functional status, cognitive ability or social support system     Problem: Self Harm/Suicidality  Goal: Will have no self-injury during hospital stay  Description: INTERVENTIONS:  1. Q 30 MINUTES: Routine safety checks  2. Q SHIFT & PRN: Assess risk to determine if routine checks are adequate to maintain patient safety  5/15/2022 0304 by Trisha Roy LPN  Outcome: Progressing  5/15/2022 0030 by Trisha Roy LPN  Outcome: Progressing

## 2022-05-15 NOTE — PROGRESS NOTES
Patient wanted her glasses from the locked cabinet; Security was notified; I went with the  and security opened the locked cabinet; Security opened the sealed bag; The patient's glasses was retrieved from the sealed bag; The patient's belongings was closed back up and put back in the cabinet and locked up by security. Electronically signed by Mathew Simental LPN on 3/73/4109 at 5:05 AM

## 2022-05-15 NOTE — ED PROVIDER NOTES
[facility] EMERGENCY DEPT  eMERGENCY dEPARTMENT eNCOUnter      Pt Name: Moises Harvey  MRN: [id number]  Kika 1955  Date of evaluation: 5/14/2022  Provider: Anthony Mcadams 4424       Chief Complaint   Patient presents with    Suicidal     stated to EMS that she and her daughter were going to commit suicide today, did not say how         HISTORY OF PRESENT ILLNESS   (Location/Symptom, Timing/Onset,Context/Setting, Quality, Duration, Modifying Factors, Severity)  Note limiting factors. Moises Harvey is a 74 y.o. female with history including hypertension, osteoarthritis, COPD, type 2 diabetes, obesity, depression, anemia, polypharmacy, diabetic neuropathy, attempted suicide, borderline personality disorder, and intentional drug overdose who presents to the emergency department with complaint of suicidal ideation. The patient was brought in by EMS with her daughter as well. They were staying in a hotel for 2-day set up by a local shelter. Today after their time ran out, they both called EMS. Originally stated that the patient and her daughter had plans to commit suicide together today. Later retracted this statement saying that she was the only one who was suicidal and her daughter was not. Dates that her suicidal thoughts have been present for years and is unable to tell me what changed today to bring her in. She states that she has a plan but will not answer the question regarding what her plan is. She denies any associated homicidal thoughts. Denies any hallucinations. She denies any drug use or taking any medication today. She states she feels very tired. 3 bottles of Benadryl were found in her bag but she is denying taking any. He was diagnosed with COVID 2 days ago and does admit to some fatigue but denies any significant shortness of breath or chest pain. She admits to cough and congestion. HPI    NursingNotes were reviewed.     REVIEW OF SYSTEMS    (2-9 systems for level 4, 10 or more for level 5)     Review of Systems   Constitutional: Positive for fatigue. Negative for chills and fever. HENT: Positive for congestion. Respiratory: Positive for cough. Negative for shortness of breath. Cardiovascular: Negative for chest pain. Gastrointestinal: Negative for abdominal pain, diarrhea, nausea and vomiting. Musculoskeletal: Positive for myalgias. Neurological: Positive for headaches. Negative for dizziness and weakness. Psychiatric/Behavioral: Positive for suicidal ideas. Negative for hallucinations. The patient is not nervous/anxious. All other systems reviewed and are negative.            PAST MEDICALHISTORY     Past Medical History:   Diagnosis Date    Alcohol overdose     history of    Anemia     Anxiety     [facility]     Brain tumor [facility]     Chronic back pain     ruptured discs    COPD (chronic obstructive pulmonary disease) (HCC)     Dementia (HCC)     Depression     Diabetes ([facility]     Elevated liver enzymes     Frequent falls     Headache     History of kidney infection     Hyperlipidemia     Hypertension     Neuropathy     Obesity     Osteoarthritis     Oxygen dependent     Psoriasis     Scoliosis     Sleep apnea     UTI (urinary tract infection)          SURGICAL HISTORY       Past Surgical History:   Procedure Laterality Date    APPENDECTOMY       SECTION      CHOLECYSTECTOMY      GASTRIC BYPASS SURGERY      RETROPHYARYNGEAL ABCESS INCISION/DRAIN      TONSILLECTOMY      TUBAL LIGATION      TUMOR REMOVAL      bone tumor, r wrist     UPPER GASTROINTESTINAL ENDOSCOPY  2015    Tarik: sm hiatal hernia; s/p balbir-en-y; esoph plaques, pos yeast; neg CS         CURRENT MEDICATIONS     Previous Medications    ACCU-CHEK SOFTCLIX LANCETS MISC    CHECK BLOOD SUGAR TWICE DAILY AND AS NEEDED    ALBUTEROL SULFATE HFA (VENTOLIN HFA) 108 (90 BASE) MCG/ACT INHALER    Inhale 2 puffs into the lungs every 6 hours as needed for Wheezing    ATORVASTATIN (LIPITOR) 20 MG TABLET    Take 1 tablet by mouth daily    BLOOD GLUCOSE MONITOR KIT AND SUPPLIES    Test 3 times a day & as needed for symptoms of irregular blood glucose. Dx:    BLOOD GLUCOSE MONITOR STRIPS    Test 3 times a day & as needed for symptoms of irregular blood glucose. CLOBETASOL (TEMOVATE) 0.05 % OINTMENT    Apply topically 2 times daily. CONTINUOUS BLOOD GLUC  (FREESTYLE ABDOULAYE READER) MARCIA    1 Units by Does not apply route 2 times daily    CONTINUOUS BLOOD GLUC SENSOR (FREESTYLE ABDOULAYE SENSOR SYSTEM) MISC    1 Units by Does not apply route 2 times daily    DICLOFENAC (VOLTAREN) 75 MG EC TABLET    Take 1 tablet by mouth 2 times daily    DIVALPROEX (DEPAKOTE ER) 500 MG EXTENDED RELEASE TABLET    Take 2 tablets by mouth daily    DIVALPROEX (DEPAKOTE) 500 MG DR TABLET        ELASTIC BANDAGES & SUPPORTS (FUTURO SOFT CERVICAL COLLAR) MISC    Wear for 1 week.     FERROUS SULFATE (FEROSUL) 325 (65 FE) MG TABLET    Take 1 tablet by mouth daily (with breakfast)    FLUTICASONE (FLONASE) 50 MCG/ACT NASAL SPRAY    2 sprays by Nasal route daily    FUROSEMIDE (LASIX) 20 MG TABLET    Take 1 tablet by mouth daily    INSULIN PEN NEEDLE (B-D ULTRAFINE III SHORT PEN) 31G X 8 MM MISC    Inject 1 each as directed daily    IPRATROPIUM-ALBUTEROL (DUONEB) 0.5-2.5 (3) MG/3ML SOLN NEBULIZER SOLUTION    Inhale 3 mLs into the lungs every 6 hours as needed for Shortness of Breath    IRBESARTAN (AVAPRO) 150 MG TABLET    Take 1 tablet by mouth nightly    LINACLOTIDE (LINZESS) 72 MCG CAPS CAPSULE    Take 1 capsule by mouth every morning (before breakfast)    LIRAGLUTIDE (VICTOZA) 18 MG/3ML SOPN SC INJECTION    Inject 1.8 mg into the skin daily    MELATONIN 3 MG TABS TABLET    Take 1 tablet by mouth nightly    METFORMIN (GLUCOPHAGE) 500 MG TABLET    Take 1 tablet by mouth 2 times daily (with meals)    MIRTAZAPINE (REMERON) 7.5 MG TABLET    Take 1 tablet by mouth nightly    NYSTATIN (MYCOSTATIN) 926607 UNIT/GM POWDER    Apply 3 times daily. PANTOPRAZOLE (PROTONIX) 40 MG TABLET    Take 1 tablet by mouth daily.     QUETIAPINE (SEROQUEL) 200 MG TABLET    Take 1 tablet by mouth nightly    VITAMIN B-12 (CYANOCOBALAMIN) 500 MCG TABLET    Take 1 tablet by mouth daily    VITAMIN D (ERGOCALCIFEROL) 1.25 MG (86427 UT) CAPS CAPSULE    Take 1 capsule by mouth once a week for 11 doses       ALLERGIES     Haldol [haloperidol lactate], Morphine, and Codeine    FAMILY HISTORY       Family History   Problem Relation Age of Onset    Diabetes Mother     Kidney Disease Mother     High Blood Pressure Mother     Cancer Mother     Ovarian Cancer Mother 43    Diabetes Father     High Blood Pressure Father     Heart Disease Father     Diabetes Brother     High Blood Pressure Brother     Diabetes Sister     High Blood Pressure Sister     Diabetes Brother     High Blood Pressure Brother     Cancer Maternal Aunt     Colon Cancer Neg Hx     Colon Polyps Neg Hx     Esophageal Cancer Neg Hx     Liver Cancer Neg Hx     Liver Disease Neg Hx     Rectal Cancer Neg Hx     Stomach Cancer Neg Hx           SOCIAL HISTORY       Social History     Socioeconomic History    Marital status:      Spouse name: None    Number of children: None    Years of education: None    Highest education level: None   Occupational History    None   Tobacco Use    Smoking status: Current Every Day Smoker     Packs/day: 0.50     Types: Cigarettes    Smokeless tobacco: Never Used   Vaping Use    Vaping Use: Never used   Substance and Sexual Activity    Alcohol use: Yes     Comment: occ    Drug use: Yes     Types: Marijuana Sujit Alfaro     Comment: occ    Sexual activity: Not Currently   Other Topics Concern    None   Social History Narrative    None     Social Determinants of Health     Financial Resource Strain:     Difficulty of Paying Living Expenses: Not on file   Food Insecurity:     Worried About Running Out of Food in the Last Year: Not on file    Ran Out of Food in the Last Year: Not on file   Transportation Needs:     Lack of Transportation (Medical): Not on file    Lack of Transportation (Non-Medical): Not on file   Physical Activity:     Days of Exercise per Week: Not on file    Minutes of Exercise per Session: Not on file   Stress:     Feeling of Stress : Not on file   Social Connections:     Frequency of Communication with Friends and Family: Not on file    Frequency of Social Gatherings with Friends and Family: Not on file    Attends Faith Services: Not on file    Active Member of [address] [place] [address] [employer] or Organizations: Not on file    Attends Club or Organization Meetings: Not on file    Marital Status: Not on file   Intimate Partner Violence:     Fear of Current or Ex-Partner: Not on file    Emotionally Abused: Not on file    Physically Abused: Not on file    Sexually Abused: Not on file   Housing Stability:     Unable to Pay for Housing in the Last Year: Not on file    Number of [place] in the Last Year: Not on file    Unstable Housing in the Last Year: Not on file       SCREENINGS    Angel Luis Coma Scale  Eye Opening: Spontaneous  Best Verbal Response: Oriented  Best Motor Response: Obeys commands  Angel Luis Coma Scale Score: 15        PHYSICAL EXAM    (up to 7 for level 4, 8 or more for level 5)     ED Triage Vitals [05/14/22 1730]   BP Temp Temp Source Pulse Resp SpO2 Height Weight   (!) 110/55 98.5 °F (36.9 °C) Oral 79 16 91 % -- --       Physical Exam  Vitals and nursing note reviewed. Constitutional:       General: She is not in acute distress. Appearance: Normal appearance. She is normal weight. She is not ill-appearing, toxic-appearing or diaphoretic. HENT:      Head: Normocephalic and atraumatic. Cardiovascular:      Rate and Rhythm: Normal rate and regular rhythm. Pulses: Normal pulses. Heart sounds: Normal heart sounds.    Pulmonary:      Effort: Pulmonary effort is normal. No respiratory distress. Breath sounds: Normal breath sounds. Chest:      Chest wall: No tenderness. Abdominal:      Palpations: Abdomen is soft. Tenderness: There is no abdominal tenderness. Musculoskeletal:      Cervical back: Normal range of motion and neck supple. No rigidity or tenderness. Right lower leg: No edema. Left lower leg: No edema. Lymphadenopathy:      Cervical: No cervical adenopathy. Skin:     General: Skin is warm and dry. Neurological:      General: No focal deficit present. Mental Status: She is alert and oriented to person, place, and time. Psychiatric:         Attention and Perception: Attention and perception normal. She is attentive. She does not perceive auditory or visual hallucinations. Mood and Affect: Mood is depressed. Speech: Speech is delayed. Behavior: Behavior is withdrawn. Behavior is not aggressive or combative. Thought Content: Thought content includes suicidal ideation. Thought content does not include homicidal ideation. Thought content includes suicidal plan. Thought content does not include homicidal plan.          DIAGNOSTIC RESULTS     LABS:  Labs Reviewed   COVID-19, RAPID - Abnormal; Notable for the following components:       Result Value    SARS-CoV-2, NAAT DETECTED (*)     All other components within normal limits    Narrative:     [address] ,  Microbiology results called to and read back by hunter cerna rn er,  05/14/2022 19:06, by NIKA   CBC WITH AUTO DIFFERENTIAL - Abnormal; Notable for the following components:    WBC 4.5 (*)     RBC 4.19 (*)     Hemoglobin 10.4 (*)     Hematocrit 35.3 (*)     MCH 24.8 (*)     MCHC 29.5 (*)     RDW 17.3 (*)     Neutrophils % 48.9 (*)     All other components within normal limits   COMPREHENSIVE METABOLIC PANEL W/ REFLEX TO MG FOR LOW K - Abnormal; Notable for the following components:    Calcium 8.0 (*)     Total Protein 6.2 (*)     Alkaline Phosphatase 157 (*)     All other components within normal limits   URINALYSIS WITH MICROSCOPIC - Abnormal; Notable for the following components:    Ketones, Urine TRACE (*)     Nitrite, Urine POSITIVE (*)     Leukocyte Esterase, Urine MODERATE (*)     Bacteria, UA 3+ (*)     Crystals, UA NEG (*)     WBC, UA 16 (*)     All other components within normal limits   URINE DRUG SCREEN   ACETAMINOPHEN LEVEL   ETHANOL   SALICYLATE LEVEL       All other labs were within normal range or not returned as of this dictation. EMERGENCY DEPARTMENT COURSE and DIFFERENTIAL DIAGNOSIS/MDM:   Vitals:    Vitals:    05/14/22 1730   BP: (!) 110/55   Pulse: 79   Resp: 16   Temp: 98.5 °F (36.9 °C)   TempSrc: Oral   SpO2: 91%       MDM  Patient is a 82-year-old female who presents the ER with complaint of suicidal ideation and COVID-19. She states she been having symptoms for years. She denies any associated homicidal thoughts or hallucinations. She cannot be medically cleared in the ER due to having COVID-19. Pertinent negatives of her work-up do include negative alcohol, aspirin, and Tylenol levels. Her drug screen was also negative. She does have a UTI so she was given a dose of Rocephin in the ER. Recall that she cannot be evaluated by psychiatry. She does have a history of attempted overdose and suicide attempt so I will admit her to the hospitalist until she can be cleared by psych. CONSULTS:  Dr Miguel Turner, Accepting hospitalist    FINAL IMPRESSION      1. Suicidal ideation    2.  COVID-19          DISPOSITION/PLAN   DISPOSITION Decision To Admit 05/14/2022 08:26:00 PM    (Please note that portions of this note were completed with a voice recognition program.  Efforts were made to edit thedictations but occasionally words are mis-transcribed.)    GRISEL Ruiz (electronically signed)     Eduardo Ruiz  05/14/22 2043

## 2022-05-15 NOTE — CONSULTS
[facility]  Psychiatry Consult    Reason for Consult:  SI  72-year-old white female with history of depression, borderline personality disorder, COPD,  diabetes, admitted with suicidal ideation. [facility]ed [facility]. UDS negative. UA suggests UTI. UC pending. Patient is known to our service. Overdosed on Benadryl in January of this year. Patient is observed in her room this morning. She is restless. Dysphoric affect. Irritable. States she will definitely kill herself if she leaves the hospital.  \"I can no longer live like that. I am homeless. My life is terrible. \"  She has been severely depressed. Not sleeping. Poor appetite. Hopeless and helpless. We processed her feelings. Patient saw a sitter outside the room and stated it is her \"daughter\". Denies physical symtoms. Psychiatric History:    Diagnoses: Bipolar disorder, borderline personality disorder  Suicide attempts/gestures: overdose on Benadryl in 2020 and 2022, overdose on pills and antifreeze in 2016.  History of cutting herself since early childhood, last time in 2000  Prior hospitalizations: several [facility] to [facility], last time in 2022  Medication trials: Remeron, Zoloft, BuSpar, Vistaril, donepezil, Latuda, Abilify, Depakote, Seroquel. Mental health contact: Lost to follow-up  ?    Substance Use History:  Drinks alcohol. Denies abuse and recent use. Smokes 0.5 PPD.     Allergies:  Haldol [haloperidol lactate], Codeine, and Morphine    Medical History:  Past Medical History:   Diagnosis Date    Alcohol overdose     history of    Anemia     Anxiety     Bipolar disorder ([address]     Borderline personality disorder ([address]     Brain tumor ([address]     Chronic back pain     ruptured discs    COPD (chronic obstructive pulmonary disease) (HCC)     Dementia (HCC)     Depression     Diabetes ([address]     Elevated liver enzymes     Frequent falls     Headache     History of kidney infection     Hyperlipidemia     Hypertension     Neuropathy     Obesity     Osteoarthritis     Oxygen dependent     Psoriasis     Scoliosis     Sleep apnea     UTI (urinary tract infection)        Family History:  Family History   Problem Relation Age of Onset    Diabetes Mother     Kidney Disease Mother     High Blood Pressure Mother     Cancer Mother     Ovarian Cancer Mother 43    Diabetes Father     High Blood Pressure Father     Heart Disease Father     Diabetes Brother     High Blood Pressure Brother     Diabetes Sister     High Blood Pressure Sister     Diabetes Brother     High Blood Pressure Brother     Cancer Maternal Aunt     Colon Cancer Neg Hx     Colon Polyps Neg Hx     Esophageal Cancer Neg Hx     Liver Cancer Neg Hx     Liver Disease Neg Hx     Rectal Cancer Neg Hx     Stomach Cancer Neg Hx        Social History:  Homeless. Review of Systems: 14 point review of systems negative except as described above    Vitals:    05/15/22 1135   BP: (!) 102/57   Pulse: 63   Resp: 18   Temp: 98.4 °F (36.9 °C)   SpO2: 98%       Mental Status  Appearance: Disheveled and in hospital attire. Made good eye contact. Gait not assessed. No abnormal movements or tremor. Behavior: Calm, cooperative, and socially appropriate. No psychomotor retardation/agitation appreciated. Speech: Normal in tone, volume, and quality. No slurring, dysarthria or pressured speech noted. Mood: \"Ok\"   Affect: Mood congruent. Thought Process: Appears linear, logical and goal oriented. Causality appears intact. Thought Content: Denies active suicidal and homicidal ideation. No overt delusions or paranoia appreciated. Perceptions: Denies auditory or visual hallucinations at present time. Not responding to internal stimuli. Concentration: Intact. Orientation: to person, place, date, and situation. Language: Intact. Fund of information: Intact.    Memory: Recent and remote appear intact. Impulsivity: Limited. Neurovegitative: Fair appetite and sleep. Insight: Impaired. Judgment: Impaired. Assessment  DSM-5 DIAGNOSIS:  Impression   Depression unspecified  Suicidal ideation  Insomnia unspecified  Borderline personality disorder  Tobacco use disorder  Alcohol use  Anemia  COVID-19  UTI  COPD  Diabetes  Vitamin D deficiency  Vitamin B12 deficiency    Patient remains suicidal. Appears somewhat confused this am - likely  In the setting of her medical issues. Continue one-to-one. Medical management. We will restart previous psychotropic regimen. Trazodone for sleep. PRN Risperdal for agitation.  consult. We will follow. Thank you for consulting our service. Please call us with questions.       Trupti Hoff MD

## 2022-05-16 LAB
AMMONIA: 34 UMOL/L (ref 11–51)
ANION GAP SERPL CALCULATED.3IONS-SCNC: 10 MMOL/L (ref 7–19)
BACTERIA SPEC AEROBE CULT: NORMAL
BACTERIA SPEC AEROBE CULT: NORMAL
BASOPHILS ABSOLUTE: 0 K/UL (ref 0–0.2)
BASOPHILS RELATIVE PERCENT: 0.3 % (ref 0–1)
BUN BLDV-MCNC: 13 MG/DL (ref 8–23)
CALCIUM SERPL-MCNC: 8 MG/DL (ref 8.8–10.2)
CHLORIDE BLD-SCNC: 105 MMOL/L (ref 98–111)
CO2: 23 MMOL/L (ref 22–29)
CREAT SERPL-MCNC: 0.5 MG/DL (ref 0.5–0.9)
EOSINOPHILS ABSOLUTE: 0.1 K/UL (ref 0–0.6)
EOSINOPHILS RELATIVE PERCENT: 2.2 % (ref 0–5)
GFR AFRICAN AMERICAN: >59
GFR NON-AFRICAN AMERICAN: >60
GLUCOSE BLD-MCNC: 103 MG/DL (ref 70–99)
GLUCOSE BLD-MCNC: 155 MG/DL (ref 70–99)
GLUCOSE BLD-MCNC: 78 MG/DL (ref 70–99)
GLUCOSE BLD-MCNC: 92 MG/DL (ref 74–109)
GLUCOSE BLD-MCNC: 94 MG/DL (ref 70–99)
HCT VFR BLD CALC: 34.3 % (ref 37–47)
HEMOGLOBIN: 10 G/DL (ref 12–16)
IMMATURE GRANULOCYTES #: 0 K/UL
LYMPHOCYTES ABSOLUTE: 1.3 K/UL (ref 1.1–4.5)
LYMPHOCYTES RELATIVE PERCENT: 34.5 % (ref 20–40)
MCH RBC QN AUTO: 24.9 PG (ref 27–31)
MCHC RBC AUTO-ENTMCNC: 29.2 G/DL (ref 33–37)
MCV RBC AUTO: 85.5 FL (ref 81–99)
MONOCYTES ABSOLUTE: 0.3 K/UL (ref 0–0.9)
MONOCYTES RELATIVE PERCENT: 9.1 % (ref 0–10)
NEUTROPHILS ABSOLUTE: 1.9 K/UL (ref 1.5–7.5)
NEUTROPHILS RELATIVE PERCENT: 53.6 % (ref 50–65)
PDW BLD-RTO: 17.1 % (ref 11.5–14.5)
PERFORMED ON: ABNORMAL
PERFORMED ON: ABNORMAL
PERFORMED ON: NORMAL
PERFORMED ON: NORMAL
PLATELET # BLD: 130 K/UL (ref 130–400)
PMV BLD AUTO: 10.9 FL (ref 9.4–12.3)
POTASSIUM SERPL-SCNC: 4.4 MMOL/L (ref 3.5–5)
RBC # BLD: 4.01 M/UL (ref 4.2–5.4)
SODIUM BLD-SCNC: 138 MMOL/L (ref 136–145)
WBC # BLD: 3.6 K/UL (ref 4.8–10.8)

## 2022-05-16 PROCEDURE — 6360000002 HC RX W HCPCS: Performed by: HOSPITALIST

## 2022-05-16 PROCEDURE — 1210000000 HC MED SURG R&B

## 2022-05-16 PROCEDURE — 85025 COMPLETE CBC W/AUTO DIFF WBC: CPT

## 2022-05-16 PROCEDURE — 2580000003 HC RX 258: Performed by: HOSPITALIST

## 2022-05-16 PROCEDURE — 82947 ASSAY GLUCOSE BLOOD QUANT: CPT

## 2022-05-16 PROCEDURE — 80048 BASIC METABOLIC PNL TOTAL CA: CPT

## 2022-05-16 PROCEDURE — 99231 SBSQ HOSP IP/OBS SF/LOW 25: CPT | Performed by: PSYCHIATRY & NEUROLOGY

## 2022-05-16 PROCEDURE — 6370000000 HC RX 637 (ALT 250 FOR IP): Performed by: NURSE PRACTITIONER

## 2022-05-16 PROCEDURE — 36415 COLL VENOUS BLD VENIPUNCTURE: CPT

## 2022-05-16 PROCEDURE — 6370000000 HC RX 637 (ALT 250 FOR IP): Performed by: PSYCHIATRY & NEUROLOGY

## 2022-05-16 PROCEDURE — 2580000003 HC RX 258: Performed by: NURSE PRACTITIONER

## 2022-05-16 PROCEDURE — 82140 ASSAY OF AMMONIA: CPT

## 2022-05-16 PROCEDURE — 6360000002 HC RX W HCPCS: Performed by: INTERNAL MEDICINE

## 2022-05-16 PROCEDURE — 6360000002 HC RX W HCPCS: Performed by: NURSE PRACTITIONER

## 2022-05-16 RX ORDER — CYANOCOBALAMIN 1000 UG/ML
1000 INJECTION INTRAMUSCULAR; SUBCUTANEOUS ONCE
Status: COMPLETED | OUTPATIENT
Start: 2022-05-16 | End: 2022-05-16

## 2022-05-16 RX ORDER — ENOXAPARIN SODIUM 100 MG/ML
40 INJECTION SUBCUTANEOUS DAILY
Status: DISCONTINUED | OUTPATIENT
Start: 2022-05-16 | End: 2022-05-22 | Stop reason: HOSPADM

## 2022-05-16 RX ADMIN — PANTOPRAZOLE SODIUM 40 MG: 40 TABLET, DELAYED RELEASE ORAL at 06:33

## 2022-05-16 RX ADMIN — RISPERIDONE 1 MG: 0.5 TABLET, ORALLY DISINTEGRATING ORAL at 16:57

## 2022-05-16 RX ADMIN — MIRTAZAPINE 7.5 MG: 15 TABLET, FILM COATED ORAL at 20:19

## 2022-05-16 RX ADMIN — ATORVASTATIN CALCIUM 20 MG: 20 TABLET, FILM COATED ORAL at 09:14

## 2022-05-16 RX ADMIN — CYANOCOBALAMIN 1000 MCG: 1000 INJECTION, SOLUTION INTRAMUSCULAR; SUBCUTANEOUS at 09:14

## 2022-05-16 RX ADMIN — IRON SUCROSE 200 MG: 20 INJECTION, SOLUTION INTRAVENOUS at 16:50

## 2022-05-16 RX ADMIN — QUETIAPINE FUMARATE 200 MG: 50 TABLET ORAL at 20:18

## 2022-05-16 RX ADMIN — OXYCODONE HYDROCHLORIDE AND ACETAMINOPHEN 500 MG: 500 TABLET ORAL at 20:19

## 2022-05-16 RX ADMIN — OXYCODONE HYDROCHLORIDE AND ACETAMINOPHEN 500 MG: 500 TABLET ORAL at 09:14

## 2022-05-16 RX ADMIN — GUAIFENESIN SYRUP AND DEXTROMETHORPHAN 5 ML: 100; 10 SYRUP ORAL at 06:37

## 2022-05-16 RX ADMIN — Medication 5 MG: at 20:19

## 2022-05-16 RX ADMIN — DIVALPROEX SODIUM 1000 MG: 500 TABLET, EXTENDED RELEASE ORAL at 09:13

## 2022-05-16 RX ADMIN — TRAZODONE HYDROCHLORIDE 50 MG: 50 TABLET ORAL at 20:19

## 2022-05-16 RX ADMIN — ACETAMINOPHEN 650 MG: 325 TABLET ORAL at 16:57

## 2022-05-16 RX ADMIN — ENOXAPARIN SODIUM 40 MG: 100 INJECTION SUBCUTANEOUS at 09:14

## 2022-05-16 RX ADMIN — SODIUM CHLORIDE, PRESERVATIVE FREE 10 ML: 5 INJECTION INTRAVENOUS at 09:14

## 2022-05-16 RX ADMIN — ZINC SULFATE 220 MG (50 MG) CAPSULE 50 MG: CAPSULE at 09:14

## 2022-05-16 RX ADMIN — SODIUM CHLORIDE, PRESERVATIVE FREE 10 ML: 5 INJECTION INTRAVENOUS at 20:19

## 2022-05-16 RX ADMIN — WATER 1000 MG: 1 INJECTION INTRAMUSCULAR; INTRAVENOUS; SUBCUTANEOUS at 00:12

## 2022-05-16 RX ADMIN — RISPERIDONE 1 MG: 0.5 TABLET, ORALLY DISINTEGRATING ORAL at 06:37

## 2022-05-16 ASSESSMENT — PAIN SCALES - GENERAL
PAINLEVEL_OUTOF10: 0
PAINLEVEL_OUTOF10: 3
PAINLEVEL_OUTOF10: 0

## 2022-05-16 ASSESSMENT — PAIN DESCRIPTION - ORIENTATION: ORIENTATION: RIGHT;LOWER

## 2022-05-16 ASSESSMENT — PAIN DESCRIPTION - DESCRIPTORS: DESCRIPTORS: ACHING;DISCOMFORT

## 2022-05-16 ASSESSMENT — PAIN DESCRIPTION - LOCATION: LOCATION: NECK;BACK

## 2022-05-16 NOTE — CARE COORDINATION
Initial CM Assessment    Initial Assessment Completed at bedside with:    [x]   Patient  []   Family/Caregiver/Guardian   []   Other:      Patient Contact Information:  [place] [address] - pt states that she is homeless   [phone] (home)   Above information verified? [x]   Yes  []   No    ADLS:    Independent   Support System:   Children  Plan to return to current housing:   [x]   Yes - see note below   []   No    Do you have any unmet social needs that would keep you from returning home safely:  [x]   Yes  []   No              Unmet Social Needs Notes:    Homeless     Had [address] [facility] prior to admission:    []   Yes  [x]   No    Currently ACTIVE with Home Health CARE:    []   Yes  [x]   No  []   Interested at discharge  [facility]:      Current PCP:  JORGE Kelly  PCP verified? [x]   Yes  []   No    Pharmacy:    [facility], [facility] [phone]  [address] [place] [address] 2012  [facility] [address]  Phone: [phone] Fax: [phone]    [id number], [address]  [address] [facility]  [address] [place] [address]  Phone: [phone] Fax: [phone] [facility], [address] [place] [address] [phone] - f [phone]  [address]  Phone: [phone] Fax: [phone]    [facility] Viktoriya Hay [phone] - F [phone]  [place] [address]  Phone: [phone] Fax: 214 [facility] [address] - P [phone] - f [phone]  [address] [facility]  [address] [place] [address]  Phone: [phone] Fax: [phone] - [address], [address] [phone] - F [phone]  [address] [place] [address] RD.   [address] [address]  Phone: [phone] Fax: [phone]    [facility] [address]  [address] [place] [address]  Phone: [phone] Fax: 1100 [facility] Jacob, [address] [facility] [address] Robby Ferrer  Phone: [phone] Fax: [phone] [facility], [address] [phone] Tonia Corona [phone]  [address] [address]  Phone: [phone] Fax: [phone]    [address] 823 [facility], [address] [facility] [address]  [phone] Tonia Corona [phone]  [address] [address]  Phone: [phone] Fax: [phone]    [facility], [facility] [phone] Tonia Corona [phone]  [address]  559 [facility] [address]  Phone: [phone] Fax: [phone]    [address] [facility] [address] [phone] Tonia Corona [phone]  [address] [address]  Phone: [phone] Fax: [phone]    Prefer to use Meds to Bed? []   Yes  [x]   No  Potential assistance purchasing medications? []   Yes  [x]   No    Active with HD/PD prior to admission:           []   Yes  [x]   No  HD Center:       Financial:  Payor: Hannah Srinivasan / Plan: Nova Leader / Product Type: *No Product type* /     Pre-Cert required for SNF:   [x]   Yes  []   No    Patient Deficits:  [x]   Yes   []   No    If yes:  []   Confusion/Memory  []   Visual  []   Motor/Sensory         []   Right arm         []   Right leg         []   Left arm         []   Left leg  []   Language/Speech         []   Aphasia         []   Dysarthria         []   Swallow         Kennan Coma Scale  Eye Opening: Spontaneous  Best Verbal Response: Oriented  Best Motor Response: Obeys commands  Kennan Coma Scale Score: 15    Patient Deficit Notes: Additional CM/SW Notes:   Pt tested + for COVID on 5/11.  She states that she and her daughter are homeless. They came in together, daughter was discharged. She is not sure where her daughter is but states that she called last night. They have been staying in a hotel [facility]) which pt paid for. Pt fidgeting in bed, rocking back and forth, speech is erratic. Shirley Ou Inquired about etoh or any kind of substance abuse, pt adamantly denied. States that she will not go to any kind of rehab because she does not use substances. States that she needs SW to \"find her a home\". Asked if pt has ever been to a shelter, she stated no and that she will not go to one because she is scared. Explained to pt that she is COVID+, there are limited resources that can be offered to her as she declined rehab and shelter. Voiced understanding. Attempted to reach dtr at number listed in chart, states number has been disconnected. SW will follow.      Ju and/or her family were provided with choice of provider:  [x]   Yes   []   No      [address] [facility]

## 2022-05-16 NOTE — PROGRESS NOTES
Physical Therapy  PCA notes pT has been up ad moises in room without difficulty and is currently in the shower independently. No need for skilled PT in this setting at this time.   Electronically signed by Suhail Fox PT on 5/16/2022 at 9:46 AM

## 2022-05-16 NOTE — FLOWSHEET NOTE
05/16/22 1613   Encounter Summary   Encounter Overview/Reason  Initial Encounter   Service Provided For: Patient  (By phone)   Referral/Consult From: Nurse   Support System Children   Complexity of Encounter Moderate   Begin Time 1546   End Time  1557   Total Time Calculated 11 min   Encounter    Type Initial Screen/Assessment   Crisis   Type Emotional distress   Spiritual/Emotional needs   Type Spiritual Distress   Grief, Loss, and Adjustments   Type Life Adjustments  (Homeless)   Assessment/Intervention/Outcome   Assessment Stress overload;Despair  (Pt recounted \"Don't want to be homeless. \")   Intervention Active listening   Outcome Venting emotion;Encouraged   Plan and Referrals   Plan/Referrals No future visits requested     Electronically signed by Chichi Navarro  [employer] on 5/16/2022 at 4:16 PM

## 2022-05-16 NOTE — PROGRESS NOTES
[facility] [place] MEDICINE  - PROGRESS NOTE    Admit Date: 5/14/2022         CC: SI/depression/back pain     Subjective: suidicidal, depressed, no headache, no dizziness, no N/V/D/C, no abd pain, no dysuria or hematuria, no chest pain, no edema, no cough, no wheezing, no fever or chills     Objective: no distress, no complaints    Diet: ADULT DIET;  Regular; Safety Tray; Safety Tray (Disposables)  Pain is: none   Nausea:None  Bowel Movement/Flatus yes    Data:   Scheduled Meds: Reviewed  Current Facility-Administered Medications   Medication Dose Route Frequency Provider Last Rate Last Admin    enoxaparin (LOVENOX) injection 40 mg  40 mg SubCUTAneous Daily Patience Barb, DO   40 mg at 05/16/22 0914    fentaNYL (SUBLIMAZE) injection 25 mcg  25 mcg IntraVENous Q1H PRN Danay Barnes MD        risperiDONE (RISPERDAL M-TABS) disintegrating tablet 1 mg  1 mg Oral Q6H PRN Opal Demarco MD   1 mg at 05/16/22 0780    traZODone (DESYREL) tablet 50 mg  50 mg Oral Nightly Opal Demarco MD   50 mg at 05/15/22 2138    iron sucrose (VENOFER) injection 200 mg  200 mg IntraVENous Q24H Danay Barnes MD   200 mg at 05/15/22 1538    sodium chloride flush 0.9 % injection 5-40 mL  5-40 mL IntraVENous 2 times per day Danay Barnes MD   10 mL at 05/16/22 0914    sodium chloride flush 0.9 % injection 5-40 mL  5-40 mL IntraVENous PRN Danay Barnes MD        0.9 % sodium chloride infusion   IntraVENous PRN Danay Barnes MD        LORazepam (ATIVAN) tablet 1 mg  1 mg Oral Q1H PRN Danay Barnes MD        Or    LORazepam (ATIVAN) injection 1 mg  1 mg IntraVENous Q1H PRN Danay Barnes MD        Or    LORazepam (ATIVAN) tablet 2 mg  2 mg Oral Q1H PRN Danay Barnes MD        Or    LORazepam (ATIVAN) injection 2 mg  2 mg IntraVENous Q1H PRN Danay Barnes MD        ondansetron (ZOFRAN-ODT) disintegrating tablet 4 mg  4 mg Oral Q8H PRN Dalphine Fendt, APRN - CNP        Or    [facility]ron [facility] injection 4 mg  4 mg IntraVENous Q6H PRN Dalphine Fendt, APRN - CNP        polyethylene glycol (GLYCOLAX) packet 17 g  17 g Oral Daily PRN Dalphine Fendt, APRN - CNP        acetaminophen (TYLENOL) tablet 650 mg  650 mg Oral Q6H PRN Dalphine Fendt, APRN - CNP   650 mg at 05/15/22 0747    Or    acetaminophen (TYLENOL) suppository 650 mg  650 mg Rectal Q6H PRN Dalphine Fendt, APRN - CNP        guaiFENesin-dextromethorphan (ROBITUSSIN DM) 100-10 MG/5ML syrup 5 mL  5 mL Oral Q4H PRN Dalphine Fendt, APRN - CNP   5 mL at 05/16/22 0637    albuterol sulfate  (90 Base) MCG/ACT inhaler 2 puff  2 puff Inhalation Q4H PRN Dalphine Fendt, APRN - CNP   2 puff at 05/15/22 0746    zinc sulfate (ZINCATE) capsule 50 mg  50 mg Oral Daily Dalphine Fendt, APRN - CNP   50 mg at 05/16/22 0914    ascorbic acid (VITAMIN C) tablet 500 mg  500 mg Oral BID Dalphine Fendt, APRN - CNP   500 mg at 05/16/22 4914    melatonin disintegrating tablet 5 mg  5 mg Oral Nightly Dalphine Fendt, APRN - CNP   5 mg at 05/15/22 2138    cefTRIAXone (ROCEPHIN) 1,000 mg in sterile water 10 mL IV syringe  1,000 mg IntraVENous Q24H Dalphine Fendt, APRN - CNP   1,000 mg at 05/16/22 0012    insulin lispro (HUMALOG) injection vial 0-6 Units  0-6 Units SubCUTAneous TID WC Dalphine Fendt, APRN - CNP        insulin lispro (HUMALOG) injection vial 0-3 Units  0-3 Units SubCUTAneous Nightly Dalphine Fendt, APRN - CNP        atorvastatin (LIPITOR) tablet 20 mg  20 mg Oral Daily Dalphine Fendt, APRN - CNP   20 mg at 05/16/22 0914    divalproex (DEPAKOTE ER) extended release tablet 1,000 mg  1,000 mg Oral Daily Dalphine Fendt, APRN - CNP   1,000 mg at 05/16/22 0913    fluticasone (FLONASE) 50 MCG/ACT nasal spray 2 spray  2 spray Nasal Daily Dalphine Fendt, APRN - CNP   2 spray at 05/15/22 0746    QUEtiapine (SEROQUEL) tablet 200 mg  200 mg Oral Nightly Dalphine Fendt, APRN - CNP   200 mg at 05/15/22 2139    pantoprazole (PROTONIX) tablet 40 mg  40 mg Oral QAM AC Jony Godoy, APRN - CNP   40 mg at 05/16/22 6576    mirtazapine (REMERON) tablet 7.5 mg  7.5 mg Oral Nightly Jony Godoy, APRN - CNP   7.5 mg at 05/15/22 2139     DVT Prophylaxis: Lovenox 30 mg sq daily    Continuous Infusions:   sodium chloride         Intake/Output Summary (Last 24 hours) at 5/16/2022 1322  Last data filed at 5/16/2022 0946  Gross per 24 hour   Intake 540 ml   Output --   Net 540 ml     CBC:   Recent Labs     05/14/22 1807 05/14/22 2329 05/16/22  0331   WBC 4.5* 3.6* 3.6*   HGB 10.4* 9.5* 10.0*    155 130     BMP:  Recent Labs     05/14/22 1807 05/16/22  0331    138   K 5.0 4.4    105   CO2 23 23   BUN 17 13   CREATININE 0.8 0.5   GLUCOSE 86 92     ABGs: No results found for: PHART, PO2ART, SUJ6KYW  INR: No results for input(s): INR in the last 72 hours. Objective:   Vitals: /63   Pulse 81   Temp 96.9 °F (36.1 °C) (Temporal)   Resp 18   Ht 5' (1.524 m)   Wt 164 lb 3 oz (74.5 kg)   LMP  (LMP Unknown)   SpO2 96%   Breastfeeding No   BMI 32.07 kg/m²   General appearance: alert, appears stated age and cooperative  Skin: Skin color, texture, turgor normal.   HEENT: Head: Normocephalic, no lesions, without obvious abnormality. Neck: no adenopathy, no carotid bruit, no JVD and supple, symmetrical, trachea midline  Lungs: clear to auscultation bilaterally  Heart: regular rate and rhythm, S1, S2 normal, no murmur, click, rub or gallop  Abdomen: soft, non-tender; bowel sounds normal; no masses,  no organomegaly  Extremities: extremities normal, atraumatic, no cyanosis or edema  Lymphatic: No significant lymph node enlargement papable  Neurologic: Mental status: Alert, oriented, thought content appropriate        Assessment & Plan:    · SI- psych following   · Depression  · Insomnia  · Borderline personality disorder  · Tobacco use disorder  · Alcohol use  · Alcohol withdrawals ? - seem resolved- ciwa  · COVID-19- asymptomatic - no tx   · Anemia of iron def and B12 def- started on replacement   · UTI- ruled out   · DM2- ISS  · HTN- home meds  · Chronic back pain - fentanyl prn   · Trazodone for sleep.  PRN Risperdal for agitation    Patient Active Problem List:     Hypertension     Osteoarthritis     Psoriasis     COPD (chronic obstructive pulmonary disease) ([facility])     Sleep apnea     Oxygen dependent     Alternating constipation and diarrhea     S/P gastric bypass     Polypharmacy     Diabetic neuropathy associated with type 2 diabetes mellitus [facility]        See orders   Disposition: needs placement, pt is homeless, dc when cleared by psych and has a place to go     Danay Barnes MD

## 2022-05-16 NOTE — PROGRESS NOTES
Department of Psychiatry  Attending Progress Note     Chief complaint: \"I'm a little better\"    SUBJECTIVE:   Chart reviewed, discussed with the team. No major issues overnight. Patient is med-compliant. No SEs. Sleeping and  Eating well. Patient seen resting in bed. Calm, cooperative. States she is doing [place] little better. \" Vague SI. Slept better. Appetite is good. Denies physical complaints. Hoping she can see her daughter today. OBJECTIVE    Physical  Wt Readings from Last 3 Encounters:   05/14/22 164 lb 3 oz (74.5 kg)   11/07/21 200 lb (90.7 kg)   09/17/21 200 lb (90.7 kg)     Temp Readings from Last 3 Encounters:   05/16/22 96.9 °F (36.1 °C) (Temporal)   02/03/22 96.4 °F (35.8 °C) (Temporal)   01/29/22 99.1 °F (37.3 °C) (Temporal)     BP Readings from Last 3 Encounters:   05/16/22 135/63   02/03/22 110/78   01/29/22 101/66     Pulse Readings from Last 3 Encounters:   05/16/22 81   02/03/22 93   01/29/22 65        Review of Systems: 14-point review of systems negative except as described above    Mental Status Examination:   Appearance:  Stated age. Gait not assessed. No abnormal movements or tremor. Behavior: Calm, cooperative  Speech: Normal in tone, volume, and quality. No slurring, dysarthria or pressured speech noted. Mood: \"A little better \"   Affect: Mood congruent. Thought Process: Appears linear. Thought Content:  Vague SI. Denies HI. No overt delusions or paranoia appreciated. Perceptions: Denies auditory or visual hallucinations at present time. Not responding to internal stimuli. Concentration: Intact. Orientation: to person, place, date, and situation. Language: Intact. Fund of information: Intact. Memory: Recent and remote appear intact. Neurovegitative: Improved appetite and sleep. Insight: Limited. Judgment: Limited.     Data  Lab Results   Component Value Date    WBC 3.6 (L) 05/16/2022    HGB 10.0 (L) 05/16/2022    HCT 34.3 (L) 05/16/2022    MCV 85.5 05/16/2022  05/16/2022      Lab Results   Component Value Date     05/16/2022    K 4.4 05/16/2022     05/16/2022    CO2 23 05/16/2022    BUN 13 05/16/2022    CREATININE 0.5 05/16/2022    GLUCOSE 92 05/16/2022    CALCIUM 8.0 (L) 05/16/2022    PROT 6.2 (L) 05/14/2022    LABALBU 4.1 05/14/2022    BILITOT <0.2 05/14/2022    ALKPHOS 157 (H) 05/14/2022    AST 21 05/14/2022    ALT 15 05/14/2022    LABGLOM >60 05/16/2022    GFRAA >59 05/16/2022    GLOB 3.1 05/09/2017       Medications    Current Facility-Administered Medications:     enoxaparin (LOVENOX) injection 40 mg, 40 mg, SubCUTAneous, Daily, Patience Barb, DO, 40 mg at 05/16/22 0914    fentaNYL (SUBLIMAZE) injection 25 mcg, 25 mcg, IntraVENous, Q1H PRN, Jose Merida MD    risperiDONE (RISPERDAL M-TABS) disintegrating tablet 1 mg, 1 mg, Oral, Q6H PRN, Matthew Hartley MD, 1 mg at 05/16/22 0137    traZODone (DESYREL) tablet 50 mg, 50 mg, Oral, Nightly, Matthew Hartley MD, 50 mg at 05/15/22 2138    iron sucrose (VENOFER) injection 200 mg, 200 mg, IntraVENous, Q24H, Jose Merida MD, 200 mg at 05/15/22 1538    sodium chloride flush 0.9 % injection 5-40 mL, 5-40 mL, IntraVENous, 2 times per day, Jose Merida MD, 10 mL at 05/16/22 0914    sodium chloride flush 0.9 % injection 5-40 mL, 5-40 mL, IntraVENous, PRN, Jose Merida MD    0.9 % sodium chloride infusion, , IntraVENous, PRN, Jose Merida MD    LORazepam (ATIVAN) tablet 1 mg, 1 mg, Oral, Q1H PRN **OR** LORazepam (ATIVAN) injection 1 mg, 1 mg, IntraVENous, Q1H PRN **OR** LORazepam (ATIVAN) tablet 2 mg, 2 mg, Oral, Q1H PRN **OR** LORazepam (ATIVAN) injection 2 mg, 2 mg, IntraVENous, Q1H PRN **OR** [DISCONTINUED] LORazepam (ATIVAN) tablet 3 mg, 3 mg, Oral, Q1H PRN **OR** [DISCONTINUED] LORazepam (ATIVAN) injection 3 mg, 3 mg, IntraVENous, Q1H PRN **OR** [DISCONTINUED] LORazepam (ATIVAN) tablet 4 mg, 4 mg, Oral, Q1H PRN **OR** [DISCONTINUED] (SEROQUEL) tablet 200 mg, 200 mg, Oral, Nightly, Rui France, APRN - CNP, 200 mg at 05/15/22 2139    pantoprazole (PROTONIX) tablet 40 mg, 40 mg, Oral, QAM AC, Rui France, APRN - CNP, 40 mg at 05/16/22 6707    mirtazapine (REMERON) tablet 7.5 mg, 7.5 mg, Oral, Nightly, Rui France, APRN - CNP, 7.5 mg at 05/15/22 2139    ASSESSMENT AND PLAN  DSM 5 DIAGNOSIS  Impression  Depression unspecified  Suicidal ideation  Insomnia unspecified  Borderline personality disorder  Tobacco use disorder  Alcohol use  Anemia  COVID-19  UTI  COPD  Diabetes  Vitamin D deficiency  Vitamin B12 deficiency    Some improvement in MS overnight. Continue to observe. Keep on 1:1. SW to address homelessness.       Amount of time spent with patient:      15 minutes with greater than 50 % of the time spent in counseling and collaboration of care

## 2022-05-17 LAB
ANION GAP SERPL CALCULATED.3IONS-SCNC: 8 MMOL/L (ref 7–19)
ANISOCYTOSIS: ABNORMAL
BASOPHILS ABSOLUTE: 0 K/UL (ref 0–0.2)
BASOPHILS RELATIVE PERCENT: 0.3 % (ref 0–1)
BUN BLDV-MCNC: 14 MG/DL (ref 8–23)
CALCIUM SERPL-MCNC: 8.1 MG/DL (ref 8.8–10.2)
CHLORIDE BLD-SCNC: 107 MMOL/L (ref 98–111)
CO2: 25 MMOL/L (ref 22–29)
CREAT SERPL-MCNC: 0.5 MG/DL (ref 0.5–0.9)
EOSINOPHILS ABSOLUTE: 0.1 K/UL (ref 0–0.6)
EOSINOPHILS RELATIVE PERCENT: 2.3 % (ref 0–5)
GFR AFRICAN AMERICAN: >59
GFR NON-AFRICAN AMERICAN: >60
GLUCOSE BLD-MCNC: 103 MG/DL (ref 70–99)
GLUCOSE BLD-MCNC: 107 MG/DL (ref 70–99)
GLUCOSE BLD-MCNC: 201 MG/DL (ref 70–99)
GLUCOSE BLD-MCNC: 91 MG/DL (ref 74–109)
GLUCOSE BLD-MCNC: 92 MG/DL (ref 70–99)
HCT VFR BLD CALC: 34.9 % (ref 37–47)
HEMOGLOBIN: 10 G/DL (ref 12–16)
HYPOCHROMIA: ABNORMAL
IMMATURE GRANULOCYTES #: 0 K/UL
LYMPHOCYTES ABSOLUTE: 1.5 K/UL (ref 1.1–4.5)
LYMPHOCYTES RELATIVE PERCENT: 38.5 % (ref 20–40)
MCH RBC QN AUTO: 24.8 PG (ref 27–31)
MCHC RBC AUTO-ENTMCNC: 28.7 G/DL (ref 33–37)
MCV RBC AUTO: 86.4 FL (ref 81–99)
MONOCYTES ABSOLUTE: 0.4 K/UL (ref 0–0.9)
MONOCYTES RELATIVE PERCENT: 9.6 % (ref 0–10)
NEUTROPHILS ABSOLUTE: 1.9 K/UL (ref 1.5–7.5)
NEUTROPHILS RELATIVE PERCENT: 49 % (ref 50–65)
PDW BLD-RTO: 17.3 % (ref 11.5–14.5)
PERFORMED ON: ABNORMAL
PERFORMED ON: NORMAL
PLATELET # BLD: 146 K/UL (ref 130–400)
PMV BLD AUTO: 11.3 FL (ref 9.4–12.3)
POTASSIUM SERPL-SCNC: 5.2 MMOL/L (ref 3.5–5)
RBC # BLD: 4.04 M/UL (ref 4.2–5.4)
SODIUM BLD-SCNC: 140 MMOL/L (ref 136–145)
URINE CULTURE, ROUTINE: NORMAL
WBC # BLD: 3.8 K/UL (ref 4.8–10.8)

## 2022-05-17 PROCEDURE — 82947 ASSAY GLUCOSE BLOOD QUANT: CPT

## 2022-05-17 PROCEDURE — 6370000000 HC RX 637 (ALT 250 FOR IP): Performed by: PSYCHIATRY & NEUROLOGY

## 2022-05-17 PROCEDURE — 1210000000 HC MED SURG R&B

## 2022-05-17 PROCEDURE — 36415 COLL VENOUS BLD VENIPUNCTURE: CPT

## 2022-05-17 PROCEDURE — 99231 SBSQ HOSP IP/OBS SF/LOW 25: CPT | Performed by: PSYCHIATRY & NEUROLOGY

## 2022-05-17 PROCEDURE — 6370000000 HC RX 637 (ALT 250 FOR IP): Performed by: HOSPITALIST

## 2022-05-17 PROCEDURE — 80048 BASIC METABOLIC PNL TOTAL CA: CPT

## 2022-05-17 PROCEDURE — 6360000002 HC RX W HCPCS: Performed by: INTERNAL MEDICINE

## 2022-05-17 PROCEDURE — 6360000002 HC RX W HCPCS: Performed by: HOSPITALIST

## 2022-05-17 PROCEDURE — 6370000000 HC RX 637 (ALT 250 FOR IP): Performed by: NURSE PRACTITIONER

## 2022-05-17 PROCEDURE — 2580000003 HC RX 258: Performed by: HOSPITALIST

## 2022-05-17 PROCEDURE — 85025 COMPLETE CBC W/AUTO DIFF WBC: CPT

## 2022-05-17 RX ORDER — FERROUS SULFATE 325(65) MG
325 TABLET ORAL 2 TIMES DAILY WITH MEALS
Status: DISCONTINUED | OUTPATIENT
Start: 2022-05-18 | End: 2022-05-22 | Stop reason: HOSPADM

## 2022-05-17 RX ORDER — SODIUM POLYSTYRENE SULFONATE 15 G/60ML
15 SUSPENSION ORAL; RECTAL ONCE
Status: COMPLETED | OUTPATIENT
Start: 2022-05-17 | End: 2022-05-17

## 2022-05-17 RX ORDER — CYANOCOBALAMIN 1000 UG/ML
1000 INJECTION INTRAMUSCULAR; SUBCUTANEOUS ONCE
Status: COMPLETED | OUTPATIENT
Start: 2022-05-17 | End: 2022-05-17

## 2022-05-17 RX ADMIN — Medication 5 MG: at 22:18

## 2022-05-17 RX ADMIN — OXYCODONE HYDROCHLORIDE AND ACETAMINOPHEN 500 MG: 500 TABLET ORAL at 08:06

## 2022-05-17 RX ADMIN — MIRTAZAPINE 7.5 MG: 15 TABLET, FILM COATED ORAL at 22:18

## 2022-05-17 RX ADMIN — ATORVASTATIN CALCIUM 20 MG: 20 TABLET, FILM COATED ORAL at 08:06

## 2022-05-17 RX ADMIN — LORAZEPAM 2 MG: 2 INJECTION INTRAMUSCULAR; INTRAVENOUS at 17:54

## 2022-05-17 RX ADMIN — SODIUM CHLORIDE, PRESERVATIVE FREE 10 ML: 5 INJECTION INTRAVENOUS at 08:14

## 2022-05-17 RX ADMIN — DIVALPROEX SODIUM 1000 MG: 500 TABLET, EXTENDED RELEASE ORAL at 08:06

## 2022-05-17 RX ADMIN — ZINC SULFATE 220 MG (50 MG) CAPSULE 50 MG: CAPSULE at 08:06

## 2022-05-17 RX ADMIN — FLUTICASONE PROPIONATE 2 SPRAY: 50 SPRAY, METERED NASAL at 08:06

## 2022-05-17 RX ADMIN — ENOXAPARIN SODIUM 40 MG: 100 INJECTION SUBCUTANEOUS at 08:06

## 2022-05-17 RX ADMIN — TRAZODONE HYDROCHLORIDE 50 MG: 50 TABLET ORAL at 22:18

## 2022-05-17 RX ADMIN — PANTOPRAZOLE SODIUM 40 MG: 40 TABLET, DELAYED RELEASE ORAL at 08:06

## 2022-05-17 RX ADMIN — QUETIAPINE FUMARATE 200 MG: 50 TABLET ORAL at 22:18

## 2022-05-17 RX ADMIN — SODIUM CHLORIDE, PRESERVATIVE FREE 10 ML: 5 INJECTION INTRAVENOUS at 22:17

## 2022-05-17 RX ADMIN — OXYCODONE HYDROCHLORIDE AND ACETAMINOPHEN 500 MG: 500 TABLET ORAL at 22:18

## 2022-05-17 RX ADMIN — LORAZEPAM 1 MG: 1 TABLET ORAL at 03:13

## 2022-05-17 RX ADMIN — SODIUM POLYSTYRENE SULFONATE 15 G: 15 SUSPENSION ORAL; RECTAL at 12:59

## 2022-05-17 RX ADMIN — CYANOCOBALAMIN 1000 MCG: 1000 INJECTION, SOLUTION INTRAMUSCULAR; SUBCUTANEOUS at 12:59

## 2022-05-17 RX ADMIN — INSULIN LISPRO 2 UNITS: 100 INJECTION, SOLUTION INTRAVENOUS; SUBCUTANEOUS at 13:21

## 2022-05-17 RX ADMIN — IRON SUCROSE 200 MG: 20 INJECTION, SOLUTION INTRAVENOUS at 17:03

## 2022-05-17 RX ADMIN — FENTANYL CITRATE 25 MCG: 50 INJECTION, SOLUTION INTRAMUSCULAR; INTRAVENOUS at 03:05

## 2022-05-17 ASSESSMENT — PAIN DESCRIPTION - LOCATION: LOCATION: BACK;NECK

## 2022-05-17 ASSESSMENT — PAIN SCALES - GENERAL
PAINLEVEL_OUTOF10: 10
PAINLEVEL_OUTOF10: 0

## 2022-05-17 ASSESSMENT — PAIN - FUNCTIONAL ASSESSMENT: PAIN_FUNCTIONAL_ASSESSMENT: ACTIVITIES ARE NOT PREVENTED

## 2022-05-17 NOTE — PLAN OF CARE
Problem: Safety - Adult  Goal: Free from fall injury  5/17/2022 0359 by Rocio Ballesteros RN  Outcome: Progressing  5/16/2022 1426 by Gage Cummings RN  Outcome: Progressing  Flowsheets (Taken 5/16/2022 0815)  Free From Fall Injury: Instruct family/caregiver on patient safety     Problem: ABCDS Injury Assessment  Goal: Absence of physical injury  5/17/2022 0359 by Rocio Ballesteros RN  Outcome: Progressing  5/16/2022 1426 by Gage Cummings RN  Outcome: Progressing  Flowsheets (Taken 5/16/2022 0815)  Absence of Physical Injury: Implement safety measures based on patient assessment     Problem: Skin/Tissue Integrity  Goal: Absence of new skin breakdown  Description: 1. Monitor for areas of redness and/or skin breakdown  2. Assess vascular access sites hourly  3. Every 4-6 hours minimum:  Change oxygen saturation probe site  4. Every 4-6 hours:  If on nasal continuous positive airway pressure, respiratory therapy assess nares and determine need for appliance change or resting period.   5/17/2022 0359 by Rocio Ballesteros RN  Outcome: Progressing  5/16/2022 1426 by Gage Cummings RN  Outcome: Progressing     Problem: Chronic Conditions and Co-morbidities  Goal: Patient's chronic conditions and co-morbidity symptoms are monitored and maintained or improved  5/17/2022 0359 by Rocio Ballesteros RN  Outcome: Progressing  5/16/2022 1426 by Gage Cummings RN  Outcome: Progressing  Flowsheets  Taken 5/16/2022 0810 by Gage Cummings RN  Care Plan - Patient's Chronic Conditions and Co-Morbidity Symptoms are Monitored and Maintained or Improved: Monitor and assess patient's chronic conditions and comorbid symptoms for stability, deterioration, or improvement  Taken 5/16/2022 0630 by Gurney Kehr, LPN  Care Plan - Patient's Chronic Conditions and Co-Morbidity Symptoms are Monitored and Maintained or Improved: Monitor and assess patient's chronic conditions and comorbid symptoms for stability, deterioration, or improvement     Problem: Discharge Planning  Goal: Discharge to home or other facility with appropriate resources  5/17/2022 0359 by Aramis Rubin RN  Outcome: Progressing  5/16/2022 1426 by Mg Pacheco RN  Outcome: Progressing  Flowsheets  Taken 5/16/2022 0810 by Mg Pacheco RN  Discharge to home or other facility with appropriate resources: Identify barriers to discharge with patient and caregiver  Taken 5/16/2022 0630 by Espinoza Orozco LPN  Discharge to home or other facility with appropriate resources: Identify barriers to discharge with patient and caregiver     Problem: Self Harm/Suicidality  Goal: Will have no self-injury during hospital stay  Description: INTERVENTIONS:  1. Q 30 MINUTES: Routine safety checks  2. Q SHIFT & PRN: Assess risk to determine if routine checks are adequate to maintain patient safety  5/17/2022 0359 by Aramis Rubin RN  Outcome: Progressing  5/16/2022 1426 by Mg Pacheco RN  Outcome: Progressing     Problem: Pain  Goal: Verbalizes/displays adequate comfort level or baseline comfort level  5/17/2022 0359 by Aramis Rubin RN  Outcome: Progressing  5/16/2022 1426 by Mg Pacheco RN  Outcome: Progressing  Flowsheets (Taken 5/16/2022 0120 by Espinoza Orozco LPN)  Verbalizes/displays adequate comfort level or baseline comfort level:   Encourage patient to monitor pain and request assistance   Assess pain using appropriate pain scale   Administer analgesics based on type and severity of pain and evaluate response   Implement non-pharmacological measures as appropriate and evaluate response

## 2022-05-17 NOTE — ACP (ADVANCE CARE PLANNING)
Advance Care Planning     Advance Care Planning Activator (Inpatient)  Conversation Note      Date of ACP Conversation: 5/17/2022     [employer] with: POA/Sister, Yuri Last. ACP Activator: Tru De La Rosa RN        Health Care Decision Maker:     [facility] Decision Maker:   Yuri Last [phone]    Care Preferences    Ventilation: \"If you were in your present state of health and suddenly became very ill and were unable to breathe on your own, what would your preference be about the use of a ventilator (breathing machine) if it were available to you? \"      Would the patient desire the use of ventilator (breathing machine)?: YES    \"If your health worsens and it becomes clear that your chance of recovery is unlikely, what would your preference be about the use of a ventilator (breathing machine) if it were available to you? \"     Would the patient desire the use of ventilator (breathing machine)?: NO      Resuscitation  \"CPR works best to restart the heart when there is a sudden event, like a heart attack, in someone who is otherwise healthy. Unfortunately, CPR does not typically restart the heart for people who have serious health conditions or who are very sick. \"    \"In the event your heart stopped as a result of an underlying serious health condition, would you want attempts to be made to restart your heart (answer \"yes\" for attempt to resuscitate) or would you prefer a natural death (answer \"no\" for do not attempt to resuscitate)? \" YES           Conversation Outcomes:  [x] ACP discussion completed  [] Existing advance directive reviewed with patient; no changes to patient's previously recorded wishes  [] New Advance Directive completed  [] Portable Do Not Rescitate prepared for Provider review and signature  [] POLST/POST/MOLST/MOST prepared for Provider review and signature      Spoke with sister, Yuri Last, she will provide POA paperwork ASAP.   Pt came in with SI and found to be COVID pos.     Electronically signed by Julio Baez RN on 5/17/2022 at 9:46 AM

## 2022-05-17 NOTE — PROGRESS NOTES
HOSPITAL MEDICINE  - PROGRESS NOTE    Admit Date: 5/14/2022         CC: SI/depression/back pain     Subjective: suidicidal, depressed, no headache, no dizziness, no N/V/D/C, no abd pain, no dysuria or hematuria, no chest pain, no edema, no cough, no wheezing, no fever or chills     Objective: no distress, no complaints      73 yo female PMH depression, borderline personality disorder admitted with suicidal ideation. history of alcohol abuse, on Ciwa for withdrawal. COVID positive but asymptomatic. Psych on board. Has suicidal thoughts. She is homeless. Diet: ADULT DIET; Regular; Low Potassium (Less than 3000 mg/day);  Safety Tray; Safety Tray (Disposables)  Pain is: none   Nausea:None  Bowel Movement/Flatus yes    Data:   Scheduled Meds: Reviewed  Current Facility-Administered Medications   Medication Dose Route Frequency Provider Last Rate Last Admin    [START ON 5/18/2022] ferrous sulfate (IRON 325) tablet 325 mg  325 mg Oral BID  Eva Alvarado MD        enoxaparin (LOVENOX) injection 40 mg  40 mg SubCUTAneous Daily Patience Barb, DO   40 mg at 05/17/22 0806    fentaNYL (SUBLIMAZE) injection 25 mcg  25 mcg IntraVENous Q1H PRN Eva Alvarado MD   25 mcg at 05/17/22 0305    risperiDONE (RISPERDAL M-TABS) disintegrating tablet 1 mg  1 mg Oral Q6H PRN Sandra Langley MD   1 mg at 05/16/22 1657    traZODone (DESYREL) tablet 50 mg  50 mg Oral Nightly Sandra Langley MD   50 mg at 05/16/22 2019    sodium chloride flush 0.9 % injection 5-40 mL  5-40 mL IntraVENous 2 times per day Eva Alvarado MD   10 mL at 05/17/22 0814    sodium chloride flush 0.9 % injection 5-40 mL  5-40 mL IntraVENous PRN Eva Alvarado MD        0.9 % sodium chloride infusion   IntraVENous PRN Eva Alvarado MD        LORazepam (ATIVAN) tablet 1 mg  1 mg Oral Q1H PRN Eva Alvarado MD   1 mg at 05/17/22 4240    Or    LORazepam (ATIVAN) injection 1 mg  1 mg IntraVENous Q1H PRN Juanna Saint, MD        Or    LORazepam (ATIVAN) tablet 2 mg  2 mg Oral Q1H PRN Juanna Saint, MD        Or    LORazepam (ATIVAN) injection 2 mg  2 mg IntraVENous Q1H PRN Juanna Saint, MD        ondansetron (ZOFRAN-ODT) disintegrating tablet 4 mg  4 mg Oral Q8H PRN Darryl JORGE Martins - CNP        Or    ondansetron [facility]) injection 4 mg  4 mg IntraVENous Q6H PRN Darryl Eliezer, APRN - CNP        polyethylene glycol (GLYCOLAX) packet 17 g  17 g Oral Daily PRN Darryl Carr, APRN - CNP        acetaminophen (TYLENOL) tablet 650 mg  650 mg Oral Q6H PRN Darryl Eliezer, APRN - CNP   650 mg at 05/16/22 1657    Or    acetaminophen (TYLENOL) suppository 650 mg  650 mg Rectal Q6H PRN Darryl Carr, APRN - CNP        guaiFENesin-dextromethorphan (ROBITUSSIN DM) 100-10 MG/5ML syrup 5 mL  5 mL Oral Q4H PRN Darryl Carr, APRN - CNP   5 mL at 05/16/22 0637    albuterol sulfate  (90 Base) MCG/ACT inhaler 2 puff  2 puff Inhalation Q4H PRN Darryl Eliezer, APRN - CNP   2 puff at 05/15/22 0746    zinc sulfate (ZINCATE) capsule 50 mg  50 mg Oral Daily Darryl Carr, APRN - CNP   50 mg at 05/17/22 0806    ascorbic acid (VITAMIN C) tablet 500 mg  500 mg Oral BID Darryl Eliezer, APRN - CNP   500 mg at 05/17/22 0493    melatonin disintegrating tablet 5 mg  5 mg Oral Nightly Darryl Eliezer, APRN - CNP   5 mg at 05/16/22 2019    insulin lispro (HUMALOG) injection vial 0-6 Units  0-6 Units SubCUTAneous TID WC Darryl Carr, APRN - CNP   2 Units at 05/17/22 1321    insulin lispro (HUMALOG) injection vial 0-3 Units  0-3 Units SubCUTAneous Nightly Darryl Eliezer, APRN - CNP        atorvastatin (LIPITOR) tablet 20 mg  20 mg Oral Daily Darryl Eliezer, APRN - CNP   20 mg at 05/17/22 0806    divalproex (DEPAKOTE ER) extended release tablet 1,000 mg  1,000 mg Oral Daily Darryl Martins, APRN - CNP   1,000 mg at 05/17/22 0806    fluticasone (FLONASE) 50 MCG/ACT nasal spray 2 spray  2 spray Nasal Daily Quita PerezJORGE freeman - CNP   2 spray at 05/17/22 0806    QUEtiapine (SEROQUEL) tablet 200 mg  200 mg Oral Nightly Quita Kenn, APRN - CNP   200 mg at 05/16/22 2018    pantoprazole (PROTONIX) tablet 40 mg  40 mg Oral QAM AC Quita [place], APRN - CNP   40 mg at 05/17/22 0806    mirtazapine (REMERON) tablet 7.5 mg  7.5 mg Oral Nightly Quita KennLELIA freemanN - CNP   7.5 mg at 05/16/22 2019     DVT Prophylaxis: Lovenox 30 mg sq daily    Continuous Infusions:   sodium chloride         Intake/Output Summary (Last 24 hours) at 5/17/2022 1739  Last data filed at 5/17/2022 1200  Gross per 24 hour   Intake 1440 ml   Output --   Net 1440 ml     CBC:   Recent Labs     05/14/22  2329 05/16/22  0331 05/17/22  0329   WBC 3.6* 3.6* 3.8*   HGB 9.5* 10.0* 10.0*    130 146     BMP:  Recent Labs     05/14/22  1807 05/16/22  0331 05/17/22  0329    138 140   K 5.0 4.4 5.2*    105 107   CO2 23 23 25   BUN 17 13 14   CREATININE 0.8 0.5 0.5   GLUCOSE 86 92 91     ABGs: No results found for: PHART, PO2ART, PTH1JBO  INR: No results for input(s): INR in the last 72 hours. Objective:   Vitals: BP (!) 103/55   Pulse 66   Temp 98.1 °F (36.7 °C) (Oral)   Resp 16   Ht 5' (1.524 m)   Wt 164 lb 3 oz (74.5 kg)   LMP  (LMP Unknown)   SpO2 93%   Breastfeeding No   BMI 32.07 kg/m²   General appearance: alert, appears stated age and cooperative  Skin: Skin color, texture, turgor normal.   HEENT: Head: Normocephalic, no lesions, without obvious abnormality.   Neck: no adenopathy, no carotid bruit, no JVD and supple, symmetrical, trachea midline  Lungs: clear to auscultation bilaterally  Heart: regular rate and rhythm, S1, S2 normal, no murmur, click, rub or gallop  Abdomen: soft, non-tender; bowel sounds normal; no masses,  no organomegaly  Extremities: extremities normal, atraumatic, no cyanosis or edema  Lymphatic: No significant lymph node enlargement papable  Neurologic: Mental status: Alert, oriented, thought content appropriate        Assessment & Plan:    · SI- psych following   · Depression  · Insomnia  · Borderline personality disorder  · Tobacco use disorder  · Alcohol use  · Alcohol withdrawals -cont Ciwa    · COVID-19- asymptomatic - no tx   · Anemia of iron def and B12 def- started on replacement   · UTI- ruled out   · DM2- ISS  · Hyper kalemia- treat   · Chronic back pain - fentanyl prn   · Trazodone for sleep.  PRN Risperdal for agitation  · Homeless     Patient Active Problem List:     Hypertension     Osteoarthritis     Psoriasis     COPD (chronic obstructive pulmonary disease) ([facility])     Sleep apnea     Oxygen dependent     Alternating constipation and diarrhea     S/P gastric bypass     Polypharmacy     Diabetic neuropathy associated with type 2 diabetes mellitus ([facility] [address]        See orders   Disposition: needs to be cleared by psych- needs placement in case she is cleared- pt is homeless     Grzegorz Gaxiola MD

## 2022-05-17 NOTE — PROGRESS NOTES
Department of Psychiatry  Attending Progress Note     Chief complaint: \"I'm better\"    SUBJECTIVE:   Chart reviewed, discussed with the team. No major issues overnight. Patient is med-compliant. No SEs. Sleeping and  Eating well. No evidence of suicidality. Mild paranoia per staff. Patient seen resting in bed. Calm, cooperative. Smiled. States she is doing \"better. \" Denies SI. Slept \"like a rock\". Appetite is good. Denies physical complaints. Talked to her daughter on the phone. \"She is in a safe place. \" Patient is asking if she has multiple personality disorder. The writer provided psychoeducation. Patient is worried about disposition. OBJECTIVE    Physical  Wt Readings from Last 3 Encounters:   05/14/22 164 lb 3 oz (74.5 kg)   11/07/21 200 lb (90.7 kg)   09/17/21 200 lb (90.7 kg)     Temp Readings from Last 3 Encounters:   05/17/22 98.1 °F (36.7 °C) (Oral)   02/03/22 96.4 °F (35.8 °C) (Temporal)   01/29/22 99.1 °F (37.3 °C) (Temporal)     BP Readings from Last 3 Encounters:   05/17/22 (!) 103/55   02/03/22 110/78   01/29/22 101/66     Pulse Readings from Last 3 Encounters:   05/17/22 66   02/03/22 93   01/29/22 65        Review of Systems: 14-point review of systems negative except as described above    Mental Status Examination:   Appearance:  Stated age. Gait not assessed. No abnormal movements or tremor. Behavior: Calm, cooperative, smiled  Speech: Normal in tone, volume, and quality. No slurring, dysarthria or pressured speech noted. Mood: \"Better \"   Affect: Mood congruent. Thought Process: Appears linear. Thought Content:  Denies SI. Denies HI. No overt delusions or paranoia appreciated. Perceptions: Denies auditory or visual hallucinations at present time. Not responding to internal stimuli. Concentration: Intact. Orientation: to person, place, date, and situation. Language: Intact. Fund of information: Intact. Memory: Recent and remote appear intact.    Neurovegitative: Improved appetite and sleep. Insight: Improving. Judgment: Improving.     Data  Lab Results   Component Value Date    WBC 3.8 (L) 05/17/2022    HGB 10.0 (L) 05/17/2022    HCT 34.9 (L) 05/17/2022    MCV 86.4 05/17/2022     05/17/2022      Lab Results   Component Value Date     05/17/2022    K 5.2 (H) 05/17/2022     05/17/2022    CO2 25 05/17/2022    BUN 14 05/17/2022    CREATININE 0.5 05/17/2022    GLUCOSE 91 05/17/2022    CALCIUM 8.1 (L) 05/17/2022    PROT 6.2 (L) 05/14/2022    LABALBU 4.1 05/14/2022    BILITOT <0.2 05/14/2022    ALKPHOS 157 (H) 05/14/2022    AST 21 05/14/2022    ALT 15 05/14/2022    LABGLOM >60 05/17/2022    GFRAA >59 05/17/2022    GLOB 3.1 05/09/2017       Medications    Current Facility-Administered Medications:     sodium polystyrene (KAYEXALATE) 15 GM/60ML suspension 15 g, 15 g, Oral, Once, Ezra Morejon MD  [facility]  [START ON 5/18/2022] ferrous sulfate (IRON 325) tablet 325 mg, 325 mg, Oral, BID WC, Ezra Morejon MD    cyanocobalamin injection 1,000 mcg, 1,000 mcg, IntraMUSCular, Once, Ezra Morejon MD    enoxaparin (LOVENOX) injection 40 mg, 40 mg, SubCUTAneous, Daily, Patience Barb, DO, 40 mg at 05/17/22 0806    fentaNYL (SUBLIMAZE) injection 25 mcg, 25 mcg, IntraVENous, Q1H PRN, Ezra Morejon MD, 25 mcg at 05/17/22 0305    risperiDONE (RISPERDAL M-TABS) disintegrating tablet 1 mg, 1 mg, Oral, Q6H PRN, Shwetha Duncan MD, 1 mg at 05/16/22 1657    traZODone (DESYREL) tablet 50 mg, 50 mg, Oral, Nightly, Shwetha Duncan MD, 50 mg at 05/16/22 2019    iron sucrose (VENOFER) injection 200 mg, 200 mg, IntraVENous, Q24H, Ezra Morejon MD, 200 mg at 05/16/22 1650    sodium chloride flush 0.9 % injection 5-40 mL, 5-40 mL, IntraVENous, 2 times per day, Ezra Morejon MD, 10 mL at 05/17/22 0814    sodium chloride flush 0.9 % injection 5-40 mL, 5-40 mL, IntraVENous, PRN, Ezra Morejon MD    0.9 % sodium chloride infusion, , IntraVENous, PRN, Lloyd Mendoza MD    LORazepam (ATIVAN) tablet 1 mg, 1 mg, Oral, Q1H PRN, 1 mg at 05/17/22 0313 **OR** LORazepam (ATIVAN) injection 1 mg, 1 mg, IntraVENous, Q1H PRN **OR** LORazepam (ATIVAN) tablet 2 mg, 2 mg, Oral, Q1H PRN **OR** LORazepam (ATIVAN) injection 2 mg, 2 mg, IntraVENous, Q1H PRN **OR** [DISCONTINUED] LORazepam (ATIVAN) tablet 3 mg, 3 mg, Oral, Q1H PRN **OR** [DISCONTINUED] LORazepam (ATIVAN) injection 3 mg, 3 mg, IntraVENous, Q1H PRN **OR** [DISCONTINUED] LORazepam (ATIVAN) tablet 4 mg, 4 mg, Oral, Q1H PRN **OR** [DISCONTINUED] LORazepam (ATIVAN) injection 4 mg, 4 mg, IntraVENous, Q1H PRN, Lloyd Mendoza MD    ondansetron (ZOFRAN-ODT) disintegrating tablet 4 mg, 4 mg, Oral, Q8H PRN **OR** ondansetron (ZOFRAN) injection 4 mg, 4 mg, IntraVENous, Q6H PRN, JORGE Hubbard - CNP    polyethylene glycol (GLYCOLAX) packet 17 g, 17 g, Oral, Daily PRN, Elvi Mendosa APRN - CNP    acetaminophen (TYLENOL) tablet 650 mg, 650 mg, Oral, Q6H PRN, 650 mg at 05/16/22 1657 **OR** acetaminophen (TYLENOL) suppository 650 mg, 650 mg, Rectal, Q6H PRN, Elvi Mendosa APRN - CNP    guaiFENesin-dextromethorphan (ROBITUSSIN DM) 100-10 MG/5ML syrup 5 mL, 5 mL, Oral, Q4H PRN, Elvi Mendosa APRN - CNP, 5 mL at 05/16/22 0637    albuterol sulfate  (90 Base) MCG/ACT inhaler 2 puff, 2 puff, Inhalation, Q4H PRN, Elvi Mendosa APRN - CNP, 2 puff at 05/15/22 0746    zinc sulfate (ZINCATE) capsule 50 mg, 50 mg, Oral, Daily, Elvi Mendosa, APRN - CNP, 50 mg at 05/17/22 4935    ascorbic acid (VITAMIN C) tablet 500 mg, 500 mg, Oral, BID, Elvi Mendosa, APRN - CNP, 500 mg at 05/17/22 9406    melatonin disintegrating tablet 5 mg, 5 mg, Oral, Nightly, Elvi Mendosa, JORGE - CNP, 5 mg at 05/16/22 2019    insulin lispro (HUMALOG) injection vial 0-6 Units, 0-6 Units, SubCUTAneous, TID WC, Elvi Mendosa, APRN - CNP    insulin lispro (HUMALOG) injection vial 0-3 Units, 0-3 Units, SubCUTAneous, Nightly, Rios Ripple, APRN - CNP    atorvastatin (LIPITOR) tablet 20 mg, 20 mg, Oral, Daily, Rios Ripple, APRN - CNP, 20 mg at 05/17/22 0806    divalproex (DEPAKOTE ER) extended release tablet 1,000 mg, 1,000 mg, Oral, Daily, Rios Ripple, APRN - CNP, 1,000 mg at 05/17/22 0806    fluticasone (FLONASE) 50 MCG/ACT nasal spray 2 spray, 2 spray, Nasal, Daily, Rios Ripple, APRN - CNP, 2 spray at 05/17/22 0806    QUEtiapine (SEROQUEL) tablet 200 mg, 200 mg, Oral, Nightly, Rios Ripple, APRN - CNP, 200 mg at 05/16/22 2018    pantoprazole (PROTONIX) tablet 40 mg, 40 mg, Oral, QAM AC, Rios Ripple, APRN - CNP, 40 mg at 05/17/22 0806    mirtazapine (REMERON) tablet 7.5 mg, 7.5 mg, Oral, Nightly, Rios Ripple, APRN - CNP, 7.5 mg at 05/16/22 2019    ASSESSMENT AND PLAN  DSM 5 DIAGNOSIS  Impression  Depression unspecified  Insomnia unspecified  Borderline personality disorder  Tobacco use disorder  Alcohol use  Anemia  COVID-19  UTI  COPD  Diabetes  Vitamin D deficiency  Vitamin B12 deficiency    Improving. Continue to observe. Keep on 1:1. SW to address homelessness.       Amount of time spent with patient:      15 minutes with greater than 50 % of the time spent in counseling and collaboration of care
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
## 2022-05-18 LAB
ANION GAP SERPL CALCULATED.3IONS-SCNC: 9 MMOL/L (ref 7–19)
ANISOCYTOSIS: ABNORMAL
BASOPHILS ABSOLUTE: 0 K/UL (ref 0–0.2)
BASOPHILS RELATIVE PERCENT: 0.5 % (ref 0–1)
BUN BLDV-MCNC: 13 MG/DL (ref 8–23)
CALCIUM SERPL-MCNC: 8 MG/DL (ref 8.8–10.2)
CHLORIDE BLD-SCNC: 107 MMOL/L (ref 98–111)
CO2: 25 MMOL/L (ref 22–29)
CREAT SERPL-MCNC: 0.4 MG/DL (ref 0.5–0.9)
EOSINOPHILS ABSOLUTE: 0.1 K/UL (ref 0–0.6)
EOSINOPHILS RELATIVE PERCENT: 3.3 % (ref 0–5)
GFR AFRICAN AMERICAN: >59
GFR NON-AFRICAN AMERICAN: >60
GLUCOSE BLD-MCNC: 229 MG/DL (ref 70–99)
GLUCOSE BLD-MCNC: 82 MG/DL (ref 74–109)
GLUCOSE BLD-MCNC: 92 MG/DL (ref 70–99)
GLUCOSE BLD-MCNC: 98 MG/DL (ref 70–99)
GLUCOSE BLD-MCNC: 99 MG/DL (ref 70–99)
HCT VFR BLD CALC: 32.9 % (ref 37–47)
HEMOGLOBIN: 9.5 G/DL (ref 12–16)
HYPOCHROMIA: ABNORMAL
IMMATURE GRANULOCYTES #: 0 K/UL
LYMPHOCYTES ABSOLUTE: 1.4 K/UL (ref 1.1–4.5)
LYMPHOCYTES RELATIVE PERCENT: 37.9 % (ref 20–40)
MCH RBC QN AUTO: 24.7 PG (ref 27–31)
MCHC RBC AUTO-ENTMCNC: 28.9 G/DL (ref 33–37)
MCV RBC AUTO: 85.5 FL (ref 81–99)
MONOCYTES ABSOLUTE: 0.3 K/UL (ref 0–0.9)
MONOCYTES RELATIVE PERCENT: 8.4 % (ref 0–10)
NEUTROPHILS ABSOLUTE: 1.8 K/UL (ref 1.5–7.5)
NEUTROPHILS RELATIVE PERCENT: 49.6 % (ref 50–65)
PDW BLD-RTO: 17.3 % (ref 11.5–14.5)
PERFORMED ON: ABNORMAL
PERFORMED ON: NORMAL
PLATELET # BLD: 162 K/UL (ref 130–400)
PMV BLD AUTO: 11 FL (ref 9.4–12.3)
POTASSIUM SERPL-SCNC: 4.3 MMOL/L (ref 3.5–5)
RBC # BLD: 3.85 M/UL (ref 4.2–5.4)
SODIUM BLD-SCNC: 141 MMOL/L (ref 136–145)
WBC # BLD: 3.7 K/UL (ref 4.8–10.8)

## 2022-05-18 PROCEDURE — 82947 ASSAY GLUCOSE BLOOD QUANT: CPT

## 2022-05-18 PROCEDURE — 80048 BASIC METABOLIC PNL TOTAL CA: CPT

## 2022-05-18 PROCEDURE — 2580000003 HC RX 258: Performed by: HOSPITALIST

## 2022-05-18 PROCEDURE — 85025 COMPLETE CBC W/AUTO DIFF WBC: CPT

## 2022-05-18 PROCEDURE — 6360000002 HC RX W HCPCS: Performed by: INTERNAL MEDICINE

## 2022-05-18 PROCEDURE — 1210000000 HC MED SURG R&B

## 2022-05-18 PROCEDURE — 6370000000 HC RX 637 (ALT 250 FOR IP): Performed by: NURSE PRACTITIONER

## 2022-05-18 PROCEDURE — 99231 SBSQ HOSP IP/OBS SF/LOW 25: CPT | Performed by: PSYCHIATRY & NEUROLOGY

## 2022-05-18 PROCEDURE — 6370000000 HC RX 637 (ALT 250 FOR IP): Performed by: HOSPITALIST

## 2022-05-18 PROCEDURE — 36415 COLL VENOUS BLD VENIPUNCTURE: CPT

## 2022-05-18 PROCEDURE — 6370000000 HC RX 637 (ALT 250 FOR IP): Performed by: PSYCHIATRY & NEUROLOGY

## 2022-05-18 RX ORDER — DEXTROSE MONOHYDRATE 50 MG/ML
100 INJECTION, SOLUTION INTRAVENOUS PRN
Status: DISCONTINUED | OUTPATIENT
Start: 2022-05-18 | End: 2022-05-22 | Stop reason: HOSPADM

## 2022-05-18 RX ADMIN — ZINC SULFATE 220 MG (50 MG) CAPSULE 50 MG: CAPSULE at 07:28

## 2022-05-18 RX ADMIN — PANTOPRAZOLE SODIUM 40 MG: 40 TABLET, DELAYED RELEASE ORAL at 07:28

## 2022-05-18 RX ADMIN — FERROUS SULFATE TAB 325 MG (65 MG ELEMENTAL FE) 325 MG: 325 (65 FE) TAB at 16:43

## 2022-05-18 RX ADMIN — FERROUS SULFATE TAB 325 MG (65 MG ELEMENTAL FE) 325 MG: 325 (65 FE) TAB at 07:28

## 2022-05-18 RX ADMIN — Medication 5 MG: at 20:55

## 2022-05-18 RX ADMIN — OXYCODONE HYDROCHLORIDE AND ACETAMINOPHEN 500 MG: 500 TABLET ORAL at 20:55

## 2022-05-18 RX ADMIN — DIVALPROEX SODIUM 1000 MG: 500 TABLET, EXTENDED RELEASE ORAL at 07:28

## 2022-05-18 RX ADMIN — FLUTICASONE PROPIONATE 2 SPRAY: 50 SPRAY, METERED NASAL at 07:33

## 2022-05-18 RX ADMIN — ATORVASTATIN CALCIUM 20 MG: 20 TABLET, FILM COATED ORAL at 07:28

## 2022-05-18 RX ADMIN — MIRTAZAPINE 7.5 MG: 15 TABLET, FILM COATED ORAL at 20:55

## 2022-05-18 RX ADMIN — SODIUM CHLORIDE, PRESERVATIVE FREE 10 ML: 5 INJECTION INTRAVENOUS at 20:54

## 2022-05-18 RX ADMIN — TRAZODONE HYDROCHLORIDE 50 MG: 50 TABLET ORAL at 20:55

## 2022-05-18 RX ADMIN — SODIUM CHLORIDE, PRESERVATIVE FREE 10 ML: 5 INJECTION INTRAVENOUS at 07:28

## 2022-05-18 RX ADMIN — INSULIN LISPRO 2 UNITS: 100 INJECTION, SOLUTION INTRAVENOUS; SUBCUTANEOUS at 12:05

## 2022-05-18 RX ADMIN — ENOXAPARIN SODIUM 40 MG: 100 INJECTION SUBCUTANEOUS at 07:28

## 2022-05-18 RX ADMIN — QUETIAPINE FUMARATE 200 MG: 50 TABLET ORAL at 20:56

## 2022-05-18 RX ADMIN — OXYCODONE HYDROCHLORIDE AND ACETAMINOPHEN 500 MG: 500 TABLET ORAL at 07:28

## 2022-05-18 NOTE — PLAN OF CARE
Problem: Safety - Adult  Goal: Free from fall injury  5/18/2022 0911 by Shakira Tyson RN  Outcome: Completed  5/18/2022 0818 by Shakira Tyson RN  Outcome: Progressing     Problem: ABCDS Injury Assessment  Goal: Absence of physical injury  5/18/2022 0911 by Shakira Tyson RN  Outcome: Completed  5/18/2022 0818 by Shakira Tyson RN  Outcome: Progressing     Problem: Skin/Tissue Integrity  Goal: Absence of new skin breakdown  Description: 1. Monitor for areas of redness and/or skin breakdown  2. Assess vascular access sites hourly  3. Every 4-6 hours minimum:  Change oxygen saturation probe site  4. Every 4-6 hours:  If on nasal continuous positive airway pressure, respiratory therapy assess nares and determine need for appliance change or resting period.   5/18/2022 0911 by Shakira Tyson RN  Outcome: Completed  5/18/2022 0818 by Shakira Tyson RN  Outcome: Progressing     Problem: Chronic Conditions and Co-morbidities  Goal: Patient's chronic conditions and co-morbidity symptoms are monitored and maintained or improved  5/18/2022 0911 by Shakira Tyson RN  Outcome: Completed  5/18/2022 0818 by Shakira Tyson RN  Outcome: Progressing     Problem: Discharge Planning  Goal: Discharge to home or other facility with appropriate resources  5/18/2022 0911 by Shakira Tyson RN  Outcome: Completed  5/18/2022 0818 by Shakira Tyson RN  Outcome: Progressing     Problem: Self Harm/Suicidality  Goal: Will have no self-injury during hospital stay  Description: INTERVENTIONS:  1. Q 30 MINUTES: Routine safety checks  2. Q SHIFT & PRN: Assess risk to determine if routine checks are adequate to maintain patient safety  5/18/2022 0911 by Shakira Tyson RN  Outcome: Completed  5/18/2022 0818 by Shakira Tyson RN  Outcome: Progressing     Problem: Pain  Goal: Verbalizes/displays adequate comfort level or baseline comfort level  5/18/2022 0911 by Shakira Tyson RN  Outcome: Completed  5/18/2022 0818 by Shakira Tyson RN  Outcome: Progressing

## 2022-05-18 NOTE — PLAN OF CARE
Problem: Safety - Adult  Goal: Free from fall injury  Outcome: Progressing     Problem: ABCDS Injury Assessment  Goal: Absence of physical injury  Outcome: Progressing     Problem: Skin/Tissue Integrity  Goal: Absence of new skin breakdown  Description: 1. Monitor for areas of redness and/or skin breakdown  2. Assess vascular access sites hourly  3. Every 4-6 hours minimum:  Change oxygen saturation probe site  4. Every 4-6 hours:  If on nasal continuous positive airway pressure, respiratory therapy assess nares and determine need for appliance change or resting period.   Outcome: Progressing     Problem: Chronic Conditions and Co-morbidities  Goal: Patient's chronic conditions and co-morbidity symptoms are monitored and maintained or improved  Outcome: Progressing     Problem: Discharge Planning  Goal: Discharge to home or other facility with appropriate resources  Outcome: Progressing     Problem: Self Harm/Suicidality  Goal: Will have no self-injury during hospital stay  Description: INTERVENTIONS:  1. Q 30 MINUTES: Routine safety checks  2. Q SHIFT & PRN: Assess risk to determine if routine checks are adequate to maintain patient safety  Outcome: Progressing     Problem: Pain  Goal: Verbalizes/displays adequate comfort level or baseline comfort level  Outcome: Progressing

## 2022-05-18 NOTE — CARE COORDINATION
Spoke with patient about discharge plan. She now states that she is interested in Rehab. Provided patient with list of Addiction Facilities. Explained to patient that she will not be able to be accepted to a facility until she is out of quarantine. Per Care Everywhere, patient tested positive for Covid-19 5/11/2022. Email sent to Infection Department requesting a note stating when patient can be out of quarantine.     Electronically signed by Maciel Ga RN on 5/18/2022 at 10:13 AM

## 2022-05-18 NOTE — PROGRESS NOTES
Progress Note  Date:2022       Room:0430/430-01  Patient Name:Ju Knutson     YOB: 1955     Age:67 y.o. Subjective    Subjective: 47-year-old lady with a history of depression, borderline personality, alcohol abuse, presenting to the hospital with concerns of suicidal ideation. On admission was noted to be COVID-positive however asymptomatic, followed in-house by psychiatry, recommending continued monitoring house as patient with passive suicidal ideations. We need to get admitted to inpatient psych after completion of quarantine on .  also following patient's case. Seen in house this morning, denied any acute overnight event, denied any chest pain or shortness of breath. Eats breakfast without any issues, denies any nausea or abdominal pain. Overall still with depressed mood       Review of Systems 12 point system reviewed negative except as stated above. Objective         Vitals Last 24 Hours:  TEMPERATURE:  Temp  Av.3 °F (36.3 °C)  Min: 96.7 °F (35.9 °C)  Max: 97.8 °F (36.6 °C)  RESPIRATIONS RANGE: Resp  Av.3  Min: 16  Max: 18  PULSE OXIMETRY RANGE: SpO2  Av.3 %  Min: 97 %  Max: 98 %  PULSE RANGE: Pulse  Av.3  Min: 78  Max: 84  BLOOD PRESSURE RANGE: Systolic (08WXN), SCV:727 , Min:117 , WQL:873   ; Diastolic (33YAM), SJV:04, Min:62, Max:80    I/O (24Hr): Intake/Output Summary (Last 24 hours) at 2022 1229  Last data filed at 2022 3536  Gross per 24 hour   Intake 650 ml   Output --   Net 650 ml       Physical Examination:  General: Well-developed, no acute distress lying comfortably in bed. HEENT: Atraumatic normocephalic, range of motion normal  Cardiac: Normal S1-S2 no murmurs rub or gallop.   Respiratory: clear To auscultation bilaterally, no rhonchi or rales, no wheezing  Abdomen: Soft, positive bowel sounds in all quadrants, no distention, nontender to palpation  Extremities: no tenderness, no edema, moves all extremities  Psych: Affect mood depressed, good eye contact, behavioral normal.        Labs/Imaging/Diagnostics    Labs:  CBC:  Recent Labs     05/16/22  0331 05/17/22 0329 05/18/22  0352   WBC 3.6* 3.8* 3.7*   RBC 4.01* 4.04* 3.85*   HGB 10.0* 10.0* 9.5*   HCT 34.3* 34.9* 32.9*   MCV 85.5 86.4 85.5   RDW 17.1* 17.3* 17.3*    146 162     CHEMISTRIES:  Recent Labs     05/16/22  0331 05/17/22 0329 05/18/22 0352    140 141   K 4.4 5.2* 4.3    107 107   CO2 23 25 25   BUN 13 14 13   CREATININE 0.5 0.5 0.4*   GLUCOSE 92 91 82     PT/INR:No results for input(s): PROTIME, INR in the last 72 hours. APTT:No results for input(s): APTT in the last 72 hours. LIVER PROFILE:No results for input(s): AST, ALT, BILIDIR, BILITOT, ALKPHOS in the last 72 hours. Imaging Last 24 Hours:  No results found. Assessment//Plan           Hospital Problems           Last Modified POA    * (Principal) Suicidal thoughts 5/14/2022 Yes    History of suicide attempt 5/14/2022 Yes    COVID-19 virus infection 5/14/2022 Yes    Acute urinary tract infection 5/14/2022 Yes    Hypertension 5/14/2022 Yes    COPD (chronic obstructive pulmonary disease) ([facility] [address] 5/14/2022 Yes    Diabetes ([facility] [address] 5/14/2022 Yes    Depression 5/14/2022 Yes    S/P gastric bypass 5/14/2022 Yes    Borderline personality disorder ([facility] [address] 5/14/2022 Yes        Assessment & Plan    Depression with suicidal ideation  Borderline personality  Psych currently following  May be admitted to inpatient psych after quarantine completed 5/22. Management per psych. COVID-19 infection  Asymptomatic. Supportive management. Alcohol use disorder  No signs of evidence of withdrawal  Continue to monitor closely  Replete electrolytes as needed. Chronic back pain: Fentanyl as needed. Insomnia: Trazodone    Tobacco use disorder: Counseled on cessation. Type 2 diabetes: Insulin sliding scale for now.       Code: Full  Diet: Diabetic diet  DVT prophylaxis: Enoxaparin  Disposition: Likely inpatient psych 5/22. Electronically signed by   Jason Flowers MD   Internal Medicine Hospitalist  On 5/18/2022  At 12:38 PM    EMR Dragon/Transcription disclaimer:   Much of this encounter note is an electronic transcription/translation of spoken language to printed text.  The electronic translation of spoken language may permit erroneous, or at times, nonsensical words or phrases to be inadvertently transcribed; although attempts have made to review the note for such errors, some may still exist.

## 2022-05-18 NOTE — PROGRESS NOTES
Department of Psychiatry  Attending Progress Note     Chief complaint: \"I'm better\"    SUBJECTIVE:   Chart reviewed, discussed with the team. No major issues overnight. Patient is med-compliant. No SEs. Sleeping and eating well. Patient seen resting in bed. Calm, cooperative. States she is doing poorly today. \"I feel like falling asleep and not waking up. \" Patient is worried about disposition. No clear plan so far. She is observed coughing. OBJECTIVE    Physical  Wt Readings from Last 3 Encounters:   05/14/22 164 lb 3 oz (74.5 kg)   11/07/21 200 lb (90.7 kg)   09/17/21 200 lb (90.7 kg)     Temp Readings from Last 3 Encounters:   05/18/22 97.5 °F (36.4 °C)   02/03/22 96.4 °F (35.8 °C) (Temporal)   01/29/22 99.1 °F (37.3 °C) (Temporal)     BP Readings from Last 3 Encounters:   05/18/22 117/70   02/03/22 110/78   01/29/22 101/66     Pulse Readings from Last 3 Encounters:   05/18/22 84   02/03/22 93   01/29/22 65        Review of Systems: 14-point review of systems negative except as described above    Mental Status Examination:   Appearance:  Stated age. Gait not assessed. No abnormal movements or tremor. Behavior: Calm, cooperative  Speech: Normal in tone, volume, and quality. No slurring, dysarthria or pressured speech noted. Mood: \"Not good \"   Affect: Mood congruent. Thought Process: Appears linear. Thought Content:  Passive SI. Denies HI. No overt delusions or paranoia appreciated. Perceptions: Denies auditory or visual hallucinations at present time. Not responding to internal stimuli. Concentration: Intact. Orientation: to person, place, date, and situation. Language: Intact. Fund of information: Intact. Memory: Recent and remote appear intact. Neurovegitative: Improved appetite and sleep. Insight: Some. Judgment: Poor.     Data  Lab Results   Component Value Date    WBC 3.7 (L) 05/18/2022    HGB 9.5 (L) 05/18/2022    HCT 32.9 (L) 05/18/2022    MCV 85.5 05/18/2022     05/18/2022      Lab Results   Component Value Date     05/18/2022    K 4.3 05/18/2022     05/18/2022    CO2 25 05/18/2022    BUN 13 05/18/2022    CREATININE 0.4 (L) 05/18/2022    GLUCOSE 82 05/18/2022    CALCIUM 8.0 (L) 05/18/2022    PROT 6.2 (L) 05/14/2022    LABALBU 4.1 05/14/2022    BILITOT <0.2 05/14/2022    ALKPHOS 157 (H) 05/14/2022    AST 21 05/14/2022    ALT 15 05/14/2022    LABGLOM >60 05/18/2022    GFRAA >59 05/18/2022    GLOB 3.1 05/09/2017       Medications    Current Facility-Administered Medications:     ferrous sulfate (IRON 325) tablet 325 mg, 325 mg, Oral, BID WC, Zoran Walden MD, 325 mg at 05/18/22 0728    enoxaparin (LOVENOX) injection 40 mg, 40 mg, SubCUTAneous, Daily, Patience Barb, DO, 40 mg at 05/18/22 0728    fentaNYL (SUBLIMAZE) injection 25 mcg, 25 mcg, IntraVENous, Q1H PRN, Zoran Walden MD, 25 mcg at 05/17/22 0305    risperiDONE (RISPERDAL M-TABS) disintegrating tablet 1 mg, 1 mg, Oral, Q6H PRN, Humberto Lechuga MD, 1 mg at 05/16/22 1657    traZODone (DESYREL) tablet 50 mg, 50 mg, Oral, Nightly, Humberto Lechuga MD, 50 mg at 05/17/22 2218    sodium chloride flush 0.9 % injection 5-40 mL, 5-40 mL, IntraVENous, 2 times per day, Zoran Walden MD, 10 mL at 05/18/22 0728    sodium chloride flush 0.9 % injection 5-40 mL, 5-40 mL, IntraVENous, PRN, Zoran Walden MD    0.9 % sodium chloride infusion, , IntraVENous, PRN, Zoran Walden MD    LORazepam (ATIVAN) tablet 1 mg, 1 mg, Oral, Q1H PRN, 1 mg at 05/17/22 0313 **OR** LORazepam (ATIVAN) injection 1 mg, 1 mg, IntraVENous, Q1H PRN **OR** LORazepam (ATIVAN) tablet 2 mg, 2 mg, Oral, Q1H PRN **OR** LORazepam (ATIVAN) injection 2 mg, 2 mg, IntraVENous, Q1H PRN, 2 mg at 05/17/22 1754 **OR** [DISCONTINUED] LORazepam (ATIVAN) tablet 3 mg, 3 mg, Oral, Q1H PRN **OR** [DISCONTINUED] LORazepam (ATIVAN) injection 3 mg, 3 mg, IntraVENous, Q1H PRN **OR** [DISCONTINUED] LORazepam (ATIVAN) tablet 4 mg, 4 mg, Oral, Q1H PRN **OR** [DISCONTINUED] LORazepam (ATIVAN) injection 4 mg, 4 mg, IntraVENous, Q1H PRN, Grzegorz Gaxiola MD    ondansetron (ZOFRAN-ODT) disintegrating tablet 4 mg, 4 mg, Oral, Q8H PRN **OR** ondansetron (ZOFRAN) injection 4 mg, 4 mg, IntraVENous, Q6H PRN, Kerens Leriche, APRN - CNP    polyethylene glycol (GLYCOLAX) packet 17 g, 17 g, Oral, Daily PRN, Kerens Leriche, APRN - CNP    acetaminophen (TYLENOL) tablet 650 mg, 650 mg, Oral, Q6H PRN, 650 mg at 05/16/22 1657 **OR** acetaminophen (TYLENOL) suppository 650 mg, 650 mg, Rectal, Q6H PRN, Kerens Leriche, APRN - CNP    guaiFENesin-dextromethorphan (ROBITUSSIN DM) 100-10 MG/5ML syrup 5 mL, 5 mL, Oral, Q4H PRN, Kerens Leriche, APRN - CNP, 5 mL at 05/16/22 0637    albuterol sulfate  (90 Base) MCG/ACT inhaler 2 puff, 2 puff, Inhalation, Q4H PRN, Kerens Leriche, APRN - CNP, 2 puff at 05/15/22 0746    zinc sulfate (ZINCATE) capsule 50 mg, 50 mg, Oral, Daily, Kerens Leriche, APRN - CNP, 50 mg at 05/18/22 1192    ascorbic acid (VITAMIN C) tablet 500 mg, 500 mg, Oral, BID, Kerens Leriche, APRN - CNP, 500 mg at 05/18/22 0156    melatonin disintegrating tablet 5 mg, 5 mg, Oral, Nightly, Kerens Leriche, APRN - CNP, 5 mg at 05/17/22 2218    insulin lispro (HUMALOG) injection vial 0-6 Units, 0-6 Units, SubCUTAneous, TID WC, Kerens Leriche, APRN - CNP, 2 Units at 05/17/22 1321    insulin lispro (HUMALOG) injection vial 0-3 Units, 0-3 Units, SubCUTAneous, Nightly, Kerens Leriche, APRN - CNP    atorvastatin (LIPITOR) tablet 20 mg, 20 mg, Oral, Daily, Kerens Leriche, APRN - CNP, 20 mg at 05/18/22 4415    divalproex (DEPAKOTE ER) extended release tablet 1,000 mg, 1,000 mg, Oral, Daily, Kerens Leriche, APRN - CNP, 1,000 mg at 05/18/22 0728    fluticasone (FLONASE) 50 MCG/ACT nasal spray 2 spray, 2 spray, Nasal, Daily, Kerens Leriche, APRN - CNP, 2 spray at 05/18/22 0733    QUEtiapine (SEROQUEL) tablet 200 mg, 200 mg, Oral, Nightly, Kerens Jakyiche, APRN - CNP, 200 mg at 05/17/22 2218    pantoprazole (PROTONIX) tablet 40 mg, 40 mg, Oral, QAM AC, Janellcharlee Brooks, APRN - CNP, 40 mg at 05/18/22 0849    mirtazapine (REMERON) tablet 7.5 mg, 7.5 mg, Oral, Nightly, Janell Cecilia, APRN - CNP, 7.5 mg at 05/17/22 2218    ASSESSMENT AND PLAN  DSM 5 DIAGNOSIS  Impression  Depression unspecified  Suicidal ideation  Insomnia unspecified  Borderline personality disorder  Tobacco use disorder  Alcohol use  Anemia  COVID-19  UTI  COPD  Diabetes  Vitamin D deficiency  Vitamin B12 deficiency    Passive SI today, likely in the setting of social stressors. Continue to observe. Keep on 1:1. SW to work on disposition.       Amount of time spent with patient:      15 minutes with greater than 50 % of the time spent in counseling and collaboration of care

## 2022-05-19 LAB
ANION GAP SERPL CALCULATED.3IONS-SCNC: 10 MMOL/L (ref 7–19)
BASOPHILS ABSOLUTE: 0 K/UL (ref 0–0.2)
BASOPHILS RELATIVE PERCENT: 0.4 % (ref 0–1)
BUN BLDV-MCNC: 15 MG/DL (ref 8–23)
CALCIUM SERPL-MCNC: 8.4 MG/DL (ref 8.8–10.2)
CHLORIDE BLD-SCNC: 107 MMOL/L (ref 98–111)
CO2: 25 MMOL/L (ref 22–29)
CREAT SERPL-MCNC: 0.4 MG/DL (ref 0.5–0.9)
EOSINOPHILS ABSOLUTE: 0.2 K/UL (ref 0–0.6)
EOSINOPHILS RELATIVE PERCENT: 3.4 % (ref 0–5)
GFR AFRICAN AMERICAN: >59
GFR NON-AFRICAN AMERICAN: >60
GLUCOSE BLD-MCNC: 140 MG/DL (ref 70–99)
GLUCOSE BLD-MCNC: 338 MG/DL (ref 70–99)
GLUCOSE BLD-MCNC: 78 MG/DL (ref 70–99)
GLUCOSE BLD-MCNC: 78 MG/DL (ref 74–109)
GLUCOSE BLD-MCNC: 90 MG/DL (ref 70–99)
HCT VFR BLD CALC: 34.7 % (ref 37–47)
HEMOGLOBIN: 9.8 G/DL (ref 12–16)
HYPOCHROMIA: ABNORMAL
IMMATURE GRANULOCYTES #: 0 K/UL
LYMPHOCYTES ABSOLUTE: 1.7 K/UL (ref 1.1–4.5)
LYMPHOCYTES RELATIVE PERCENT: 34.1 % (ref 20–40)
MCH RBC QN AUTO: 24.8 PG (ref 27–31)
MCHC RBC AUTO-ENTMCNC: 28.2 G/DL (ref 33–37)
MCV RBC AUTO: 87.8 FL (ref 81–99)
MONOCYTES ABSOLUTE: 0.5 K/UL (ref 0–0.9)
MONOCYTES RELATIVE PERCENT: 11 % (ref 0–10)
NEUTROPHILS ABSOLUTE: 2.5 K/UL (ref 1.5–7.5)
NEUTROPHILS RELATIVE PERCENT: 50.7 % (ref 50–65)
PDW BLD-RTO: 17.3 % (ref 11.5–14.5)
PERFORMED ON: ABNORMAL
PERFORMED ON: ABNORMAL
PERFORMED ON: NORMAL
PERFORMED ON: NORMAL
PLATELET # BLD: 195 K/UL (ref 130–400)
PLATELET SLIDE REVIEW: ADEQUATE
PMV BLD AUTO: 11.3 FL (ref 9.4–12.3)
POTASSIUM SERPL-SCNC: 4.3 MMOL/L (ref 3.5–5)
RBC # BLD: 3.95 M/UL (ref 4.2–5.4)
SODIUM BLD-SCNC: 142 MMOL/L (ref 136–145)
WBC # BLD: 4.9 K/UL (ref 4.8–10.8)

## 2022-05-19 PROCEDURE — 6370000000 HC RX 637 (ALT 250 FOR IP): Performed by: NURSE PRACTITIONER

## 2022-05-19 PROCEDURE — 6370000000 HC RX 637 (ALT 250 FOR IP): Performed by: HOSPITALIST

## 2022-05-19 PROCEDURE — 36415 COLL VENOUS BLD VENIPUNCTURE: CPT

## 2022-05-19 PROCEDURE — 85025 COMPLETE CBC W/AUTO DIFF WBC: CPT

## 2022-05-19 PROCEDURE — 82947 ASSAY GLUCOSE BLOOD QUANT: CPT

## 2022-05-19 PROCEDURE — 2580000003 HC RX 258: Performed by: HOSPITALIST

## 2022-05-19 PROCEDURE — 6360000002 HC RX W HCPCS: Performed by: INTERNAL MEDICINE

## 2022-05-19 PROCEDURE — 80048 BASIC METABOLIC PNL TOTAL CA: CPT

## 2022-05-19 PROCEDURE — 6370000000 HC RX 637 (ALT 250 FOR IP): Performed by: PSYCHIATRY & NEUROLOGY

## 2022-05-19 PROCEDURE — 2100000000 HC CCU R&B

## 2022-05-19 PROCEDURE — 99231 SBSQ HOSP IP/OBS SF/LOW 25: CPT | Performed by: PSYCHIATRY & NEUROLOGY

## 2022-05-19 RX ORDER — MIRTAZAPINE 7.5 MG/1
7.5 TABLET, FILM COATED ORAL NIGHTLY
Status: DISCONTINUED | OUTPATIENT
Start: 2022-05-19 | End: 2022-05-22 | Stop reason: HOSPADM

## 2022-05-19 RX ORDER — GUAIFENESIN/DEXTROMETHORPHAN 100-10MG/5
5 SYRUP ORAL 4 TIMES DAILY
Status: DISCONTINUED | OUTPATIENT
Start: 2022-05-19 | End: 2022-05-22 | Stop reason: HOSPADM

## 2022-05-19 RX ORDER — RISPERIDONE 0.5 MG/1
1 TABLET, ORALLY DISINTEGRATING ORAL EVERY 6 HOURS PRN
Status: DISCONTINUED | OUTPATIENT
Start: 2022-05-19 | End: 2022-05-22 | Stop reason: HOSPADM

## 2022-05-19 RX ADMIN — ACETAMINOPHEN 650 MG: 325 TABLET ORAL at 16:57

## 2022-05-19 RX ADMIN — GUAIFENESIN SYRUP AND DEXTROMETHORPHAN 5 ML: 100; 10 SYRUP ORAL at 20:18

## 2022-05-19 RX ADMIN — ZINC SULFATE 220 MG (50 MG) CAPSULE 50 MG: CAPSULE at 08:57

## 2022-05-19 RX ADMIN — MIRTAZAPINE 7.5 MG: 7.5 TABLET ORAL at 20:18

## 2022-05-19 RX ADMIN — FERROUS SULFATE TAB 325 MG (65 MG ELEMENTAL FE) 325 MG: 325 (65 FE) TAB at 16:57

## 2022-05-19 RX ADMIN — ENOXAPARIN SODIUM 40 MG: 100 INJECTION SUBCUTANEOUS at 08:58

## 2022-05-19 RX ADMIN — FERROUS SULFATE TAB 325 MG (65 MG ELEMENTAL FE) 325 MG: 325 (65 FE) TAB at 08:57

## 2022-05-19 RX ADMIN — GUAIFENESIN SYRUP AND DEXTROMETHORPHAN 5 ML: 100; 10 SYRUP ORAL at 15:36

## 2022-05-19 RX ADMIN — ATORVASTATIN CALCIUM 20 MG: 20 TABLET, FILM COATED ORAL at 08:58

## 2022-05-19 RX ADMIN — SODIUM CHLORIDE, PRESERVATIVE FREE 10 ML: 5 INJECTION INTRAVENOUS at 20:18

## 2022-05-19 RX ADMIN — FLUTICASONE PROPIONATE 2 SPRAY: 50 SPRAY, METERED NASAL at 09:08

## 2022-05-19 RX ADMIN — QUETIAPINE FUMARATE 200 MG: 50 TABLET ORAL at 20:18

## 2022-05-19 RX ADMIN — OXYCODONE HYDROCHLORIDE AND ACETAMINOPHEN 500 MG: 500 TABLET ORAL at 08:57

## 2022-05-19 RX ADMIN — OXYCODONE HYDROCHLORIDE AND ACETAMINOPHEN 500 MG: 500 TABLET ORAL at 20:21

## 2022-05-19 RX ADMIN — DIVALPROEX SODIUM 1000 MG: 500 TABLET, EXTENDED RELEASE ORAL at 08:57

## 2022-05-19 RX ADMIN — TRAZODONE HYDROCHLORIDE 50 MG: 50 TABLET ORAL at 20:18

## 2022-05-19 RX ADMIN — Medication 5 MG: at 20:18

## 2022-05-19 RX ADMIN — ACETAMINOPHEN 650 MG: 325 TABLET ORAL at 08:57

## 2022-05-19 RX ADMIN — SODIUM CHLORIDE, PRESERVATIVE FREE 10 ML: 5 INJECTION INTRAVENOUS at 08:58

## 2022-05-19 RX ADMIN — INSULIN LISPRO 4 UNITS: 100 INJECTION, SOLUTION INTRAVENOUS; SUBCUTANEOUS at 12:31

## 2022-05-19 ASSESSMENT — PAIN SCALES - GENERAL: PAINLEVEL_OUTOF10: 3

## 2022-05-19 NOTE — PLAN OF CARE
Problem: Safety - Adult  Goal: Free from fall injury  Outcome: Progressing     Problem: Self Harm/Suicidality  Goal: Will have no self-injury during hospital stay  Description: INTERVENTIONS:  1. Q 30 MINUTES: Routine safety checks  2. Q SHIFT & PRN: Assess risk to determine if routine checks are adequate to maintain patient safety  Outcome: Progressing     Problem: Pain  Goal: Verbalizes/displays adequate comfort level or baseline comfort level  Outcome: Progressing
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
## 2022-05-19 NOTE — PROGRESS NOTES
Progress Note  Date:2022       Room:0430/430-01  Patient Name:Ju Aguilera     YOB: 1955     Age:67 y.o. Subjective    Subjective: 68-year-old lady with a history of depression, borderline personality, alcohol abuse, presenting to the hospital with concerns of suicidal ideation. On admission was noted to be COVID-positive however asymptomatic, followed in-house by psychiatry, recommending continued monitoring house as patient with passive suicidal ideations. We need to get admitted to inpatient psych after completion of quarantine on .  also following patient's case. Seen in house this morning, denied any acute overnight event, denied any chest pain or shortness of breath. Complaining of headache. Review of Systems 12 point system reviewed negative except as stated above. Objective         Vitals Last 24 Hours:  TEMPERATURE:  Temp  Av.6 °F (36.4 °C)  Min: 96.9 °F (36.1 °C)  Max: 98.2 °F (36.8 °C)  RESPIRATIONS RANGE: Resp  Av.7  Min: 16  Max: 18  PULSE OXIMETRY RANGE: SpO2  Av.7 %  Min: 96 %  Max: 98 %  PULSE RANGE: Pulse  Av.3  Min: 71  Max: 81  BLOOD PRESSURE RANGE: Systolic (76RPR), TWH:315 , Min:103 , CW   ; Diastolic (03GRT), JVA:85, Min:54, Max:67    I/O (24Hr): Intake/Output Summary (Last 24 hours) at 2022 1148  Last data filed at 2022 0830  Gross per 24 hour   Intake 240 ml   Output --   Net 240 ml       Physical Examination:  General: Well-developed, no acute distress lying comfortably in bed. HEENT: Atraumatic normocephalic, range of motion normal  Cardiac: Normal S1-S2 no murmurs rub or gallop.   Respiratory: clear To auscultation bilaterally, no rhonchi or rales, no wheezing  Abdomen: Soft, positive bowel sounds in all quadrants, no distention, nontender to palpation  Extremities: no tenderness, no edema, moves all extremities  Psych: Affect mood depressed, good eye contact, behavioral normal.        Labs/Imaging/Diagnostics    Labs:  CBC:  Recent Labs     05/17/22 0329 05/18/22 0352 05/19/22  0517   WBC 3.8* 3.7* 4.9   RBC 4.04* 3.85* 3.95*   HGB 10.0* 9.5* 9.8*   HCT 34.9* 32.9* 34.7*   MCV 86.4 85.5 87.8   RDW 17.3* 17.3* 17.3*    162 195     CHEMISTRIES:  Recent Labs     05/17/22 0329 05/18/22 0352 05/19/22  0517    141 142   K 5.2* 4.3 4.3    107 107   CO2 25 25 25   BUN 14 13 15   CREATININE 0.5 0.4* 0.4*   GLUCOSE 91 82 78     PT/INR:No results for input(s): PROTIME, INR in the last 72 hours. APTT:No results for input(s): APTT in the last 72 hours. LIVER PROFILE:No results for input(s): AST, ALT, BILIDIR, BILITOT, ALKPHOS in the last 72 hours. Imaging Last 24 Hours:  No results found. Assessment//Plan           Hospital Problems           Last Modified POA    * (Principal) Suicidal thoughts 5/14/2022 Yes    History of suicide attempt 5/14/2022 Yes    COVID-19 virus infection 5/14/2022 Yes    Acute urinary tract infection 5/14/2022 Yes    Hypertension 5/14/2022 Yes    COPD (chronic obstructive pulmonary disease) ([facility] [address] 5/14/2022 Yes    Diabetes ([facility] [address] 5/14/2022 Yes    Depression 5/14/2022 Yes    S/P gastric bypass 5/14/2022 Yes    Borderline personality disorder ([facility] [address] 5/14/2022 Yes        Assessment & Plan    Depression with suicidal ideation  Borderline personality  Psych currently following  May be admitted to inpatient psych after quarantine completed 5/22. Management per psych. COVID-19 infection  Asymptomatic. Supportive management. Alcohol use disorder  No signs of evidence of withdrawal  Continue to monitor closely  Replete electrolytes as needed. Chronic back pain: Fentanyl as needed. Insomnia: Trazodone    Tobacco use disorder: Counseled on cessation. Type 2 diabetes: Insulin sliding scale for now. Code: Full  Diet: Diabetic diet  DVT prophylaxis: Enoxaparin  Disposition: Likely inpatient psych 5/22.       Electronically signed by Mario Archuleta MD   Internal Medicine Hospitalist  On 5/19/2022  At 11:48 AM    EMR Dragon/Transcription disclaimer:   Much of this encounter note is an electronic transcription/translation of spoken language to printed text.  The electronic translation of spoken language may permit erroneous, or at times, nonsensical words or phrases to be inadvertently transcribed; although attempts have made to review the note for such errors, some may still exist.

## 2022-05-19 NOTE — PROGRESS NOTES
Ju Knutson received from 4th floor to room # [phone] . Mental Status: Patient is oriented, alert, coherent, logical, thought processes intact and able to concentrate and follow conversation. Vitals:    05/19/22 1215   BP: 109/78   Pulse: 76   Resp: 17   Temp: 97.2 °F (36.2 °C)   SpO2: 98%     Placed on cardiac monitor: Yes. Bedside monitor. Belongings: None with patient at bedside . Family at bedside No.  Oriented Patient to room. Call light within reach. Yes. Transfer was: Well tolerated by patient. .    Electronically signed by Elena Esparza RN on 5/19/2022 at 4:24 PM

## 2022-05-20 LAB
ANION GAP SERPL CALCULATED.3IONS-SCNC: 10 MMOL/L (ref 7–19)
BASOPHILS ABSOLUTE: 0 K/UL (ref 0–0.2)
BASOPHILS ABSOLUTE: 0 K/UL (ref 0–0.2)
BASOPHILS RELATIVE PERCENT: 0.2 % (ref 0–1)
BASOPHILS RELATIVE PERCENT: 0.4 % (ref 0–1)
BUN BLDV-MCNC: 17 MG/DL (ref 8–23)
CALCIUM SERPL-MCNC: 8.3 MG/DL (ref 8.8–10.2)
CHLORIDE BLD-SCNC: 106 MMOL/L (ref 98–111)
CO2: 24 MMOL/L (ref 22–29)
CREAT SERPL-MCNC: 0.4 MG/DL (ref 0.5–0.9)
EOSINOPHILS ABSOLUTE: 0.2 K/UL (ref 0–0.6)
EOSINOPHILS ABSOLUTE: 0.2 K/UL (ref 0–0.6)
EOSINOPHILS RELATIVE PERCENT: 3.8 % (ref 0–5)
EOSINOPHILS RELATIVE PERCENT: 4.3 % (ref 0–5)
GFR AFRICAN AMERICAN: >59
GFR NON-AFRICAN AMERICAN: >60
GLUCOSE BLD-MCNC: 116 MG/DL (ref 70–99)
GLUCOSE BLD-MCNC: 120 MG/DL (ref 70–99)
GLUCOSE BLD-MCNC: 137 MG/DL (ref 70–99)
GLUCOSE BLD-MCNC: 76 MG/DL (ref 74–109)
GLUCOSE BLD-MCNC: 86 MG/DL (ref 70–99)
HCT VFR BLD CALC: 32.2 % (ref 37–47)
HCT VFR BLD CALC: 33 % (ref 37–47)
HEMOGLOBIN: 9.6 G/DL (ref 12–16)
HEMOGLOBIN: 9.7 G/DL (ref 12–16)
IMMATURE GRANULOCYTES #: 0 K/UL
IMMATURE GRANULOCYTES #: 0 K/UL
LYMPHOCYTES ABSOLUTE: 1.4 K/UL (ref 1.1–4.5)
LYMPHOCYTES ABSOLUTE: 1.7 K/UL (ref 1.1–4.5)
LYMPHOCYTES RELATIVE PERCENT: 30.8 % (ref 20–40)
LYMPHOCYTES RELATIVE PERCENT: 36 % (ref 20–40)
MCH RBC QN AUTO: 25.2 PG (ref 27–31)
MCH RBC QN AUTO: 25.3 PG (ref 27–31)
MCHC RBC AUTO-ENTMCNC: 29.4 G/DL (ref 33–37)
MCHC RBC AUTO-ENTMCNC: 29.8 G/DL (ref 33–37)
MCV RBC AUTO: 85 FL (ref 81–99)
MCV RBC AUTO: 85.7 FL (ref 81–99)
MONOCYTES ABSOLUTE: 0.5 K/UL (ref 0–0.9)
MONOCYTES ABSOLUTE: 0.5 K/UL (ref 0–0.9)
MONOCYTES RELATIVE PERCENT: 10.7 % (ref 0–10)
MONOCYTES RELATIVE PERCENT: 10.9 % (ref 0–10)
NEUTROPHILS ABSOLUTE: 2.3 K/UL (ref 1.5–7.5)
NEUTROPHILS ABSOLUTE: 2.4 K/UL (ref 1.5–7.5)
NEUTROPHILS RELATIVE PERCENT: 48.7 % (ref 50–65)
NEUTROPHILS RELATIVE PERCENT: 53.5 % (ref 50–65)
PDW BLD-RTO: 17.6 % (ref 11.5–14.5)
PDW BLD-RTO: 17.6 % (ref 11.5–14.5)
PERFORMED ON: ABNORMAL
PERFORMED ON: NORMAL
PLATELET # BLD: 198 K/UL (ref 130–400)
PLATELET # BLD: 200 K/UL (ref 130–400)
PMV BLD AUTO: 10.8 FL (ref 9.4–12.3)
PMV BLD AUTO: 11.4 FL (ref 9.4–12.3)
POTASSIUM SERPL-SCNC: 4.5 MMOL/L (ref 3.5–5)
RBC # BLD: 3.79 M/UL (ref 4.2–5.4)
RBC # BLD: 3.85 M/UL (ref 4.2–5.4)
SODIUM BLD-SCNC: 140 MMOL/L (ref 136–145)
WBC # BLD: 4.4 K/UL (ref 4.8–10.8)
WBC # BLD: 4.7 K/UL (ref 4.8–10.8)

## 2022-05-20 PROCEDURE — 6370000000 HC RX 637 (ALT 250 FOR IP): Performed by: HOSPITALIST

## 2022-05-20 PROCEDURE — 80048 BASIC METABOLIC PNL TOTAL CA: CPT

## 2022-05-20 PROCEDURE — 6370000000 HC RX 637 (ALT 250 FOR IP): Performed by: NURSE PRACTITIONER

## 2022-05-20 PROCEDURE — 2100000000 HC CCU R&B

## 2022-05-20 PROCEDURE — 2580000003 HC RX 258: Performed by: HOSPITALIST

## 2022-05-20 PROCEDURE — 85025 COMPLETE CBC W/AUTO DIFF WBC: CPT

## 2022-05-20 PROCEDURE — 6360000002 HC RX W HCPCS: Performed by: INTERNAL MEDICINE

## 2022-05-20 PROCEDURE — 36415 COLL VENOUS BLD VENIPUNCTURE: CPT

## 2022-05-20 PROCEDURE — 82947 ASSAY GLUCOSE BLOOD QUANT: CPT

## 2022-05-20 PROCEDURE — 6370000000 HC RX 637 (ALT 250 FOR IP): Performed by: PSYCHIATRY & NEUROLOGY

## 2022-05-20 PROCEDURE — 6360000002 HC RX W HCPCS: Performed by: NURSE PRACTITIONER

## 2022-05-20 RX ORDER — NICOTINE 21 MG/24HR
1 PATCH, TRANSDERMAL 24 HOURS TRANSDERMAL DAILY
Status: DISCONTINUED | OUTPATIENT
Start: 2022-05-20 | End: 2022-05-22 | Stop reason: HOSPADM

## 2022-05-20 RX ADMIN — RISPERIDONE 1 MG: 0.5 TABLET, ORALLY DISINTEGRATING ORAL at 12:38

## 2022-05-20 RX ADMIN — DIVALPROEX SODIUM 1000 MG: 500 TABLET, EXTENDED RELEASE ORAL at 09:02

## 2022-05-20 RX ADMIN — GUAIFENESIN SYRUP AND DEXTROMETHORPHAN 5 ML: 100; 10 SYRUP ORAL at 20:38

## 2022-05-20 RX ADMIN — ACETAMINOPHEN 650 MG: 325 TABLET ORAL at 05:19

## 2022-05-20 RX ADMIN — Medication 5 MG: at 20:37

## 2022-05-20 RX ADMIN — SODIUM CHLORIDE, PRESERVATIVE FREE 10 ML: 5 INJECTION INTRAVENOUS at 09:04

## 2022-05-20 RX ADMIN — GUAIFENESIN SYRUP AND DEXTROMETHORPHAN 5 ML: 100; 10 SYRUP ORAL at 09:02

## 2022-05-20 RX ADMIN — OXYCODONE HYDROCHLORIDE AND ACETAMINOPHEN 500 MG: 500 TABLET ORAL at 09:02

## 2022-05-20 RX ADMIN — ONDANSETRON 4 MG: 2 INJECTION INTRAMUSCULAR; INTRAVENOUS at 09:35

## 2022-05-20 RX ADMIN — ZINC SULFATE 220 MG (50 MG) CAPSULE 50 MG: CAPSULE at 09:02

## 2022-05-20 RX ADMIN — MIRTAZAPINE 7.5 MG: 7.5 TABLET ORAL at 20:37

## 2022-05-20 RX ADMIN — TRAZODONE HYDROCHLORIDE 50 MG: 50 TABLET ORAL at 20:37

## 2022-05-20 RX ADMIN — ENOXAPARIN SODIUM 40 MG: 100 INJECTION SUBCUTANEOUS at 09:02

## 2022-05-20 RX ADMIN — OXYCODONE HYDROCHLORIDE AND ACETAMINOPHEN 500 MG: 500 TABLET ORAL at 20:36

## 2022-05-20 RX ADMIN — RISPERIDONE 1 MG: 0.5 TABLET, ORALLY DISINTEGRATING ORAL at 22:04

## 2022-05-20 RX ADMIN — ACETAMINOPHEN 650 MG: 325 TABLET ORAL at 22:04

## 2022-05-20 RX ADMIN — FERROUS SULFATE TAB 325 MG (65 MG ELEMENTAL FE) 325 MG: 325 (65 FE) TAB at 16:50

## 2022-05-20 RX ADMIN — FERROUS SULFATE TAB 325 MG (65 MG ELEMENTAL FE) 325 MG: 325 (65 FE) TAB at 09:03

## 2022-05-20 RX ADMIN — ATORVASTATIN CALCIUM 20 MG: 20 TABLET, FILM COATED ORAL at 09:02

## 2022-05-20 RX ADMIN — QUETIAPINE FUMARATE 200 MG: 50 TABLET ORAL at 20:37

## 2022-05-20 RX ADMIN — GUAIFENESIN SYRUP AND DEXTROMETHORPHAN 5 ML: 100; 10 SYRUP ORAL at 16:50

## 2022-05-20 RX ADMIN — PANTOPRAZOLE SODIUM 40 MG: 40 TABLET, DELAYED RELEASE ORAL at 05:19

## 2022-05-20 ASSESSMENT — PAIN SCALES - GENERAL
PAINLEVEL_OUTOF10: 0
PAINLEVEL_OUTOF10: 0
PAINLEVEL_OUTOF10: 9

## 2022-05-20 ASSESSMENT — PAIN DESCRIPTION - LOCATION: LOCATION: HEAD

## 2022-05-20 NOTE — PROGRESS NOTES
Pt states at 1800 that she is leaving right now to go to [place] to be with her Daughter. Contacted Dr. Ibeth Chappell about current situation and pt now has a 72 hour hold on file. Pt is agreeable and states she will stay tonight.      Electronically signed by Reuben Mojica RN on 5/20/2022 at 6:26 PM

## 2022-05-20 NOTE — PROGRESS NOTES
Comprehensive Nutrition Assessment    Type and Reason for Visit:  Initial,RD Nutrition Re-Screen/LOS    Nutrition Recommendations/Plan:   1. Modify current diet order     Malnutrition Assessment:  Malnutrition Status: At risk for malnutrition (Comment) (05/20/22 7766)    Context:  Acute Illness     Findings of the 6 clinical characteristics of malnutrition:  Energy Intake:  Mild decrease in energy intake (Comment)  Weight Loss:  Greater than 7.5% over 3 months     Body Fat Loss:  No significant body fat loss     Muscle Mass Loss:  No significant muscle mass loss    Fluid Accumulation:  No significant fluid accumulation Extremities   Strength:  Not Performed    Nutrition Assessment:    Following patient for LOS x 6 days. PO intake has been fairly good with intake ranging 25-50% and %. Modifying current diet to include Carb Control as accuchek's are elevated  140-338. Pt is at risk for nutritional compromise d/t weight loss in past 6 months    Nutrition Related Findings:    s/p Gastric bypass Wound Type: None       Current Nutrition Intake & Therapies:    Average Meal Intake: 26-50%,%  Average Supplements Intake: None Ordered  ADULT DIET; Regular; 4 carb choices (60 gm/meal); Low Potassium (Less than 3000 mg/day); Safety Tray; Safety Tray (Disposables No Utensils)    Anthropometric Measures:  Height: 5' (152.4 cm)  Ideal Body Weight (IBW): 100 lbs (45 kg)    Admission Body Weight: 164 lb 3 oz (74.5 kg)  Current Body Weight:  (NA),   IBW. Current BMI (kg/m2):    Usual Body Weight: 200 lb (90.7 kg) (11/2021)  BMI Categories: Obese Class 1 (BMI 30.0-34. 9)    Estimated Daily Nutrient Needs:  Energy Requirements Based On: Kcal/kg  Weight Used for Energy Requirements: Current  Energy (kcal/day): 819-1043 kcals (11-14 kcals/kg)  Weight Used for Protein Requirements: Ideal  Protein (g/day): 91g  Method Used for Fluid Requirements: 1 ml/kcal  Fluid (ml/day): 819-1043 ml    Nutrition Diagnosis: · Inadequate oral intake related to psychological cause or life stress,early satiety as evidenced by weight loss 7.5% in 3 months      Nutrition Interventions:   Food and/or Nutrient Delivery: Modify Current Diet  Nutrition Education/Counseling: No recommendation at this time  Coordination of Nutrition Care: Continue to monitor while inpatient       Goals:     Goals: Meet at least 75% of estimated needs,PO intake 75% or greater       Nutrition Monitoring and Evaluation:   Behavioral-Environmental Outcomes: None Identified  Food/Nutrient Intake Outcomes: Food and Nutrient Intake  Physical Signs/Symptoms Outcomes: Biochemical Data,Weight,Skin,Nutrition Focused Physical Findings    Discharge Planning:    Continue current diet     Lizet Wilhelm MS, RD, LD  Contact: [phone]

## 2022-05-20 NOTE — PROGRESS NOTES
Progress Note  Date:2022       Room:0735/735-01  Patient Name:Ju Barnes     YOB: 1955     Age:67 y.o. Subjective    Subjective: 63-year-old lady with a history of depression, borderline personality, alcohol abuse, presenting to the hospital with concerns of suicidal ideation. On admission was noted to be COVID-positive however asymptomatic, followed in-house by psychiatry, recommending continued monitoring house as patient with passive suicidal ideations. We need to get admitted to inpatient psych after completion of quarantine on .  also following patient's case. Seen in house this morning, denied any acute overnight event, denied any chest pain or shortness of breath. Review of Systems 12 point system reviewed negative except as stated above. Objective         Vitals Last 24 Hours:  TEMPERATURE:  Temp  Av °F (36.7 °C)  Min: 97.2 °F (36.2 °C)  Max: 98.6 °F (37 °C)  RESPIRATIONS RANGE: Resp  Av.9  Min: 12  Max: 28  PULSE OXIMETRY RANGE: SpO2  Av.1 %  Min: 92 %  Max: 98 %  PULSE RANGE: Pulse  Av.3  Min: 57  Max: 96  BLOOD PRESSURE RANGE: Systolic (40WDR), TVK:999 , Min:90 , NGH:669   ; Diastolic (77KHW), YMP:36, Min:44, Max:87    I/O (24Hr): Intake/Output Summary (Last 24 hours) at 2022 1213  Last data filed at 2022 0900  Gross per 24 hour   Intake 360 ml   Output --   Net 360 ml       Physical Examination:  General: Well-developed, no acute distress lying comfortably in bed. HEENT: Atraumatic normocephalic, range of motion normal  Cardiac: Normal S1-S2 no murmurs rub or gallop.   Respiratory: clear To auscultation bilaterally, no rhonchi or rales, no wheezing  Abdomen: Soft, positive bowel sounds in all quadrants, no distention, nontender to palpation  Extremities: no tenderness, no edema, moves all extremities  Psych: Affect mood depressed, good eye contact, behavioral normal.        Labs/Imaging/Diagnostics Labs:  CBC:  Recent Labs     05/18/22  0352 05/19/22  0517 05/20/22  0123   WBC 3.7* 4.9 4.7*   RBC 3.85* 3.95* 3.85*   HGB 9.5* 9.8* 9.7*   HCT 32.9* 34.7* 33.0*   MCV 85.5 87.8 85.7   RDW 17.3* 17.3* 17.6*    195 198     CHEMISTRIES:  Recent Labs     05/18/22  0352 05/19/22  0517 05/20/22  0123    142 140   K 4.3 4.3 4.5    107 106   CO2 25 25 24   BUN 13 15 17   CREATININE 0.4* 0.4* 0.4*   GLUCOSE 82 78 76     PT/INR:No results for input(s): PROTIME, INR in the last 72 hours. APTT:No results for input(s): APTT in the last 72 hours. LIVER PROFILE:No results for input(s): AST, ALT, BILIDIR, BILITOT, ALKPHOS in the last 72 hours. Imaging Last 24 Hours:  No results found. Assessment//Plan           Hospital Problems           Last Modified POA    * (Principal) Suicidal thoughts 5/14/2022 Yes    History of suicide attempt 5/14/2022 Yes    COVID-19 virus infection 5/14/2022 Yes    Acute urinary tract infection 5/14/2022 Yes    Hypertension 5/14/2022 Yes    COPD (chronic obstructive pulmonary disease) ([facility] 5/14/2022 Yes    Diabetes ([facility] 5/14/2022 Yes    Depression 5/14/2022 Yes    S/P gastric bypass 5/14/2022 Yes    Borderline personality disorder ([facility] 5/14/2022 Yes        Assessment & Plan      Depression with suicidal ideation  Borderline personality  Psych currently following  May be admitted to inpatient psych after quarantine completed 5/22. Management per psych. COVID-19 infection  Asymptomatic. Supportive management. Alcohol use disorder  No signs of evidence of withdrawal  Continue to monitor closely  Replete electrolytes as needed. Chronic back pain: Fentanyl as needed. Insomnia: Trazodone    Tobacco use disorder: Counseled on cessation. Type 2 diabetes: Insulin sliding scale for now. Code: Full  Diet: Diabetic diet  DVT prophylaxis: Enoxaparin  Disposition: Likely inpatient psych 5/22.       Electronically signed by   Urban Jordan MD   Internal Medicine Hospitalist  On 5/20/2022  At 12:13 PM    EMR Dragon/Transcription disclaimer:   Much of this encounter note is an electronic transcription/translation of spoken language to printed text.  The electronic translation of spoken language may permit erroneous, or at times, nonsensical words or phrases to be inadvertently transcribed; although attempts have made to review the note for such errors, some may still exist.

## 2022-05-21 LAB
GLUCOSE BLD-MCNC: 124 MG/DL (ref 70–99)
GLUCOSE BLD-MCNC: 144 MG/DL (ref 70–99)
GLUCOSE BLD-MCNC: 75 MG/DL (ref 70–99)
GLUCOSE BLD-MCNC: 97 MG/DL (ref 70–99)
PERFORMED ON: ABNORMAL
PERFORMED ON: ABNORMAL
PERFORMED ON: NORMAL
PERFORMED ON: NORMAL

## 2022-05-21 PROCEDURE — 6370000000 HC RX 637 (ALT 250 FOR IP): Performed by: NURSE PRACTITIONER

## 2022-05-21 PROCEDURE — 6370000000 HC RX 637 (ALT 250 FOR IP): Performed by: PSYCHIATRY & NEUROLOGY

## 2022-05-21 PROCEDURE — 2100000000 HC CCU R&B

## 2022-05-21 PROCEDURE — 6370000000 HC RX 637 (ALT 250 FOR IP): Performed by: HOSPITALIST

## 2022-05-21 PROCEDURE — 6360000002 HC RX W HCPCS: Performed by: INTERNAL MEDICINE

## 2022-05-21 PROCEDURE — 82947 ASSAY GLUCOSE BLOOD QUANT: CPT

## 2022-05-21 RX ADMIN — ACETAMINOPHEN 650 MG: 325 TABLET ORAL at 07:41

## 2022-05-21 RX ADMIN — INSULIN LISPRO 1 UNITS: 100 INJECTION, SOLUTION INTRAVENOUS; SUBCUTANEOUS at 16:54

## 2022-05-21 RX ADMIN — Medication 5 MG: at 21:19

## 2022-05-21 RX ADMIN — OXYCODONE HYDROCHLORIDE AND ACETAMINOPHEN 500 MG: 500 TABLET ORAL at 21:20

## 2022-05-21 RX ADMIN — PANTOPRAZOLE SODIUM 40 MG: 40 TABLET, DELAYED RELEASE ORAL at 06:18

## 2022-05-21 RX ADMIN — FERROUS SULFATE TAB 325 MG (65 MG ELEMENTAL FE) 325 MG: 325 (65 FE) TAB at 16:54

## 2022-05-21 RX ADMIN — MIRTAZAPINE 7.5 MG: 7.5 TABLET ORAL at 21:19

## 2022-05-21 RX ADMIN — GUAIFENESIN SYRUP AND DEXTROMETHORPHAN 5 ML: 100; 10 SYRUP ORAL at 16:54

## 2022-05-21 RX ADMIN — FERROUS SULFATE TAB 325 MG (65 MG ELEMENTAL FE) 325 MG: 325 (65 FE) TAB at 07:42

## 2022-05-21 RX ADMIN — TRAZODONE HYDROCHLORIDE 50 MG: 50 TABLET ORAL at 21:19

## 2022-05-21 RX ADMIN — QUETIAPINE FUMARATE 200 MG: 50 TABLET ORAL at 21:19

## 2022-05-21 RX ADMIN — GUAIFENESIN SYRUP AND DEXTROMETHORPHAN 5 ML: 100; 10 SYRUP ORAL at 21:19

## 2022-05-21 RX ADMIN — RISPERIDONE 1 MG: 0.5 TABLET, ORALLY DISINTEGRATING ORAL at 10:50

## 2022-05-21 ASSESSMENT — PAIN SCALES - GENERAL: PAINLEVEL_OUTOF10: 9
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
## 2022-05-21 NOTE — PROGRESS NOTES
Progress Note  Date:2022       Room:0735/735-01  Patient Name:Ju Lechuga     YOB: 1955     Age:67 y.o. Subjective    Subjective: 79-year-old lady with a history of depression, borderline personality, alcohol abuse, presenting to the hospital with concerns of suicidal ideation. On admission was noted to be COVID-positive however asymptomatic, followed in-house by psychiatry, recommending continued monitoring house as patient with passive suicidal ideations. Will need to get admitted to inpatient psych after completion of quarantine on .  also following patient's case. Patient threatening to leave the hospital AMA yesterday, 72 hours hold was placed patient compliant with care and awaiting psych reevaluation today. Seen in house this morning, denied any chest pain or shortness of breath, stated she is feeling a lot better. Review of Systems 12 point system reviewed negative except as stated above. Objective         Vitals Last 24 Hours:  TEMPERATURE:  Temp  Av.4 °F (36.9 °C)  Min: 97.9 °F (36.6 °C)  Max: 98.8 °F (37.1 °C)  RESPIRATIONS RANGE: Resp  Av.3  Min: 16  Max: 25  PULSE OXIMETRY RANGE: SpO2  Av.1 %  Min: 92 %  Max: 98 %  PULSE RANGE: Pulse  Av.5  Min: 56  Max: 101  BLOOD PRESSURE RANGE: Systolic (95VCT), FDV:254 , Min:98 , CRE:307   ; Diastolic (05QXO), SGL:54, Min:47, Max:82    I/O (24Hr): Intake/Output Summary (Last 24 hours) at 2022 1330  Last data filed at 2022 0800  Gross per 24 hour   Intake 580 ml   Output --   Net 580 ml       Physical Examination:  General: Well-developed, no acute distress lying comfortably in bed. HEENT: Atraumatic normocephalic, range of motion normal  Cardiac: Normal S1-S2 no murmurs rub or gallop.   Respiratory: clear To auscultation bilaterally, no rhonchi or rales, no wheezing  Abdomen: Soft, positive bowel sounds in all quadrants, no distention, nontender to palpation  Extremities: no tenderness, no edema, moves all extremities  Psych: Affect mood depressed, good eye contact, behavioral normal.        Labs/Imaging/Diagnostics    Labs:  CBC:  Recent Labs     05/19/22  0517 05/20/22  0123 05/20/22  1353   WBC 4.9 4.7* 4.4*   RBC 3.95* 3.85* 3.79*   HGB 9.8* 9.7* 9.6*   HCT 34.7* 33.0* 32.2*   MCV 87.8 85.7 85.0   RDW 17.3* 17.6* 17.6*    198 200     CHEMISTRIES:  Recent Labs     05/19/22  0517 05/20/22  0123    140   K 4.3 4.5    106   CO2 25 24   BUN 15 17   CREATININE 0.4* 0.4*   GLUCOSE 78 76     PT/INR:No results for input(s): PROTIME, INR in the last 72 hours. APTT:No results for input(s): APTT in the last 72 hours. LIVER PROFILE:No results for input(s): AST, ALT, BILIDIR, BILITOT, ALKPHOS in the last 72 hours. Imaging Last 24 Hours:  No results found. Assessment//Plan           Hospital Problems           Last Modified POA    * (Principal) Suicidal thoughts 5/14/2022 Yes    History of suicide attempt 5/14/2022 Yes    COVID-19 virus infection 5/14/2022 Yes    Acute urinary tract infection 5/14/2022 Yes    Hypertension 5/14/2022 Yes    COPD (chronic obstructive pulmonary disease) ([facility] 5/14/2022 Yes    Diabetes ([facility] 5/14/2022 Yes    Depression 5/14/2022 Yes    S/P gastric bypass 5/14/2022 Yes    Borderline personality disorder ([facility] [address] 5/14/2022 Yes        Assessment & Plan      Depression with suicidal ideation  Borderline personality  Psych currently following  May be admitted to inpatient psych after quarantine completed 5/22. Management per psych. COVID-19 infection  Asymptomatic. Supportive management. Alcohol use disorder  No signs of evidence of withdrawal  Continue to monitor closely  Replete electrolytes as needed. Chronic back pain: Fentanyl as needed. Insomnia: Trazodone    Tobacco use disorder: Counseled on cessation. Type 2 diabetes: Insulin sliding scale for now.       Code: Full  Diet: Diabetic diet  DVT prophylaxis: Enoxaparin  Disposition: Likely inpatient psych 5/22. Electronically signed by   Jr Galvan MD   Internal Medicine Hospitalist  On 5/21/2022  At 1:30 PM    EMR Dragon/Transcription disclaimer:   Much of this encounter note is an electronic transcription/translation of spoken language to printed text.  The electronic translation of spoken language may permit erroneous, or at times, nonsensical words or phrases to be inadvertently transcribed; although attempts have made to review the note for such errors, some may still exist.

## 2022-05-21 NOTE — PLAN OF CARE
Problem: Safety - Adult  Goal: Free from fall injury  Outcome: Not Progressing     Problem: Self Harm/Suicidality  Goal: Will have no self-injury during hospital stay  Description: INTERVENTIONS:  1. Q 30 MINUTES: Routine safety checks  2. Q SHIFT & PRN: Assess risk to determine if routine checks are adequate to maintain patient safety  Outcome: Not Progressing     Problem: Pain  Goal: Verbalizes/displays adequate comfort level or baseline comfort level  Outcome: Not Progressing

## 2022-05-22 ENCOUNTER — HOSPITAL ENCOUNTER (INPATIENT)
Age: 67
LOS: 3 days | Discharge: HOME | DRG: 881 | End: 2022-05-25
Attending: PSYCHIATRY & NEUROLOGY | Admitting: PSYCHIATRY & NEUROLOGY
Payer: MEDICARE

## 2022-05-22 VITALS
WEIGHT: 164.19 LBS | RESPIRATION RATE: 20 BRPM | HEART RATE: 76 BPM | OXYGEN SATURATION: 96 % | BODY MASS INDEX: 32.24 KG/M2 | SYSTOLIC BLOOD PRESSURE: 142 MMHG | TEMPERATURE: 97.6 F | HEIGHT: 60 IN | DIASTOLIC BLOOD PRESSURE: 63 MMHG

## 2022-05-22 DIAGNOSIS — F31.78 BIPOLAR DISORDER, IN FULL REMISSION, MOST RECENT EPISODE MIXED (HCC): ICD-10-CM

## 2022-05-22 DIAGNOSIS — F30.9 MANIC EPISODE (HCC): ICD-10-CM

## 2022-05-22 DIAGNOSIS — E78.2 MIXED HYPERLIPIDEMIA: ICD-10-CM

## 2022-05-22 DIAGNOSIS — E11.9 TYPE 2 DIABETES MELLITUS WITHOUT COMPLICATION, UNSPECIFIED WHETHER LONG TERM INSULIN USE (HCC): ICD-10-CM

## 2022-05-22 PROBLEM — R45.851 SUICIDAL THOUGHTS: Status: ACTIVE | Noted: 2022-05-22

## 2022-05-22 LAB
GLUCOSE BLD-MCNC: 104 MG/DL (ref 70–99)
GLUCOSE BLD-MCNC: 106 MG/DL (ref 70–99)
GLUCOSE BLD-MCNC: 215 MG/DL (ref 70–99)
GLUCOSE BLD-MCNC: 99 MG/DL (ref 70–99)
HBA1C MFR BLD: 5.2 % (ref 4–6)
PERFORMED ON: ABNORMAL
PERFORMED ON: NORMAL
SARS-COV-2, NAAT: NOT DETECTED

## 2022-05-22 PROCEDURE — 6370000000 HC RX 637 (ALT 250 FOR IP): Performed by: NURSE PRACTITIONER

## 2022-05-22 PROCEDURE — 36415 COLL VENOUS BLD VENIPUNCTURE: CPT

## 2022-05-22 PROCEDURE — 6370000000 HC RX 637 (ALT 250 FOR IP): Performed by: HOSPITALIST

## 2022-05-22 PROCEDURE — 6360000002 HC RX W HCPCS: Performed by: INTERNAL MEDICINE

## 2022-05-22 PROCEDURE — 1240000000 HC EMOTIONAL WELLNESS R&B

## 2022-05-22 PROCEDURE — 6370000000 HC RX 637 (ALT 250 FOR IP): Performed by: PSYCHIATRY & NEUROLOGY

## 2022-05-22 PROCEDURE — 99233 SBSQ HOSP IP/OBS HIGH 50: CPT | Performed by: PSYCHIATRY & NEUROLOGY

## 2022-05-22 PROCEDURE — 87635 SARS-COV-2 COVID-19 AMP PRB: CPT

## 2022-05-22 PROCEDURE — 83036 HEMOGLOBIN GLYCOSYLATED A1C: CPT

## 2022-05-22 PROCEDURE — 82947 ASSAY GLUCOSE BLOOD QUANT: CPT

## 2022-05-22 RX ORDER — DIVALPROEX SODIUM 500 MG/1
1000 TABLET, EXTENDED RELEASE ORAL DAILY
Status: DISCONTINUED | OUTPATIENT
Start: 2022-05-22 | End: 2022-05-22

## 2022-05-22 RX ORDER — POLYETHYLENE GLYCOL 3350 17 G/17G
17 POWDER, FOR SOLUTION ORAL DAILY PRN
Status: CANCELLED | OUTPATIENT
Start: 2022-05-22

## 2022-05-22 RX ORDER — ACETAMINOPHEN 650 MG/1
650 SUPPOSITORY RECTAL EVERY 6 HOURS PRN
Status: CANCELLED | OUTPATIENT
Start: 2022-05-22

## 2022-05-22 RX ORDER — ACETAMINOPHEN 325 MG/1
650 TABLET ORAL EVERY 6 HOURS PRN
Status: CANCELLED | OUTPATIENT
Start: 2022-05-22

## 2022-05-22 RX ORDER — PANTOPRAZOLE SODIUM 40 MG/1
40 TABLET, DELAYED RELEASE ORAL
Status: DISCONTINUED | OUTPATIENT
Start: 2022-05-23 | End: 2022-05-25 | Stop reason: HOSPADM

## 2022-05-22 RX ORDER — PANTOPRAZOLE SODIUM 40 MG/1
40 TABLET, DELAYED RELEASE ORAL
Status: CANCELLED | OUTPATIENT
Start: 2022-05-23

## 2022-05-22 RX ORDER — ONDANSETRON 4 MG/1
4 TABLET, ORALLY DISINTEGRATING ORAL EVERY 8 HOURS PRN
Status: CANCELLED | OUTPATIENT
Start: 2022-05-22

## 2022-05-22 RX ORDER — FERROUS SULFATE 325(65) MG
325 TABLET ORAL 2 TIMES DAILY WITH MEALS
Status: CANCELLED | OUTPATIENT
Start: 2022-05-22

## 2022-05-22 RX ORDER — DEXTROSE MONOHYDRATE 50 MG/ML
100 INJECTION, SOLUTION INTRAVENOUS PRN
Status: CANCELLED | OUTPATIENT
Start: 2022-05-22

## 2022-05-22 RX ORDER — MIRTAZAPINE 7.5 MG/1
7.5 TABLET, FILM COATED ORAL NIGHTLY
Status: CANCELLED | OUTPATIENT
Start: 2022-05-22

## 2022-05-22 RX ORDER — NICOTINE 21 MG/24HR
1 PATCH, TRANSDERMAL 24 HOURS TRANSDERMAL DAILY
Status: DISCONTINUED | OUTPATIENT
Start: 2022-05-22 | End: 2022-05-25 | Stop reason: HOSPADM

## 2022-05-22 RX ORDER — ACETAMINOPHEN 325 MG/1
650 TABLET ORAL EVERY 4 HOURS PRN
Status: DISCONTINUED | OUTPATIENT
Start: 2022-05-22 | End: 2022-05-23

## 2022-05-22 RX ORDER — ATORVASTATIN CALCIUM 20 MG/1
20 TABLET, FILM COATED ORAL DAILY
Status: DISCONTINUED | OUTPATIENT
Start: 2022-05-23 | End: 2022-05-25 | Stop reason: HOSPADM

## 2022-05-22 RX ORDER — ZINC SULFATE 50(220)MG
50 CAPSULE ORAL DAILY
Status: CANCELLED | OUTPATIENT
Start: 2022-05-22

## 2022-05-22 RX ORDER — QUETIAPINE FUMARATE 200 MG/1
200 TABLET, FILM COATED ORAL NIGHTLY
Status: DISCONTINUED | OUTPATIENT
Start: 2022-05-22 | End: 2022-05-25 | Stop reason: HOSPADM

## 2022-05-22 RX ORDER — RISPERIDONE 1 MG/1
1 TABLET, ORALLY DISINTEGRATING ORAL EVERY 6 HOURS PRN
Status: DISCONTINUED | OUTPATIENT
Start: 2022-05-22 | End: 2022-05-25 | Stop reason: HOSPADM

## 2022-05-22 RX ORDER — SODIUM CHLORIDE 0.9 % (FLUSH) 0.9 %
5-40 SYRINGE (ML) INJECTION EVERY 12 HOURS SCHEDULED
Status: CANCELLED | OUTPATIENT
Start: 2022-05-22

## 2022-05-22 RX ORDER — ZINC SULFATE 50(220)MG
50 CAPSULE ORAL DAILY
Status: DISCONTINUED | OUTPATIENT
Start: 2022-05-23 | End: 2022-05-25 | Stop reason: HOSPADM

## 2022-05-22 RX ORDER — ASCORBIC ACID 500 MG
500 TABLET ORAL 2 TIMES DAILY
Status: DISCONTINUED | OUTPATIENT
Start: 2022-05-22 | End: 2022-05-25 | Stop reason: HOSPADM

## 2022-05-22 RX ORDER — ALBUTEROL SULFATE 2.5 MG/3ML
2.5 SOLUTION RESPIRATORY (INHALATION) EVERY 4 HOURS PRN
Status: DISCONTINUED | OUTPATIENT
Start: 2022-05-22 | End: 2022-05-25 | Stop reason: HOSPADM

## 2022-05-22 RX ORDER — INSULIN LISPRO 100 [IU]/ML
0-3 INJECTION, SOLUTION INTRAVENOUS; SUBCUTANEOUS NIGHTLY
Status: CANCELLED | OUTPATIENT
Start: 2022-05-22

## 2022-05-22 RX ORDER — TRAZODONE HYDROCHLORIDE 50 MG/1
50 TABLET ORAL NIGHTLY
Status: DISCONTINUED | OUTPATIENT
Start: 2022-05-22 | End: 2022-05-25 | Stop reason: HOSPADM

## 2022-05-22 RX ORDER — GUAIFENESIN/DEXTROMETHORPHAN 100-10MG/5
5 SYRUP ORAL 4 TIMES DAILY
Status: CANCELLED | OUTPATIENT
Start: 2022-05-22

## 2022-05-22 RX ORDER — SODIUM CHLORIDE 0.9 % (FLUSH) 0.9 %
5-40 SYRINGE (ML) INJECTION PRN
Status: CANCELLED | OUTPATIENT
Start: 2022-05-22

## 2022-05-22 RX ORDER — FERROUS SULFATE 325(65) MG
325 TABLET ORAL 2 TIMES DAILY WITH MEALS
Status: DISCONTINUED | OUTPATIENT
Start: 2022-05-22 | End: 2022-05-25 | Stop reason: HOSPADM

## 2022-05-22 RX ORDER — MECOBALAMIN 5000 MCG
5 TABLET,DISINTEGRATING ORAL NIGHTLY
Status: CANCELLED | OUTPATIENT
Start: 2022-05-22

## 2022-05-22 RX ORDER — ATORVASTATIN CALCIUM 20 MG/1
20 TABLET, FILM COATED ORAL DAILY
Status: CANCELLED | OUTPATIENT
Start: 2022-05-22

## 2022-05-22 RX ORDER — INSULIN LISPRO 100 [IU]/ML
0-6 INJECTION, SOLUTION INTRAVENOUS; SUBCUTANEOUS
Status: CANCELLED | OUTPATIENT
Start: 2022-05-22

## 2022-05-22 RX ORDER — ONDANSETRON 2 MG/ML
4 INJECTION INTRAMUSCULAR; INTRAVENOUS EVERY 6 HOURS PRN
Status: CANCELLED | OUTPATIENT
Start: 2022-05-22

## 2022-05-22 RX ORDER — MECOBALAMIN 5000 MCG
5 TABLET,DISINTEGRATING ORAL NIGHTLY
Status: DISCONTINUED | OUTPATIENT
Start: 2022-05-22 | End: 2022-05-25 | Stop reason: HOSPADM

## 2022-05-22 RX ORDER — ATORVASTATIN CALCIUM 20 MG/1
20 TABLET, FILM COATED ORAL DAILY
Status: DISCONTINUED | OUTPATIENT
Start: 2022-05-22 | End: 2022-05-22

## 2022-05-22 RX ORDER — POLYETHYLENE GLYCOL 3350 17 G/17G
17 POWDER, FOR SOLUTION ORAL DAILY PRN
Status: DISCONTINUED | OUTPATIENT
Start: 2022-05-22 | End: 2022-05-25 | Stop reason: HOSPADM

## 2022-05-22 RX ORDER — FLUTICASONE PROPIONATE 50 MCG
2 SPRAY, SUSPENSION (ML) NASAL DAILY
Status: DISCONTINUED | OUTPATIENT
Start: 2022-05-22 | End: 2022-05-25 | Stop reason: HOSPADM

## 2022-05-22 RX ORDER — QUETIAPINE FUMARATE 50 MG/1
200 TABLET, FILM COATED ORAL NIGHTLY
Status: CANCELLED | OUTPATIENT
Start: 2022-05-22

## 2022-05-22 RX ORDER — SODIUM CHLORIDE 9 MG/ML
INJECTION, SOLUTION INTRAVENOUS PRN
Status: CANCELLED | OUTPATIENT
Start: 2022-05-22

## 2022-05-22 RX ORDER — MIRTAZAPINE 7.5 MG/1
7.5 TABLET, FILM COATED ORAL NIGHTLY
Status: DISCONTINUED | OUTPATIENT
Start: 2022-05-22 | End: 2022-05-25 | Stop reason: HOSPADM

## 2022-05-22 RX ORDER — DIVALPROEX SODIUM 500 MG/1
1000 TABLET, EXTENDED RELEASE ORAL DAILY
Status: CANCELLED | OUTPATIENT
Start: 2022-05-22

## 2022-05-22 RX ORDER — ASCORBIC ACID 500 MG
500 TABLET ORAL 2 TIMES DAILY
Status: CANCELLED | OUTPATIENT
Start: 2022-05-22

## 2022-05-22 RX ORDER — DIVALPROEX SODIUM 500 MG/1
1000 TABLET, EXTENDED RELEASE ORAL DAILY
Status: DISCONTINUED | OUTPATIENT
Start: 2022-05-23 | End: 2022-05-25 | Stop reason: HOSPADM

## 2022-05-22 RX ORDER — ALBUTEROL SULFATE 90 UG/1
2 AEROSOL, METERED RESPIRATORY (INHALATION) EVERY 4 HOURS PRN
Status: CANCELLED | OUTPATIENT
Start: 2022-05-22

## 2022-05-22 RX ORDER — FLUTICASONE PROPIONATE 50 MCG
2 SPRAY, SUSPENSION (ML) NASAL DAILY
Status: CANCELLED | OUTPATIENT
Start: 2022-05-22

## 2022-05-22 RX ORDER — INSULIN LISPRO 100 [IU]/ML
0-3 INJECTION, SOLUTION INTRAVENOUS; SUBCUTANEOUS NIGHTLY
Status: DISCONTINUED | OUTPATIENT
Start: 2022-05-22 | End: 2022-05-25 | Stop reason: HOSPADM

## 2022-05-22 RX ORDER — ENOXAPARIN SODIUM 100 MG/ML
40 INJECTION SUBCUTANEOUS DAILY
Status: CANCELLED | OUTPATIENT
Start: 2022-05-22

## 2022-05-22 RX ORDER — TRAZODONE HYDROCHLORIDE 50 MG/1
50 TABLET ORAL NIGHTLY
Status: CANCELLED | OUTPATIENT
Start: 2022-05-22

## 2022-05-22 RX ORDER — GUAIFENESIN/DEXTROMETHORPHAN 100-10MG/5
5 SYRUP ORAL 4 TIMES DAILY
Status: DISCONTINUED | OUTPATIENT
Start: 2022-05-22 | End: 2022-05-25 | Stop reason: HOSPADM

## 2022-05-22 RX ORDER — NICOTINE 21 MG/24HR
1 PATCH, TRANSDERMAL 24 HOURS TRANSDERMAL DAILY
Status: CANCELLED | OUTPATIENT
Start: 2022-05-22

## 2022-05-22 RX ORDER — RISPERIDONE 1 MG/1
1 TABLET, ORALLY DISINTEGRATING ORAL EVERY 6 HOURS PRN
Status: CANCELLED | OUTPATIENT
Start: 2022-05-22

## 2022-05-22 RX ADMIN — GUAIFENESIN SYRUP AND DEXTROMETHORPHAN 5 ML: 100; 10 SYRUP ORAL at 20:21

## 2022-05-22 RX ADMIN — ENOXAPARIN SODIUM 40 MG: 100 INJECTION SUBCUTANEOUS at 08:43

## 2022-05-22 RX ADMIN — TRAZODONE HYDROCHLORIDE 50 MG: 50 TABLET ORAL at 20:21

## 2022-05-22 RX ADMIN — RISPERIDONE 1 MG: 1 TABLET, ORALLY DISINTEGRATING ORAL at 20:21

## 2022-05-22 RX ADMIN — OXYCODONE HYDROCHLORIDE AND ACETAMINOPHEN 500 MG: 500 TABLET ORAL at 08:43

## 2022-05-22 RX ADMIN — ATORVASTATIN CALCIUM 20 MG: 20 TABLET, FILM COATED ORAL at 08:43

## 2022-05-22 RX ADMIN — GUAIFENESIN SYRUP AND DEXTROMETHORPHAN 5 ML: 100; 10 SYRUP ORAL at 08:43

## 2022-05-22 RX ADMIN — MIRTAZAPINE 7.5 MG: 7.5 TABLET ORAL at 20:21

## 2022-05-22 RX ADMIN — ZINC SULFATE 220 MG (50 MG) CAPSULE 50 MG: CAPSULE at 08:43

## 2022-05-22 RX ADMIN — OXYCODONE HYDROCHLORIDE AND ACETAMINOPHEN 500 MG: 500 TABLET ORAL at 20:21

## 2022-05-22 RX ADMIN — FLUTICASONE PROPIONATE 2 SPRAY: 50 SPRAY, METERED NASAL at 08:43

## 2022-05-22 RX ADMIN — DIVALPROEX SODIUM 1000 MG: 500 TABLET, EXTENDED RELEASE ORAL at 08:43

## 2022-05-22 RX ADMIN — PANTOPRAZOLE SODIUM 40 MG: 40 TABLET, DELAYED RELEASE ORAL at 06:19

## 2022-05-22 RX ADMIN — Medication 5 MG: at 20:20

## 2022-05-22 RX ADMIN — QUETIAPINE FUMARATE 200 MG: 200 TABLET ORAL at 20:21

## 2022-05-22 RX ADMIN — FERROUS SULFATE TAB 325 MG (65 MG ELEMENTAL FE) 325 MG: 325 (65 FE) TAB at 08:43

## 2022-05-22 RX ADMIN — RISPERIDONE 1 MG: 0.5 TABLET, ORALLY DISINTEGRATING ORAL at 10:56

## 2022-05-22 RX ADMIN — INSULIN LISPRO 1 UNITS: 100 INJECTION, SOLUTION INTRAVENOUS; SUBCUTANEOUS at 20:21

## 2022-05-22 RX ADMIN — ACETAMINOPHEN 650 MG: 325 TABLET ORAL at 20:20

## 2022-05-22 RX ADMIN — ACETAMINOPHEN 650 MG: 325 TABLET ORAL at 10:56

## 2022-05-22 RX ADMIN — FERROUS SULFATE TAB 325 MG (65 MG ELEMENTAL FE) 325 MG: 325 (65 FE) TAB at 16:48

## 2022-05-22 ASSESSMENT — SLEEP AND FATIGUE QUESTIONNAIRES
DO YOU HAVE DIFFICULTY SLEEPING: YES
DO YOU USE A SLEEP AID: YES
SLEEP PATTERN: DIFFICULTY FALLING ASLEEP;RESTLESSNESS

## 2022-05-22 ASSESSMENT — PAIN SCALES - GENERAL
PAINLEVEL_OUTOF10: 0
PAINLEVEL_OUTOF10: 9
PAINLEVEL_OUTOF10: 8

## 2022-05-22 ASSESSMENT — PATIENT HEALTH QUESTIONNAIRE - PHQ9: SUM OF ALL RESPONSES TO PHQ QUESTIONS 1-9: 20

## 2022-05-22 NOTE — DISCHARGE SUMMARY
Discharge Summary      Date:5/22/2022        Patient Name:Ju Wing     YOB: 1955     Age:67 y.o. Admit Date:5/14/2022   Admission Condition:fair   Discharged Condition:stable  Discharge Date: 05/22/22       Discharge Diagnoses   Principal Problem:    Suicidal thoughts  Active Problems:    History of suicide attempt    COVID-19 virus infection    Acute urinary tract infection    Hypertension    COPD (chronic obstructive pulmonary disease) ([facility] [address]    Diabetes ([facility] [address]    Depression    S/P gastric bypass    Borderline personality disorder ([facility]  Resolved Problems:    * No resolved hospital problems. [facility] Stay   Narrative of Hospital Course:     82-year-old lady with a history of depression, borderline personality, alcohol abuse, presenting to the hospital with concerns of suicidal ideation. In house was monitored for alcohol withdrawal and no symptoms noted hence CIWA protocol was discontinued. On admission was noted to be COVID-positive however asymptomatic, followed in-house by psychiatry, was monitored in house due to passive suicidal ideations. Patient completed her quarantine on 5/22, re-evaluated by psych and admitted to [place] [address] unit.         Physical Examination:  General: Well-developed, no acute distress lying comfortably in bed. HEENT: Atraumatic normocephalic, range of motion normal  Cardiac: Normal S1-S2 no murmurs rub or gallop.   Respiratory: clear To auscultation bilaterally, no rhonchi or rales, no wheezing  Abdomen: Soft, positive bowel sounds in all quadrants, no distention, nontender to palpation  Extremities: no tenderness, no edema, moves all extremities  Psych: Affect mood depressed, good eye contact, behavioral normal.      Consultants:   IP CONSULT TO PSYCHIATRY  IP CONSULT TO SPIRITUAL SERVICES  IP CONSULT TO SOCIAL WORK  PALLIATIVE CARE EVAL    Time Spent on Discharge:  35 minutes were spent in patient examination, evaluation, counseling as well as medication reconciliation, prescriptions for required medications, discharge plan and follow up. Surgeries/Procedures Performed:  NONE    Significant Diagnostic Studies:   Recent Labs:  CBC:   Lab Results   Component Value Date    WBC 4.4 05/20/2022    RBC 3.79 05/20/2022    HGB 9.6 05/20/2022    HCT 32.2 05/20/2022    HCT 41.3 01/06/2012    MCV 85.0 05/20/2022    MCH 25.3 05/20/2022    MCHC 29.8 05/20/2022    RDW 17.6 05/20/2022     05/20/2022     01/06/2012     BMP:    Lab Results   Component Value Date    GLUCOSE 76 05/20/2022     05/20/2022     01/06/2012    K 4.5 05/20/2022    K 5.0 05/14/2022    K 4.6 01/06/2012     05/20/2022     01/06/2012    CO2 24 05/20/2022    ANIONGAP 10 05/20/2022    BUN 17 05/20/2022    CREATININE 0.4 05/20/2022    CREATININE 0.4 01/06/2012    CALCIUM 8.3 05/20/2022    LABGLOM >60 05/20/2022    GFRAA >59 05/20/2022       Radiology Last 7 Days:  No results found. Discharge Plan   Disposition: Discharge/Readmit    Provider Follow-Up:   No follow-up provider specified. Patient Instructions   Diet: diabetic diet    Activity: activity as tolerated      Discharge Medications         Medication List      CONTINUE taking these medications    Accu-Chek Softclix Lancets Misc  CHECK BLOOD SUGAR TWICE DAILY AND AS NEEDED     blood glucose monitor kit and supplies  Test 3 times a day & as needed for symptoms of irregular blood glucose. Dx:     FreeStyle Twyla [place] Olga Lidia  1 Units by Does not apply route 2 times daily     FreeStyle Twyla Sensor System Misc  1 Units by Does not apply route 2 times daily     Futuro Soft Cervical Collar Misc  Wear for 1 week.      Insulin Pen Needle 31G X 8 MM Misc  Commonly known as: B-D ULTRAFINE III SHORT PEN  Inject 1 each as directed daily        ASK your doctor about these medications    albuterol sulfate  (90 Base) MCG/ACT inhaler  Commonly known as: Ventolin HFA  Inhale 2 puffs into the lungs every 6 hours as CYANOCOBALAMIN  Take 1 tablet by mouth daily     vitamin D 1.25 MG (20375 UT) Caps capsule  Commonly known as: ERGOCALCIFEROL  Take 1 capsule by mouth once a week for 11 doses         * This list has 2 medication(s) that are the same as other medications prescribed for you. Read the directions carefully, and ask your doctor or other care provider to review them with you.                 Electronically signed by Greg Chowdary MD on 5/22/22 at 1:56 PM CDT

## 2022-05-22 NOTE — PROGRESS NOTES
Patient arrived to floor from CCU with security and nurse. 1 to 1 sitter d/scarlett upon arrival and 15 min observation safety rounds started.

## 2022-05-22 NOTE — BH NOTE
Department of Psychiatry  Attending Progress Note - Geriatric      SUBJECTIVE:    A 74years old female with previous psychiatric history of depression and borderline personality disorder, complicated by history of diabetes and COPD, who has been admitted to medical services secondary to call with positive status and suicidal ideations. During the evaluation in ER it was noted that patient has mild UTI. Patient has been seen in her room. She was sitting on a chair and wearing hospital attire. She reported that her condition significantly improved during this hospital stay, stated that her mood is \"better\" today. She endorses improved appetite and improved quality of sleep with prescribed psychotropic medications. Patient is compliant with currently prescribed medications and denies any side effects. She continues to report feeling of anxiety and depression, endorses improved energy level, denies feeling of hopelessness or helplessness. She denies current active suicidal or homicidal ideations, denies any plans. Also, patient denies auditory and visual hallucinations. I discussed with medical staff patient's discharge options, however, there is no safe disposition plan at this time, considering that the patient is homeless, patient's daughter, who is patient's main support is currently in [address] [place] [address] and she is homeless as well. Patient does not have scheduled follow-up appointments for her psychotropic medications management. OBJECTIVE    Physical  VITALS:  /62   Pulse 75   Temp 97.2 °F (36.2 °C) (Axillary)   Resp 16   Ht 5' (1.524 m)   Wt 164 lb 3 oz (74.5 kg)   LMP  (LMP Unknown)   SpO2 95%   Breastfeeding No   BMI 32.07 kg/m²   TEMPERATURE:  Current - Temp: 97.2 °F (36.2 °C);  Max - Temp  Av °F (36.1 °C)  Min: 96 °F (35.6 °C)  Max: 98.2 °F (36.8 °C)  RESPIRATIONS RANGE: Resp  Av  Min: 16  Max: 20  PULSE RANGE: Pulse  Av.8  Min: 62  Max: 99  BLOOD PRESSURE RANGE:  Systolic (52BZU), DDI:145 , Min:96 , FUR:254   ; Diastolic (78VXS), RMR:26, Min:56, Max:70    PULSE OXIMETRY RANGE: SpO2  Av %  Min: 94 %  Max: 98 %      Mental Status Examination:   Appearance: Appropriately groomed and in hospital attire. Made good eye contact. Behavior: Calm, cooperative, friendly, and socially appropriate. No psychomotor retardation/agitation appreciated. Gait was not evaluated, as patient was sitting on her chair. Speech: Normal in tone, volume, and quality. No pressured speech noted. Mood: \"Better\"   Affect: Mood congruent. Range is slightly restricted  Thought Process: Mostly linear and goal oriented. Thought Content: Patient does not have any current active suicidal and homicidal ideations. No overt delusions or paranoia appreciated. Perceptions: Seems patient does not have any auditory or visual hallucinations at present time. Patient did not appear to be responding to internal stimuli. No overt psychosis. Orientation: to person, place and situation. Alert. Impulsivity: Seems improved  Neurovegitative: Fair appetite, fair sleep  Insight: Limited.    Judgment: Limited    Data  Labs:    CBC with Differential:    Lab Results   Component Value Date    WBC 4.4 2022    RBC 3.79 2022    HGB 9.6 2022    HCT 32.2 2022    HCT 41.3 2012     2022     2012    MCV 85.0 2022    MCH 25.3 2022    MCHC 29.8 2022    RDW 17.6 2022    LYMPHOPCT 30.8 2022    MONOPCT 10.7 2022    EOSPCT 1.3 2012    BASOPCT 0.2 2022    MONOSABS 0.50 2022    LYMPHSABS 1.4 2022    EOSABS 0.20 2022    BASOSABS 0.00 2022     CMP:    Lab Results   Component Value Date     2022     2012    K 4.5 2022    K 5.0 2022    K 4.6 2012     2022     2012    CO2 24 2022    BUN 17 2022    CREATININE 0.4 05/20/2022    CREATININE 0.4 01/06/2012    GFRAA >59 05/20/2022    LABGLOM >60 05/20/2022    GLUCOSE 76 05/20/2022    PROT 6.2 05/14/2022    PROT 6.9 04/05/2016    LABALBU 4.1 05/14/2022    LABALBU 4.3 01/06/2012    CALCIUM 8.3 05/20/2022    BILITOT <0.2 05/14/2022    ALKPHOS 157 05/14/2022    ALKPHOS 111 01/06/2012    AST 21 05/14/2022    ALT 15 05/14/2022       Medications  Current Facility-Administered Medications: nicotine (NICODERM CQ) 21 MG/24HR 1 patch, 1 patch, TransDERmal, Daily  guaiFENesin-dextromethorphan (ROBITUSSIN DM) 100-10 MG/5ML syrup 5 mL, 5 mL, Oral, 4x Daily  mirtazapine (REMERON) tablet 7.5 mg, 7.5 mg, Oral, Nightly  risperiDONE (RISPERDAL M-TABS) disintegrating tablet 1 mg, 1 mg, Oral, Q6H PRN  glucose chewable tablet 16 g, 4 tablet, Oral, PRN  dextrose bolus 10% 125 mL, 125 mL, IntraVENous, PRN **OR** dextrose bolus 10% 250 mL, 250 mL, IntraVENous, PRN  glucagon (rDNA) injection 1 mg, 1 mg, IntraMUSCular, PRN  dextrose 5 % solution, 100 mL/hr, IntraVENous, PRN  ferrous sulfate (IRON 325) tablet 325 mg, 325 mg, Oral, BID WC  enoxaparin (LOVENOX) injection 40 mg, 40 mg, SubCUTAneous, Daily  fentaNYL (SUBLIMAZE) injection 25 mcg, 25 mcg, IntraVENous, Q1H PRN  traZODone (DESYREL) tablet 50 mg, 50 mg, Oral, Nightly  sodium chloride flush 0.9 % injection 5-40 mL, 5-40 mL, IntraVENous, 2 times per day  sodium chloride flush 0.9 % injection 5-40 mL, 5-40 mL, IntraVENous, PRN  0.9 % sodium chloride infusion, , IntraVENous, PRN  ondansetron (ZOFRAN-ODT) disintegrating tablet 4 mg, 4 mg, Oral, Q8H PRN **OR** ondansetron (ZOFRAN) injection 4 mg, 4 mg, IntraVENous, Q6H PRN  polyethylene glycol (GLYCOLAX) packet 17 g, 17 g, Oral, Daily PRN  acetaminophen (TYLENOL) tablet 650 mg, 650 mg, Oral, Q6H PRN **OR** acetaminophen (TYLENOL) suppository 650 mg, 650 mg, Rectal, Q6H PRN  albuterol sulfate  (90 Base) MCG/ACT inhaler 2 puff, 2 puff, Inhalation, Q4H PRN  zinc sulfate (ZINCATE) capsule 50 mg, 50 mg, Oral, Daily  ascorbic acid (VITAMIN C) tablet 500 mg, 500 mg, Oral, BID  melatonin disintegrating tablet 5 mg, 5 mg, Oral, Nightly  insulin lispro (HUMALOG) injection vial 0-6 Units, 0-6 Units, SubCUTAneous, TID WC  insulin lispro (HUMALOG) injection vial 0-3 Units, 0-3 Units, SubCUTAneous, Nightly  atorvastatin (LIPITOR) tablet 20 mg, 20 mg, Oral, Daily  divalproex (DEPAKOTE ER) extended release tablet 1,000 mg, 1,000 mg, Oral, Daily  fluticasone (FLONASE) 50 MCG/ACT nasal spray 2 spray, 2 spray, Nasal, Daily  QUEtiapine (SEROQUEL) tablet 200 mg, 200 mg, Oral, Nightly  pantoprazole (PROTONIX) tablet 40 mg, 40 mg, Oral, QAM AC    ASSESSMENT AND PLAN    DSM-5 DIAGNOSIS:  Depression unspecified  Insomnia unspecified  Borderline personality disorder  Tobacco use disorder  Alcohol use  Anemia  COVID-19  UTI  COPD  Diabetes  Vitamin D deficiency  Vitamin B12 deficiency  Treatment noncompliance  Homelessness  Suicidal ideation, resolved    Recommendations  1. Currently patient is not suicidal, homicidal or psychotic. However, considering that there is no safe disposition plan at this time, I would recommend to transfer patient to psychiatric unit to restart patient's psychotropic medications, as well as to involve  to find a safe place, where discharge the patient from the hospital.  2.  Patient was cleared from [facility] today and she can be transferred to psychiatric unit when she is medically stable.

## 2022-05-23 PROBLEM — F32.A DEPRESSION, UNSPECIFIED: Status: ACTIVE | Noted: 2022-05-23

## 2022-05-23 LAB
GLUCOSE BLD-MCNC: 119 MG/DL (ref 70–99)
GLUCOSE BLD-MCNC: 127 MG/DL (ref 70–99)
GLUCOSE BLD-MCNC: 189 MG/DL (ref 70–99)
GLUCOSE BLD-MCNC: 81 MG/DL (ref 70–99)
PERFORMED ON: ABNORMAL
PERFORMED ON: NORMAL

## 2022-05-23 PROCEDURE — 6370000000 HC RX 637 (ALT 250 FOR IP): Performed by: HOSPITALIST

## 2022-05-23 PROCEDURE — 82947 ASSAY GLUCOSE BLOOD QUANT: CPT

## 2022-05-23 PROCEDURE — 90792 PSYCH DIAG EVAL W/MED SRVCS: CPT | Performed by: PSYCHIATRY & NEUROLOGY

## 2022-05-23 PROCEDURE — 1240000000 HC EMOTIONAL WELLNESS R&B

## 2022-05-23 PROCEDURE — 6370000000 HC RX 637 (ALT 250 FOR IP): Performed by: PSYCHIATRY & NEUROLOGY

## 2022-05-23 RX ORDER — ACETAMINOPHEN 325 MG/1
650 TABLET ORAL EVERY 4 HOURS PRN
Status: DISCONTINUED | OUTPATIENT
Start: 2022-05-23 | End: 2022-05-25 | Stop reason: HOSPADM

## 2022-05-23 RX ORDER — DEXTROSE MONOHYDRATE 50 MG/ML
100 INJECTION, SOLUTION INTRAVENOUS PRN
Status: DISCONTINUED | OUTPATIENT
Start: 2022-05-23 | End: 2022-05-25 | Stop reason: HOSPADM

## 2022-05-23 RX ADMIN — ACETAMINOPHEN 650 MG: 325 TABLET ORAL at 08:41

## 2022-05-23 RX ADMIN — OXYCODONE HYDROCHLORIDE AND ACETAMINOPHEN 500 MG: 500 TABLET ORAL at 21:36

## 2022-05-23 RX ADMIN — QUETIAPINE FUMARATE 200 MG: 200 TABLET ORAL at 21:36

## 2022-05-23 RX ADMIN — DIVALPROEX SODIUM 1000 MG: 500 TABLET, EXTENDED RELEASE ORAL at 10:09

## 2022-05-23 RX ADMIN — RISPERIDONE 1 MG: 1 TABLET, ORALLY DISINTEGRATING ORAL at 18:20

## 2022-05-23 RX ADMIN — PANTOPRAZOLE SODIUM 40 MG: 40 TABLET, DELAYED RELEASE ORAL at 06:30

## 2022-05-23 RX ADMIN — MIRTAZAPINE 7.5 MG: 7.5 TABLET ORAL at 21:37

## 2022-05-23 RX ADMIN — FERROUS SULFATE TAB 325 MG (65 MG ELEMENTAL FE) 325 MG: 325 (65 FE) TAB at 18:17

## 2022-05-23 RX ADMIN — ACETAMINOPHEN 650 MG: 325 TABLET ORAL at 21:36

## 2022-05-23 RX ADMIN — ATORVASTATIN CALCIUM 20 MG: 20 TABLET, FILM COATED ORAL at 10:09

## 2022-05-23 RX ADMIN — FLUTICASONE PROPIONATE 2 SPRAY: 50 SPRAY, METERED NASAL at 10:10

## 2022-05-23 RX ADMIN — FERROUS SULFATE TAB 325 MG (65 MG ELEMENTAL FE) 325 MG: 325 (65 FE) TAB at 10:55

## 2022-05-23 RX ADMIN — OXYCODONE HYDROCHLORIDE AND ACETAMINOPHEN 500 MG: 500 TABLET ORAL at 10:09

## 2022-05-23 RX ADMIN — Medication 5 MG: at 21:37

## 2022-05-23 RX ADMIN — ZINC SULFATE 220 MG (50 MG) CAPSULE 50 MG: CAPSULE at 10:09

## 2022-05-23 RX ADMIN — ACETAMINOPHEN 650 MG: 325 TABLET ORAL at 18:17

## 2022-05-23 RX ADMIN — GUAIFENESIN SYRUP AND DEXTROMETHORPHAN 5 ML: 100; 10 SYRUP ORAL at 10:10

## 2022-05-23 ASSESSMENT — PAIN - FUNCTIONAL ASSESSMENT
PAIN_FUNCTIONAL_ASSESSMENT: ACTIVITIES ARE NOT PREVENTED

## 2022-05-23 ASSESSMENT — PAIN DESCRIPTION - LOCATION
LOCATION: BACK

## 2022-05-23 ASSESSMENT — PAIN SCALES - GENERAL
PAINLEVEL_OUTOF10: 2
PAINLEVEL_OUTOF10: 8
PAINLEVEL_OUTOF10: 10
PAINLEVEL_OUTOF10: 6

## 2022-05-23 ASSESSMENT — PAIN DESCRIPTION - ORIENTATION
ORIENTATION: LOWER

## 2022-05-23 ASSESSMENT — PAIN DESCRIPTION - ONSET: ONSET: ON-GOING

## 2022-05-23 ASSESSMENT — PAIN DESCRIPTION - DESCRIPTORS
DESCRIPTORS: THROBBING

## 2022-05-23 ASSESSMENT — PAIN DESCRIPTION - FREQUENCY: FREQUENCY: INTERMITTENT

## 2022-05-23 ASSESSMENT — PAIN DESCRIPTION - PAIN TYPE: TYPE: CHRONIC PAIN

## 2022-05-23 NOTE — PROGRESS NOTES
[facility] Admission Note  Nursing Admission Note        Reason for Admission: The patient is a 74 y.o. female who presented to [address] [place] [address] ED complaining of suicidal thoughts. Pt has history of prior suicide attempt, intentional drug overdose, bipolar disorder, diabetes, gastric bypass surgery, HTN and  COPD. Pt tells me that she has had thoughts of taking an overdose of medication today. She denies having followed through with this plan. She reports 5 years ago having attempted suicide by drug overdose and drinking antifreeze. She tells me that she and her daughter are recently homeless.   Duration of symptoms-\"years\"    Patient Active Problem List   Diagnosis    Hypertension    Osteoarthritis    Psoriasis    COPD (chronic obstructive pulmonary disease) ([address]    Diabetes ([address]    Sleep apnea    Obesity    Depression    Frequent falls    Oxygen dependent    Anemia    Alternating constipation and diarrhea    Chronic nausea    S/P gastric bypass    Polypharmacy    Diabetic neuropathy associated with type 2 diabetes mellitus ([address]    Intentional drug overdose ([address]    Tobacco use disorder    Anticholinergic crisis, intentional self-harm, initial encounter ([address]    Suicide attempt ([address]    Borderline personality disorder ([address]    Suicidal ideation    History of suicide attempt    COVID-19 virus infection    Acute urinary tract infection    Suicidal thoughts         Addictive Behavior:   Addictive Behavior  In the Past 3 Months, Have You Felt or Has Someone Told You That You Have a Problem With  : None    Medical Problems:   Past Medical History:   Diagnosis Date    Alcohol overdose     history of    Anemia     Anxiety     Bipolar disorder ([address]     Borderline personality disorder ([address]     Brain tumor ([address]     Chronic back pain     ruptured discs    COPD (chronic obstructive pulmonary disease) ([address]     Dementia ([address]     Depression     Diabetes ([address]     Elevated liver enzymes     Frequent falls  Headache     History of kidney infection     Hyperlipidemia     Hypertension     Neuropathy     Obesity     Osteoarthritis     Oxygen dependent     Psoriasis     Scoliosis     Sleep apnea     UTI (urinary tract infection)        Status EXAM:  Mental Status and Behavioral Exam  Normal: No  Level of Assistance: Independent/Self  Facial Expression: Flat,Worried,Sad  Affect: Appropriate  Level of Consciousness: Alert  Frequency of Checks: 4 times per hour, close  Mood:Normal: No  Mood: Depressed,Anxious,Worthless, low self-esteem,Helpless,Sad  Motor Activity:Normal: No  Motor Activity: Decreased  Eye Contact: Fair  Observed Behavior: Cooperative,Withdrawn  Sexual Misconduct History: Current - no  Preception: [place] to person,[place] to time,[place] to situation,[place] to place  Attention:Normal: No  Attention: Unable to concentrate  Thought Processes: Circumstantial  Thought Content:Normal: No  Thought Content: Preoccupations  Depression Symptoms: Change in energy level,Feelings of helplessness,Feelings of hopelessess,Loss of interest,Feelings of worthlessness,Impaired concentration,Sleep disturbance  Anxiety Symptoms: Generalized  Emiliana Symptoms: No problems reported or observed.   Hallucinations: None  Delusions: No  Memory:Normal: No  Memory: Poor recent,Poor remote  Insight and Judgment: No  Insight and Judgment: Poor judgment,Poor insight      Metabolic Screening:    Lab Results   Component Value Date    LABA1C 5.2 05/22/2022     Lab Results   Component Value Date    CHOL 119 (L) 01/31/2022    CHOL 170 04/20/2021    CHOL 115 (L) 06/27/2020    CHOL 146 (L) 10/22/2019    CHOL 142 (L) 07/04/2019    CHOL 167 08/20/2017    CHOL 134 12/31/2015     Lab Results   Component Value Date    TRIG 124 01/31/2022    TRIG 188 (H) 04/20/2021    TRIG 128 06/27/2020    TRIG 125 10/22/2019    TRIG 143 07/04/2019    TRIG 258 (H) 08/20/2017    TRIG 162 12/31/2015     Lab Results   Component Value Date    HDL 49 (L) 01/31/2022    HDL 46 (L) 04/20/2021    HDL 38 (L) 06/27/2020    HDL 58 (L) 10/22/2019    HDL 48 (L) 07/04/2019    HDL 35 (L) 08/20/2017    HDL 44 12/31/2015     No components found for: LDLCAL  No results found for: LABVLDL    There is no height or weight on file to calculate BMI. BP Readings from Last 2 Encounters:   05/22/22 110/63   05/22/22 (!) 142/63       PATIENT STRENGTHS:       Patient Strengths and Limitations:         Tobacco Screening:  Practical Counseling, on admission, vivi X, if applicable and completed (first 3 are required if patient doesn't refuse):            Recognizing danger situations (included triggers and roadblocks)   yes              Coping skills (new ways to manage stress, exercise, relaxation techniques, changing routine, distraction  yes                                                    Basic information about quitting (benefits of quitting, techniques in how to quit, available resources yes  Referral for counseling faxed to [facility]     refused                                       Patient refused counseling yes  Patient has not smoked in the last 30 days no  Patient offered nicotine patch. Received yes  Refused na  Patient is a non-smoker na         Admission to Unit:    Pt admitted to [facility] under the care of Dr. Dara Epperson,  arrived on unit via [facility] with security and staff from [facility]     Patient arrived dressed in paper scrubs:  yes. Body assessment and safety check completed by Kip Wilhelm RN AND JOSE LOPEZ RN and  no contraband discovered. Patient belongings and valuables was cataloged and accounted for by Ilda Hilton RN. Admission completed by Kip Wilhelm RN  Oriented to unit, unit policy and expectations:  yes    Reviewed and explained all legal documents:  yes    Education for Fall Prevention and Restraints given: yes    Patient signed all legal documents yes   Pt verbalizes understanding:yes     Anuj Clutter Obtained? yes    Medical Bed:  Does patient require a medical bed?

## 2022-05-23 NOTE — PROGRESS NOTES
Behavioral Services  Medicare Certification Upon Admission    I certify that this patient's inpatient psychiatric hospital admission is medically necessary for:    [x] (1) Treatment which could reasonably be expected to improve this patient's condition,       [] (2) Or for diagnostic study;     AND     [x](2) The inpatient psychiatric services are provided while the individual is under the care of a physician and are included in the individualized plan of care.     Estimated length of stay/service 3-5 days     Plan for post-hospital care TBA    Electronically signed by Barb Go MD on 5/23/2022 at 7:46 AM

## 2022-05-23 NOTE — PROGRESS NOTES
[facility] Adult Unit Daily Assessment  Nursing Progress Note    Room: [facility]/616-01   Name: Raymond Long   Age: 74 y.o. Gender: female   Dx: Suicidal thoughts  Precautions: suicide risk, fall risk  Inpatient Status: voluntary       SLEEP:    Sleep Quality Good  Sleep Medications: Yes   PRN Sleep Meds: No       MEDICAL:    Other PRN Meds: Yes   Med Compliant: Yes  Accu-Chek: Yes  Oxygen/CPAP/BiPAP: No  CIWA/CINA: No   PAIN Assessment: headaches  Side Effects from medication: No    COVID Teaching:    Is Patient experiencing any respiratory symptoms (headache, fever, body aches, cough. Shubham Ra ): no  Patient educated by nursing to practice social distancing, wear masks, wash hands frequently: yes    Medical Bed:   Is patient in a medical bed? yes   If medical bed is in use, has nursing secured room while patient is awake and out of the room? yes  Has safety checks by nursing been completed on the bed/room this shift? yes    Protective Factors:    Patient identifies protective factors with nursing staff as follows: Identifies reasons for living: Yes   Supportive Social Network or family: Yes    Belief that suicide is immoral/high spirituality: Yes   Responsibility to family or others/living with family: Yes   Fear of death or dying due to pain and suffering: Yes   Engaged in work or school: No     If Patient is unable to identify, reason why? PSYCH:    Depression: 0   Anxiety: 6   SI denies suicidal ideation   HI Negative for homicidal ideation      AVH:Absent      GENERAL:    Appetite: good   Percent Meals:   Social: No   Speech: normal   Appearance: appropriately dressed and healthy looking    GROUP:    Group Participation: Yes  Participation Quality: Interactive    Notes: Patient was in the dayroom watching television by herself. Patient noted to be withdrawn, sad, low concentration, fair eye contact, and had a flat facial expression.  Patient stated that she was worried about her daughter because they both are homeless. Patient was tearful. Continue to monitor for safety.          Electronically signed by Maya Bermudez RN on 5/23/22 at 5:13 AM CDT

## 2022-05-23 NOTE — H&P
Department of Psychiatry  Attending History and Physical        CHIEF COMPLAINT:  \"I'm better\"    History obtained from: patient, chart    HISTORY OF PRESENT ILLNESS:    77-year-old white female with history of depression, borderline personality disorder, COPD,  diabetes, admitted to medicine with suicidal ideation and COVID positive. S/p quarantine. Patient is known to our service. Overdosed on Benadryl in January of this year. Patient has been calm and cooperative on our unit. Presents with brighter affect. Smiling. States she is doing better. Denies suicidal ideation. Sleeping and eating well. Worried about where she will go upon discharge. Processed her feelings. Supportive psychotherapy provided. Patient voiced no concerns. Psychiatric History:    Diagnoses: Bipolar disorder, borderline personality disorder  Suicide attempts/gestures: overdose on Benadryl in 2020 and 2022, overdose on pills and antifreeze in 2016.  History of cutting herself since early childhood, last time in 2000  Prior hospitalizations: several times to [facility], last time in 2022  Medication trials: Remeron, Zoloft, BuSpar, Vistaril, donepezil, Latuda, Abilify, Depakote, Seroquel. Mental health contact: Lost to follow-up      Substance Use History:  Drinks alcohol. Denies abuse and recent use. Smokes 0.5 PPD.     Past Medical History:    Past Medical History:   Diagnosis Date    Alcohol overdose     history of    Anemia     Anxiety     Bipolar disorder ([address]     Borderline personality disorder ([address]     Brain tumor ([address]     Chronic back pain     ruptured discs    COPD (chronic obstructive pulmonary disease) (HCC)     Dementia (HCC)     Depression     Diabetes ([address]     Elevated liver enzymes     Frequent falls     Headache     History of kidney infection     Hyperlipidemia     Hypertension     Neuropathy     Obesity     Osteoarthritis     Oxygen dependent     Psoriasis     Scoliosis     Sleep apnea     UTI (urinary tract infection)        Past Surgical History:    Past Surgical History:   Procedure Laterality Date    APPENDECTOMY       SECTION      CHOLECYSTECTOMY      GASTRIC BYPASS SURGERY      RETROPHYARYNGEAL ABCESS INCISION/DRAIN      TONSILLECTOMY      TUBAL LIGATION      TUMOR REMOVAL      bone tumor, r wrist     UPPER GASTROINTESTINAL ENDOSCOPY  2015    Tarik: sm hiatal hernia; s/p balbir-en-y; esoph plaques, pos yeast; neg CS       Medications Prior to Admission:   Prior to Admission medications    Medication Sig Start Date End Date Taking?  Authorizing Provider   divalproex (DEPAKOTE) 500 MG DR tablet  22   Historical Provider, MD   divalproex (DEPAKOTE ER) 500 MG extended release tablet Take 2 tablets by mouth daily 2/3/22 3/5/22  Opal Demarco MD   mirtazapine (REMERON) 7.5 MG tablet Take 1 tablet by mouth nightly 2/2/22 3/4/22  Opal Demarco MD   QUEtiapine (SEROQUEL) 200 MG tablet Take 1 tablet by mouth nightly 2/2/22 3/4/22  Opal Demarco MD   vitamin D (ERGOCALCIFEROL) 1.25 MG (19488 UT) CAPS capsule Take 1 capsule by mouth once a week for 11 doses 22  Opal Demarco MD   diclofenac (VOLTAREN) 75 MG EC tablet Take 1 tablet by mouth 2 times daily 10/1/21   JORGE Norrsi   linaCLOtide [facility]) 72 MCG CAPS capsule Take 1 capsule by mouth every morning (before breakfast)  Patient not taking: Reported on 5/15/2022 10/1/21   JORGE Norris   atorvastatin (LIPITOR) 20 MG tablet Take 1 tablet by mouth daily 10/1/21   JORGE Norris   ferrous sulfate (FEROSUL) 325 (65 Fe) MG tablet Take 1 tablet by mouth daily (with breakfast) 21   JORGE Norris   Liraglutide (VICTOZA) 18 MG/3ML SOPN SC injection Inject 1.8 mg into the skin daily 21   JORGE Norris   metFORMIN (GLUCOPHAGE) 500 MG tablet Take 1 tablet by mouth 2 times daily (with meals)  Patient not taking: Reported on 5/15/2022 7/26/21 JORGE Flanagan   furosemide (LASIX) 20 MG tablet Take 1 tablet by mouth daily 6/24/21   JORGE Flanagan   pantoprazole (PROTONIX) 40 MG tablet Take 1 tablet by mouth daily. 6/24/21   JORGE Flanagan   irbesartan (AVAPRO) 150 MG tablet Take 1 tablet by mouth nightly 6/24/21   JORGE Flanagan   Elastic Bandages & Supports (FUTURO SOFT CERVICAL COLLAR) [facility] Wear for 1 week. 3/30/21   [place] A JOREG Jack   clobetasol (TEMOVATE) 0.05 % ointment Apply topically 2 times daily. 1/19/21   JORGE Flanagan   melatonin 3 MG TABS tablet Take 1 tablet by mouth nightly 12/28/20   [employer] JORGE Jack   nystatin (MYCOSTATIN) 505385 UNIT/GM powder Apply 3 times daily. Patient not taking: Reported on 5/15/2022 7/30/20   JORGE Flanagan   albuterol sulfate HFA (VENTOLIN HFA) 108 (90 Base) MCG/ACT inhaler Inhale 2 puffs into the lungs every 6 hours as needed for Wheezing 7/30/20   JORGE Flanagan   Insulin Pen Needle (B-D ULTRAFINE III SHORT PEN) 31G X 8 MM MISC Inject 1 each as directed daily 10/22/19   JORGE Flanagan   ACCU-CHEK SOFTCLIX LANCETS MISC CHECK BLOOD SUGAR TWICE DAILY AND AS NEEDED 10/22/19   JORGE Flanagan   fluticasone Peg Slight) 50 MCG/ACT nasal spray 2 sprays by Nasal route daily 10/22/19   JORGE Flanagan   vitamin B-12 (CYANOCOBALAMIN) 500 MCG tablet Take 1 tablet by mouth daily 10/22/19 4/20/21  JORGE Flanagan   blood glucose monitor kit and supplies Test 3 times a day & as needed for symptoms of irregular blood glucose. Dx: 1/3/19   OJRGE Flanagan   blood glucose monitor strips Test 3 times a day & as needed for symptoms of irregular blood glucose.  1/3/19   JORGE Flanagan   ipratropium-albuterol (DUONEB) 0.5-2.5 (3) MG/3ML SOLN nebulizer solution Inhale 3 mLs into the lungs every 6 hours as needed for Shortness of Breath 10/2/18   JORGE Flanagan   Continuous Blood Gluc  Ward Rubio READER) MARCIA 1 Units by Does not apply route 2 times daily 8/21/18   Zelpha Pump, APRN   Continuous Blood Gluc Sensor (FREESTYLE ABDOULAYE SENSOR SYSTEM) MISC 1 Units by Does not apply route 2 times daily 8/21/18   Zelpha Pump, APRN       Allergies:  Haldol [haloperidol lactate], Codeine, and Morphine    Social History:  Homeless    Family History:   Family History   Problem Relation Age of Onset    Diabetes Mother     Kidney Disease Mother     High Blood Pressure Mother     Cancer Mother     Ovarian Cancer Mother 43    Diabetes Father     High Blood Pressure Father     Heart Disease Father     Diabetes Brother     High Blood Pressure Brother     Diabetes Sister     High Blood Pressure Sister     Diabetes Brother     High Blood Pressure Brother     Cancer Maternal Aunt     Colon Cancer Neg Hx     Colon Polyps Neg Hx     Esophageal Cancer Neg Hx     Liver Cancer Neg Hx     Liver Disease Neg Hx     Rectal Cancer Neg Hx     Stomach Cancer Neg Hx      No history of psychiatric illness or suicide attempts. REVIEW OF SYSTEMS:  General: No fevers, chills, night sweats, no recent weight loss or gain. Head: No headache, no migraine. Eyes: No recent visual changes. Ears: No recent hearing changes. Nose: No increased congestion or change in sense of smell. Throat: No sore throat, no trouble swallowing. Cardiovascular: No chest pain or palpitations, or dizziness. Respiratory: No cough, wheezes, congestion, or shortness of breath. Gastrointestinal: No abdominal pain, nausea or vomiting, no diarrhea or constipation. Musculo-skeletal: No edema, deformities, or loss of functions. Neurological: No loss of consciousness, abnormal sensations, or weakness. Skin: No rash, abrasions or bruises. PHYSICAL EXAM:  GENERAL APPEARANCE: 74y.o. year-old female in NAD   HEAD: Normocephalic, atraumatic. THROAT: No erythema, exudates, lesions. No tongue laceration.    CARDIOVASCULAR: PMI nondisplaced. Regular rhythm and rate. Normal S1 and S2.  PULMONARY: Clear to auscultation bilaterally, no tenderness to palpation. ABDOMEN: Soft, nontender, nondistended. MUSCULOSKELTAL: No obvious deformities, clubbing, cyanosis or edema, no spinous process or paraspinous tenderness, normal ROM, distal pulses intact symmetric 2+ bilaterally. NEUROLOGICAL: Alert, oriented x 3, CN II-XII grossly intact, motor strength 5/5 all muscle groups, DTR 2+ intact and symmetric, sensation intact to sharp and dull. No abnormal movements or tremors. SKIN: Warm, dry, intact, no rash, abrasions bruises     Vitals:  /72   Pulse 82   Temp 98 °F (36.7 °C) (Oral)   Resp 16   LMP  (LMP Unknown)   SpO2 100%     Mental Status Examination:    Appearance: Stated age. Gait stable. No abnormal movements or tremor. Behavior: Calm, cooperative  Speech: Normal in tone, volume, and quality. No slurring, dysarthria or pressured speech noted. Mood: \"Better \"   Affect: Mood congruent. Thought Process: Appears linear. Thought Content: Denies SI/HI. No overt delusions or paranoia appreciated. Perceptions: Denies auditory or visual hallucinations at present time. Not responding to internal stimuli. Concentration: Intact. Orientation: to person, place, date, and situation. Language: Intact. Fund of information: Intact. Memory: Recent and remote appear intact. Neurovegitative: Fair appetite and sleep. Insight: Improving. Judgment: Improving.     DATA:  Lab Results   Component Value Date    WBC 4.4 (L) 05/20/2022    HGB 9.6 (L) 05/20/2022    HCT 32.2 (L) 05/20/2022    MCV 85.0 05/20/2022     05/20/2022     Lab Results   Component Value Date     05/20/2022    K 4.5 05/20/2022     05/20/2022    CO2 24 05/20/2022    BUN 17 05/20/2022    CREATININE 0.4 (L) 05/20/2022    GLUCOSE 76 05/20/2022    CALCIUM 8.3 (L) 05/20/2022    PROT 6.2 (L) 05/14/2022    LABALBU 4.1 05/14/2022    BILITOT <0.2 05/14/2022    ALKPHOS 157 (H) 05/14/2022    AST 21 05/14/2022    ALT 15 05/14/2022    LABGLOM >60 05/20/2022    GFRAA >59 05/20/2022    GLOB 3.1 05/09/2017           ASSESSMENT AND PLAN:  DSM-5 DIAGNOSIS:   Impression:  Depression unspecified  Insomnia unspecified  Borderline personality disorder  Tobacco use disorder  Alcohol use  Anemia  COVID-19  COPD  Diabetes  Vitamin D deficiency  Vitamin B12 deficiency    Patient is meeting the criteria for inpatient psychiatric treatment. Work on disposition. DC tomorrow if stable. Plan:   -Admit to HI Unit and monitor on 15 minute checks. Suicide and fall precautions.  Terri Jacobs reviewed. -Gather collateral information from family with release.  -Medical monitoring to be performed by Dr. Suyapa Cervantes and associates. Order routine labs. Glycemic control.   -Acclimate to the unit. Provide supportive psychotherapy.  -Encourage participation in groups and therapeutic activities as appropriate. Work on coping skills. -Medications:    Continue current regimen. Depakote level tomorrow.    Offer nicotine patch to help with nicotine withdrawal.    -The risks, benefits, side effects, indications, contraindications, and adverse effects of the medications have been discussed.  -The patient has verbalized understanding and has capacity to give informed consent.  -SW help evaluate home environment and provide outpatient resources.  -Discuss with treatment team.

## 2022-05-23 NOTE — PROGRESS NOTES
Group Note    Number of Participants in Group: 6  Number of Patients on Unit:6      Patient attended group:Yes  Reason for Absence:  Intervention for patient absence:        Type of Group:   Wrap-Up/Relaxation    Patient's Goal: See wrap up group sheet    Participation Level:  interview          Patient Response to Learning: Yes    Patient's Behavior: Anxious and Withdrawn    Is Patient Social/Interacting: No    Relaxation:   Television:Yes   Reading:No   Game/Puzzle:No   Phone: No       Notes/Comments:      Please see patient's wrap up group sheet for patient's comments       Electronically signed by Lesvia Hsieh RN on 5/23/22 at 6:00 AM CDT

## 2022-05-23 NOTE — PLAN OF CARE
Problem: Coping  Goal: Pt/Family able to verbalize concerns and demonstrate effective coping strategies  Description: INTERVENTIONS:  1. Assist patient/family to identify coping skills, available support systems and cultural and spiritual values  2. Provide emotional support, including active listening and acknowledgement of concerns of patient and caregivers  3. Reduce environmental stimuli, as able  4. Instruct patient/family in relaxation techniques, as appropriate  5. Assess for spiritual pain/suffering and initiate Spiritual Care, Psychosocial Clinical Specialist consults as needed  Outcome: Progressing  Note:                                                                     Group Therapy Note    Date: 5/23/2022  Start Time: 1000  End Time:  1100  Number of Participants: 6    Type of Group: Psychoeducation    Wellness Binder Information  Module Name:  [address] [place] [address]  Session Number:  1    Group Goal for Pt: To improve knowledge of practical facts about depression    Notes:  Pt demonstrated improved knowledge of practical facts about depression by actively participating in group activity. Status After Intervention:  Unchanged    Participation Level:  Active Listener and Interactive    Participation Quality: Appropriate and Attentive      Speech:  normal      Thought Process/Content: Logical      Affective Functioning: Congruent      Mood: anxious and depressed      Level of consciousness:  Alert and Oriented x4      Response to Learning: Able to verbalize current knowledge/experience, Able to verbalize/acknowledge new learning, and Progressing to goal      Endings: None Reported    Modes of Intervention: Education      Discipline Responsible: Psychoeducational Specialist      Signature:  Magnus Gordon

## 2022-05-23 NOTE — PLAN OF CARE
Problem: Coping  Goal: Pt/Family able to verbalize concerns and demonstrate effective coping strategies  Description: INTERVENTIONS:  1. Assist patient/family to identify coping skills, available support systems and cultural and spiritual values  2. Provide emotional support, including active listening and acknowledgement of concerns of patient and caregivers  3. Reduce environmental stimuli, as able  4. Instruct patient/family in relaxation techniques, as appropriate  5. Assess for spiritual pain/suffering and initiate Spiritual Care, Psychosocial Clinical Specialist consults as needed  5/23/2022 1420 by Jock Hodgkin  Outcome: Progressing  Note:                                                                     Group Therapy Note    Date: 5/23/2022  Start Time: 1330  End Time:  1400  Number of Participants: 5    Type of Group: Cognitive Skills    Wellness Binder Information  Module Name:  Stress  Session Number:  5    Group Goal for Pt: To raise awareness of the effectiveness of diversionary coping skills    Notes:  Pt demonstrated improved awareness of the effectiveness of diversionary coping skills by actively participating in group activity. Status After Intervention:  Unchanged    Participation Level:  Active Listener and Interactive    Participation Quality: Appropriate and Attentive      Speech:  normal      Thought Process/Content: Logical      Affective Functioning: Congruent      Mood: anxious and depressed      Level of consciousness:  Alert and Oriented x4      Response to Learning: Able to verbalize current knowledge/experience, Able to verbalize/acknowledge new learning, and Progressing to goal      Endings: None Reported    Modes of Intervention: Education      Discipline Responsible: Psychoeducational Specialist      Signature:  Jock Hodgkin

## 2022-05-23 NOTE — PROGRESS NOTES
SW met with treatment team to discuss pt's progress and setbacks. SW 2 was present. Pt was admitted, voluntary, for depression, SI with a plan, pt would not tell what her plan is, has hx of psychiatric treatment/admissions, hx of SA by OD, identifies current stressors as being homeless, reports not sleeping, poor appetite, feelings of hopelessness/helplessness, denies HI/AVH. Since admission, pt reportedly was cooperative with admission process, alert/oriented x 4, slept well last night, with medications, appetite is good, attends scheduled group activities, isolative to self, independent with ADLS, compliant with medications, behavior has been withdrawn, affect flat, denies depression, reports moderate anxiety, denies SI/HI/AVH, continue current treatment plan.

## 2022-05-23 NOTE — PROGRESS NOTES
Requirement Note     SW met with pt to complete Psychosocial and CSSR-S on this date. Patients long and short term goals discussed. Patient voiced understanding. Treatment plan sheet signed. Patient verbalized understanding of the treatment plan. Patient participated in goals and objectives of the treatment plan. Patient discussed safety plan with . In the last 6 months has the pt been a danger to self: YES  In the last 6 months has the pt been a danger to others: NO  Legal Guardian/POA: NO     Patient received all components practical counseling of tobacco practical counseling during the hospital stay. Pt was provided with resources and SW is helping to find placement due to current homelessness.     Electronically signed by DOMINICK Ayala on 5/23/2022 at 3:19 PM

## 2022-05-23 NOTE — PROGRESS NOTES
Admission Note      Reason for admission/Target Symptom: Patient admitted to [facility] due to:Patient and daughter picked up by EMS at [facility] with initial complaint of being \"hot. \"  Patient states she was dx with Covid 2 days ago. On the way to hospital, patient stated to EMS that she and her daughter were going to commit suicide today, did not say how. Diagnoses: Depression unspecified, Suicidal ideation, Insomnia unspecified  Borderline personality disorder, Tobacco use disorder, Alcohol use, Anemia  COVID-19, UTI, COPD, Diabetes, vitamin D deficiency, Vitamin B12 deficiency  UDS: Neg  BAL:  Neg    SW met with treatment team to discuss patient's treatment including care planning, discharge planning, and follow-up needs. Pt has been admitted to [facility]. Treatment team has identified patient's discharge needs as medication management and outpatient therapy/counseling. Pt confirmed  the need for ongoing treatment post inpatient stay. Pt was also provided a handout of contact information for drug and alcohol treatment centers and other community support service such as EL, AA, and [facility].

## 2022-05-24 PROBLEM — U07.1 COVID-19 VIRUS INFECTION: Status: RESOLVED | Noted: 2022-05-14 | Resolved: 2022-05-24

## 2022-05-24 PROBLEM — Z78.9 ALCOHOL USE: Chronic | Status: ACTIVE | Noted: 2022-05-24

## 2022-05-24 PROBLEM — R45.851 SUICIDAL IDEATION: Status: RESOLVED | Noted: 2022-05-14 | Resolved: 2022-05-24

## 2022-05-24 PROBLEM — F32.A DEPRESSION, UNSPECIFIED: Status: RESOLVED | Noted: 2022-05-23 | Resolved: 2022-05-24

## 2022-05-24 PROBLEM — N39.0 ACUTE URINARY TRACT INFECTION: Status: RESOLVED | Noted: 2022-05-14 | Resolved: 2022-05-24

## 2022-05-24 PROBLEM — T50.902A INTENTIONAL DRUG OVERDOSE (HCC): Status: RESOLVED | Noted: 2020-06-25 | Resolved: 2022-05-24

## 2022-05-24 PROBLEM — R45.851 SUICIDAL THOUGHTS: Status: RESOLVED | Noted: 2022-05-22 | Resolved: 2022-05-24

## 2022-05-24 PROBLEM — T44.3X2A: Status: RESOLVED | Noted: 2022-01-28 | Resolved: 2022-05-24

## 2022-05-24 LAB
CHOLESTEROL, TOTAL: 131 MG/DL (ref 160–199)
GLUCOSE BLD-MCNC: 110 MG/DL (ref 70–99)
GLUCOSE BLD-MCNC: 141 MG/DL (ref 70–99)
GLUCOSE BLD-MCNC: 92 MG/DL (ref 70–99)
HBA1C MFR BLD: 5.6 % (ref 4–6)
HDLC SERPL-MCNC: 44 MG/DL (ref 65–121)
LDL CHOLESTEROL CALCULATED: 55 MG/DL
PERFORMED ON: ABNORMAL
PERFORMED ON: ABNORMAL
PERFORMED ON: NORMAL
TRIGL SERPL-MCNC: 159 MG/DL (ref 0–149)
VALPROIC ACID LEVEL: 55.7 UG/ML (ref 50–100)

## 2022-05-24 PROCEDURE — 6370000000 HC RX 637 (ALT 250 FOR IP): Performed by: PSYCHIATRY & NEUROLOGY

## 2022-05-24 PROCEDURE — 80061 LIPID PANEL: CPT

## 2022-05-24 PROCEDURE — 99233 SBSQ HOSP IP/OBS HIGH 50: CPT | Performed by: PSYCHIATRY & NEUROLOGY

## 2022-05-24 PROCEDURE — 1240000000 HC EMOTIONAL WELLNESS R&B

## 2022-05-24 PROCEDURE — 6370000000 HC RX 637 (ALT 250 FOR IP): Performed by: HOSPITALIST

## 2022-05-24 PROCEDURE — 36415 COLL VENOUS BLD VENIPUNCTURE: CPT

## 2022-05-24 PROCEDURE — 80164 ASSAY DIPROPYLACETIC ACD TOT: CPT

## 2022-05-24 PROCEDURE — 83036 HEMOGLOBIN GLYCOSYLATED A1C: CPT

## 2022-05-24 PROCEDURE — 82947 ASSAY GLUCOSE BLOOD QUANT: CPT

## 2022-05-24 RX ORDER — ERGOCALCIFEROL 1.25 MG/1
50000 CAPSULE ORAL WEEKLY
Qty: 11 CAPSULE | Refills: 1 | Status: SHIPPED | OUTPATIENT
Start: 2022-05-24 | End: 2022-08-03

## 2022-05-24 RX ORDER — ASCORBIC ACID 500 MG
500 TABLET ORAL 2 TIMES DAILY
Qty: 60 TABLET | Refills: 1 | Status: SHIPPED | OUTPATIENT
Start: 2022-05-24 | End: 2022-06-23

## 2022-05-24 RX ORDER — PANTOPRAZOLE SODIUM 40 MG/1
40 TABLET, DELAYED RELEASE ORAL
Qty: 30 TABLET | Refills: 1 | Status: SHIPPED | OUTPATIENT
Start: 2022-05-25 | End: 2022-06-24

## 2022-05-24 RX ORDER — MIRTAZAPINE 7.5 MG/1
7.5 TABLET, FILM COATED ORAL NIGHTLY
Qty: 30 TABLET | Refills: 1 | Status: SHIPPED | OUTPATIENT
Start: 2022-05-24 | End: 2022-06-23

## 2022-05-24 RX ORDER — FERROUS SULFATE 325(65) MG
325 TABLET ORAL 2 TIMES DAILY WITH MEALS
Qty: 60 TABLET | Refills: 1 | Status: SHIPPED | OUTPATIENT
Start: 2022-05-24 | End: 2022-06-23

## 2022-05-24 RX ORDER — ATORVASTATIN CALCIUM 20 MG/1
20 TABLET, FILM COATED ORAL DAILY
Qty: 30 TABLET | Refills: 1 | Status: SHIPPED | OUTPATIENT
Start: 2022-05-24 | End: 2022-06-23

## 2022-05-24 RX ORDER — ZINC SULFATE 50(220)MG
50 CAPSULE ORAL DAILY
Qty: 30 CAPSULE | Refills: 1 | COMMUNITY
Start: 2022-05-25 | End: 2022-06-24

## 2022-05-24 RX ORDER — CHOLECALCIFEROL (VITAMIN D3) 125 MCG
500 CAPSULE ORAL DAILY
Qty: 30 TABLET | Refills: 1 | Status: SHIPPED | OUTPATIENT
Start: 2022-05-24 | End: 2022-06-23

## 2022-05-24 RX ORDER — QUETIAPINE FUMARATE 200 MG/1
200 TABLET, FILM COATED ORAL NIGHTLY
Qty: 30 TABLET | Refills: 1 | Status: SHIPPED | OUTPATIENT
Start: 2022-05-24 | End: 2022-06-23

## 2022-05-24 RX ORDER — TRAZODONE HYDROCHLORIDE 50 MG/1
50 TABLET ORAL NIGHTLY
Qty: 30 TABLET | Refills: 1 | Status: SHIPPED | OUTPATIENT
Start: 2022-05-24 | End: 2022-06-23

## 2022-05-24 RX ORDER — DIVALPROEX SODIUM 500 MG/1
1000 TABLET, EXTENDED RELEASE ORAL DAILY
Qty: 60 TABLET | Refills: 1 | Status: SHIPPED | OUTPATIENT
Start: 2022-05-25 | End: 2022-06-24

## 2022-05-24 RX ADMIN — FERROUS SULFATE TAB 325 MG (65 MG ELEMENTAL FE) 325 MG: 325 (65 FE) TAB at 08:59

## 2022-05-24 RX ADMIN — ZINC SULFATE 220 MG (50 MG) CAPSULE 50 MG: CAPSULE at 08:59

## 2022-05-24 RX ADMIN — DIVALPROEX SODIUM 1000 MG: 500 TABLET, EXTENDED RELEASE ORAL at 08:59

## 2022-05-24 RX ADMIN — FERROUS SULFATE TAB 325 MG (65 MG ELEMENTAL FE) 325 MG: 325 (65 FE) TAB at 17:20

## 2022-05-24 RX ADMIN — QUETIAPINE FUMARATE 200 MG: 200 TABLET ORAL at 20:39

## 2022-05-24 RX ADMIN — MIRTAZAPINE 7.5 MG: 7.5 TABLET ORAL at 20:39

## 2022-05-24 RX ADMIN — ACETAMINOPHEN 650 MG: 325 TABLET ORAL at 20:37

## 2022-05-24 RX ADMIN — OXYCODONE HYDROCHLORIDE AND ACETAMINOPHEN 500 MG: 500 TABLET ORAL at 08:59

## 2022-05-24 RX ADMIN — Medication 5 MG: at 20:39

## 2022-05-24 RX ADMIN — ATORVASTATIN CALCIUM 20 MG: 20 TABLET, FILM COATED ORAL at 08:59

## 2022-05-24 RX ADMIN — OXYCODONE HYDROCHLORIDE AND ACETAMINOPHEN 500 MG: 500 TABLET ORAL at 20:39

## 2022-05-24 RX ADMIN — PANTOPRAZOLE SODIUM 40 MG: 40 TABLET, DELAYED RELEASE ORAL at 06:16

## 2022-05-24 RX ADMIN — TRAZODONE HYDROCHLORIDE 50 MG: 50 TABLET ORAL at 20:39

## 2022-05-24 ASSESSMENT — PAIN DESCRIPTION - DESCRIPTORS
DESCRIPTORS: ACHING;THROBBING
DESCRIPTORS: OTHER (COMMENT)
DESCRIPTORS: ACHING;THROBBING

## 2022-05-24 ASSESSMENT — PAIN SCALES - GENERAL
PAINLEVEL_OUTOF10: 7
PAINLEVEL_OUTOF10: 7
PAINLEVEL_OUTOF10: 3

## 2022-05-24 ASSESSMENT — PAIN DESCRIPTION - ORIENTATION
ORIENTATION: LOWER

## 2022-05-24 ASSESSMENT — PAIN SCALES - WONG BAKER
WONGBAKER_NUMERICALRESPONSE: 2
WONGBAKER_NUMERICALRESPONSE: 0

## 2022-05-24 ASSESSMENT — PAIN DESCRIPTION - FREQUENCY
FREQUENCY: INTERMITTENT

## 2022-05-24 ASSESSMENT — PAIN DESCRIPTION - LOCATION
LOCATION: BACK

## 2022-05-24 ASSESSMENT — PAIN - FUNCTIONAL ASSESSMENT
PAIN_FUNCTIONAL_ASSESSMENT: ACTIVITIES ARE NOT PREVENTED

## 2022-05-24 ASSESSMENT — PAIN DESCRIPTION - PAIN TYPE
TYPE: ACUTE PAIN;CHRONIC PAIN
TYPE: CHRONIC PAIN;ACUTE PAIN

## 2022-05-24 ASSESSMENT — PAIN DESCRIPTION - ONSET: ONSET: ON-GOING

## 2022-05-24 NOTE — PLAN OF CARE
Problem: Chronic Conditions and Co-morbidities  Goal: Patient's chronic conditions and co-morbidity symptoms are monitored and maintained or improved  5/24/2022 1534 by Ismael Lamb RN  Outcome: Not Progressing  5/24/2022 0453 by Shannon Thomas RN  Outcome: Progressing     Problem: Self Harm/Suicidality  Goal: Will have no self-injury during hospital stay  5/24/2022 1534 by Ismael Lamb RN  Outcome: Progressing  5/24/2022 0453 by Shannon Thomas RN  Outcome: Progressing     Problem: Depression  Goal: Will be euthymic at discharge  5/24/2022 1534 by Ismael Lamb RN  Outcome: Progressing  5/24/2022 0453 by Shannon Thomas RN  Outcome: Progressing     Problem: Behavior  Goal: Pt/Family maintain appropriate behavior and adhere to behavioral management agreement, if implemented  5/24/2022 1534 by Ismael Lamb RN  Outcome: Not Progressing  5/24/2022 0453 by Shannon Thomas RN  Outcome: Progressing     Problem: Anxiety  Goal: Will report anxiety at manageable levels  5/24/2022 1534 by Ismael Lamb RN  Outcome: Not Progressing  Flowsheets (Taken 5/24/2022 1515)  Will report anxiety at manageable levels: Teach and rehearse alternative coping skills  5/24/2022 0453 by Shannon Thomas RN  Outcome: Progressing     Problem: Drug Abuse/Detox  Goal: Will have no detox symptoms and will verbalize plan for changing drug-related behavior  5/24/2022 1534 by Ismael Lamb RN  Outcome: Progressing  5/24/2022 0453 by Shannon Thomas RN  Outcome: Progressing     Problem: Sleep Disturbance  Goal: Will exhibit normal sleeping pattern  5/24/2022 1534 by Ismael Lamb RN  Outcome: Progressing  5/24/2022 0453 by Shannon Thomas RN  Outcome: Progressing     Problem: Self Care Deficit  Goal: Return ADL status to a safe level of function  5/24/2022 1534 by Ismael Lamb RN  Outcome: Not Progressing  5/24/2022 0453 by Shannon Thomas RN  Outcome: Progressing     Problem: Spiritual Care  Goal: Pt/Family able to move forward in process of forgiving self, others, and/or higher power  5/24/2022 0453 by Hilary Howell RN  Outcome: Progressing  Goal: Pt feels balance and connection with higher power that empowers the self during times of loss, guilt and fear  5/24/2022 1534 by Nataly Peralta RN  Outcome: Progressing  5/24/2022 0453 by Hilary Howell RN  Outcome: Progressing     Problem: Safety - Adult  Goal: Free from fall injury  5/24/2022 1534 by Nataly Peralta RN  Outcome: Progressing  5/24/2022 0453 by Hilary Howell RN  Outcome: Progressing     Problem: Coping  Goal: Pt/Family able to verbalize concerns and demonstrate effective coping strategies  5/24/2022 1534 by Nataly Peralta RN  Outcome: Not Progressing  Flowsheets (Taken 5/24/2022 1515)  Patient/family able to verbalize anxieties, fears, and concerns, and demonstrate effective coping: Assist patient/family to identify coping skills, available support systems and cultural and spiritual values  5/24/2022 1335 by Edgar Ernst  Outcome: Progressing  Note:                                                                     Group Therapy Note    Date: 5/24/2022  Start Time: 1300  End Time:  1330  Number of Participants: 3    Type of Group: Cognitive Skills    Wellness Binder Information  Module Name:  Emotional Wellness  Session Number:  1    Group Goal for Pt: To raise awareness of the importance of healthy emotional expression    Notes:  Pt demonstrated improved awareness of the importance of healthy emotional expression by actively participating in group activity. Status After Intervention:  Unchanged    Participation Level:  Active Listener and Interactive    Participation Quality: Appropriate and Attentive      Speech:  normal      Thought Process/Content: Logical      Affective Functioning: Congruent      Mood: anxious and depressed      Level of consciousness:  Alert and Oriented x4      Response to Learning: Able to verbalize current knowledge/experience, Able to verbalize/acknowledge new learning, and Progressing to goal      Endings: None Reported    Modes of Intervention: Education      Discipline Responsible: Psychoeducational Specialist      Signature:  Stephani Armijo    5/24/2022 1129 by Stephani Armijo  Outcome: Progressing  Note:                                                                     Group Therapy Note    Date: 5/24/2022  Start Time: 1000  End Time:  1100  Number of Participants: 4    Type of Group: Psychoeducation    Wellness Binder Information  Module Name:  Women's Issues  Session Number:  4    Group Goal for Pt: To raise awareness of how thoughts influence feelings    Notes:  Pt demonstrated improved awareness of how thoughts influence feelings by actively participating in group discussion. Status After Intervention:  Unchanged    Participation Level:  Active Listener    Participation Quality: Appropriate and Attentive      Speech:  normal      Thought Process/Content: Logical      Affective Functioning: Congruent      Mood: anxious and depressed      Level of consciousness:  Alert and Oriented x4      Response to Learning: Able to verbalize current knowledge/experience, Able to verbalize/acknowledge new learning, and Progressing to goal      Endings: None Reported    Modes of Intervention: Education      Discipline Responsible: Psychoeducational Specialist      Signature:  Stephani Armijo    5/24/2022 0453 by Charline Tanner RN  Outcome: Progressing     Problem: Pain  Goal: Verbalizes/displays adequate comfort level or baseline comfort level  5/24/2022 1534 by Shaquille Sharif RN  Outcome: Progressing  5/24/2022 0453 by Charline Tanner RN  Outcome: Progressing

## 2022-05-24 NOTE — PROGRESS NOTES
WRAP UP GROUP NOTE:     Patient's Goal:  Providing feedback as to their own progress in the care-plan provided. Pt's have an opportunity to explore self-reflective skills and share any additional cares and concerns not yet addressed. Pt effectively participated. Energy level:  Normal   Appetite:  Good   Concentration:   Good   Hallucinations:  Gone   Depression:  Improved   Anxiety:  On/off   How I worked today:  I feel I achieved/tried a lot  What helps me sleep:  Read   Any questions/complaints/comments:  No   Group/activities that helped me today were:  Life skills--what to improve depression;  Self-enhancement--same; Therapeutic recreation--what improves depression;  Seeing doctor--saw several doctors, all good; One-to-one with staff--one-to-one with numerous staff all good.

## 2022-05-24 NOTE — PLAN OF CARE
Problem: Coping  Goal: Pt/Family able to verbalize concerns and demonstrate effective coping strategies  Description: INTERVENTIONS:  1. Assist patient/family to identify coping skills, available support systems and cultural and spiritual values  2. Provide emotional support, including active listening and acknowledgement of concerns of patient and caregivers  3. Reduce environmental stimuli, as able  4. Instruct patient/family in relaxation techniques, as appropriate  5. Assess for spiritual pain/suffering and initiate Spiritual Care, Psychosocial Clinical Specialist consults as needed  5/24/2022 1129 by Miguel Bradford  Outcome: Progressing  Note:                                                                     Group Therapy Note    Date: 5/24/2022  Start Time: 1000  End Time:  1100  Number of Participants: 4    Type of Group: Psychoeducation    Wellness Binder Information  Module Name:  Women's Issues  Session Number:  4    Group Goal for Pt: To raise awareness of how thoughts influence feelings    Notes:  Pt demonstrated improved awareness of how thoughts influence feelings by actively participating in group discussion. Status After Intervention:  Unchanged    Participation Level:  Active Listener    Participation Quality: Appropriate and Attentive      Speech:  normal      Thought Process/Content: Logical      Affective Functioning: Congruent      Mood: anxious and depressed      Level of consciousness:  Alert and Oriented x4      Response to Learning: Able to verbalize current knowledge/experience, Able to verbalize/acknowledge new learning, and Progressing to goal      Endings: None Reported    Modes of Intervention: Education      Discipline Responsible: Psychoeducational Specialist      Signature:  Miguel Bradfodr

## 2022-05-24 NOTE — PROGRESS NOTES
[facility] Daily Shift Assessment-Geriatric Unit  Nursing Progress Note          Room: [address] [place] [address]-   Name: Adri Barrera   Age: 74 y.o. Gender: female   Dx: Suicidal thoughts  Precautions: suicide risk and fall risk  Inpatient Status: voluntary     SLEEP:    Sleep: Yes,   Sleep Quality Fair   Hours Slept: 6   Sleep Medications: Yes  PRN Sleep Meds: No       MEDICAL:      Other PRN Meds: No   Med Compliant: No   Accu-Chek: Yes -119 at HS   Oxygen/CPAP/BiPAP: No  CIWA/CINA: No   PAIN Assessment: present - adequately treated  Side Effects from medication: No    COVID TEACHING:    Is Patient experiencing any respiratory symptoms (headache, fever, body aches, cough. Tuliodettrolo Ou ): no  Patient educated by nursing to practice social distancing, wear masks, wash hands frequently: yes    Medical Bed:   Is patient in a medical bed? yes   If medical bed is in use, has nursing secured room while patient is awake and out of the room? yes  Has safety checks by nursing been completed on the bed/room this shift? yes    Protective Factors:    Patient identifies protective factors with nursing staff as follows: Identifies reasons for living: Yes   Supportive Social Network or family: No    Belief that suicide is immoral/high spirituality: Yes   Responsibility to family or others/living with family: Yes   Fear of death or dying due to pain and suffering: No   Engaged in work or school: No     If Patient is unable to identify, reason why?        Metabolic Screening:    Lab Results   Component Value Date    LABA1C 5.2 05/22/2022       Lab Results   Component Value Date    CHOL 119 (L) 01/31/2022    CHOL 170 04/20/2021    CHOL 115 (L) 06/27/2020    CHOL 146 (L) 10/22/2019    CHOL 142 (L) 07/04/2019    CHOL 167 08/20/2017    CHOL 134 12/31/2015     Lab Results   Component Value Date    TRIG 124 01/31/2022    TRIG 188 (H) 04/20/2021    TRIG 128 06/27/2020    TRIG 125 10/22/2019    TRIG 143 07/04/2019    TRIG 258 (H) 08/20/2017    TRIG 162 12/31/2015 Lab Results   Component Value Date    HDL 49 (L) 01/31/2022    HDL 46 (L) 04/20/2021    HDL 38 (L) 06/27/2020    HDL 58 (L) 10/22/2019    HDL 48 (L) 07/04/2019    HDL 35 (L) 08/20/2017    HDL 44 12/31/2015     No components found for: LDLCAL  No results found for: LABVLDL      There is no height or weight on file to calculate BMI. BP Readings from Last 2 Encounters:   05/23/22 105/73   05/22/22 (!) 142/63         PSYCH:     SI denies suicidal ideation    HI Negative for homicidal ideation        AVH:Absent      Depression: 0 Anxiety: 2       GENERAL:      Appetite: good  Percent Meals:    Social: Yes Speech: normal   Appearance:appropriately dressed and disheveled  Assistive Devices: noneLevel of Assist: Independent      GROUP:    Group Participation: Yes  Participation LevelMinimal    Participation QualityAppropriate    Notes:   Patient up in the TV area watching TV, then came to the day area and was using the exercise bike. Patient has been cooperative tonight. Watched TV with other patients then went to bed and asked for a book to read for a while. Patient has rested well during the night. Will continue to monitor for safety.           Electronically signed by Stan Cedillo RN on 5/24/22 at 5:07 AM CDT

## 2022-05-24 NOTE — PLAN OF CARE
Problem: Coping  Goal: Pt/Family able to verbalize concerns and demonstrate effective coping strategies  Description: INTERVENTIONS:  1. Assist patient/family to identify coping skills, available support systems and cultural and spiritual values  2. Provide emotional support, including active listening and acknowledgement of concerns of patient and caregivers  3. Reduce environmental stimuli, as able  4. Instruct patient/family in relaxation techniques, as appropriate  5. Assess for spiritual pain/suffering and initiate Spiritual Care, Psychosocial Clinical Specialist consults as needed  5/24/2022 1335 by Treva Dawn  Outcome: Progressing  Note:                                                                     Group Therapy Note    Date: 5/24/2022  Start Time: 1300  End Time:  1330  Number of Participants: 3    Type of Group: Cognitive Skills    Wellness Binder Information  Module Name:  Emotional Wellness  Session Number:  1    Group Goal for Pt: To raise awareness of the importance of healthy emotional expression    Notes:  Pt demonstrated improved awareness of the importance of healthy emotional expression by actively participating in group activity. Status After Intervention:  Unchanged    Participation Level:  Active Listener and Interactive    Participation Quality: Appropriate and Attentive      Speech:  normal      Thought Process/Content: Logical      Affective Functioning: Congruent      Mood: anxious and depressed      Level of consciousness:  Alert and Oriented x4      Response to Learning: Able to verbalize current knowledge/experience, Able to verbalize/acknowledge new learning, and Progressing to goal      Endings: None Reported    Modes of Intervention: Education      Discipline Responsible: Psychoeducational Specialist      Signature:  Treva Dawn

## 2022-05-24 NOTE — H&P
HISTORY and PHYSICAL      CHIEF COMPLAINT:  Depression, SI    Reason for Admission:  Depression, SI    History Obtained From:  patient    HISTORY OF PRESENT ILLNESS:      The patient is a 74 y.o. female who is admitted to the [place] [address] unit with worsening mood issues. She has no c/o CP or SOA. No abdominal pain or N/V. No dysuria. No new pain complaints. No fevers. No HA or dizziness.    Past Medical History:        Diagnosis Date    Alcohol overdose     history of    Anemia     Anxiety     Bipolar disorder ([facility])     Borderline [facility] disorder [facility]     Brain tumor ([facility]     Chronic back pain     ruptured discs    COPD (chronic obstructive pulmonary disease) ([facility])     Dementia ([facility])     Depression     Diabetes ([facility]     Elevated liver enzymes     Frequent falls     Headache     History of kidney infection     Hyperlipidemia     Hypertension     Neuropathy     Obesity     Osteoarthritis     Oxygen dependent     Psoriasis     Scoliosis     Sleep apnea     UTI (urinary tract infection)      Past Surgical History:        Procedure Laterality Date    APPENDECTOMY       SECTION      CHOLECYSTECTOMY      GASTRIC BYPASS SURGERY      RETROPHYARYNGEAL ABCESS INCISION/DRAIN      TONSILLECTOMY      TUBAL LIGATION      TUMOR REMOVAL      bone tumor, r wrist     UPPER GASTROINTESTINAL ENDOSCOPY  2015    Tarik: sm hiatal hernia; s/p balbir-en-y; esoph plaques, pos yeast; neg CS         Medications Prior to Admission:    Medications Prior to Admission: divalproex (DEPAKOTE) 500 MG DR tablet,   divalproex (DEPAKOTE ER) 500 MG extended release tablet, Take 2 tablets by mouth daily  mirtazapine (REMERON) 7.5 MG tablet, Take 1 tablet by mouth nightly  QUEtiapine (SEROQUEL) 200 MG tablet, Take 1 tablet by mouth nightly  vitamin D (ERGOCALCIFEROL) 1.25 MG (55108 UT) CAPS capsule, Take 1 capsule by mouth once a week for 11 doses  diclofenac (VOLTAREN) 75 MG EC tablet, Take 1 tablet by mouth 2 times daily  linaCLOtide (LINZESS) 72 MCG CAPS capsule, Take 1 capsule by mouth every morning (before breakfast) (Patient not taking: Reported on 5/15/2022)  atorvastatin (LIPITOR) 20 MG tablet, Take 1 tablet by mouth daily  ferrous sulfate (FEROSUL) 325 (65 Fe) MG tablet, Take 1 tablet by mouth daily (with breakfast)  Liraglutide (VICTOZA) 18 MG/3ML SOPN SC injection, Inject 1.8 mg into the skin daily  metFORMIN (GLUCOPHAGE) 500 MG tablet, Take 1 tablet by mouth 2 times daily (with meals) (Patient not taking: Reported on 5/15/2022)  furosemide (LASIX) 20 MG tablet, Take 1 tablet by mouth daily  pantoprazole (PROTONIX) 40 MG tablet, Take 1 tablet by mouth daily. irbesartan (AVAPRO) 150 MG tablet, Take 1 tablet by mouth nightly  Elastic Bandages & Supports (FUTURO SOFT CERVICAL COLLAR) MISC, Wear for 1 week. clobetasol (TEMOVATE) 0.05 % ointment, Apply topically 2 times daily. melatonin 3 MG TABS tablet, Take 1 tablet by mouth nightly  nystatin (MYCOSTATIN) 648945 UNIT/GM powder, Apply 3 times daily. (Patient not taking: Reported on 5/15/2022)  albuterol sulfate HFA (VENTOLIN HFA) 108 (90 Base) MCG/ACT inhaler, Inhale 2 puffs into the lungs every 6 hours as needed for Wheezing  Insulin Pen Needle (B-D ULTRAFINE III SHORT PEN) 31G X 8 MM MISC, Inject 1 each as directed daily  ACCU-CHEK SOFTCLIX LANCETS MISC, CHECK BLOOD SUGAR TWICE DAILY AND AS NEEDED  fluticasone (FLONASE) 50 MCG/ACT nasal spray, 2 sprays by Nasal route daily  vitamin B-12 (CYANOCOBALAMIN) 500 MCG tablet, Take 1 tablet by mouth daily  blood glucose monitor kit and supplies, Test 3 times a day & as needed for symptoms of irregular blood glucose. Dx:  blood glucose monitor strips, Test 3 times a day & as needed for symptoms of irregular blood glucose.   ipratropium-albuterol (DUONEB) 0.5-2.5 (3) MG/3ML SOLN nebulizer solution, Inhale 3 mLs into the lungs every 6 hours as needed for Shortness of Breath  Continuous Blood Gluc  (FREESTYLE ABDOULAYE READER) MARCIA, 1 Units by Does not apply route 2 times daily  Continuous Blood Gluc Sensor (FREESTYLE ABDOULAYE SENSOR SYSTEM) MISC, 1 Units by Does not apply route 2 times daily    Allergies:  Haldol [haloperidol lactate], Codeine, and Morphine    Social History:   TOBACCO:   reports that she has been smoking cigarettes. She has a 25.00 pack-year smoking history. She has never used smokeless tobacco.  ETOH:   reports current alcohol use. DRUGS:   reports current drug use. Drug: Marijuana Lita Ing). MARITAL STATUS:    OCCUPATION:  Not working  Patient currently is homeless      Family History:       Problem Relation Age of Onset    Diabetes Mother     Kidney Disease Mother     High Blood Pressure Mother     Cancer Mother     Ovarian Cancer Mother 43    Diabetes Father     High Blood Pressure Father     Heart Disease Father     Diabetes Brother     High Blood Pressure Brother     Diabetes Sister     High Blood Pressure Sister     Diabetes Brother     High Blood Pressure Brother     Cancer Maternal Aunt     Colon Cancer Neg Hx     Colon Polyps Neg Hx     Esophageal Cancer Neg Hx     Liver Cancer Neg Hx     Liver Disease Neg Hx     Rectal Cancer Neg Hx     Stomach Cancer Neg Hx      REVIEW OF SYSTEMS:  Constitutional: neg  CV: neg  Pulmonary: neg  GI: neg  : neg  Psych: depression, SI  Neuro: neg  Skin: neg  MusculoSkeletal: neg  HEENT: neg  Joints: neg    Vitals:  /73   Pulse 105   Temp 97.1 °F (36.2 °C) (Temporal)   Resp 18   LMP  (LMP Unknown)   SpO2 97%     PHYSICAL EXAM:  Gen: NAD, alert  HEENT: WNL  Lymph: no LAD  Neck: no JVD or masses  Chest: CTA bilat  CV: RRR  Abdomen: NT/ND  Extrem: no C/C/E  Neuro: non focal  Skin: no rashes  Joints: no redness    DATA:  I have reviewed the admission labs and imaging tests.     ASSESSMENT AND PLAN:      Patient Active Hospital Problem List:   Depression, Suicidal intent--follow with Psych    HTN---follow BP    DM2---follow glucose   Chronic Pain---supportive care   Anemia   CPD---no issues              Marivel Cardona MD  11:01 PM 5/23/2022

## 2022-05-24 NOTE — PROGRESS NOTES
Department of Psychiatry  Attending Progress Note     Chief complaint: \"I'm ok\"    SUBJECTIVE:   Chart reviewed, discussed with the team. No major issues overnight. Patient is med-compliant. No SEs. Performs ADLs. Social and goes to groups. Patient seen in the TV area. Denies SI/HI/AVH. Rates depression 4/10, anxiety 3/10. Slept 6h. Denies physical complaints. States she can stay with a  upon discharge. When asked if she talked to him, states \"No, but I know how to find him. \" We discussed the need for safe discharge. Her sister is currently out of town, coming back tomorrow. Referrals sent to [facility] yesterday. OBJECTIVE    Physical  Wt Readings from Last 3 Encounters:   05/14/22 164 lb 3 oz (74.5 kg)   11/07/21 200 lb (90.7 kg)   09/17/21 200 lb (90.7 kg)     Temp Readings from Last 3 Encounters:   05/24/22 96.6 °F (35.9 °C) (Temporal)   05/22/22 97.6 °F (36.4 °C) (Axillary)   02/03/22 96.4 °F (35.8 °C) (Temporal)     BP Readings from Last 3 Encounters:   05/24/22 (!) 145/80   05/22/22 (!) 142/63   02/03/22 110/78     Pulse Readings from Last 3 Encounters:   05/24/22 85   05/22/22 76   02/03/22 93        Review of Systems: 14-point review of systems negative except as described above    Mental Status Examination:   Appearance:  Stated age. Gait stable. No abnormal movements or tremor. Behavior: Calm, cooperative  Speech: Normal in tone, volume, and quality. No slurring, dysarthria or pressured speech noted. Mood: \"I'm ok \"   Affect: Mood congruent. Thought Process: Appears linear. Thought Content:  Denies SI/HI. No overt delusions or paranoia appreciated. Perceptions: Denies auditory or visual hallucinations at present time. Not responding to internal stimuli. Concentration: Intact. Orientation: to person, place, date, and situation. Language: Intact. Fund of information: Intact. Memory: Recent and remote appear intact. Neurovegitative: Improved appetite and sleep.    Insight: Improving. Judgment: Improving.     Data  Lab Results   Component Value Date    WBC 4.4 (L) 05/20/2022    HGB 9.6 (L) 05/20/2022    HCT 32.2 (L) 05/20/2022    MCV 85.0 05/20/2022     05/20/2022      Lab Results   Component Value Date     05/20/2022    K 4.5 05/20/2022     05/20/2022    CO2 24 05/20/2022    BUN 17 05/20/2022    CREATININE 0.4 (L) 05/20/2022    GLUCOSE 76 05/20/2022    CALCIUM 8.3 (L) 05/20/2022    PROT 6.2 (L) 05/14/2022    LABALBU 4.1 05/14/2022    BILITOT <0.2 05/14/2022    ALKPHOS 157 (H) 05/14/2022    AST 21 05/14/2022    ALT 15 05/14/2022    LABGLOM >60 05/20/2022    GFRAA >59 05/20/2022    GLOB 3.1 05/09/2017       Medications    Current Facility-Administered Medications:     mineral oil-hydrophilic petrolatum (AQUAPHOR) ointment, , Topical, BID PRN, Manuel Cespedes MD    glucose chewable tablet 16 g, 4 tablet, Oral, PRN, Manuel Cespedes MD    dextrose bolus 10% 125 mL, 125 mL, IntraVENous, PRN **OR** dextrose bolus 10% 250 mL, 250 mL, IntraVENous, PRN, Manuel Cespedes MD    glucagon (rDNA) injection 1 mg, 1 mg, IntraMUSCular, PRN, Manuel Cespedes MD    dextrose 5 % solution, 100 mL/hr, IntraVENous, PRN, Manuel Cespedes MD    acetaminophen (TYLENOL) tablet 650 mg, 650 mg, Oral, Q4H PRN, Opal Demarco MD    guaiFENesin-dextromethorphan (ROBITUSSIN DM) 100-10 MG/5ML syrup 5 mL, 5 mL, Oral, 4x Daily, Elizabeth Echavarria MD, 5 mL at 05/23/22 1010    albuterol (PROVENTIL) nebulizer solution 2.5 mg, 2.5 mg, Nebulization, Q4H PRN, Elizabeth Echavarria MD    ascorbic acid (VITAMIN C) tablet 500 mg, 500 mg, Oral, BID, Elizabeth Echavarria MD, 500 mg at 05/24/22 1644    ferrous sulfate (IRON 325) tablet 325 mg, 325 mg, Oral, BID WC, Elizabeth Echavarria MD, 325 mg at 05/24/22 0859    fluticasone (FLONASE) 50 MCG/ACT nasal spray 2 spray, 2 spray, Nasal, Daily, Elizabeth Echavarria MD, 2 spray at 05/23/22 1010    insulin lispro (HUMALOG) injection vial 0-3 Units, 0-3 Units, SubCUTAneous, Nightly, Vinicio Barber MD, 1 Units at 05/22/22 2021    melatonin disintegrating tablet 5 mg, 5 mg, Oral, Nightly, Vinicio Barber MD, 5 mg at 05/23/22 2137    mirtazapine (REMERON) tablet 7.5 mg, 7.5 mg, Oral, Nightly, Vinicio Barber MD, 7.5 mg at 05/23/22 2137    nicotine (NICODERM CQ) 21 MG/24HR 1 patch, 1 patch, TransDERmal, Daily, Vinicio Barber MD, 1 patch at 05/23/22 1008    pantoprazole (PROTONIX) tablet 40 mg, 40 mg, Oral, QAM AC, Vinicio Barber MD, 40 mg at 05/24/22 2743    QUEtiapine (SEROQUEL) tablet 200 mg, 200 mg, Oral, Nightly, Vinicio Barber MD, 200 mg at 05/23/22 2136    risperiDONE (RISPERDAL M-TABS) disintegrating tablet 1 mg, 1 mg, Oral, Q6H PRN, Vinicio Barber MD, 1 mg at 05/23/22 1820    traZODone (DESYREL) tablet 50 mg, 50 mg, Oral, Nightly, Vinicio Barber MD, 50 mg at 05/22/22 2021    zinc sulfate (ZINCATE) capsule 50 mg, 50 mg, Oral, Daily, Vinicio Barber MD, 50 mg at 05/24/22 9920    polyethylene glycol (GLYCOLAX) packet 17 g, 17 g, Oral, Daily PRN, Odilon Silver MD    atorvastatin (LIPITOR) tablet 20 mg, 20 mg, Oral, Daily, Vinicio Barber MD, 20 mg at 05/24/22 6681    divalproex (DEPAKOTE ER) extended release tablet 1,000 mg, 1,000 mg, Oral, Daily, Vinicio Barber MD, 1,000 mg at 05/24/22 5544    ASSESSMENT AND PLAN  DSM 5 DIAGNOSIS  Impression  Bipolar I disorder, most recent episode depressed  Insomnia unspecified  Borderline personality disorder  Tobacco use disorder  Alcohol use  Anemia  COVID-19  COPD  Diabetes  Vitamin D deficiency  Vitamin B12 deficiency     Continue to observe. Work on safe disposition. Plan:   1. Psychiatric Medications:   Continue current psychotropic medications as recommended. Monitor for side effects. The risks, benefits, side effects, indications, contraindications, alternatives and adverse effects of the medications have been discussed with patient. 2. Continue to provide supportive psychotherapy. Encourage socialization and participation in recreational activities. Work on coping skills. 3. Medical Issues:    Continue medical monitoring by Dr. Rosa Elena Meade and associates. 4. Disposition:     to provide outpatient resources and facilitate disposition.      Amount of time spent with patient:      35 minutes with greater than 50 % of the time spent in counseling and collaboration of care

## 2022-05-24 NOTE — PLAN OF CARE
Problem: Chronic Conditions and Co-morbidities  Goal: Patient's chronic conditions and co-morbidity symptoms are monitored and maintained or improved  Outcome: Progressing     Problem: Self Harm/Suicidality  Goal: Will have no self-injury during hospital stay  Description: INTERVENTIONS:  1. Q 30 MINUTES: Routine safety checks  2. Q SHIFT & PRN: Assess risk to determine if routine checks are adequate to maintain patient safety  Outcome: Progressing     Problem: Depression  Goal: Will be euthymic at discharge  Description: INTERVENTIONS:  1. Administer medication as ordered  2. Provide emotional support via 1:1 interaction with staff  3. Encourage involvement in milieu/groups/activities  4. Monitor for social isolation  Outcome: Progressing     Problem: Behavior  Goal: Pt/Family maintain appropriate behavior and adhere to behavioral management agreement, if implemented  Description: INTERVENTIONS:  1. Assess patient/family's coping skills and  non-compliant behavior (including use of illegal substances)  2. Notify security of behavior or suspected illegal substances which indicate the need for search of the patient and/or belongings  3. Encourage verbalization of thoughts and concerns in a socially appropriate manner  4. Utilize positive, consistent limit setting strategies supporting safety of patient, staff and others  5. Encourage participation in the decision making process about the behavioral management agreement  6. Implement a Health Care Agreement if patient meets criteria  7. If a patient's behavior jeopardizes the safety of the patient, staff, or others refer to organization policy. If a visitor's behavior poses a threat to safety call refer to organization policy. 8. Initiate consult with , Psychosocial CNS, Spiritual Care as appropriate  Outcome: Progressing     Problem: Anxiety  Goal: Will report anxiety at manageable levels  Description: INTERVENTIONS:  1.  Administer medication as ordered  2. Teach and rehearse alternative coping skills  3. Provide emotional support with 1:1 interaction with staff  Outcome: Progressing     Problem: Drug Abuse/Detox  Goal: Will have no detox symptoms and will verbalize plan for changing drug-related behavior  Description: INTERVENTIONS:  1. Administer medication as ordered  2. Monitor physical status  3. Provide emotional support with 1:1 interaction with staff  4. Encourage  recovery focused treatment   Outcome: Progressing     Problem: Sleep Disturbance  Goal: Will exhibit normal sleeping pattern  Description: INTERVENTIONS:  1. Administer medication as ordered  2. Decrease environmental stimuli, including noise, as appropriate  3. Discourage social isolation and naps during the day  Outcome: Progressing     Problem: Self Care Deficit  Goal: Return ADL status to a safe level of function  Description: INTERVENTIONS:  1. Administer medication as ordered  2. Assess ADL deficits and provide assistive devices as needed  3. Obtain PT/OT consults as needed  4. Assist and instruct patient to increase activity and self care as tolerated  Outcome: Progressing     Problem: Spiritual Care  Goal: Pt/Family able to move forward in process of forgiving self, others, and/or higher power  Description: INTERVENTIONS:  1. Assist patient to overcome blocks to healing by use of spiritual practices (prayer, meditation, guided imagery, reiki, breath work, etc). 2. De-myth guilt and help patient/family identify possible irrational spiritual/cultural beliefs and values. 3. Explore possibilities of making amends & reconciliation with self, others, and/or a greater power. 4. Guide patient/family in identifying painful feelings of guilt. 5. Help patient explore and identify spiritual beliefs, cultural understandings or values that may help or hinder letting go of issue. 6. Help patient explore feelings of anger, bitterness, resentment.   7. Help patient/family identify and examine the situation in which these feelings are experienced. 8. Help patient/family identify destructive displacement of feelings onto other individuals. 9. Invite use of sacraments/rituals/ceremonies as appropriate (e.g. - confession, anointing, smudging). 10. Refer patient to formal counseling and/or to adalberto community for further support work. Outcome: Progressing  Goal: Pt feels balance and connection with higher power that empowers the self during times of loss, guilt and fear  Description: INTERVENTIONS:  1. Create safety for patient through empathic presence and non-judgmental listening. 2. Encourage patient to explore his/her values, beliefs and/or spiritual images and practices. 3. Encourage use of breath work, imagery, meditation, relaxation, reiki to ease distress and provide healing. 4. Encourage use of cultural and spiritual celebrations and rituals. 5. Facilitate discussion that helps patient sort out spiritual concerns. 6. Help patient identify where meaning/hope/comfort & strength are in his/her life. 7. Refer patient to adalberto community for assistance, as appropriate. 8. Respond to patient/family need for prayer/ritual/sacrament/ceremony. Outcome: Progressing     Problem: Safety - Adult  Goal: Free from fall injury  Outcome: Progressing     Problem: Coping  Goal: Pt/Family able to verbalize concerns and demonstrate effective coping strategies  Description: INTERVENTIONS:  1. Assist patient/family to identify coping skills, available support systems and cultural and spiritual values  2. Provide emotional support, including active listening and acknowledgement of concerns of patient and caregivers  3. Reduce environmental stimuli, as able  4. Instruct patient/family in relaxation techniques, as appropriate  5.  Assess for spiritual pain/suffering and initiate Spiritual Care, Psychosocial Clinical Specialist consults as needed  Outcome: Progressing     Problem: Pain  Goal: Verbalizes/displays adequate comfort level or baseline comfort level  Outcome: Progressing

## 2022-05-24 NOTE — PROGRESS NOTES
SW met with treatment team to discuss pt's progress and setbacks. SW 2 was present. Pt reportedly slept fair last night, with medications, appetite is good, attends scheduled group activities, social with peers/staff, independent with ADLS, compliant with medications, behavior has been cooperative, denies depression, reports mild anxiety, denies SI/HI/AVH, will be discharged today, follow-up appointments will be scheduled.
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
## 2022-05-24 NOTE — DISCHARGE SUMMARY
Discharge Summary     Patient ID:  Guevara Hess  [id number]  28 y.o.  1955    Admit date: 5/22/2022  Discharge date: 5/24/2022    Admitting Physician: Latrell Philip MD   Attending Physician: Clement Valadez MD  Discharge Provider: Clement Valadez MD     Admission Diagnoses:   Depression unspecified  Insomnia unspecified  Borderline personality disorder  Tobacco use disorder  Alcohol use  Anemia  COVID-19  COPD  Diabetes  Vitamin D deficiency  Vitamin B12 deficiency  Psoriasis    Discharge Diagnoses:   Depression unspecified  Insomnia unspecified  Borderline personality disorder  Tobacco use disorder  Alcohol use  Anemia  COVID-19  COPD  Diabetes  Vitamin D deficiency  Vitamin B12 deficiency  Psoriasis    Admission Condition: poor    Discharged Condition: stable    Indication for Admission: safety concern     CHIEF COMPLAINT:  \"I'm better\"     History obtained from: patient, chart     HISTORY OF PRESENT ILLNESS:    61-year-old white female with history of depression, borderline personality disorder, COPD,  diabetes, admitted to medicine with suicidal ideation and COVID positive. S/p quarantine. Patient is known to our service.  Overdosed on Benadryl in January of this year.     Patient has been calm and cooperative on our unit. Presents with brighter affect. Smiling. States she is doing better. Denies suicidal ideation. Sleeping and eating well. Worried about where she will go upon discharge. Processed her feelings. Supportive psychotherapy provided.   Patient voiced no concerns.     Psychiatric History:    Diagnoses: Bipolar disorder, borderline personality disorder  Suicide attempts/gestures: overdose on Benadryl in 2020 and 2022, overdose on pills and antifreeze in 2016.  History of cutting herself since early childhood, last time in 2000  Prior hospitalizations: several times to [facility], last time in 2022  Medication trials: Remeron, Zoloft, BuSpar, Vistaril, donepezil, Latuda, Abilify, Depakote, Seroquel. Mental health contact: Lost to follow-up      Substance Use History:  Drinks alcohol.  Denies abuse and recent use.  Smokes 0.5 PPD. Hospital Course:  Patient was admitted to the behavioral health floor and was acclimated to the unit. She was placed on suicide and fall precautions. Labs were reviewed and physical exam was completed by Dr. Marie Delaney and associates. Home medications were reconciled. DENISSE was obtained and reviewed. Depakote and Seroquel were continued for mood stabilization. Trazodone was given for sleep. Glycemic control was ensured. Patient tolerated all the medications well and without side effects. Follow up with PCP was recommended. Patient attended and participated in groups. All interactions with the peers and staff members were appropriate. Behaviorally, she was not a problem. There was no evidence of suicidality or psychosis. Sleep and appetite improved. With the above-mentioned medications changes as well as psychotherapeutic interventions, patient reported considerable improvement in her condition and requested discharge home. It was felt that patient was at her baseline. Patient has no access to guns at home. Her sister agreed to take her home. On 5/24/2022 it was therefore decided to discharge the patient, as it was felt that she received maximal benefit from her hospitalization. This patient is not suicidal, homicidal or psychotic at discharge. She does not present danger to self or others.       Number of antipsychotic medication prescribed at discharge: 1  IF MORE THAN ONE IS USED: NA    History of greater than 3 failed multiple monotherapy trials: NA  Monotherapy taper plan/ cross taper in progress: NA  Augmentation of Clozapine: NA    Referral to addiction treatment: patient refused    Prescription for Alcohol or Drug Disorder Medication: patient refused    Prescription for Tobacco Cessation medication: none    If no prescriptions for Tobacco Cessation must document why: patient refused    Consults: Internal medicine    Significant Diagnostic Studies:   Lab Results   Component Value Date    WBC 4.4 (L) 05/20/2022    HGB 9.6 (L) 05/20/2022    HCT 32.2 (L) 05/20/2022    MCV 85.0 05/20/2022     05/20/2022     Lab Results   Component Value Date     05/20/2022    K 4.5 05/20/2022     05/20/2022    CO2 24 05/20/2022    BUN 17 05/20/2022    CREATININE 0.4 (L) 05/20/2022    GLUCOSE 76 05/20/2022    CALCIUM 8.3 (L) 05/20/2022    PROT 6.2 (L) 05/14/2022    LABALBU 4.1 05/14/2022    BILITOT <0.2 05/14/2022    ALKPHOS 157 (H) 05/14/2022    AST 21 05/14/2022    ALT 15 05/14/2022    LABGLOM >60 05/20/2022    GFRAA >59 05/20/2022    GLOB 3.1 05/09/2017         Lab Results   Component Value Date    LLLQDBOC88 262 05/14/2022     Lab Results   Component Value Date    VITD25 32.3 05/14/2022    VITD25 33.0 05/14/2022     Lab Results   Component Value Date    CHOL 131 (L) 05/24/2022    CHOL 119 (L) 01/31/2022    CHOL 170 04/20/2021     Lab Results   Component Value Date    TRIG 159 (H) 05/24/2022    TRIG 124 01/31/2022    TRIG 188 (H) 04/20/2021     Lab Results   Component Value Date    HDL 44 (L) 05/24/2022    HDL 49 (L) 01/31/2022    HDL 46 (L) 04/20/2021     Lab Results   Component Value Date    LDLCALC 55 05/24/2022    LDLCALC 45 01/31/2022    LDLCALC 86 04/20/2021     Lab Results   Component Value Date    VLDL 32 12/31/2015     No results found for: [facility]  Lab Results   Component Value Date    LABA1C 5.6 05/24/2022     No results found for: [facility]  Lab Results   Component Value Date    TSHFT4 2.48 01/31/2022    TSH 3.840 05/14/2022       Treatments: RN and SW    Mental status examination at the time of discharge:  Alert, Oriented X 4  Appearance:  Improved Hygiene, smiling  Speech with Regular Rate and Rhythm  Eye Contact:  Good  No Psychomotor Agitation/Retardation Noted  Attitude:  Cooperative  Mood:   \"So much better\"  Affective: Congruent, appropriate to the situation, with a normal range and intensity  Thought Processes:  Coherently communicated, logical and goal oriented  Thought Content:  No Suicidal Ideation, No Homicidal Ideation, No Auditory or Visual Hallucinations, NO Overt Delusions  Insight: Improved  Judgement: Improved  Memory is intact for both remote and recent  Intellectual Functioning:  Within the [address] of Knowledge:  Adequate  Attention and Concentration:  Adequate    Discharge Exam:  Please, see medical note    Disposition: home with sister    Patient Instructions:   Current Discharge Medication List      START taking these medications    Details   ascorbic acid (VITAMIN C) 500 MG tablet Take 1 tablet by mouth 2 times daily  Qty: 60 tablet, Refills: 1      mineral oil-hydrophilic petrolatum (AQUAPHOR) ointment Apply topically as needed.   Qty: 396 g, Refills: 1      traZODone (DESYREL) 50 MG tablet Take 1 tablet by mouth nightly  Qty: 30 tablet, Refills: 1      zinc sulfate (ZINCATE) 220 (50 Zn) MG capsule Take 1 capsule by mouth daily  Qty: 30 capsule, Refills: 1         CONTINUE these medications which have CHANGED    Details   atorvastatin (LIPITOR) 20 MG tablet Take 1 tablet by mouth daily  Qty: 30 tablet, Refills: 1    Associated Diagnoses: Mixed hyperlipidemia      divalproex (DEPAKOTE ER) 500 MG extended release tablet Take 2 tablets by mouth daily  Qty: 60 tablet, Refills: 1      ferrous sulfate (IRON 325) 325 (65 Fe) MG tablet Take 1 tablet by mouth 2 times daily (with meals)  Qty: 60 tablet, Refills: 1      metFORMIN (GLUCOPHAGE) 500 MG tablet Take 1 tablet by mouth 2 times daily (with meals)  Qty: 60 tablet, Refills: 1    Associated Diagnoses: Type 2 diabetes mellitus without complication, unspecified whether long term insulin use (HCC)      mirtazapine (REMERON) 7.5 MG tablet Take 1 tablet by mouth nightly  Qty: 30 tablet, Refills: 1      pantoprazole (PROTONIX) 40 MG tablet Take 1 tablet by mouth every morning (before breakfast)  Qty: 30 tablet, Refills: 1      QUEtiapine (SEROQUEL) 200 MG tablet Take 1 tablet by mouth nightly  Qty: 30 tablet, Refills: 1    Associated Diagnoses: Manic episode ([address] Bipolar disorder, in full remission, most recent episode mixed ([facility])      vitamin B-12 (CYANOCOBALAMIN) 500 MCG tablet Take 1 tablet by mouth daily  Qty: 30 tablet, Refills: 1      vitamin D (ERGOCALCIFEROL) 1.25 MG (15446 UT) CAPS capsule Take 1 capsule by mouth once a week for 11 doses  Qty: 11 capsule, Refills: 1         CONTINUE these medications which have NOT CHANGED    Details   Elastic Bandages & Supports (FUTURO SOFT CERVICAL COLLAR) [address] [facility] Wear for 1 week. Qty: 1 each, Refills: 0      melatonin 3 MG TABS tablet Take 1 tablet by mouth nightly  Qty: 90 tablet, Refills: 3      albuterol sulfate HFA (VENTOLIN HFA) 108 (90 Base) MCG/ACT inhaler Inhale 2 puffs into the lungs every 6 hours as needed for Wheezing  Qty: 3 Inhaler, Refills: 3    Associated Diagnoses: Shortness of breath; Oxygen dependent      Insulin Pen Needle (B-D ULTRAFINE III SHORT PEN) 31G X 8 MM MISC Inject 1 each as directed daily  Qty: 100 each, Refills: 5      ACCU-CHEK SOFTCLIX LANCETS MISC CHECK BLOOD SUGAR TWICE DAILY AND AS NEEDED  Qty: 300 each, Refills: 3      fluticasone (FLONASE) 50 MCG/ACT nasal spray 2 sprays by Nasal route daily  Qty: 1 Bottle, Refills: 3    Associated Diagnoses: Right ear pain; Dysfunction of right eustachian tube      blood glucose monitor kit and supplies Test 3 times a day & as needed for symptoms of irregular blood glucose. Dx:  Obed Chaitanya: 1 kit, Refills: 0    Comments: Brand per patient preference. May round up to next available package size.   Associated Diagnoses: Other diabetic neurological complication associated with type 2 diabetes mellitus (HCC)      Continuous Blood Gluc  (FREESTYLE ABDOULAYE READER) MARCIA 1 Units by Does not apply route 2 times daily  Qty: 1 Device, Refills: 0      Continuous Blood Gluc Sensor ([address] [place] [address]) MISC 1 Units by Does not apply route 2 times daily  Qty: 1 each, Refills: 0         STOP taking these medications       divalproex (DEPAKOTE) 500 MG DR tablet Comments:   Reason for Stopping:         diclofenac (VOLTAREN) 75 MG EC tablet Comments:   Reason for Stopping:         linaCLOtide (LINZESS) 72 MCG CAPS capsule Comments:   Reason for Stopping:         Liraglutide (VICTOZA) 18 MG/3ML SOPN SC injection Comments:   Reason for Stopping:         furosemide (LASIX) 20 MG tablet Comments:   Reason for Stopping:         irbesartan (AVAPRO) 150 MG tablet Comments:   Reason for Stopping:         clobetasol (TEMOVATE) 0.05 % ointment Comments:   Reason for Stopping:         nystatin (MYCOSTATIN) 340994 UNIT/GM powder Comments:   Reason for Stopping:         blood glucose monitor strips Comments:   Reason for Stopping:         ipratropium-albuterol (DUONEB) 0.5-2.5 (3) MG/3ML SOLN nebulizer solution Comments:   Reason for Stopping:               Activity: as tolerated  Diet: diabetic diet  Wound Care: none needed    Follow-up:   [facility]   [address] [place] [address], [address]   Phone [phone]   Fax [phone]   CRISIS LINE: [phone]               Follow up with [facility]   [address] [place] [address], [address]   Phone [phone]   Fax [phone]   CRISIS LINE: [phone]              Follow up with PCP in 2 week(s)  on follow up with PCP, please review labs/imaging done during this Hospital stay, and discuss any additional/repeat testing or treatment needed         Time worked: 35 min    Participation: good    Electronically signed by Harriett Pride MD on 5/24/2022 at 10:02 AM

## 2022-05-25 VITALS
SYSTOLIC BLOOD PRESSURE: 111 MMHG | TEMPERATURE: 96.4 F | DIASTOLIC BLOOD PRESSURE: 49 MMHG | RESPIRATION RATE: 18 BRPM | OXYGEN SATURATION: 98 % | HEART RATE: 87 BPM

## 2022-05-25 LAB
GLUCOSE BLD-MCNC: 118 MG/DL (ref 70–99)
GLUCOSE BLD-MCNC: 123 MG/DL (ref 70–99)
PERFORMED ON: ABNORMAL
PERFORMED ON: ABNORMAL

## 2022-05-25 PROCEDURE — 6370000000 HC RX 637 (ALT 250 FOR IP): Performed by: PSYCHIATRY & NEUROLOGY

## 2022-05-25 PROCEDURE — 5130000000 HC BRIDGE APPOINTMENT

## 2022-05-25 PROCEDURE — 82947 ASSAY GLUCOSE BLOOD QUANT: CPT

## 2022-05-25 PROCEDURE — 6370000000 HC RX 637 (ALT 250 FOR IP): Performed by: HOSPITALIST

## 2022-05-25 PROCEDURE — 99239 HOSP IP/OBS DSCHRG MGMT >30: CPT | Performed by: PSYCHIATRY & NEUROLOGY

## 2022-05-25 RX ORDER — MECOBALAMIN 5000 MCG
5 TABLET,DISINTEGRATING ORAL NIGHTLY
Qty: 30 TABLET | Refills: 1 | Status: SHIPPED | OUTPATIENT
Start: 2022-05-25 | End: 2022-06-24

## 2022-05-25 RX ADMIN — PANTOPRAZOLE SODIUM 40 MG: 40 TABLET, DELAYED RELEASE ORAL at 06:07

## 2022-05-25 RX ADMIN — ATORVASTATIN CALCIUM 20 MG: 20 TABLET, FILM COATED ORAL at 08:00

## 2022-05-25 RX ADMIN — FLUTICASONE PROPIONATE 2 SPRAY: 50 SPRAY, METERED NASAL at 07:57

## 2022-05-25 RX ADMIN — ACETAMINOPHEN 650 MG: 325 TABLET ORAL at 11:13

## 2022-05-25 RX ADMIN — FERROUS SULFATE TAB 325 MG (65 MG ELEMENTAL FE) 325 MG: 325 (65 FE) TAB at 08:00

## 2022-05-25 RX ADMIN — DIVALPROEX SODIUM 1000 MG: 500 TABLET, EXTENDED RELEASE ORAL at 07:59

## 2022-05-25 RX ADMIN — ZINC SULFATE 220 MG (50 MG) CAPSULE 50 MG: CAPSULE at 07:59

## 2022-05-25 RX ADMIN — ACETAMINOPHEN 650 MG: 325 TABLET ORAL at 06:58

## 2022-05-25 RX ADMIN — OXYCODONE HYDROCHLORIDE AND ACETAMINOPHEN 500 MG: 500 TABLET ORAL at 08:00

## 2022-05-25 ASSESSMENT — PAIN SCALES - WONG BAKER: WONGBAKER_NUMERICALRESPONSE: 8

## 2022-05-25 ASSESSMENT — PAIN DESCRIPTION - DESCRIPTORS
DESCRIPTORS: THROBBING
DESCRIPTORS: THROBBING

## 2022-05-25 ASSESSMENT — PAIN SCALES - GENERAL
PAINLEVEL_OUTOF10: 3
PAINLEVEL_OUTOF10: 8
PAINLEVEL_OUTOF10: 6

## 2022-05-25 ASSESSMENT — PAIN DESCRIPTION - ORIENTATION
ORIENTATION: LOWER
ORIENTATION: LOWER

## 2022-05-25 ASSESSMENT — PAIN DESCRIPTION - ONSET: ONSET: PROGRESSIVE

## 2022-05-25 ASSESSMENT — PAIN DESCRIPTION - LOCATION
LOCATION: BACK
LOCATION: BACK

## 2022-05-25 ASSESSMENT — PAIN DESCRIPTION - PAIN TYPE: TYPE: CHRONIC PAIN

## 2022-05-25 ASSESSMENT — PAIN DESCRIPTION - FREQUENCY: FREQUENCY: INTERMITTENT

## 2022-05-25 NOTE — PROGRESS NOTES
Called Dr Addy Wong office [phone], to request mental health follow-up and medication management, and was informed Ju was discharged form that facility in April 2021 due to behavior.

## 2022-05-25 NOTE — PROGRESS NOTES
PAU met with treatment team to discuss pt's progress and setbacks. Pt reportedly didn't sleep much last night, with medications, appetite is good, attends scheduled group activities, social with peers/staff, independent with ADLS, compliant with medications, behavior has been cooperative, denies depression, reports moderate anxiety, denies SI/HI/AVH, will be discharged today, follow-up appointments will be scheduled. PAU is working with family and facilities to obtain placement.      Electronically signed by DOMINICK Leon on 5/25/2022 at 12:20 PM

## 2022-05-25 NOTE — PROGRESS NOTES
PAU talked with sister of Pt (Ron Carbajal) multiple times in order to assess possible placement and inform sister that she would need to pick her up upon discharge due to her being payee for patient per Dr Keena Dietz. Sister was very defensive and unhappy with that information and said \"we are forcing her to take her\". SW advised sister that she could turn payee responsibility over to state guardian if she did not want to be contacted further about patient. Sister said she would come get patient and hung up.     Electronically signed by DOMINICK Rivera on 5/25/2022 at 12:42 PM

## 2022-05-25 NOTE — PROGRESS NOTES
Progress Note  Ju Jackson  5/25/2022 12:01 AM  Subjective:   Admit Date:   5/22/2022      CC/ADMIT DX:       Interval History:   Reviewed overnight events and nursing notes. She has no medical complaints. I have reviewed all labs/diagnostics from the last 24hrs. ROS:   I have done a 10 point ROS and all are negative, except what is mentioned in the HPI. ADULT DIET; Regular; 4 carb choices (60 gm/meal); Low Potassium (Less than 3000 mg/day)    Medications:      dextrose        guaiFENesin-dextromethorphan  5 mL Oral 4x Daily    ascorbic acid  500 mg Oral BID    ferrous sulfate  325 mg Oral BID WC    fluticasone  2 spray Nasal Daily    insulin lispro  0-3 Units SubCUTAneous Nightly    melatonin  5 mg Oral Nightly    mirtazapine  7.5 mg Oral Nightly    nicotine  1 patch TransDERmal Daily    pantoprazole  40 mg Oral QAM AC    QUEtiapine  200 mg Oral Nightly    traZODone  50 mg Oral Nightly    zinc sulfate  50 mg Oral Daily    atorvastatin  20 mg Oral Daily    divalproex  1,000 mg Oral Daily           Objective:   Vitals: /60   Pulse 85   Temp (!) 96.4 °F (35.8 °C) (Temporal)   Resp 18   LMP  (LMP Unknown)   SpO2 98%  No intake or output data in the 24 hours ending 05/25/22 0001  General appearance: alert and cooperative with exam  Lungs: clear to auscultation bilaterally  Heart: regular rate and rhythm, S1, S2 normal, no murmur, click, rub or gallop  Abdomen: soft, non-tender; bowel sounds normal; no masses,  no organomegaly  Extremities: extremities normal, atraumatic, no cyanosis or edema  Neurologic:  No obvious focal neurologic deficits. Assessment and Plan:   Principal Problem:    Bipolar I, most recent episode depressed, severe ([address]  Active Problems:    Alcohol use    Insomnia    Tobacco use disorder    Borderline personality disorder ([address]  Resolved Problems:    Suicidal thoughts    Depression, unspecified      Plan:  1. Continue present medication(s)   2.   She is medically stable. I will monitor for any changes or concerns. 3.  Follow with Psych      Discharge planning:   her home     Reviewed treatment plans with the patient and/or family.              Electronically signed by Courtney Joseph MD on 5/25/2022 at 12:01 AM

## 2022-05-25 NOTE — PROGRESS NOTES
Group Note    Number of Participants in Group: 3  Number of Patients on Unit:5      Patient attended group:Yes  Reason for Absence:  Intervention for patient absence:        Type of Group:   Wrap-Up/Relaxation    Patient's Goal: See wrap up group sheet    Participation Level:    Interactive, Monopolizing, Minimal and None           Patient Response to Learning: Yes    Patient's Behavior: Guarded/Suspicious, Attention Seeking, Cooperative and Pleasant    Is Patient Social/Interacting: Yes    Relaxation:   Television:Yes   Reading:Yes   Game/Puzzle:No   Phone: No       Notes/Comments: pt calm and cooperative and social with peers and staff and participated in wrap up group      Please see patient's wrap up group sheet for patient's comments       Electronically signed by Andrea Crespo RN on 5/25/22 at 12:42 AM CDT

## 2022-05-25 NOTE — PLAN OF CARE
Problem: Chronic Conditions and Co-morbidities  Goal: Patient's chronic conditions and co-morbidity symptoms are monitored and maintained or improved  5/25/2022 0018 by Andrea Crespo RN  Outcome: Progressing  5/24/2022 1534 by Marcelo Flor RN  Outcome: Not Progressing     Problem: Self Harm/Suicidality  Goal: Will have no self-injury during hospital stay  Description: INTERVENTIONS:  1. Q 30 MINUTES: Routine safety checks  2. Q SHIFT & PRN: Assess risk to determine if routine checks are adequate to maintain patient safety  5/25/2022 0018 by Andrea Crespo RN  Outcome: Adequate for Discharge  5/24/2022 1534 by Marcelo Flor RN  Outcome: Progressing     Problem: Depression  Goal: Will be euthymic at discharge  Description: INTERVENTIONS:  1. Administer medication as ordered  2. Provide emotional support via 1:1 interaction with staff  3. Encourage involvement in milieu/groups/activities  4. Monitor for social isolation  5/25/2022 0018 by Andrea Crespo RN  Outcome: Progressing  5/24/2022 1534 by Marcelo Flor RN  Outcome: Progressing     Problem: Behavior  Goal: Pt/Family maintain appropriate behavior and adhere to behavioral management agreement, if implemented  Description: INTERVENTIONS:  1. Assess patient/family's coping skills and  non-compliant behavior (including use of illegal substances)  2. Notify security of behavior or suspected illegal substances which indicate the need for search of the patient and/or belongings  3. Encourage verbalization of thoughts and concerns in a socially appropriate manner  4. Utilize positive, consistent limit setting strategies supporting safety of patient, staff and others  5. Encourage participation in the decision making process about the behavioral management agreement  6. Implement a Health Care Agreement if patient meets criteria  7. If a patient's behavior jeopardizes the safety of the patient, staff, or others refer to organization policy.  If a visitor's behavior poses a threat to safety call refer to organization policy. 8. Initiate consult with , Psychosocial CNS, Spiritual Care as appropriate  5/25/2022 0018 by Peña Wei RN  Outcome: Progressing  5/24/2022 1534 by Nikole Drummond RN  Outcome: Not Progressing     Problem: Anxiety  Goal: Will report anxiety at manageable levels  Description: INTERVENTIONS:  1. Administer medication as ordered  2. Teach and rehearse alternative coping skills  3. Provide emotional support with 1:1 interaction with staff  5/25/2022 0018 by Peña Wei RN  Outcome: Progressing  5/24/2022 1534 by Nikole Drummond RN  Outcome: Not Progressing  Flowsheets (Taken 5/24/2022 1515)  Will report anxiety at manageable levels: Teach and rehearse alternative coping skills     Problem: Drug Abuse/Detox  Goal: Will have no detox symptoms and will verbalize plan for changing drug-related behavior  Description: INTERVENTIONS:  1. Administer medication as ordered  2. Monitor physical status  3. Provide emotional support with 1:1 interaction with staff  4. Encourage  recovery focused treatment   5/25/2022 0018 by Peña Wei RN  Outcome: Progressing  5/24/2022 1534 by Nikole Drummond RN  Outcome: Progressing     Problem: Sleep Disturbance  Goal: Will exhibit normal sleeping pattern  Description: INTERVENTIONS:  1. Administer medication as ordered  2. Decrease environmental stimuli, including noise, as appropriate  3. Discourage social isolation and naps during the day  5/25/2022 0018 by Peña Wei RN  Outcome: Progressing  5/24/2022 1534 by Nikole Drummond RN  Outcome: Progressing     Problem: Self Care Deficit  Goal: Return ADL status to a safe level of function  Description: INTERVENTIONS:  1. Administer medication as ordered  2. Assess ADL deficits and provide assistive devices as needed  3. Obtain PT/OT consults as needed  4.  Assist and instruct patient to increase activity and self care as tolerated  5/25/2022 0018 by Ernesto Bustamante RN  Outcome: Progressing  5/24/2022 1534 by Shaquille Sharif RN  Outcome: Not Progressing     Problem: Spiritual Care  Goal: Pt/Family able to move forward in process of forgiving self, others, and/or higher power  Description: INTERVENTIONS:  1. Assist patient to overcome blocks to healing by use of spiritual practices (prayer, meditation, guided imagery, reiki, breath work, etc). 2. De-myth guilt and help patient/family identify possible irrational spiritual/cultural beliefs and values. 3. Explore possibilities of making amends & reconciliation with self, others, and/or a greater power. 4. Guide patient/family in identifying painful feelings of guilt. 5. Help patient explore and identify spiritual beliefs, cultural understandings or values that may help or hinder letting go of issue. 6. Help patient explore feelings of anger, bitterness, resentment. 7. Help patient/family identify and examine the situation in which these feelings are experienced. 8. Help patient/family identify destructive displacement of feelings onto other individuals. 9. Invite use of sacraments/rituals/ceremonies as appropriate (e.g. - confession, anointing, smudging). 10. Refer patient to formal counseling and/or to adalberto community for further support work. Outcome: Progressing  Goal: Pt feels balance and connection with higher power that empowers the self during times of loss, guilt and fear  Description: INTERVENTIONS:  1. Create safety for patient through empathic presence and non-judgmental listening. 2. Encourage patient to explore his/her values, beliefs and/or spiritual images and practices. 3. Encourage use of breath work, imagery, meditation, relaxation, reiki to ease distress and provide healing. 4. Encourage use of cultural and spiritual celebrations and rituals. 5. Facilitate discussion that helps patient sort out spiritual concerns.   6. Help patient identify where meaning/hope/comfort & strength are in his/her life. 7. Refer patient to adalberto community for assistance, as appropriate. 8. Respond to patient/family need for prayer/ritual/sacrament/ceremony. 5/25/2022 0018 by Caitlin Pacheco RN  Outcome: Progressing  5/24/2022 1534 by Larisa Manuel RN  Outcome: Progressing     Problem: Safety - Adult  Goal: Free from fall injury  5/25/2022 0018 by Caitlin Pacheco RN  Outcome: Adequate for Discharge  Flowsheets (Taken 5/25/2022 0012)  Free From Fall Injury: Instruct family/caregiver on patient safety  5/24/2022 1534 by Larisa Manuel RN  Outcome: Progressing     Problem: Coping  Goal: Pt/Family able to verbalize concerns and demonstrate effective coping strategies  Description: INTERVENTIONS:  1. Assist patient/family to identify coping skills, available support systems and cultural and spiritual values  2. Provide emotional support, including active listening and acknowledgement of concerns of patient and caregivers  3. Reduce environmental stimuli, as able  4. Instruct patient/family in relaxation techniques, as appropriate  5.  Assess for spiritual pain/suffering and initiate Spiritual Care, Psychosocial Clinical Specialist consults as needed  5/25/2022 0018 by Caitlin Pacheco RN  Outcome: Progressing  5/24/2022 1534 by Larisa Manuel RN  Outcome: Not Progressing  Flowsheets (Taken 5/24/2022 1515)  Patient/family able to verbalize anxieties, fears, and concerns, and demonstrate effective coping: Assist patient/family to identify coping skills, available support systems and cultural and spiritual values  5/24/2022 1335 by Franco Gonzalez  Outcome: Progressing  Note:                                                                     Group Therapy Note    Date: 5/24/2022  Start Time: 1300  End Time:  1330  Number of Participants: 3    Type of Group: Cognitive Skills    Wellness Binder Information  Module Name:  Emotional Wellness  Session Number:  1    Group Goal for Pt: To raise awareness of the importance of healthy emotional expression    Notes:  Pt demonstrated improved awareness of the importance of healthy emotional expression by actively participating in group activity. Status After Intervention:  Unchanged    Participation Level: Active Listener and Interactive    Participation Quality: Appropriate and Attentive      Speech:  normal      Thought Process/Content: Logical      Affective Functioning: Congruent      Mood: anxious and depressed      Level of consciousness:  Alert and Oriented x4      Response to Learning: Able to verbalize current knowledge/experience, Able to verbalize/acknowledge new learning, and Progressing to goal      Endings: None Reported    Modes of Intervention: Education      Discipline Responsible: Psychoeducational Specialist      Signature:  Franco Gonzalez    5/24/2022 1129 by Franco Gonzalez  Outcome: Progressing  Note:                                                                     Group Therapy Note    Date: 5/24/2022  Start Time: 1000  End Time:  1100  Number of Participants: 4    Type of Group: Psychoeducation    Wellness Binder Information  Module Name:  Women's Issues  Session Number:  4    Group Goal for Pt: To raise awareness of how thoughts influence feelings    Notes:  Pt demonstrated improved awareness of how thoughts influence feelings by actively participating in group discussion. Status After Intervention:  Unchanged    Participation Level:  Active Listener    Participation Quality: Appropriate and Attentive      Speech:  normal      Thought Process/Content: Logical      Affective Functioning: Congruent      Mood: anxious and depressed      Level of consciousness:  Alert and Oriented x4      Response to Learning: Able to verbalize current knowledge/experience, Able to verbalize/acknowledge new learning, and Progressing to goal      Endings: None Reported    Modes of Intervention: Education      Discipline Responsible: Psychoeducational Specialist      Signature:  Elva Persaud       Problem: Pain  Goal: Verbalizes/displays adequate comfort level or baseline comfort level  5/25/2022 0018 by Lori Dean RN  Outcome: Progressing  Flowsheets  Taken 5/24/2022 2207  Verbalizes/displays adequate comfort level or baseline comfort level: Encourage patient to monitor pain and request assistance  Taken 5/24/2022 2137  Verbalizes/displays adequate comfort level or baseline comfort level: (pt resting in bed) --  Taken 5/24/2022 2107  Verbalizes/displays adequate comfort level or baseline comfort level:   Encourage patient to monitor pain and request assistance   Assess pain using appropriate pain scale  Taken 5/24/2022 2037  Verbalizes/displays adequate comfort level or baseline comfort level:   Encourage patient to monitor pain and request assistance   Assess pain using appropriate pain scale   Administer analgesics based on type and severity of pain and evaluate response  5/24/2022 1534 by Skylar Reid RN  Outcome: Progressing     Problem: ABCDS Injury Assessment  Goal: Absence of physical injury  Outcome: Adequate for Discharge  Flowsheets (Taken 5/25/2022 0012)  Absence of Physical Injury: Implement safety measures based on patient assessment

## 2022-05-25 NOTE — PROGRESS NOTES
Behavioral Health   Discharge Note  Bridge Appointment completed: Reviewed Discharge Instructions with patient. Patient verbalizes understanding and agreement with the discharge plan using the teachback method. Referral for Outpatient Tobacco Cessation Counseling, upon discharge (vivi X if applicable and completed):    ( )  Hospital staff assisted patient to call Quit Line or faxed referral                                   during hospitalization                  ( )  Recognizing danger situations (included triggers and roadblocks), if not completed on admission                    ( )  Coping skills (new ways to manage stress, exercise, relaxation techniques, changing routine, distraction), if not completed on admission                                                           ( )  Basic information about quitting (benefits of quitting, techniques in how to quit, available resources, if not completed on admission  ( ) Referral for counseling faxed to [facility]   ( ) Patient refused referral  ( ) Patient refused counseling  (x ) Patient refused smoking cessation medication upon discharge    Vaccinations (vivi X if applicable and completed):  ( ) Patient states already received influenza vaccine elsewhere  ( ) Patient received influenza vaccine during this hospitalization  (x ) Patient refused influenza vaccine at this time      Pt discharged with followings belongings:       Valuables sent home with patient. Valuables retrieved from [employer] and returned to patient. Patient left department with  staff via  , discharged to  home. Patient education on aftercare instructions: yes   Patient verbalize understanding of AVS:  yes. Suicidal Ideations? No AVH? denies HI?  Negative for homicidal ideation       Status EXAM upon discharge:  Mental Status and Behavioral Exam  Normal: Yes  Level of Assistance: Independent/Self  Facial Expression: Worried  Affect: Congruent  Level of Consciousness: Alert  Frequency of Checks: 4 times per hour, close  Mood:Normal: No  Mood: Anxious  Motor Activity:Normal: Yes  Motor Activity: Tremors  Eye Contact: Good  Observed Behavior: Cooperative  Sexual Misconduct History: Current - no  Preception: [place] to person,[place] to time,[place] to place,[place] to situation  Attention:Normal: Yes  Attention: Unable to concentrate  Thought Processes: Circumstantial  Thought Content:Normal: Yes  Thought Content: Preoccupations  Depression Symptoms: No problems reported or observed. Anxiety Symptoms: Generalized  Emiliana Symptoms: No problems reported or observed. Hallucinations: None  Delusions: No  Memory:Normal: No  Memory: Poor recent  Insight and Judgment: No  Insight and Judgment: Poor insight      Patient discharged home in stable condition with no obvious s/s of distress voiced or noted. Patient discharged home with personal belongings, belogings from the safe, medications from our pharmacy and home medications.

## 2022-05-25 NOTE — DISCHARGE SUMMARY
Discharge Note     Pt discharging on this date. Pt denies SI, HI, and AVH at this time. Pt reports improvement in behavior and is leaving unit in overall good condition. SW and pt discussed pt's follow up appointments and importance of attending appointments as scheduled, pt voiced understanding and agreement. Pt and SW also discussed pt safety plan and pt able to verbally identify: warning signs, coping strategies, places and people that help make the pt feel better/distract negative thoughts, friends/family/agencies/professionals the pt can reach out to in a crisis, and something that is important to the pt/worth living for. Pt provided the national suicide prevention hotline number (2-933-797-239-047-8064) as well as local community behavioral health [facility]) crisis number for emergencies (9-084-639-485-477-5163). Pt to follow up with:  [facility] in [place], [place] on _05_/_27_/22 @ 10:00 AM. This will be an assessment intake appt. Patient refused referral to outpatient tobacco cessation counseling    SW offered to assist pt with transportation, pt reports that her sister was picking her up.      Electronically signed by DOMINICK Alba on 5/25/2022 at 12:51 PM

## 2022-05-25 NOTE — PROGRESS NOTES
Group Therapy Note    Start Time: 800  End Time:  356  Number of Participants: 4    Type of Group: Community Meeting       Patient's Goal:  \"going home hopefully\"      Notes:      Participation Level:  Active Listener       Participation Quality: Appropriate      Thought Process/Content: Logical      Affective Functioning: Congruent      Mood: calm      Level of consciousness:  Alert      Modes of Intervention: Support      Discipline Responsible: Behavioral Health Tech II      Signature:  Michaelle Xiong

## 2022-05-25 NOTE — DISCHARGE SUMMARY
Discharge Summary      Patient ID:  Moises Harvey  [id number]  32 y.o.  1955     Admit date: 5/22/2022  Discharge date: 5/25/2022     Admitting Physician: Maye Dandy, MD   Attending Physician: Sina Smith MD  Discharge Provider: Sina Smith MD      Admission Diagnoses:   Depression unspecified  Insomnia unspecified  Borderline personality disorder  Tobacco use disorder  Alcohol use  Anemia  COVID-19  COPD  Diabetes  Vitamin D deficiency  Vitamin B12 deficiency  Psoriasis     Discharge Diagnoses:   Depression unspecified  Insomnia unspecified  Borderline personality disorder  Tobacco use disorder  Alcohol use  Anemia  COVID-19  COPD  Diabetes  Vitamin D deficiency  Vitamin B12 deficiency  Psoriasis     Admission Condition: poor     Discharged Condition: stable     Indication for Admission: safety concern      CHIEF COMPLAINT:  \"I'm better\"     History obtained from: patient, chart     HISTORY OF PRESENT ILLNESS:    46-year-old white female with history of depression, borderline personality disorder, COPD,  diabetes, admitted to medicine with suicidal ideation and COVID positive. S/p quarantine.  Patient is known to our service.  Overdosed on Benadryl in January of this year.     Patient has been calm and cooperative on our unit.  Presents with brighter affect.  Smiling.  States she is doing better.  Denies suicidal ideation.  Sleeping and eating well.  Worried about where she will go upon discharge.  Processed her feelings.  Supportive psychotherapy provided.  Patient voiced no concerns.     Psychiatric History:    Diagnoses: Bipolar disorder, borderline personality disorder  Suicide attempts/gestures: overdose on Benadryl in 2020 and 2022, overdose on pills and antifreeze in 2016.  History of cutting herself since early childhood, last time in 2000  Prior hospitalizations: several times to [facility], last time in 2022  Medication trials: Remeron, Zoloft, BuSpar, Vistaril, donepezil, Latuda, Abilify, Depakote, Seroquel. Mental health contact: Lost to follow-up      Substance Use History:  Drinks alcohol.  Denies abuse and recent use.  Smokes 0.5 PPD.     Hospital Course:  Patient was admitted to the behavioral health floor and was acclimated to the unit. She was placed on suicide and fall precautions. Labs were reviewed and physical exam was completed by Dr. Twin Goddard and associates. Home medications were reconciled. DENISSE was obtained and reviewed. Depakote and Seroquel were continued for mood stabilization. Trazodone was given for sleep. Glycemic control was ensured. Patient tolerated all the medications well and without side effects. Follow up with PCP was recommended.     Patient attended and participated in groups. All interactions with the peers and staff members were appropriate. Behaviorally, she was not a problem.  There was no evidence of suicidality or psychosis. Sleep and appetite improved. With the above-mentioned medications changes as well as psychotherapeutic interventions, patient reported considerable improvement in her condition and requested discharge home. It was felt that patient was at her baseline. Patient has no access to guns at home. Her sister agreed to take her home.     On 5/24/2022 it was therefore decided to discharge the patient, as it was felt that she received maximal benefit from her hospitalization. This patient is not suicidal, homicidal or psychotic at discharge.  She does not present danger to self or others.        Number of antipsychotic medication prescribed at discharge: 1  IF MORE THAN ONE IS USED: NA     History of greater than 3 failed multiple monotherapy trials: NA  Monotherapy taper plan/ cross taper in progress: NA  Augmentation of Clozapine: NA     Referral to addiction treatment: patient refused     Prescription for Alcohol or Drug Disorder Medication: patient refused     Prescription for Tobacco Cessation medication: none     If no prescriptions for Tobacco Cessation must document why: patient refused     Consults: Internal medicine     Significant Diagnostic Studies:         Lab Results   Component Value Date     WBC 4.4 (L) 05/20/2022     HGB 9.6 (L) 05/20/2022     HCT 32.2 (L) 05/20/2022     MCV 85.0 05/20/2022      05/20/2022            Lab Results   Component Value Date      05/20/2022     K 4.5 05/20/2022      05/20/2022     CO2 24 05/20/2022     BUN 17 05/20/2022     CREATININE 0.4 (L) 05/20/2022     GLUCOSE 76 05/20/2022     CALCIUM 8.3 (L) 05/20/2022     PROT 6.2 (L) 05/14/2022     LABALBU 4.1 05/14/2022     BILITOT <0.2 05/14/2022     ALKPHOS 157 (H) 05/14/2022     AST 21 05/14/2022     ALT 15 05/14/2022     LABGLOM >60 05/20/2022     GFRAA >59 05/20/2022     GLOB 3.1 05/09/2017                  Lab Results   Component Value Date     RVJMKFCC29 262 05/14/2022            Lab Results   Component Value Date     VITD25 32.3 05/14/2022     VITD25 33.0 05/14/2022            Lab Results   Component Value Date     CHOL 131 (L) 05/24/2022     CHOL 119 (L) 01/31/2022     CHOL 170 04/20/2021            Lab Results   Component Value Date     TRIG 159 (H) 05/24/2022     TRIG 124 01/31/2022     TRIG 188 (H) 04/20/2021            Lab Results   Component Value Date     HDL 44 (L) 05/24/2022     HDL 49 (L) 01/31/2022     HDL 46 (L) 04/20/2021            Lab Results   Component Value Date     LDLCALC 55 05/24/2022     LDLCALC 45 01/31/2022     LDLCALC 86 04/20/2021            Lab Results   Component Value Date     VLDL 32 12/31/2015      No results found for: CHOLHDLRATIO        Lab Results   Component Value Date     LABA1C 5.6 05/24/2022      No results found for: EAG        Lab Results   Component Value Date     TSHFT4 2.48 01/31/2022     TSH 3.840 05/14/2022         Treatments: RN and PAU     Mental status examination at the time of discharge:  Alert, Oriented X 4  Appearance:  Improved Hygiene, smiling  Speech with Regular Rate and Rhythm  Eye Contact:  Good  No Psychomotor Agitation/Retardation Noted  Attitude:  Cooperative  Mood: \"So much better\"  Affective: Congruent, appropriate to the situation, with a normal range and intensity  Thought Processes:  Coherently communicated, logical and goal oriented  Thought Content:  No Suicidal Ideation, No Homicidal Ideation, No Auditory or Visual Hallucinations, NO Overt Delusions  Insight: Improved  Judgement: Improved  Memory is intact for both remote and recent  Intellectual Functioning:  Within the [address] of Knowledge:  Adequate  Attention and Concentration:  Adequate     Discharge Exam:  Please, see medical note     Disposition: home with sister     Patient Instructions:         Current Discharge Medication List             START taking these medications     Details   ascorbic acid (VITAMIN C) 500 MG tablet Take 1 tablet by mouth 2 times daily  Qty: 60 tablet, Refills: 1       mineral oil-hydrophilic petrolatum (AQUAPHOR) ointment Apply topically as needed.   Qty: 396 g, Refills: 1       traZODone (DESYREL) 50 MG tablet Take 1 tablet by mouth nightly  Qty: 30 tablet, Refills: 1       zinc sulfate (ZINCATE) 220 (50 Zn) MG capsule Take 1 capsule by mouth daily  Qty: 30 capsule, Refills: 1                 CONTINUE these medications which have CHANGED     Details   atorvastatin (LIPITOR) 20 MG tablet Take 1 tablet by mouth daily  Qty: 30 tablet, Refills: 1     Associated Diagnoses: Mixed hyperlipidemia       divalproex (DEPAKOTE ER) 500 MG extended release tablet Take 2 tablets by mouth daily  Qty: 60 tablet, Refills: 1       ferrous sulfate (IRON 325) 325 (65 Fe) MG tablet Take 1 tablet by mouth 2 times daily (with meals)  Qty: 60 tablet, Refills: 1       metFORMIN (GLUCOPHAGE) 500 MG tablet Take 1 tablet by mouth 2 times daily (with meals)  Qty: 60 tablet, Refills: 1     Associated Diagnoses: Type 2 diabetes mellitus without complication, unspecified whether long term insulin use ([facility]       mirtazapine (REMERON) 7.5 MG tablet Take 1 tablet by mouth nightly  Qty: 30 tablet, Refills: 1       pantoprazole (PROTONIX) 40 MG tablet Take 1 tablet by mouth every morning (before breakfast)  Qty: 30 tablet, Refills: 1       QUEtiapine (SEROQUEL) 200 MG tablet Take 1 tablet by mouth nightly  Qty: 30 tablet, Refills: 1     Associated Diagnoses: Manic episode ([facility] Bipolar disorder, in full remission, most recent episode mixed (HCC)       vitamin B-12 (CYANOCOBALAMIN) 500 MCG tablet Take 1 tablet by mouth daily  Qty: 30 tablet, Refills: 1       vitamin D (ERGOCALCIFEROL) 1.25 MG (16247 UT) CAPS capsule Take 1 capsule by mouth once a week for 11 doses  Qty: 11 capsule, Refills: 1                 CONTINUE these medications which have NOT CHANGED     Details   Elastic Bandages & Supports (FUTURO SOFT CERVICAL COLLAR) [address] Wear for 1 week. Qty: 1 each, Refills: 0       melatonin 3 MG TABS tablet Take 1 tablet by mouth nightly  Qty: 90 tablet, Refills: 3       albuterol sulfate HFA (VENTOLIN HFA) 108 (90 Base) MCG/ACT inhaler Inhale 2 puffs into the lungs every 6 hours as needed for Wheezing  Qty: 3 Inhaler, Refills: 3     Associated Diagnoses: Shortness of breath; Oxygen dependent       Insulin Pen Needle (B-D ULTRAFINE III SHORT PEN) 31G X 8 MM MISC Inject 1 each as directed daily  Qty: 100 each, Refills: 5       ACCU-CHEK SOFTCLIX LANCETS MISC CHECK BLOOD SUGAR TWICE DAILY AND AS NEEDED  Qty: 300 each, Refills: 3       fluticasone (FLONASE) 50 MCG/ACT nasal spray 2 sprays by Nasal route daily  Qty: 1 Bottle, Refills: 3     Associated Diagnoses: Right ear pain; Dysfunction of right eustachian tube       blood glucose monitor kit and supplies Test 3 times a day & as needed for symptoms of irregular blood glucose. Dx:  Tameka Mosleyal: 1 kit, Refills: 0     Comments: Brand per patient preference. May round up to next available package size.   Associated Diagnoses: Other diabetic neurological complication associated with type 2 diabetes mellitus (HCC)       Continuous Blood Gluc  (FREESTYLE ABDOULAYE READER) MARCIA 1 Units by Does not apply route 2 times daily  Qty: 1 Device, Refills: 0       Continuous Blood Gluc Sensor ([address] [place] [address]) MISC 1 Units by Does not apply route 2 times daily  Qty: 1 each, Refills: 0                STOP taking these medications         divalproex (DEPAKOTE) 500 MG DR tablet Comments:   Reason for Stopping:            diclofenac (VOLTAREN) 75 MG EC tablet Comments:   Reason for Stopping:            linaCLOtide (LINZESS) 72 MCG CAPS capsule Comments:   Reason for Stopping:            Liraglutide (VICTOZA) 18 MG/3ML SOPN SC injection Comments:   Reason for Stopping:            furosemide (LASIX) 20 MG tablet Comments:   Reason for Stopping:            irbesartan (AVAPRO) 150 MG tablet Comments:   Reason for Stopping:            clobetasol (TEMOVATE) 0.05 % ointment Comments:   Reason for Stopping:            nystatin (MYCOSTATIN) 271745 UNIT/GM powder Comments:   Reason for Stopping:            blood glucose monitor strips Comments:   Reason for Stopping:            ipratropium-albuterol (DUONEB) 0.5-2.5 (3) MG/3ML SOLN nebulizer solution Comments:   Reason for Stopping:                    Activity: as tolerated  Diet: diabetic diet  Wound Care: none needed     Follow-up:   Follow up with [address] [place] [address] [facility]   [address] [place] [address], [address]   Phone [phone]   Fax [phone]   CRISIS LINE: [phone]                Follow up with [address] [place] [address] [facility]   [address] [place] [address], [address]   Phone [phone]   Fax [phone]   CRISIS LINE: [phone]              Follow up with PCP in 2 week(s)  on follow up with PCP, please review labs/imaging done during this Hospital stay, and discuss any additional/repeat testing or treatment needed            Time worked: 35 min     Participation: good

## 2022-05-25 NOTE — PROGRESS NOTES
[facility] Daily Shift Assessment-Geriatric Unit  Nursing Progress Note          Room: [address] [place] [address]    Name: Faraz Hurley   Age: 74 y.o. Gender: female   Dx: Bipolar I, most recent episode depressed, severe ([address]  Precautions: suicide risk and fall risk  Inpatient Status: voluntary     SLEEP:    Sleep: Yes,   Sleep Quality Fair   Hours Slept:    Sleep Medications: Yes  PRN Sleep Meds: No       MEDICAL:      Other PRN Meds: Yes and Tylenol   Med Compliant: No refused insulin and robitussin DM  Accu-Chek: Yes 141   Oxygen/CPAP/BiPAP: No  CIWA/CINA: No   PAIN Assessment: present - adequately treated, location, lower back, receiving treatment or level of pain (1-10, 10 severe), 7  Side Effects from medication: No    COVID TEACHING:    Is Patient experiencing any respiratory symptoms (headache, fever, body aches, cough. Олег Gracia ): no  Patient educated by nursing to practice social distancing, wear masks, wash hands frequently: yes    Medical Bed:   Is patient in a medical bed? yes   If medical bed is in use, has nursing secured room while patient is awake and out of the room? yes  Has safety checks by nursing been completed on the bed/room this shift? yes    Protective Factors:    Patient identifies protective factors with nursing staff as follows: Identifies reasons for living: Yes   Supportive Social Network or family: No    Belief that suicide is immoral/high spirituality: Yes   Responsibility to family or others/living with family: Yes   Fear of death or dying due to pain and suffering: No   Engaged in work or school: No     If Patient is unable to identify, reason why?        Metabolic Screening:    Lab Results   Component Value Date    LABA1C 5.6 05/24/2022       Lab Results   Component Value Date    CHOL 131 (L) 05/24/2022    CHOL 119 (L) 01/31/2022    CHOL 170 04/20/2021    CHOL 115 (L) 06/27/2020    CHOL 146 (L) 10/22/2019    CHOL 142 (L) 07/04/2019    CHOL 167 08/20/2017    CHOL 134 12/31/2015     Lab Results Component Value Date    TRIG 159 (H) 05/24/2022    TRIG 124 01/31/2022    TRIG 188 (H) 04/20/2021    TRIG 128 06/27/2020    TRIG 125 10/22/2019    TRIG 143 07/04/2019    TRIG 258 (H) 08/20/2017    TRIG 162 12/31/2015     Lab Results   Component Value Date    HDL 44 (L) 05/24/2022    HDL 49 (L) 01/31/2022    HDL 46 (L) 04/20/2021    HDL 38 (L) 06/27/2020    HDL 58 (L) 10/22/2019    HDL 48 (L) 07/04/2019    HDL 35 (L) 08/20/2017    HDL 44 12/31/2015     No components found for: LDLCAL  No results found for: LABVLDL      There is no height or weight on file to calculate BMI. BP Readings from Last 2 Encounters:   05/24/22 108/60   05/22/22 (!) 142/63         PSYCH:     SI denies suicidal ideation    HI Negative for homicidal ideation        AVH:Absent      Depression: 0 Anxiety: 5 on and off      GENERAL:      Appetite: good  Percent Meals:    Social: Yes Speech: normal   Appearance:appropriately dressed and disheveled  Assistive Devices: noneLevel of Assist: Independent      GROUP:    Group Participation: Yes  Participation LevelMinimal    Participation QualityAppropriate    Notes: pt in and out of TV area, pt participated in wrap up group, pt social with peers and staff, pt sleeping fair this night with interm awakening and coming and and out of day area, pt awaken at 0340 and she said that she thought it was 5 or 6 am and was time to get up. Pt is cooperative and pt asked this evening if she was able to be discharged in morning. Pt affect was brighter, and behavior was cooperative, appetite is good and pt denies SI,HI and AVH. Will continue to monitor for safety and comfort.            Electronically signed by Natalia Jo RN on 5/25/22 at 3:45 AM CDT

## 2022-05-25 NOTE — PLAN OF CARE
Problem: Chronic Conditions and Co-morbidities  Goal: Patient's chronic conditions and co-morbidity symptoms are monitored and maintained or improved  5/25/2022 0835 by Lynnda Fleischer, RN  Outcome: Completed  5/25/2022 0018 by Kael Ny RN  Outcome: Progressing     Problem: Self Harm/Suicidality  Goal: Will have no self-injury during hospital stay  Description: INTERVENTIONS:  1. Q 30 MINUTES: Routine safety checks  2. Q SHIFT & PRN: Assess risk to determine if routine checks are adequate to maintain patient safety  5/25/2022 0835 by Lynnda Fleischer, RN  Outcome: Completed  5/25/2022 0018 by Kael Ny RN  Outcome: Adequate for Discharge     Problem: Depression  Goal: Will be euthymic at discharge  Description: INTERVENTIONS:  1. Administer medication as ordered  2. Provide emotional support via 1:1 interaction with staff  3. Encourage involvement in milieu/groups/activities  4. Monitor for social isolation  5/25/2022 0835 by Lynnda Fleischer, RN  Outcome: Completed  5/25/2022 0018 by Kael Ny RN  Outcome: Progressing     Problem: Behavior  Goal: Pt/Family maintain appropriate behavior and adhere to behavioral management agreement, if implemented  Description: INTERVENTIONS:  1. Assess patient/family's coping skills and  non-compliant behavior (including use of illegal substances)  2. Notify security of behavior or suspected illegal substances which indicate the need for search of the patient and/or belongings  3. Encourage verbalization of thoughts and concerns in a socially appropriate manner  4. Utilize positive, consistent limit setting strategies supporting safety of patient, staff and others  5. Encourage participation in the decision making process about the behavioral management agreement  6. Implement a Health Care Agreement if patient meets criteria  7. If a patient's behavior jeopardizes the safety of the patient, staff, or others refer to organization policy.  If a visitor's behavior poses a threat to safety call refer to organization policy. 8. Initiate consult with , Psychosocial CNS, Spiritual Care as appropriate  5/25/2022 9874 by Low Saleh RN  Outcome: Completed  5/25/2022 0018 by Caitlin Pacheco RN  Outcome: Progressing     Problem: Anxiety  Goal: Will report anxiety at manageable levels  Description: INTERVENTIONS:  1. Administer medication as ordered  2. Teach and rehearse alternative coping skills  3. Provide emotional support with 1:1 interaction with staff  5/25/2022 0835 by Low Saleh RN  Outcome: Completed  5/25/2022 0018 by Caitlin Pacheco RN  Outcome: Progressing     Problem: Drug Abuse/Detox  Goal: Will have no detox symptoms and will verbalize plan for changing drug-related behavior  Description: INTERVENTIONS:  1. Administer medication as ordered  2. Monitor physical status  3. Provide emotional support with 1:1 interaction with staff  4. Encourage  recovery focused treatment   5/25/2022 0835 by Low Saleh RN  Outcome: Completed  5/25/2022 0018 by Caitlin Pacheco RN  Outcome: Progressing     Problem: Sleep Disturbance  Goal: Will exhibit normal sleeping pattern  Description: INTERVENTIONS:  1. Administer medication as ordered  2. Decrease environmental stimuli, including noise, as appropriate  3. Discourage social isolation and naps during the day  5/25/2022 0835 by Low Saleh RN  Outcome: Completed  5/25/2022 0018 by Caitlin Pacheco RN  Outcome: Progressing     Problem: Self Care Deficit  Goal: Return ADL status to a safe level of function  Description: INTERVENTIONS:  1. Administer medication as ordered  2. Assess ADL deficits and provide assistive devices as needed  3. Obtain PT/OT consults as needed  4.  Assist and instruct patient to increase activity and self care as tolerated  5/25/2022 0835 by Low Saleh RN  Outcome: Completed  5/25/2022 0018 by Caitlin Pacheco RN  Outcome: Progressing     Problem: Spiritual Care  Goal: Pt/Family able to move forward in process of forgiving self, others, and/or higher power  Description: INTERVENTIONS:  1. Assist patient to overcome blocks to healing by use of spiritual practices (prayer, meditation, guided imagery, reiki, breath work, etc). 2. De-myth guilt and help patient/family identify possible irrational spiritual/cultural beliefs and values. 3. Explore possibilities of making amends & reconciliation with self, others, and/or a greater power. 4. Guide patient/family in identifying painful feelings of guilt. 5. Help patient explore and identify spiritual beliefs, cultural understandings or values that may help or hinder letting go of issue. 6. Help patient explore feelings of anger, bitterness, resentment. 7. Help patient/family identify and examine the situation in which these feelings are experienced. 8. Help patient/family identify destructive displacement of feelings onto other individuals. 9. Invite use of sacraments/rituals/ceremonies as appropriate (e.g. - confession, anointing, smudging). 10. Refer patient to formal counseling and/or to [facility] for further support work. 5/25/2022 0835 by Christine Hyatt RN  Outcome: Completed  5/25/2022 0018 by Anuj Reed RN  Outcome: Progressing  Goal: Pt feels balance and connection with higher power that empowers the self during times of loss, guilt and fear  Description: INTERVENTIONS:  1. Create safety for patient through empathic presence and non-judgmental listening. 2. Encourage patient to explore his/her values, beliefs and/or spiritual images and practices. 3. Encourage use of breath work, imagery, meditation, relaxation, reiki to ease distress and provide healing. 4. Encourage use of cultural and spiritual celebrations and rituals. 5. Facilitate discussion that helps patient sort out spiritual concerns. 6. Help patient identify where meaning/hope/comfort & strength are in his/her life.   7. Refer patient to [facility] for assistance, as appropriate. 8. Respond to patient/family need for prayer/ritual/sacrament/ceremony. 5/25/2022 0835 by Edin Black RN  Outcome: Completed  5/25/2022 0018 by Lauren Barber RN  Outcome: Progressing     Problem: Safety - Adult  Goal: Free from fall injury  5/25/2022 0835 by Edin Black RN  Outcome: Completed  5/25/2022 0018 by Lauren Barber RN  Outcome: Adequate for Discharge  Flowsheets (Taken 5/25/2022 0012)  Free From Fall Injury: Instruct family/caregiver on patient safety     Problem: Coping  Goal: Pt/Family able to verbalize concerns and demonstrate effective coping strategies  Description: INTERVENTIONS:  1. Assist patient/family to identify coping skills, available support systems and cultural and spiritual values  2. Provide emotional support, including active listening and acknowledgement of concerns of patient and caregivers  3. Reduce environmental stimuli, as able  4. Instruct patient/family in relaxation techniques, as appropriate  5.  Assess for spiritual pain/suffering and initiate Spiritual Care, Psychosocial Clinical Specialist consults as needed  5/25/2022 5730 by Edin Black RN  Outcome: Completed  5/25/2022 0018 by Lauren Barber RN  Outcome: Progressing     Problem: Pain  Goal: Verbalizes/displays adequate comfort level or baseline comfort level  5/25/2022 0835 by Edin Black RN  Outcome: Completed  Flowsheets (Taken 5/25/2022 2246)  Verbalizes/displays adequate comfort level or baseline comfort level: Assess pain using appropriate pain scale  5/25/2022 0018 by Lauren Barber RN  Outcome: Progressing  Flowsheets  Taken 5/24/2022 2207  Verbalizes/displays adequate comfort level or baseline comfort level: Encourage patient to monitor pain and request assistance  Taken 5/24/2022 2137  Verbalizes/displays adequate comfort level or baseline comfort level: (pt resting in bed) --  Taken 5/24/2022 2107  Verbalizes/displays adequate comfort level or baseline comfort level:   Encourage patient to monitor pain and request assistance   Assess pain using appropriate pain scale  Taken 5/24/2022 2037  Verbalizes/displays adequate comfort level or baseline comfort level:   Encourage patient to monitor pain and request assistance   Assess pain using appropriate pain scale   Administer analgesics based on type and severity of pain and evaluate response     Problem: ABCDS Injury Assessment  Goal: Absence of physical injury  5/25/2022 0835 by Jany Peacock RN  Outcome: Completed  5/25/2022 0018 by Bi Oglesby RN  Outcome: Adequate for Discharge  Flowsheets (Taken 5/25/2022 0012)  Absence of Physical Injury: Implement safety measures based on patient assessment

## 2022-05-25 NOTE — PLAN OF CARE
Problem: Chronic Conditions and Co-morbidities  Goal: Patient's chronic conditions and co-morbidity symptoms are monitored and maintained or improved  5/25/2022 0018 by Ernesto Bustamante RN  Outcome: Progressing     Problem: Depression  Goal: Will be euthymic at discharge  Description: INTERVENTIONS:  1. Administer medication as ordered  2. Provide emotional support via 1:1 interaction with staff  3. Encourage involvement in milieu/groups/activities  4. Monitor for social isolation  5/25/2022 0018 by Ernesto Bustamante RN  Outcome: Progressing     Problem: Behavior  Goal: Pt/Family maintain appropriate behavior and adhere to behavioral management agreement, if implemented  Description: INTERVENTIONS:  1. Assess patient/family's coping skills and  non-compliant behavior (including use of illegal substances)  2. Notify security of behavior or suspected illegal substances which indicate the need for search of the patient and/or belongings  3. Encourage verbalization of thoughts and concerns in a socially appropriate manner  4. Utilize positive, consistent limit setting strategies supporting safety of patient, staff and others  5. Encourage participation in the decision making process about the behavioral management agreement  6. Implement a Health Care Agreement if patient meets criteria  7. If a patient's behavior jeopardizes the safety of the patient, staff, or others refer to organization policy. If a visitor's behavior poses a threat to safety call refer to organization policy. 8. Initiate consult with , Psychosocial CNS, Spiritual Care as appropriate  5/25/2022 0018 by Ernesto Bustamante RN  Outcome: Progressing     Problem: Anxiety  Goal: Will report anxiety at manageable levels  Description: INTERVENTIONS:  1. Administer medication as ordered  2. Teach and rehearse alternative coping skills  3.  Provide emotional support with 1:1 interaction with staff  5/25/2022 0018 by Ernesto Bustamante RN  Outcome: Progressing     Problem: Drug Abuse/Detox  Goal: Will have no detox symptoms and will verbalize plan for changing drug-related behavior  Description: INTERVENTIONS:  1. Administer medication as ordered  2. Monitor physical status  3. Provide emotional support with 1:1 interaction with staff  4. Encourage  recovery focused treatment   5/25/2022 0018 by Albina Genao RN  Outcome: Progressing     Problem: Sleep Disturbance  Goal: Will exhibit normal sleeping pattern  Description: INTERVENTIONS:  1. Administer medication as ordered  2. Decrease environmental stimuli, including noise, as appropriate  3. Discourage social isolation and naps during the day  5/25/2022 0018 by Albina Genao RN  Outcome: Progressing     Problem: Self Care Deficit  Goal: Return ADL status to a safe level of function  Description: INTERVENTIONS:  1. Administer medication as ordered  2. Assess ADL deficits and provide assistive devices as needed  3. Obtain PT/OT consults as needed  4. Assist and instruct patient to increase activity and self care as tolerated  5/25/2022 0018 by Albina Genao RN  Outcome: Progressing     Problem: Spiritual Care  Goal: Pt/Family able to move forward in process of forgiving self, others, and/or higher power  Description: INTERVENTIONS:  1. Assist patient to overcome blocks to healing by use of spiritual practices (prayer, meditation, guided imagery, reiki, breath work, etc). 2. De-myth guilt and help patient/family identify possible irrational spiritual/cultural beliefs and values. 3. Explore possibilities of making amends & reconciliation with self, others, and/or a greater power. 4. Guide patient/family in identifying painful feelings of guilt. 5. Help patient explore and identify spiritual beliefs, cultural understandings or values that may help or hinder letting go of issue. 6. Help patient explore feelings of anger, bitterness, resentment.   7. Help patient/family identify and examine the situation in which these feelings are experienced. 8. Help patient/family identify destructive displacement of feelings onto other individuals. 9. Invite use of sacraments/rituals/ceremonies as appropriate (e.g. - confession, anointing, smudging). 10. Refer patient to formal counseling and/or to [facility] community for further support work. 5/25/2022 0018 by Lori Dean RN  Outcome: Progressing  Goal: Pt feels balance and connection with higher power that empowers the self during times of loss, guilt and fear  Description: INTERVENTIONS:  1. Create safety for patient through empathic presence and non-judgmental listening. 2. Encourage patient to explore his/her values, beliefs and/or spiritual images and practices. 3. Encourage use of breath work, imagery, meditation, relaxation, reiki to ease distress and provide healing. 4. Encourage use of cultural and spiritual celebrations and rituals. 5. Facilitate discussion that helps patient sort out spiritual concerns. 6. Help patient identify where meaning/hope/comfort & strength are in his/her life. 7. Refer patient to [facility] for assistance, as appropriate. 8. Respond to patient/family need for prayer/ritual/sacrament/ceremony. 5/25/2022 0018 by Lori Dean RN  Outcome: Progressing     Problem: Coping  Goal: Pt/Family able to verbalize concerns and demonstrate effective coping strategies  Description: INTERVENTIONS:  1. Assist patient/family to identify coping skills, available support systems and cultural and spiritual values  2. Provide emotional support, including active listening and acknowledgement of concerns of patient and caregivers  3. Reduce environmental stimuli, as able  4. Instruct patient/family in relaxation techniques, as appropriate  5.  Assess for spiritual pain/suffering and initiate Spiritual Care, Psychosocial Clinical Specialist consults as needed  5/25/2022 0018 by Lori Dean RN  Outcome: Progressing     Problem: Pain  Goal: Verbalizes/displays adequate comfort level or baseline comfort level  Recent Flowsheet Documentation  Taken 5/25/2022 4179 by Wendy Concepcion RN  Verbalizes/displays adequate comfort level or baseline comfort level: Assess pain using appropriate pain scale  5/25/2022 0018 by Peña Wei RN  Outcome: Progressing  Flowsheets  Taken 5/24/2022 2207  Verbalizes/displays adequate comfort level or baseline comfort level: Encourage patient to monitor pain and request assistance  Taken 5/24/2022 2137  Verbalizes/displays adequate comfort level or baseline comfort level: (pt resting in bed) --  Taken 5/24/2022 2107  Verbalizes/displays adequate comfort level or baseline comfort level:   Encourage patient to monitor pain and request assistance   Assess pain using appropriate pain scale  Taken 5/24/2022 2037  Verbalizes/displays adequate comfort level or baseline comfort level:   Encourage patient to monitor pain and request assistance   Assess pain using appropriate pain scale   Administer analgesics based on type and severity of pain and evaluate response

## 2022-05-25 NOTE — DISCHARGE INSTR - DIET

## 2022-05-25 NOTE — PROGRESS NOTES
Sw spoke to patient and she was experiencing anxiety about going home with her sister because she was '[not being nice to her\". SW validated patient feelings and assisted with talking through possible options. Pt decided she would go with sister in order to have a roof over her head. Sister was contacted and made aware that pt was ready to be discharged and asked was she coming to pick her up. Sister replied \"your dr didn't leave me any choice so yes it's verified and I'm coming\".      Electronically signed by DOMINICK Serrato on 5/25/2022 at 12:48 PM

## 2022-05-25 NOTE — PROGRESS NOTES
CLINICAL PHARMACY NOTE: MEDS TO BEDS    Total # of Prescriptions Filled: 12   The following medications were delivered to the patient:  · Melatonin 5mg  · Metformin 500mg  · Trazodone 50mg  · Quetiapine 200mg  · ferosul 325mg  · Divalproex ER 500mg  · Atorvastatin 20mg  · Vitamin D  · Vitamin B-12  · Pantoprazole 40mg  · Mirtazapine 7.5mg  · Vitamin C    Additional Documentation: The medications were picked up at the pharmacy window by Ray Gavin from [address] [place] [address].

## 2022-05-25 NOTE — PROGRESS NOTES
Wrap Up Group:    Patient effectively participating with self-evaluation skills and communicating observations made regarding their own behavior throughout the day. Patient tolerated well.    WRAP UP GROUP NOTE:     Patient's Goal:  Providing feedback as to their own progress in the care-plan provided. Pt's have an opportunity to explore self-reflective skills and share any additional cares and concerns not yet addressed. Pt effectively participated.      Energy level:HIGH  Appetite:GOOD  Concentration: IMPROVING  Hallucinations:GONE  Depression:IMPROVED  Anxiety:ON AND OFF  How I worked today:TRIED A LOT  What helps me sleep:READ  Any questions/complaints/comments:NO, ONLY APOLOGIY TO STAFF FOR TEMPER TAMPRON, (TANTRUM)     LIFE SKILLS: WITH GROUP THERAPY  SEEING DOCTOR:  3X, GREAT HELP  ONE TO ONE WITH STAFF:  YES, GREAT HELP/  I'M IMPRESSED BY HER DILIGENCE

## 2022-05-26 NOTE — PROGRESS NOTES
Progress Note  uJ Aguilera  5/26/2022 8:41 AM  Subjective:   Admit Date:   5/22/2022      CC/ADMIT DX:       Interval History:   Reviewed overnight events and nursing notes. She has no new medical issues. I have reviewed all labs/diagnostics from the last 24hrs. ROS:   I have done a 10 point ROS and all are negative, except what is mentioned in the HPI. No diet orders on file    Medications:               Objective:   Vitals: BP (!) 111/49   Pulse 87   Temp (!) 96.4 °F (35.8 °C) (Temporal)   Resp 18   LMP  (LMP Unknown)   SpO2 98%  No intake or output data in the 24 hours ending 05/26/22 0841  General appearance: alert and cooperative with exam  Extremities: extremities normal, atraumatic, no cyanosis or edema  Neurologic:  No obvious focal neurologic deficits. Skin: no rashes    Assessment and Plan:   Principal Problem:    Bipolar I, most recent episode depressed, severe ([address]  Active Problems:    Alcohol use    Insomnia    Tobacco use disorder    Borderline personality disorder ([address]  Resolved Problems:    Suicidal thoughts    Depression, unspecified      Plan:  1. Continue present medication(s)   2. She remains medically stable. I will monitor for any changes or concerns. 3.  Follow with Psych      Discharge planning:   her home     Reviewed treatment plans with the patient and/or family.              Electronically signed by Tom Ocasio MD on 5/26/2022 at 8:41 AM

## 2022-09-29 DIAGNOSIS — F30.9 MANIC EPISODE (HCC): ICD-10-CM

## 2022-09-29 DIAGNOSIS — E11.9 TYPE 2 DIABETES MELLITUS WITHOUT COMPLICATION, UNSPECIFIED WHETHER LONG TERM INSULIN USE (HCC): ICD-10-CM

## 2022-09-29 DIAGNOSIS — F31.78 BIPOLAR DISORDER, IN FULL REMISSION, MOST RECENT EPISODE MIXED (HCC): ICD-10-CM

## 2022-09-29 DIAGNOSIS — E78.2 MIXED HYPERLIPIDEMIA: ICD-10-CM

## 2022-09-29 RX ORDER — PANTOPRAZOLE SODIUM 40 MG/1
TABLET, DELAYED RELEASE ORAL
Qty: 30 TABLET | Refills: 1 | OUTPATIENT
Start: 2022-09-29

## 2022-09-29 RX ORDER — MIRTAZAPINE 7.5 MG/1
7.5 TABLET, FILM COATED ORAL NIGHTLY
Qty: 30 TABLET | Refills: 1 | OUTPATIENT
Start: 2022-09-29 | End: 2022-10-29

## 2022-09-29 RX ORDER — QUETIAPINE FUMARATE 200 MG/1
200 TABLET, FILM COATED ORAL NIGHTLY
Qty: 30 TABLET | Refills: 1 | OUTPATIENT
Start: 2022-09-29 | End: 2022-10-29

## 2022-09-29 RX ORDER — TRAZODONE HYDROCHLORIDE 50 MG/1
50 TABLET ORAL NIGHTLY
Qty: 30 TABLET | Refills: 1 | OUTPATIENT
Start: 2022-09-29 | End: 2022-10-29

## 2022-09-29 RX ORDER — ATORVASTATIN CALCIUM 20 MG/1
TABLET, FILM COATED ORAL
Qty: 30 TABLET | Refills: 1 | OUTPATIENT
Start: 2022-09-29

## 2022-11-02 DIAGNOSIS — F30.9 MANIC EPISODE (HCC): ICD-10-CM

## 2022-11-02 DIAGNOSIS — E11.9 TYPE 2 DIABETES MELLITUS WITHOUT COMPLICATION, UNSPECIFIED WHETHER LONG TERM INSULIN USE (HCC): ICD-10-CM

## 2022-11-02 DIAGNOSIS — F31.78 BIPOLAR DISORDER, IN FULL REMISSION, MOST RECENT EPISODE MIXED (HCC): ICD-10-CM

## 2022-11-02 DIAGNOSIS — E78.2 MIXED HYPERLIPIDEMIA: ICD-10-CM

## 2022-11-02 RX ORDER — QUETIAPINE FUMARATE 200 MG/1
200 TABLET, FILM COATED ORAL NIGHTLY
Qty: 30 TABLET | Refills: 1 | OUTPATIENT
Start: 2022-11-02 | End: 2022-12-02

## 2022-11-02 RX ORDER — ATORVASTATIN CALCIUM 20 MG/1
TABLET, FILM COATED ORAL
Qty: 30 TABLET | Refills: 1 | OUTPATIENT
Start: 2022-11-02

## 2022-11-02 RX ORDER — DIVALPROEX SODIUM 500 MG/1
TABLET, EXTENDED RELEASE ORAL
Qty: 60 TABLET | Refills: 1 | OUTPATIENT
Start: 2022-11-02

## 2022-11-02 RX ORDER — MIRTAZAPINE 7.5 MG/1
7.5 TABLET, FILM COATED ORAL NIGHTLY
Qty: 30 TABLET | Refills: 1 | OUTPATIENT
Start: 2022-11-02 | End: 2022-12-02
